# Patient Record
Sex: FEMALE | Race: BLACK OR AFRICAN AMERICAN | NOT HISPANIC OR LATINO | Employment: OTHER | URBAN - METROPOLITAN AREA
[De-identification: names, ages, dates, MRNs, and addresses within clinical notes are randomized per-mention and may not be internally consistent; named-entity substitution may affect disease eponyms.]

---

## 2018-06-15 DIAGNOSIS — M81.0 SENILE OSTEOPOROSIS: Primary | ICD-10-CM

## 2020-10-28 PROBLEM — R29.898 DECREASED STRENGTH OF UPPER EXTREMITY: Status: ACTIVE | Noted: 2020-10-28

## 2020-10-28 PROBLEM — M25.60 DECREASED RANGE OF MOTION: Status: ACTIVE | Noted: 2020-10-28

## 2021-01-11 ENCOUNTER — IMMUNIZATION (OUTPATIENT)
Dept: FAMILY MEDICINE | Facility: CLINIC | Age: 76
End: 2021-01-11

## 2021-01-11 DIAGNOSIS — Z23 NEED FOR VACCINATION: ICD-10-CM

## 2021-01-11 PROCEDURE — 0001A COVID-19, MRNA, LNP-S, PF, 30 MCG/0.3 ML DOSE VACCINE: CPT | Mod: CV19,,, | Performed by: FAMILY MEDICINE

## 2021-01-11 PROCEDURE — 91300 COVID-19, MRNA, LNP-S, PF, 30 MCG/0.3 ML DOSE VACCINE: ICD-10-PCS | Mod: ,,, | Performed by: FAMILY MEDICINE

## 2021-01-11 PROCEDURE — 91300 COVID-19, MRNA, LNP-S, PF, 30 MCG/0.3 ML DOSE VACCINE: CPT | Mod: ,,, | Performed by: FAMILY MEDICINE

## 2021-01-11 PROCEDURE — 0001A COVID-19, MRNA, LNP-S, PF, 30 MCG/0.3 ML DOSE VACCINE: ICD-10-PCS | Mod: CV19,,, | Performed by: FAMILY MEDICINE

## 2021-02-01 ENCOUNTER — IMMUNIZATION (OUTPATIENT)
Dept: FAMILY MEDICINE | Facility: CLINIC | Age: 76
End: 2021-02-01

## 2021-02-01 DIAGNOSIS — Z23 NEED FOR VACCINATION: Primary | ICD-10-CM

## 2021-02-01 PROCEDURE — 91300 COVID-19, MRNA, LNP-S, PF, 30 MCG/0.3 ML DOSE VACCINE: CPT | Mod: PBBFAC,PN

## 2021-02-01 PROCEDURE — 0002A COVID-19, MRNA, LNP-S, PF, 30 MCG/0.3 ML DOSE VACCINE: CPT | Mod: PBBFAC,PN

## 2022-09-27 ENCOUNTER — IMMUNIZATION (OUTPATIENT)
Dept: FAMILY MEDICINE | Facility: CLINIC | Age: 77
End: 2022-09-27
Payer: MEDICARE

## 2022-09-27 DIAGNOSIS — Z23 NEED FOR VACCINATION: Primary | ICD-10-CM

## 2022-09-27 PROCEDURE — 91312 COVID-19, MRNA, LNP-S, BIVALENT BOOSTER, PF, 30 MCG/0.3 ML DOSE: ICD-10-PCS | Mod: S$GLB,,, | Performed by: FAMILY MEDICINE

## 2022-09-27 PROCEDURE — 91312 COVID-19, MRNA, LNP-S, BIVALENT BOOSTER, PF, 30 MCG/0.3 ML DOSE: CPT | Mod: S$GLB,,, | Performed by: FAMILY MEDICINE

## 2022-09-27 PROCEDURE — 0124A COVID-19, MRNA, LNP-S, BIVALENT BOOSTER, PF, 30 MCG/0.3 ML DOSE: CPT | Mod: PBBFAC | Performed by: FAMILY MEDICINE

## 2022-11-28 ENCOUNTER — TELEPHONE (OUTPATIENT)
Dept: HEPATOLOGY | Facility: CLINIC | Age: 77
End: 2022-11-28
Payer: MEDICARE

## 2022-11-28 PROBLEM — R07.9 CHEST PAIN: Status: RESOLVED | Noted: 2022-11-28 | Resolved: 2022-11-28

## 2022-11-28 PROBLEM — R07.9 CHEST PAIN: Status: ACTIVE | Noted: 2022-11-28

## 2022-11-28 NOTE — TELEPHONE ENCOUNTER
Returned call to patient. No answer. Left VM that I scheduled her hospital follow up appointment on 12/9.

## 2022-11-28 NOTE — TELEPHONE ENCOUNTER
----- Message from Guilherme Gonzalez sent at 11/28/2022 10:03 AM CST -----  .Type:  Sooner Apoointment Request    Caller is requesting a sooner appointment.  Name of Caller:Pt  When is the first available appointment?02/2023  Symptoms:Hospital f/u Liver Mass  Would the patient rather a call back or a response via [employer]? call  Best Call Back Number:[phone]

## 2022-11-28 NOTE — TELEPHONE ENCOUNTER
----- Message from Guilherme Gonzalez sent at 11/28/2022 10:03 AM CST -----  .Type:  Sooner Apoointment Request    Caller is requesting a sooner appointment.  Name of Caller:Pt  When is the first available appointment?02/2023  Symptoms:Hospital f/u Liver Mass  Would the patient rather a call back or a response via [employer]? call  Best Call Back Number:[phone]

## 2022-11-29 ENCOUNTER — OFFICE VISIT (OUTPATIENT)
Dept: CARDIOLOGY | Facility: CLINIC | Age: 77
End: 2022-11-29
Payer: MEDICARE

## 2022-11-29 VITALS
DIASTOLIC BLOOD PRESSURE: 80 MMHG | SYSTOLIC BLOOD PRESSURE: 140 MMHG | BODY MASS INDEX: 20.97 KG/M2 | OXYGEN SATURATION: 98 % | HEIGHT: 64 IN | WEIGHT: 122.81 LBS | HEART RATE: 60 BPM

## 2022-11-29 DIAGNOSIS — R07.89 ATYPICAL CHEST PAIN: ICD-10-CM

## 2022-11-29 DIAGNOSIS — K74.4 SECONDARY BILIARY CIRRHOSIS: ICD-10-CM

## 2022-11-29 DIAGNOSIS — I10 PRIMARY HYPERTENSION: Primary | ICD-10-CM

## 2022-11-29 DIAGNOSIS — Z86.19 HISTORY OF HEPATITIS C: ICD-10-CM

## 2022-11-29 DIAGNOSIS — R16.0 LIVER MASS: ICD-10-CM

## 2022-11-29 PROCEDURE — 1101F PR PT FALLS ASSESS DOC 0-1 FALLS W/OUT INJ PAST YR: ICD-10-PCS | Mod: CPTII,S$GLB,, | Performed by: INTERNAL MEDICINE

## 2022-11-29 PROCEDURE — 1160F RVW MEDS BY RX/DR IN RCRD: CPT | Mod: CPTII,S$GLB,, | Performed by: INTERNAL MEDICINE

## 2022-11-29 PROCEDURE — 1101F PT FALLS ASSESS-DOCD LE1/YR: CPT | Mod: CPTII,S$GLB,, | Performed by: INTERNAL MEDICINE

## 2022-11-29 PROCEDURE — 99999 PR PBB SHADOW E&M-EST. PATIENT-LVL III: ICD-10-PCS | Mod: PBBFAC,,, | Performed by: INTERNAL MEDICINE

## 2022-11-29 PROCEDURE — 1159F PR MEDICATION LIST DOCUMENTED IN MEDICAL RECORD: ICD-10-PCS | Mod: CPTII,S$GLB,, | Performed by: INTERNAL MEDICINE

## 2022-11-29 PROCEDURE — 99999 PR PBB SHADOW E&M-EST. PATIENT-LVL III: CPT | Mod: PBBFAC,,, | Performed by: INTERNAL MEDICINE

## 2022-11-29 PROCEDURE — 3079F DIAST BP 80-89 MM HG: CPT | Mod: CPTII,S$GLB,, | Performed by: INTERNAL MEDICINE

## 2022-11-29 PROCEDURE — 3079F PR MOST RECENT DIASTOLIC BLOOD PRESSURE 80-89 MM HG: ICD-10-PCS | Mod: CPTII,S$GLB,, | Performed by: INTERNAL MEDICINE

## 2022-11-29 PROCEDURE — 99204 OFFICE O/P NEW MOD 45 MIN: CPT | Mod: S$GLB,,, | Performed by: INTERNAL MEDICINE

## 2022-11-29 PROCEDURE — 99204 PR OFFICE/OUTPT VISIT, NEW, LEVL IV, 45-59 MIN: ICD-10-PCS | Mod: S$GLB,,, | Performed by: INTERNAL MEDICINE

## 2022-11-29 PROCEDURE — 1159F MED LIST DOCD IN RCRD: CPT | Mod: CPTII,S$GLB,, | Performed by: INTERNAL MEDICINE

## 2022-11-29 PROCEDURE — 3077F SYST BP >= 140 MM HG: CPT | Mod: CPTII,S$GLB,, | Performed by: INTERNAL MEDICINE

## 2022-11-29 PROCEDURE — 1160F PR REVIEW ALL MEDS BY PRESCRIBER/CLIN PHARMACIST DOCUMENTED: ICD-10-PCS | Mod: CPTII,S$GLB,, | Performed by: INTERNAL MEDICINE

## 2022-11-29 PROCEDURE — 3288F PR FALLS RISK ASSESSMENT DOCUMENTED: ICD-10-PCS | Mod: CPTII,S$GLB,, | Performed by: INTERNAL MEDICINE

## 2022-11-29 PROCEDURE — 3288F FALL RISK ASSESSMENT DOCD: CPT | Mod: CPTII,S$GLB,, | Performed by: INTERNAL MEDICINE

## 2022-11-29 PROCEDURE — 3077F PR MOST RECENT SYSTOLIC BLOOD PRESSURE >= 140 MM HG: ICD-10-PCS | Mod: CPTII,S$GLB,, | Performed by: INTERNAL MEDICINE

## 2022-11-29 NOTE — ASSESSMENT & PLAN NOTE
Near goal.  Goal BP < 140/90.  Compliant w/ meds.    - continue medical therapy   - enroll in digital medicine prgm  - risk factor and lifestyle modifications

## 2022-11-29 NOTE — PROGRESS NOTES
Subjective:    Patient ID:  Seema Gann is a 77 y.o. female who presents for evaluation of Hospital Follow Up (Hospital f/u from 11/28/22 chest pain)      PCP: Amena Zaldivar MD     HPI  Pt is a 76 yo F w/ PMH of HTN, Biliary Cirrhosis w/ enlarging hepatic mass since 2015, and HCV who presents for evaluation of cp.  She was hospitalized 11/28/2022 for cp w/ a negative cardiac w/u and was discharged home w/ cardiology f/u.  She mentions that the cp was a pressure, like something was sitting on her chest and lasted a few hours and resolved when she went to the ED.  She denies any further episodes of cp, sob, edema, orthopnea, PND, presyncope, LOC, palpitations, and claudication.  She notes compliance w/ meds and denies side effects.  She monitors her BP at home and states it runs 140s/80s.  She does not exercise however states she is very active w/ gardening and denies cp or sob.      Past Medical History:   Diagnosis Date    Cirrhosis     Hepatitis     Hypertension      Past Surgical History:   Procedure Laterality Date    APPENDECTOMY      HYSTERECTOMY       Social History     Socioeconomic History    Marital status:    Tobacco Use    Smoking status: Never   Substance and Sexual Activity    Alcohol use: No    Drug use: No     History reviewed. No pertinent family history.    Review of patient's allergies indicates:  No Known Allergies    Medication List with Changes/Refills   Current Medications    CARVEDILOL (COREG) 3.125 MG TABLET    Take 3.125 mg by mouth 2 (two) times daily with meals.    LISINOPRIL (PRINIVIL,ZESTRIL) 20 MG TABLET    Take 20 mg by mouth once daily.    SPIRONOLACTONE (ALDACTONE) 100 MG TABLET    Take 100 mg by mouth once daily.       Review of Systems   Constitutional: Negative for diaphoresis and fever.   HENT:  Negative for congestion and hearing loss.    Eyes:  Negative for blurred vision and pain.   Cardiovascular:  Positive for chest pain. Negative for claudication, dyspnea on  "exertion, leg swelling, near-syncope, palpitations and syncope.   Respiratory:  Negative for shortness of breath and sleep disturbances due to breathing.    Hematologic/Lymphatic: Negative for bleeding problem. Does not bruise/bleed easily.   Skin:  Negative for color change and poor wound healing.   Gastrointestinal:  Negative for abdominal pain and nausea.   Genitourinary:  Negative for bladder incontinence and flank pain.   Neurological:  Negative for focal weakness and light-headedness.      Objective:   BP (!) 140/80 (BP Location: Left arm, Patient Position: Sitting, BP Method: Small (Manual))   Pulse 60   Ht 5' 4" (1.626 m)   Wt 55.7 kg (122 lb 12.8 oz)   SpO2 98%   BMI 21.08 kg/m²    Physical Exam  Constitutional:       Appearance: She is well-developed. She is not diaphoretic.   HENT:      Head: Normocephalic and atraumatic.   Eyes:      General: No scleral icterus.     Pupils: Pupils are equal, round, and reactive to light.   Neck:      Vascular: No JVD.   Cardiovascular:      Rate and Rhythm: Normal rate and regular rhythm.      Pulses: Intact distal pulses.      Heart sounds: S1 normal and S2 normal. Murmur heard.   High-pitched blowing holosystolic murmur is present with a grade of 2/6 at the apex.     No friction rub. No gallop.   Pulmonary:      Effort: Pulmonary effort is normal. No respiratory distress.      Breath sounds: Normal breath sounds. No wheezing or rales.   Chest:      Chest wall: No tenderness.   Abdominal:      General: Bowel sounds are normal. There is no distension.      Palpations: Abdomen is soft. There is no mass.      Tenderness: There is no abdominal tenderness. There is no rebound.   Musculoskeletal:         General: No tenderness. Normal range of motion.      Cervical back: Normal range of motion and neck supple.   Skin:     General: Skin is warm and dry.      Coloration: Skin is not pale.   Neurological:      Mental Status: She is alert and oriented to person, place, and " time.      Coordination: Coordination normal.      Deep Tendon Reflexes: Reflexes normal.   Psychiatric:         Behavior: Behavior normal.         Judgment: Judgment normal.         EC2022- reviewed.  Sinus katherine w/ PACs, RBBB, LAFB.      Echo: 2022- reviewed.    Summary    The left ventricle is normal in size with concentric remodeling and normal systolic function.  The estimated ejection fraction is 70%.  Normal left ventricular diastolic function.  Normal right ventricular size with normal right ventricular systolic function.  Mild aortic regurgitation.  Mild tricuspid regurgitation.  Mild pulmonic regurgitation.  Mild mitral regurgitation.  Normal central venous pressure (3 mmHg).  The estimated PA systolic pressure is 32 mmHg.    Assessment:       1. Primary hypertension    2. Secondary biliary cirrhosis    3. History of hepatitis C    4. Liver mass    5. Atypical chest pain         Plan:         Secondary biliary cirrhosis  Planned to follow w/ hepatology 2022.      History of hepatitis C  Planned to see hepatology in near future.      Liver mass  Enlarging.  Planned to see hepatology.      Primary hypertension  Near goal.  Goal BP < 140/90.  Compliant w/ meds.    - continue medical therapy   - enroll in digital medicine prgm  - risk factor and lifestyle modifications     Atypical chest pain  Pt w/ negative cardiac w/u however has not had a ETT.  Pt denies further episodes.    - check ETT to r/o ischemia       Total duration of face to face visit time 30 minutes.  Total time spent counseling greater than fifty percent of total visit time.  Counseling included discussion regarding imaging findings, diagnosis, possibilities, treatment options, risks and benefits.  The patient had many questions regarding the options and long-term effects      Prabhjot Jones M.D.  Interventional Cardiology

## 2022-11-29 NOTE — ASSESSMENT & PLAN NOTE
Pt w/ negative cardiac w/u however has not had a ETT.  Pt denies further episodes.    - check ETT to r/o ischemia

## 2022-12-09 ENCOUNTER — LAB VISIT (OUTPATIENT)
Dept: LAB | Facility: HOSPITAL | Age: 77
End: 2022-12-09
Payer: MEDICARE

## 2022-12-09 ENCOUNTER — OFFICE VISIT (OUTPATIENT)
Dept: HEPATOLOGY | Facility: CLINIC | Age: 77
End: 2022-12-09
Payer: MEDICARE

## 2022-12-09 VITALS
WEIGHT: 117.94 LBS | SYSTOLIC BLOOD PRESSURE: 124 MMHG | DIASTOLIC BLOOD PRESSURE: 61 MMHG | HEART RATE: 61 BPM | TEMPERATURE: 97 F | BODY MASS INDEX: 20.14 KG/M2 | OXYGEN SATURATION: 98 % | HEIGHT: 64 IN

## 2022-12-09 DIAGNOSIS — R16.0 LIVER MASS: ICD-10-CM

## 2022-12-09 DIAGNOSIS — R97.8 OTHER ABNORMAL TUMOR MARKERS: ICD-10-CM

## 2022-12-09 DIAGNOSIS — R93.2 ABNORMAL FINDINGS ON DIAGNOSTIC IMAGING OF LIVER AND BILIARY TRACT: ICD-10-CM

## 2022-12-09 DIAGNOSIS — Z86.19 HISTORY OF HEPATITIS C: Primary | ICD-10-CM

## 2022-12-09 DIAGNOSIS — R07.9 CHEST PAIN: ICD-10-CM

## 2022-12-09 LAB
AFP SERPL-MCNC: 24 NG/ML (ref 0–8.4)
ALBUMIN SERPL BCP-MCNC: 4.1 G/DL (ref 3.5–5.2)
ALP SERPL-CCNC: 207 U/L (ref 55–135)
ALT SERPL W/O P-5'-P-CCNC: 262 U/L (ref 10–44)
ANION GAP SERPL CALC-SCNC: 12 MMOL/L (ref 8–16)
AST SERPL-CCNC: 47 U/L (ref 10–40)
BILIRUB SERPL-MCNC: 1.9 MG/DL (ref 0.1–1)
BUN SERPL-MCNC: 14 MG/DL (ref 8–23)
CALCIUM SERPL-MCNC: 10.3 MG/DL (ref 8.7–10.5)
CANCER AG19-9 SERPL-ACNC: 40.8 U/ML (ref 0–40)
CHLORIDE SERPL-SCNC: 106 MMOL/L (ref 95–110)
CO2 SERPL-SCNC: 23 MMOL/L (ref 23–29)
CREAT SERPL-MCNC: 1.2 MG/DL (ref 0.5–1.4)
ERYTHROCYTE [DISTWIDTH] IN BLOOD BY AUTOMATED COUNT: 12.6 % (ref 11.5–14.5)
EST. GFR  (NO RACE VARIABLE): 46.9 ML/MIN/1.73 M^2
GLUCOSE SERPL-MCNC: 124 MG/DL (ref 70–110)
HBV SURFACE AG SERPL QL IA: NORMAL
HCT VFR BLD AUTO: 41.6 % (ref 37–48.5)
HGB BLD-MCNC: 13.8 G/DL (ref 12–16)
INR PPP: 1.1 (ref 0.8–1.2)
MCH RBC QN AUTO: 32.6 PG (ref 27–31)
MCHC RBC AUTO-ENTMCNC: 33.2 G/DL (ref 32–36)
MCV RBC AUTO: 98 FL (ref 82–98)
PLATELET # BLD AUTO: 148 K/UL (ref 150–450)
PMV BLD AUTO: 12.2 FL (ref 9.2–12.9)
POTASSIUM SERPL-SCNC: 4 MMOL/L (ref 3.5–5.1)
PROT SERPL-MCNC: 8.1 G/DL (ref 6–8.4)
PROTHROMBIN TIME: 11.8 SEC (ref 9–12.5)
RBC # BLD AUTO: 4.23 M/UL (ref 4–5.4)
SODIUM SERPL-SCNC: 141 MMOL/L (ref 136–145)
WBC # BLD AUTO: 4.06 K/UL (ref 3.9–12.7)

## 2022-12-09 PROCEDURE — 80053 COMPREHEN METABOLIC PANEL: CPT | Performed by: INTERNAL MEDICINE

## 2022-12-09 PROCEDURE — 1126F PR PAIN SEVERITY QUANTIFIED, NO PAIN PRESENT: ICD-10-PCS | Mod: CPTII,S$GLB,, | Performed by: INTERNAL MEDICINE

## 2022-12-09 PROCEDURE — 36415 COLL VENOUS BLD VENIPUNCTURE: CPT | Performed by: INTERNAL MEDICINE

## 2022-12-09 PROCEDURE — 87522 HEPATITIS C REVRS TRNSCRPJ: CPT | Performed by: INTERNAL MEDICINE

## 2022-12-09 PROCEDURE — 82105 ALPHA-FETOPROTEIN SERUM: CPT | Performed by: INTERNAL MEDICINE

## 2022-12-09 PROCEDURE — 3074F SYST BP LT 130 MM HG: CPT | Mod: CPTII,S$GLB,, | Performed by: INTERNAL MEDICINE

## 2022-12-09 PROCEDURE — 3074F PR MOST RECENT SYSTOLIC BLOOD PRESSURE < 130 MM HG: ICD-10-PCS | Mod: CPTII,S$GLB,, | Performed by: INTERNAL MEDICINE

## 2022-12-09 PROCEDURE — 99999 PR PBB SHADOW E&M-EST. PATIENT-LVL III: ICD-10-PCS | Mod: PBBFAC,,, | Performed by: INTERNAL MEDICINE

## 2022-12-09 PROCEDURE — 85610 PROTHROMBIN TIME: CPT | Performed by: INTERNAL MEDICINE

## 2022-12-09 PROCEDURE — 1159F PR MEDICATION LIST DOCUMENTED IN MEDICAL RECORD: ICD-10-PCS | Mod: CPTII,S$GLB,, | Performed by: INTERNAL MEDICINE

## 2022-12-09 PROCEDURE — 87340 HEPATITIS B SURFACE AG IA: CPT | Performed by: INTERNAL MEDICINE

## 2022-12-09 PROCEDURE — 85027 COMPLETE CBC AUTOMATED: CPT | Performed by: INTERNAL MEDICINE

## 2022-12-09 PROCEDURE — 99205 OFFICE O/P NEW HI 60 MIN: CPT | Mod: S$GLB,,, | Performed by: INTERNAL MEDICINE

## 2022-12-09 PROCEDURE — 1159F MED LIST DOCD IN RCRD: CPT | Mod: CPTII,S$GLB,, | Performed by: INTERNAL MEDICINE

## 2022-12-09 PROCEDURE — 3078F DIAST BP <80 MM HG: CPT | Mod: CPTII,S$GLB,, | Performed by: INTERNAL MEDICINE

## 2022-12-09 PROCEDURE — 3078F PR MOST RECENT DIASTOLIC BLOOD PRESSURE < 80 MM HG: ICD-10-PCS | Mod: CPTII,S$GLB,, | Performed by: INTERNAL MEDICINE

## 2022-12-09 PROCEDURE — 86301 IMMUNOASSAY TUMOR CA 19-9: CPT | Performed by: INTERNAL MEDICINE

## 2022-12-09 PROCEDURE — 1126F AMNT PAIN NOTED NONE PRSNT: CPT | Mod: CPTII,S$GLB,, | Performed by: INTERNAL MEDICINE

## 2022-12-09 PROCEDURE — 99999 PR PBB SHADOW E&M-EST. PATIENT-LVL III: CPT | Mod: PBBFAC,,, | Performed by: INTERNAL MEDICINE

## 2022-12-09 PROCEDURE — 99205 PR OFFICE/OUTPT VISIT, NEW, LEVL V, 60-74 MIN: ICD-10-PCS | Mod: S$GLB,,, | Performed by: INTERNAL MEDICINE

## 2022-12-09 NOTE — PROGRESS NOTES
Subjective:       Patient ID: Seema Gann is a 77 y.o. female.    Chief Complaint: Cirrhosis    HPI  I saw this 77 y.o. lady in the liver clinic where she came alone.  Last seen in our clinic in March 2015    - G1 HCV cirrhosis with SVR  - admitted to [facility] on 11/28/22 with chest pain and was found to have a large mass on US.    Liver US: 11/28/22  Large heterogeneous hepatic focus highly concerning for mass recommend further evaluation with CT of the abdomen with without contrast to further evaluate.  Nonspecific pancreatic hypoechoic focus    Investigations: 11/28/22  Low platelets    AST 52    HCV RNA neg in Jan 2016      PMH:  Hypertension  HCV cirrhosis        Review of Systems   Constitutional:  Negative for activity change, appetite change, chills, fatigue, fever and unexpected weight change.   HENT:  Negative for ear pain, hearing loss, nosebleeds, sore throat and trouble swallowing.    Eyes:  Negative for redness and visual disturbance.   Respiratory:  Negative for cough, chest tightness, shortness of breath and wheezing.    Cardiovascular:  Negative for chest pain and palpitations.   Gastrointestinal:  Negative for abdominal distention, abdominal pain, blood in stool, constipation, diarrhea, nausea and vomiting.   Genitourinary:  Negative for difficulty urinating, dysuria, frequency, hematuria and urgency.   Musculoskeletal:  Negative for arthralgias, back pain, gait problem, joint swelling and myalgias.   Skin:  Negative for rash.   Neurological:  Negative for tremors, seizures, speech difficulty, weakness and headaches.   Hematological:  Negative for adenopathy.   Psychiatric/Behavioral:  Negative for confusion, decreased concentration and sleep disturbance. The patient is not nervous/anxious.          Lab Results   Component Value Date     (H) 11/28/2022    AST 52 (H) 11/28/2022    GGT 45 01/16/2015    ALKPHOS 168 (H) 11/28/2022    BILITOT 1.0 11/28/2022      Past Medical History:   Diagnosis Date    Cirrhosis     Hepatitis     Hypertension      Past Surgical History:   Procedure Laterality Date    APPENDECTOMY      HYSTERECTOMY       Current Outpatient Medications   Medication Sig    carvedilol (COREG) 3.125 MG tablet Take 3.125 mg by mouth 2 (two) times daily with meals.    lisinopril (PRINIVIL,ZESTRIL) 20 MG tablet Take 20 mg by mouth once daily.    spironolactone (ALDACTONE) 100 MG tablet Take 100 mg by mouth once daily.     No current facility-administered medications for this visit.       Objective:      Physical Exam  Constitutional:       General: She is not in acute distress.  HENT:      Head: Normocephalic.   Eyes:      Pupils: Pupils are equal, round, and reactive to light.   Neck:      Thyroid: No thyromegaly.      Vascular: No JVD.      Trachea: No tracheal deviation.   Cardiovascular:      Rate and Rhythm: Normal rate and regular rhythm.      Heart sounds: Normal heart sounds. No murmur heard.  Pulmonary:      Effort: Pulmonary effort is normal.      Breath sounds: Normal breath sounds. No stridor.   Abdominal:      Palpations: Abdomen is soft.   Lymphadenopathy:      Head:      Right side of head: No submental, submandibular, tonsillar, preauricular, posterior auricular or occipital adenopathy.      Left side of head: No submental, submandibular, tonsillar, preauricular, posterior auricular or occipital adenopathy.      Cervical: No cervical adenopathy.   Neurological:      Mental Status: She is alert. She is not disoriented.      Cranial Nerves: No cranial nerve deficit.      Sensory: No sensory deficit.       Assessment:       1. History of hepatitis C    2. Chest pain    3. Liver mass    4. Abnormal findings on diagnostic imaging of liver and biliary tract    5. Other abnormal tumor markers        Plan:   She has an excellent functional status but her imaging suggests a large liver mass that may represent HCC.  She is aware of this.    - labs incl  AFP/Ca 19 9  - HCV RNA  - HBVsAg  - MRI with contrast    I will discuss the findings of her MRI in our IR conference and come up with a management plan.

## 2022-12-12 LAB
HCV RNA SERPL QL NAA+PROBE: NOT DETECTED
HCV RNA SPEC NAA+PROBE-ACNC: <12 IU/ML

## 2022-12-22 ENCOUNTER — HOSPITAL ENCOUNTER (OUTPATIENT)
Dept: RADIOLOGY | Facility: HOSPITAL | Age: 77
Discharge: HOME | End: 2022-12-22
Attending: INTERNAL MEDICINE
Payer: MEDICARE

## 2022-12-22 DIAGNOSIS — R16.0 LIVER MASS: ICD-10-CM

## 2022-12-22 PROCEDURE — 25500020 PHARM REV CODE 255: Mod: PO | Performed by: INTERNAL MEDICINE

## 2022-12-22 PROCEDURE — A9585 GADOBUTROL INJECTION: HCPCS | Mod: PO | Performed by: INTERNAL MEDICINE

## 2022-12-22 PROCEDURE — 74183 MRI ABD W/O CNTR FLWD CNTR: CPT | Mod: TC,PO

## 2022-12-22 RX ORDER — GADOBUTROL 604.72 MG/ML
6 INJECTION INTRAVENOUS
Status: COMPLETED | OUTPATIENT
Start: 2022-12-22 | End: 2022-12-22

## 2022-12-22 RX ADMIN — GADOBUTROL 6 ML: 604.72 INJECTION INTRAVENOUS at 08:12

## 2022-12-30 ENCOUNTER — TELEPHONE (OUTPATIENT)
Dept: HEPATOLOGY | Facility: CLINIC | Age: 77
End: 2022-12-30
Payer: MEDICARE

## 2022-12-30 ENCOUNTER — TELEPHONE (OUTPATIENT)
Dept: TRANSPLANT | Facility: CLINIC | Age: 77
End: 2022-12-30
Payer: MEDICARE

## 2022-12-30 NOTE — TELEPHONE ENCOUNTER
Patient: Seema Gann       MRN: [id number]      : 1945     Age: 77 y.o.  [address] [facility] [address]    Providers  Hepatologists: Jasiel White MD    Priority of review: Cancer    Patient Transplant Status: Not a candidate    Reason for presentation: Initial staging for transplant    Clinical Summary: 77 year old lady with HCV cirrhosis who presented with chest pain and was found to have a large liver mass.    HCV RNA neg now  AFP 24    Robert 1.9  Albumin 4  Platelets     Imaging to be reviewed: MRI abdo    HCC Treatment History: none    ABO:     Platelets:   Lab Results   Component Value Date/Time     (L) 2022 03:33 PM     Creatinine:   Lab Results   Component Value Date/Time    CREATININE 1.2 2022 03:33 PM     Bilirubin:   Lab Results   Component Value Date/Time    BILITOT 1.9 (H) 2022 03:33 PM     AFP Last 3 each if available:   Lab Results   Component Value Date/Time    AFP 24 (H) 2022 03:33 PM    AFP 4.2 2016 10:35 AM    AFP 4.0 10/14/2015 10:10 AM       MELD: MELD-Na score: 12 at 2022  3:33 PM  MELD score: 12 at 2022  3:33 PM  Calculated from:  Serum Creatinine: 1.2 mg/dL at 2022  3:33 PM  Serum Sodium: 141 mmol/L (Using max of 137 mmol/L) at 2022  3:33 PM  Total Bilirubin: 1.9 mg/dL at 2022  3:33 PM  INR(ratio): 1.1 at 2022  3:33 PM  Age: 77 years    Plan:     Follow-up Provider:

## 2022-12-30 NOTE — TELEPHONE ENCOUNTER
Patient notified, her imaging will be discussed at IR conference on 1/03/23.  Patient verbalized understanding.      ----- Message from Sanjuana Benedict MA sent at 12/30/2022  2:11 PM CST -----  Regarding: FW: Pt Advice    ----- Message -----  From: Tess Cervantes  Sent: 12/30/2022   2:07 PM CST  To: [facility] Staff  Subject: Pt Advice                                        Pt called stating that she would like to know if results from MRI        Contact Seema [phone]

## 2023-01-03 ENCOUNTER — CONFERENCE (OUTPATIENT)
Dept: TRANSPLANT | Facility: CLINIC | Age: 78
End: 2023-01-03
Payer: MEDICARE

## 2023-01-03 NOTE — TELEPHONE ENCOUNTER
Patient: Seema Gann       MRN: [id number]      : 1945     Age: 77 y.o.  [address] [facility] [address]    Providers  Hepatologists: Jasiel White MD    Priority of review: Cancer    Patient Transplant Status: Not a candidate    Reason for presentation: Initial staging for transplant    Clinical Summary: 77 year old lady with HCV cirrhosis who presented with chest pain and was found to have a large liver mass.    HCV RNA neg now  AFP 24    Robert 1.9  Albumin 4  Platelets     Imaging to be reviewed: MRI abdo    HCC Treatment History: none    ABO:     Platelets:   Lab Results   Component Value Date/Time     (L) 2022 03:33 PM     Creatinine:   Lab Results   Component Value Date/Time    CREATININE 1.2 2022 03:33 PM     Bilirubin:   Lab Results   Component Value Date/Time    BILITOT 1.9 (H) 2022 03:33 PM     AFP Last 3 each if available:   Lab Results   Component Value Date/Time    AFP 24 (H) 2022 03:33 PM    AFP 4.2 2016 10:35 AM    AFP 4.0 10/14/2015 10:10 AM       MELD: MELD-Na score: 12 at 2022  3:33 PM  MELD score: 12 at 2022  3:33 PM  Calculated from:  Serum Creatinine: 1.2 mg/dL at 2022  3:33 PM  Serum Sodium: 141 mmol/L (Using max of 137 mmol/L) at 2022  3:33 PM  Total Bilirubin: 1.9 mg/dL at 2022  3:33 PM  INR(ratio): 1.1 at 2022  3:33 PM  Age: 77 years    Discussion/Plan: MRI shows an 8.3 cm lesion centered in segments 5/6. Poor arterial phase precludes optimal evaluation for HCC features, however it's concerning. The lesion demonstrates wash out on delayed images. No definite evidence of extrahepatic disease or vascular invasion. Rec bx vs TPCT. If it's HCC, Y90 is recommended.     Committee Discussion:  Liver lesion concerning for malignancy. No extrahepatic disease. IR recommended a TP CT scan or liver biopsy.    Plan:  TP CT scan.      Follow-up Provider: Dr White

## 2023-01-04 ENCOUNTER — PATIENT MESSAGE (OUTPATIENT)
Dept: HEPATOLOGY | Facility: CLINIC | Age: 78
End: 2023-01-04
Payer: MEDICARE

## 2023-01-04 ENCOUNTER — TELEPHONE (OUTPATIENT)
Dept: HEPATOLOGY | Facility: CLINIC | Age: 78
End: 2023-01-04
Payer: MEDICARE

## 2023-01-04 NOTE — TELEPHONE ENCOUNTER
----- Message from Jean Nesbitt sent at 1/4/2023  4:03 PM CST -----  Regarding: MRI Results  Patient states this is her 4th day calling to find out/discuss the results of an MRI she had done on 12/22/22. Patient requesting a call back to discuss if possible.          Contact: [phone]

## 2023-01-05 ENCOUNTER — PATIENT MESSAGE (OUTPATIENT)
Dept: HEPATOLOGY | Facility: CLINIC | Age: 78
End: 2023-01-05
Payer: MEDICARE

## 2023-01-05 ENCOUNTER — TELEPHONE (OUTPATIENT)
Dept: HEPATOLOGY | Facility: CLINIC | Age: 78
End: 2023-01-05
Payer: MEDICARE

## 2023-01-05 DIAGNOSIS — K76.9 LIVER DISEASE, UNSPECIFIED: Primary | ICD-10-CM

## 2023-01-05 DIAGNOSIS — R16.0 LIVER MASS: ICD-10-CM

## 2023-01-05 NOTE — TELEPHONE ENCOUNTER
Spoke with patient to schedule a TPCT scan, per IR conference recommendation.  Appointment scheduled on 1/13/23.    ----- Message from Sanjuana Benedict MA sent at 1/5/2023  3:10 PM CST -----  Regarding: FW: MRI Results    ----- Message -----  From: Jean Nesbitt  Sent: 1/5/2023   3:07 PM CST  To: [facility]indy [facility]s Staff  Subject: MRI Results                                      Patient is calling to speak with nurse to discuss her MRI results. Patient expressed frustration that she is never able to speak to someone to discuss this. She states she is not able to view message via patient portal. Requesting a call back.          Contact: [phone]

## 2023-01-24 ENCOUNTER — OFFICE VISIT (OUTPATIENT)
Dept: FAMILY MEDICINE | Facility: CLINIC | Age: 78
End: 2023-01-24
Payer: MEDICARE

## 2023-01-24 VITALS
SYSTOLIC BLOOD PRESSURE: 104 MMHG | TEMPERATURE: 98 F | WEIGHT: 117.75 LBS | HEIGHT: 64 IN | DIASTOLIC BLOOD PRESSURE: 68 MMHG | HEART RATE: 66 BPM | BODY MASS INDEX: 20.1 KG/M2 | OXYGEN SATURATION: 98 %

## 2023-01-24 DIAGNOSIS — R16.0 LIVER MASS: ICD-10-CM

## 2023-01-24 DIAGNOSIS — Z00.00 ANNUAL PHYSICAL EXAM: Primary | ICD-10-CM

## 2023-01-24 DIAGNOSIS — B18.2 CHRONIC HEPATITIS C WITH CIRRHOSIS: ICD-10-CM

## 2023-01-24 DIAGNOSIS — I10 PRIMARY HYPERTENSION: ICD-10-CM

## 2023-01-24 DIAGNOSIS — K74.60 CHRONIC HEPATITIS C WITH CIRRHOSIS: ICD-10-CM

## 2023-01-24 DIAGNOSIS — Z78.0 ASYMPTOMATIC POSTMENOPAUSAL STATE: ICD-10-CM

## 2023-01-24 PROCEDURE — 1101F PR PT FALLS ASSESS DOC 0-1 FALLS W/OUT INJ PAST YR: ICD-10-PCS | Mod: CPTII,S$GLB,, | Performed by: FAMILY MEDICINE

## 2023-01-24 PROCEDURE — 3288F PR FALLS RISK ASSESSMENT DOCUMENTED: ICD-10-PCS | Mod: CPTII,S$GLB,, | Performed by: FAMILY MEDICINE

## 2023-01-24 PROCEDURE — 3078F DIAST BP <80 MM HG: CPT | Mod: CPTII,S$GLB,, | Performed by: FAMILY MEDICINE

## 2023-01-24 PROCEDURE — 3074F PR MOST RECENT SYSTOLIC BLOOD PRESSURE < 130 MM HG: ICD-10-PCS | Mod: CPTII,S$GLB,, | Performed by: FAMILY MEDICINE

## 2023-01-24 PROCEDURE — 3078F PR MOST RECENT DIASTOLIC BLOOD PRESSURE < 80 MM HG: ICD-10-PCS | Mod: CPTII,S$GLB,, | Performed by: FAMILY MEDICINE

## 2023-01-24 PROCEDURE — 99999 PR PBB SHADOW E&M-EST. PATIENT-LVL III: ICD-10-PCS | Mod: PBBFAC,,, | Performed by: FAMILY MEDICINE

## 2023-01-24 PROCEDURE — 3074F SYST BP LT 130 MM HG: CPT | Mod: CPTII,S$GLB,, | Performed by: FAMILY MEDICINE

## 2023-01-24 PROCEDURE — 1126F AMNT PAIN NOTED NONE PRSNT: CPT | Mod: CPTII,S$GLB,, | Performed by: FAMILY MEDICINE

## 2023-01-24 PROCEDURE — 1159F MED LIST DOCD IN RCRD: CPT | Mod: CPTII,S$GLB,, | Performed by: FAMILY MEDICINE

## 2023-01-24 PROCEDURE — 99999 PR PBB SHADOW E&M-EST. PATIENT-LVL III: CPT | Mod: PBBFAC,,, | Performed by: FAMILY MEDICINE

## 2023-01-24 PROCEDURE — 3288F FALL RISK ASSESSMENT DOCD: CPT | Mod: CPTII,S$GLB,, | Performed by: FAMILY MEDICINE

## 2023-01-24 PROCEDURE — 99397 PER PM REEVAL EST PAT 65+ YR: CPT | Mod: S$GLB,,, | Performed by: FAMILY MEDICINE

## 2023-01-24 PROCEDURE — 99397 PR PREVENTIVE VISIT,EST,65 & OVER: ICD-10-PCS | Mod: S$GLB,,, | Performed by: FAMILY MEDICINE

## 2023-01-24 PROCEDURE — 1126F PR PAIN SEVERITY QUANTIFIED, NO PAIN PRESENT: ICD-10-PCS | Mod: CPTII,S$GLB,, | Performed by: FAMILY MEDICINE

## 2023-01-24 PROCEDURE — 1159F PR MEDICATION LIST DOCUMENTED IN MEDICAL RECORD: ICD-10-PCS | Mod: CPTII,S$GLB,, | Performed by: FAMILY MEDICINE

## 2023-01-24 PROCEDURE — 1101F PT FALLS ASSESS-DOCD LE1/YR: CPT | Mod: CPTII,S$GLB,, | Performed by: FAMILY MEDICINE

## 2023-01-25 ENCOUNTER — TELEPHONE (OUTPATIENT)
Dept: FAMILY MEDICINE | Facility: CLINIC | Age: 78
End: 2023-01-25
Payer: MEDICARE

## 2023-01-25 ENCOUNTER — TELEPHONE (OUTPATIENT)
Dept: HEPATOLOGY | Facility: CLINIC | Age: 78
End: 2023-01-25
Payer: MEDICARE

## 2023-01-25 ENCOUNTER — PATIENT MESSAGE (OUTPATIENT)
Dept: HEPATOLOGY | Facility: CLINIC | Age: 78
End: 2023-01-25
Payer: MEDICARE

## 2023-01-25 NOTE — TELEPHONE ENCOUNTER
LVM and portal message notifying Ms Kang of Dr White first available appt which was 4-26-23 at 2 pm if that day and time don't work message back or call to change her appt day and time.

## 2023-01-26 ENCOUNTER — TELEPHONE (OUTPATIENT)
Dept: HEPATOLOGY | Facility: CLINIC | Age: 78
End: 2023-01-26
Payer: MEDICARE

## 2023-01-26 NOTE — TELEPHONE ENCOUNTER
----- Message from Jacquie Box RN sent at 1/20/2023  4:27 PM CST -----  Regarding: FW: Appointment    ----- Message -----  From: Yodit King  Sent: 1/17/2023  11:14 AM CST  To: [facility] Clinical Staff  Subject: Appointment                                      Pt wants to speak with a nurse. They came in on 01/13 and was advised that they couldn't complete their CT scan. There was a concern with pt's kidneys after their blood results were seen. Pt wants to know what steps to take next.      Seema @ [phone]

## 2023-02-06 DIAGNOSIS — R93.2 ABNORMAL FINDINGS ON DIAGNOSTIC IMAGING OF LIVER AND BILIARY TRACT: ICD-10-CM

## 2023-02-06 DIAGNOSIS — R16.0 LIVER MASS: Primary | ICD-10-CM

## 2023-02-10 ENCOUNTER — PATIENT MESSAGE (OUTPATIENT)
Dept: FAMILY MEDICINE | Facility: CLINIC | Age: 78
End: 2023-02-10
Payer: MEDICARE

## 2023-02-10 DIAGNOSIS — M85.80 OSTEOPENIA, UNSPECIFIED LOCATION: Primary | ICD-10-CM

## 2023-02-13 ENCOUNTER — TELEPHONE (OUTPATIENT)
Dept: ADMINISTRATIVE | Facility: OTHER | Age: 78
End: 2023-02-13
Payer: MEDICARE

## 2023-03-01 ENCOUNTER — LAB VISIT (OUTPATIENT)
Dept: LAB | Facility: HOSPITAL | Age: 78
End: 2023-03-01
Payer: MEDICARE

## 2023-03-01 DIAGNOSIS — R16.0 LIVER MASS: ICD-10-CM

## 2023-03-01 DIAGNOSIS — R93.2 ABNORMAL FINDINGS ON DIAGNOSTIC IMAGING OF LIVER AND BILIARY TRACT: ICD-10-CM

## 2023-03-01 LAB
AFP SERPL-MCNC: 113 NG/ML (ref 0–8.4)
ALBUMIN SERPL BCP-MCNC: 4.1 G/DL (ref 3.5–5.2)
ALP SERPL-CCNC: 241 U/L (ref 55–135)
ALT SERPL W/O P-5'-P-CCNC: 595 U/L (ref 10–44)
ANION GAP SERPL CALC-SCNC: 11 MMOL/L (ref 8–16)
AST SERPL-CCNC: 49 U/L (ref 10–40)
BASOPHILS # BLD AUTO: 0.02 K/UL (ref 0–0.2)
BASOPHILS NFR BLD: 0.4 % (ref 0–1.9)
BILIRUB SERPL-MCNC: 1 MG/DL (ref 0.1–1)
BUN SERPL-MCNC: 26 MG/DL (ref 8–23)
CALCIUM SERPL-MCNC: 10 MG/DL (ref 8.7–10.5)
CHLORIDE SERPL-SCNC: 107 MMOL/L (ref 95–110)
CO2 SERPL-SCNC: 20 MMOL/L (ref 23–29)
CREAT SERPL-MCNC: 2 MG/DL (ref 0.5–1.4)
DIFFERENTIAL METHOD: ABNORMAL
EOSINOPHIL # BLD AUTO: 0.1 K/UL (ref 0–0.5)
EOSINOPHIL NFR BLD: 1.5 % (ref 0–8)
ERYTHROCYTE [DISTWIDTH] IN BLOOD BY AUTOMATED COUNT: 12.4 % (ref 11.5–14.5)
EST. GFR  (NO RACE VARIABLE): 25 ML/MIN/1.73 M^2
GLUCOSE SERPL-MCNC: 113 MG/DL (ref 70–110)
HCT VFR BLD AUTO: 35.5 % (ref 37–48.5)
HGB BLD-MCNC: 12.1 G/DL (ref 12–16)
IMM GRANULOCYTES # BLD AUTO: 0.01 K/UL (ref 0–0.04)
IMM GRANULOCYTES NFR BLD AUTO: 0.2 % (ref 0–0.5)
INR PPP: 1.1 (ref 0.8–1.2)
LYMPHOCYTES # BLD AUTO: 2.4 K/UL (ref 1–4.8)
LYMPHOCYTES NFR BLD: 45.8 % (ref 18–48)
MCH RBC QN AUTO: 32.6 PG (ref 27–31)
MCHC RBC AUTO-ENTMCNC: 34.1 G/DL (ref 32–36)
MCV RBC AUTO: 96 FL (ref 82–98)
MONOCYTES # BLD AUTO: 0.4 K/UL (ref 0.3–1)
MONOCYTES NFR BLD: 7.4 % (ref 4–15)
NEUTROPHILS # BLD AUTO: 2.3 K/UL (ref 1.8–7.7)
NEUTROPHILS NFR BLD: 44.7 % (ref 38–73)
NRBC BLD-RTO: 0 /100 WBC
PLATELET # BLD AUTO: 160 K/UL (ref 150–450)
PMV BLD AUTO: 11.6 FL (ref 9.2–12.9)
POTASSIUM SERPL-SCNC: 4.3 MMOL/L (ref 3.5–5.1)
PROT SERPL-MCNC: 8.5 G/DL (ref 6–8.4)
PROTHROMBIN TIME: 11.6 SEC (ref 9–12.5)
RBC # BLD AUTO: 3.71 M/UL (ref 4–5.4)
SODIUM SERPL-SCNC: 138 MMOL/L (ref 136–145)
WBC # BLD AUTO: 5.17 K/UL (ref 3.9–12.7)

## 2023-03-01 PROCEDURE — 80053 COMPREHEN METABOLIC PANEL: CPT | Performed by: INTERNAL MEDICINE

## 2023-03-01 PROCEDURE — 82105 ALPHA-FETOPROTEIN SERUM: CPT | Performed by: INTERNAL MEDICINE

## 2023-03-01 PROCEDURE — 85025 COMPLETE CBC W/AUTO DIFF WBC: CPT | Performed by: INTERNAL MEDICINE

## 2023-03-01 PROCEDURE — 36415 COLL VENOUS BLD VENIPUNCTURE: CPT | Performed by: INTERNAL MEDICINE

## 2023-03-01 PROCEDURE — 85610 PROTHROMBIN TIME: CPT | Performed by: INTERNAL MEDICINE

## 2023-03-02 ENCOUNTER — OFFICE VISIT (OUTPATIENT)
Dept: CARDIOLOGY | Facility: CLINIC | Age: 78
End: 2023-03-02
Payer: MEDICARE

## 2023-03-02 ENCOUNTER — TELEPHONE (OUTPATIENT)
Dept: HEPATOLOGY | Facility: CLINIC | Age: 78
End: 2023-03-02
Payer: MEDICARE

## 2023-03-02 VITALS
WEIGHT: 110 LBS | DIASTOLIC BLOOD PRESSURE: 62 MMHG | SYSTOLIC BLOOD PRESSURE: 118 MMHG | BODY MASS INDEX: 18.78 KG/M2 | HEIGHT: 64 IN | OXYGEN SATURATION: 97 % | HEART RATE: 56 BPM

## 2023-03-02 DIAGNOSIS — B18.2 CHRONIC HEPATITIS C WITH CIRRHOSIS: ICD-10-CM

## 2023-03-02 DIAGNOSIS — K74.4 SECONDARY BILIARY CIRRHOSIS: ICD-10-CM

## 2023-03-02 DIAGNOSIS — R07.89 ATYPICAL CHEST PAIN: ICD-10-CM

## 2023-03-02 DIAGNOSIS — K74.60 CHRONIC HEPATITIS C WITH CIRRHOSIS: ICD-10-CM

## 2023-03-02 DIAGNOSIS — I10 PRIMARY HYPERTENSION: Primary | ICD-10-CM

## 2023-03-02 PROCEDURE — 99214 OFFICE O/P EST MOD 30 MIN: CPT | Mod: S$GLB,,, | Performed by: INTERNAL MEDICINE

## 2023-03-02 PROCEDURE — 3288F FALL RISK ASSESSMENT DOCD: CPT | Mod: CPTII,S$GLB,, | Performed by: INTERNAL MEDICINE

## 2023-03-02 PROCEDURE — 3074F SYST BP LT 130 MM HG: CPT | Mod: CPTII,S$GLB,, | Performed by: INTERNAL MEDICINE

## 2023-03-02 PROCEDURE — 3288F PR FALLS RISK ASSESSMENT DOCUMENTED: ICD-10-PCS | Mod: CPTII,S$GLB,, | Performed by: INTERNAL MEDICINE

## 2023-03-02 PROCEDURE — 1160F RVW MEDS BY RX/DR IN RCRD: CPT | Mod: CPTII,S$GLB,, | Performed by: INTERNAL MEDICINE

## 2023-03-02 PROCEDURE — 1126F AMNT PAIN NOTED NONE PRSNT: CPT | Mod: CPTII,S$GLB,, | Performed by: INTERNAL MEDICINE

## 2023-03-02 PROCEDURE — 99999 PR PBB SHADOW E&M-EST. PATIENT-LVL III: CPT | Mod: PBBFAC,,, | Performed by: INTERNAL MEDICINE

## 2023-03-02 PROCEDURE — 3074F PR MOST RECENT SYSTOLIC BLOOD PRESSURE < 130 MM HG: ICD-10-PCS | Mod: CPTII,S$GLB,, | Performed by: INTERNAL MEDICINE

## 2023-03-02 PROCEDURE — 1101F PR PT FALLS ASSESS DOC 0-1 FALLS W/OUT INJ PAST YR: ICD-10-PCS | Mod: CPTII,S$GLB,, | Performed by: INTERNAL MEDICINE

## 2023-03-02 PROCEDURE — 1101F PT FALLS ASSESS-DOCD LE1/YR: CPT | Mod: CPTII,S$GLB,, | Performed by: INTERNAL MEDICINE

## 2023-03-02 PROCEDURE — 1126F PR PAIN SEVERITY QUANTIFIED, NO PAIN PRESENT: ICD-10-PCS | Mod: CPTII,S$GLB,, | Performed by: INTERNAL MEDICINE

## 2023-03-02 PROCEDURE — 99999 PR PBB SHADOW E&M-EST. PATIENT-LVL III: ICD-10-PCS | Mod: PBBFAC,,, | Performed by: INTERNAL MEDICINE

## 2023-03-02 PROCEDURE — 1159F MED LIST DOCD IN RCRD: CPT | Mod: CPTII,S$GLB,, | Performed by: INTERNAL MEDICINE

## 2023-03-02 PROCEDURE — 3078F DIAST BP <80 MM HG: CPT | Mod: CPTII,S$GLB,, | Performed by: INTERNAL MEDICINE

## 2023-03-02 PROCEDURE — 3078F PR MOST RECENT DIASTOLIC BLOOD PRESSURE < 80 MM HG: ICD-10-PCS | Mod: CPTII,S$GLB,, | Performed by: INTERNAL MEDICINE

## 2023-03-02 PROCEDURE — 1160F PR REVIEW ALL MEDS BY PRESCRIBER/CLIN PHARMACIST DOCUMENTED: ICD-10-PCS | Mod: CPTII,S$GLB,, | Performed by: INTERNAL MEDICINE

## 2023-03-02 PROCEDURE — 1159F PR MEDICATION LIST DOCUMENTED IN MEDICAL RECORD: ICD-10-PCS | Mod: CPTII,S$GLB,, | Performed by: INTERNAL MEDICINE

## 2023-03-02 PROCEDURE — 99214 PR OFFICE/OUTPT VISIT, EST, LEVL IV, 30-39 MIN: ICD-10-PCS | Mod: S$GLB,,, | Performed by: INTERNAL MEDICINE

## 2023-03-02 NOTE — TELEPHONE ENCOUNTER
Received call from Mary Beth with [facility] CT Scan Department.   They have the patient there for a CT Scan Abd W WO Contrast.    Serum Cr done. Due to the results. Will not be able to do CT with IV Contrast.    Will notify the Provider.

## 2023-03-02 NOTE — ASSESSMENT & PLAN NOTE
Goal BP < 140/90.  Compliant w/ meds.    - continue medical therapy   - enrolling in digital medicine prgm  - risk factor and lifestyle modifications

## 2023-03-02 NOTE — PROGRESS NOTES
Subjective:    Patient ID:  Seema Gann is a 77 y.o. female who presents for follow-up of Follow-up        PCP: Ciro ZAZUETA  Pt is a 76 yo F w/ PMH of HTN, Biliary Cirrhosis w/ enlarging hepatic mass since 2015, and HCV who presents for f/u of cp.  She was hospitalized 11/28/2022 for cp w/ a negative cardiac w/u and was discharged home w/ cardiology f/u.  She was last seen 11/29/2022 and mentioned that the cp was a pressure, like something was sitting on her chest and lasted a few hours and resolved, she had an ETT which noted normal exercise tolerance and was negative for ischemia.  Since last appt she has been doing well and has no complaints.  She denies any further episodes of cp, sob, edema, orthopnea, PND, presyncope, LOC, palpitations, and claudication.  She notes compliance w/ meds and denies side effects.  She does not monitor her BP at home.  She does not exercise however states she is very active w/ gardening and denies cp or sob.      Past Medical History:   Diagnosis Date    Cirrhosis     Hepatitis     Hypertension      Past Surgical History:   Procedure Laterality Date    APPENDECTOMY      HYSTERECTOMY       Social History     Socioeconomic History    Marital status:    Tobacco Use    Smoking status: Never   Substance and Sexual Activity    Alcohol use: No    Drug use: No     History reviewed. No pertinent family history.    Review of patient's allergies indicates:  No Known Allergies    Medication List with Changes/Refills   Current Medications    CARVEDILOL (COREG) 3.125 MG TABLET    Take 3.125 mg by mouth 2 (two) times daily with meals.    LISINOPRIL (PRINIVIL,ZESTRIL) 20 MG TABLET    Take 20 mg by mouth once daily.    SPIRONOLACTONE (ALDACTONE) 100 MG TABLET    Take 100 mg by mouth once daily.       Review of Systems   Constitutional: Negative for diaphoresis and fever.   HENT:  Negative for congestion and hearing loss.    Eyes:  Negative for blurred vision and pain.  "  Cardiovascular:  Negative for chest pain, claudication, dyspnea on exertion, leg swelling, near-syncope, palpitations and syncope.   Respiratory:  Negative for shortness of breath and sleep disturbances due to breathing.    Hematologic/Lymphatic: Negative for bleeding problem. Does not bruise/bleed easily.   Skin:  Negative for color change and poor wound healing.   Gastrointestinal:  Negative for abdominal pain and nausea.   Genitourinary:  Negative for bladder incontinence and flank pain.   Neurological:  Negative for focal weakness and light-headedness.      Objective:   /62   Pulse (!) 56   Ht 5' 4" (1.626 m)   Wt 49.9 kg (110 lb)   SpO2 97%   BMI 18.88 kg/m²    Physical Exam  Constitutional:       Appearance: She is well-developed. She is not diaphoretic.   HENT:      Head: Normocephalic and atraumatic.   Eyes:      General: No scleral icterus.     Pupils: Pupils are equal, round, and reactive to light.   Neck:      Vascular: No JVD.   Cardiovascular:      Rate and Rhythm: Normal rate and regular rhythm.      Pulses: Intact distal pulses.      Heart sounds: S1 normal and S2 normal. Murmur heard.   High-pitched blowing holosystolic murmur is present with a grade of 2/6 at the apex.     No friction rub. No gallop.   Pulmonary:      Effort: Pulmonary effort is normal. No respiratory distress.      Breath sounds: Normal breath sounds. No wheezing or rales.   Chest:      Chest wall: No tenderness.   Abdominal:      General: Bowel sounds are normal. There is no distension.      Palpations: Abdomen is soft. There is no mass.      Tenderness: There is no abdominal tenderness. There is no rebound.   Musculoskeletal:         General: No tenderness. Normal range of motion.      Cervical back: Normal range of motion and neck supple.   Skin:     General: Skin is warm and dry.      Coloration: Skin is not pale.   Neurological:      Mental Status: She is alert and oriented to person, place, and time.      " Coordination: Coordination normal.      Deep Tendon Reflexes: Reflexes normal.   Psychiatric:         Behavior: Behavior normal.         Judgment: Judgment normal.         EC2022- reviewed.  Sinus katherine w/ PACs, RBBB, LAFB.      Echo: 2022- reviewed.    Summary    The left ventricle is normal in size with concentric remodeling and normal systolic function.  The estimated ejection fraction is 70%.  Normal left ventricular diastolic function.  Normal right ventricular size with normal right ventricular systolic function.  Mild aortic regurgitation.  Mild tricuspid regurgitation.  Mild pulmonic regurgitation.  Mild mitral regurgitation.  Normal central venous pressure (3 mmHg).  The estimated PA systolic pressure is 32 mmHg.    ETT: 2022- reviewed.    Conclusion         The patient exercised for 7 minutes 30 seconds on a Abel protocol, corresponding to a functional capacity of 9 METS, achieving a peak heart rate of 157 bpm, which is 113 % of the age predicted maximum heart rate. The patient experienced no angina during the test. Their exercise capacity was above average. The Duke Treadmill Score was 8.    The ECG portion of the study is negative for ischemia.    The patient reported no chest pain during the stress test.    The blood pressure response to stress was normal.    The Duke Treadmill Score was 8.    During stress, occasional PACs are noted. , During stress, occasional PVCs are noted.    The exercise capacity was above average.    Assessment:       1. Primary hypertension    2. Chronic hepatitis C with cirrhosis    3. Secondary biliary cirrhosis    4. Atypical chest pain         Plan:         Primary hypertension  Goal BP < 140/90.  Compliant w/ meds.    - continue medical therapy   - enrolling in digital medicine prgm  - risk factor and lifestyle modifications     Chronic hepatitis C with cirrhosis  Followed by hepatology.      Secondary biliary cirrhosis  Planned to follow w/ hepatology  12/9/2022.      Atypical chest pain  Resolved.  Pt denies further episodes.    - ETT negative for ischemia       Total duration of face to face visit time 30 minutes.  Total time spent counseling greater than fifty percent of total visit time.  Counseling included discussion regarding imaging findings, diagnosis, possibilities, treatment options, risks and benefits.  The patient had many questions regarding the options and long-term effects      Prabhjot Jones M.D.  Interventional Cardiology

## 2023-04-26 ENCOUNTER — LAB VISIT (OUTPATIENT)
Dept: LAB | Facility: HOSPITAL | Age: 78
End: 2023-04-26
Attending: INTERNAL MEDICINE
Payer: MEDICARE

## 2023-04-26 ENCOUNTER — OFFICE VISIT (OUTPATIENT)
Dept: HEPATOLOGY | Facility: CLINIC | Age: 78
End: 2023-04-26
Payer: MEDICARE

## 2023-04-26 VITALS
SYSTOLIC BLOOD PRESSURE: 129 MMHG | HEART RATE: 65 BPM | HEIGHT: 64 IN | RESPIRATION RATE: 18 BRPM | WEIGHT: 108.94 LBS | BODY MASS INDEX: 18.6 KG/M2 | DIASTOLIC BLOOD PRESSURE: 62 MMHG | OXYGEN SATURATION: 97 % | TEMPERATURE: 98 F

## 2023-04-26 DIAGNOSIS — Z86.19 HISTORY OF HEPATITIS C: ICD-10-CM

## 2023-04-26 DIAGNOSIS — R16.0 LIVER MASS: ICD-10-CM

## 2023-04-26 DIAGNOSIS — B19.20 COMPENSATED HCV CIRRHOSIS: ICD-10-CM

## 2023-04-26 DIAGNOSIS — B19.20 COMPENSATED HCV CIRRHOSIS: Primary | ICD-10-CM

## 2023-04-26 DIAGNOSIS — K74.69 COMPENSATED HCV CIRRHOSIS: Primary | ICD-10-CM

## 2023-04-26 DIAGNOSIS — K74.60 CHRONIC HEPATITIS C WITH CIRRHOSIS: ICD-10-CM

## 2023-04-26 DIAGNOSIS — B18.2 CHRONIC HEPATITIS C WITH CIRRHOSIS: ICD-10-CM

## 2023-04-26 DIAGNOSIS — K74.69 COMPENSATED HCV CIRRHOSIS: ICD-10-CM

## 2023-04-26 LAB
AFP SERPL-MCNC: 460 NG/ML (ref 0–8.4)
ALBUMIN SERPL BCP-MCNC: 4 G/DL (ref 3.5–5.2)
ALP SERPL-CCNC: 212 U/L (ref 55–135)
ALT SERPL W/O P-5'-P-CCNC: 447 U/L (ref 10–44)
ANION GAP SERPL CALC-SCNC: 8 MMOL/L (ref 8–16)
AST SERPL-CCNC: 49 U/L (ref 10–40)
BASOPHILS # BLD AUTO: 0.02 K/UL (ref 0–0.2)
BASOPHILS NFR BLD: 0.5 % (ref 0–1.9)
BILIRUB SERPL-MCNC: 1 MG/DL (ref 0.1–1)
BUN SERPL-MCNC: 15 MG/DL (ref 8–23)
CALCIUM SERPL-MCNC: 10 MG/DL (ref 8.7–10.5)
CHLORIDE SERPL-SCNC: 107 MMOL/L (ref 95–110)
CO2 SERPL-SCNC: 27 MMOL/L (ref 23–29)
CREAT SERPL-MCNC: 1.4 MG/DL (ref 0.5–1.4)
DIFFERENTIAL METHOD: ABNORMAL
EOSINOPHIL # BLD AUTO: 0.1 K/UL (ref 0–0.5)
EOSINOPHIL NFR BLD: 1.6 % (ref 0–8)
ERYTHROCYTE [DISTWIDTH] IN BLOOD BY AUTOMATED COUNT: 13.1 % (ref 11.5–14.5)
EST. GFR  (NO RACE VARIABLE): 39 ML/MIN/1.73 M^2
GLUCOSE SERPL-MCNC: 99 MG/DL (ref 70–110)
HCT VFR BLD AUTO: 30.7 % (ref 37–48.5)
HGB BLD-MCNC: 10.2 G/DL (ref 12–16)
IMM GRANULOCYTES # BLD AUTO: 0.01 K/UL (ref 0–0.04)
IMM GRANULOCYTES NFR BLD AUTO: 0.2 % (ref 0–0.5)
INR PPP: 1.1 (ref 0.8–1.2)
LYMPHOCYTES # BLD AUTO: 1.6 K/UL (ref 1–4.8)
LYMPHOCYTES NFR BLD: 36.5 % (ref 18–48)
MCH RBC QN AUTO: 32.8 PG (ref 27–31)
MCHC RBC AUTO-ENTMCNC: 33.2 G/DL (ref 32–36)
MCV RBC AUTO: 99 FL (ref 82–98)
MONOCYTES # BLD AUTO: 0.4 K/UL (ref 0.3–1)
MONOCYTES NFR BLD: 7.9 % (ref 4–15)
NEUTROPHILS # BLD AUTO: 2.4 K/UL (ref 1.8–7.7)
NEUTROPHILS NFR BLD: 53.3 % (ref 38–73)
NRBC BLD-RTO: 0 /100 WBC
PLATELET # BLD AUTO: 125 K/UL (ref 150–450)
PMV BLD AUTO: 12.1 FL (ref 9.2–12.9)
POTASSIUM SERPL-SCNC: 4.1 MMOL/L (ref 3.5–5.1)
PROT SERPL-MCNC: 7.7 G/DL (ref 6–8.4)
PROTHROMBIN TIME: 11.6 SEC (ref 9–12.5)
RBC # BLD AUTO: 3.11 M/UL (ref 4–5.4)
SODIUM SERPL-SCNC: 142 MMOL/L (ref 136–145)
WBC # BLD AUTO: 4.44 K/UL (ref 3.9–12.7)

## 2023-04-26 PROCEDURE — 3078F PR MOST RECENT DIASTOLIC BLOOD PRESSURE < 80 MM HG: ICD-10-PCS | Mod: CPTII,S$GLB,, | Performed by: INTERNAL MEDICINE

## 2023-04-26 PROCEDURE — 1126F PR PAIN SEVERITY QUANTIFIED, NO PAIN PRESENT: ICD-10-PCS | Mod: CPTII,S$GLB,, | Performed by: INTERNAL MEDICINE

## 2023-04-26 PROCEDURE — 1101F PR PT FALLS ASSESS DOC 0-1 FALLS W/OUT INJ PAST YR: ICD-10-PCS | Mod: CPTII,S$GLB,, | Performed by: INTERNAL MEDICINE

## 2023-04-26 PROCEDURE — 3288F FALL RISK ASSESSMENT DOCD: CPT | Mod: CPTII,S$GLB,, | Performed by: INTERNAL MEDICINE

## 2023-04-26 PROCEDURE — 99999 PR PBB SHADOW E&M-EST. PATIENT-LVL III: CPT | Mod: PBBFAC,,, | Performed by: INTERNAL MEDICINE

## 2023-04-26 PROCEDURE — 1159F MED LIST DOCD IN RCRD: CPT | Mod: CPTII,S$GLB,, | Performed by: INTERNAL MEDICINE

## 2023-04-26 PROCEDURE — 1126F AMNT PAIN NOTED NONE PRSNT: CPT | Mod: CPTII,S$GLB,, | Performed by: INTERNAL MEDICINE

## 2023-04-26 PROCEDURE — 36415 COLL VENOUS BLD VENIPUNCTURE: CPT | Performed by: INTERNAL MEDICINE

## 2023-04-26 PROCEDURE — 85610 PROTHROMBIN TIME: CPT | Performed by: INTERNAL MEDICINE

## 2023-04-26 PROCEDURE — 99215 OFFICE O/P EST HI 40 MIN: CPT | Mod: S$GLB,,, | Performed by: INTERNAL MEDICINE

## 2023-04-26 PROCEDURE — 1101F PT FALLS ASSESS-DOCD LE1/YR: CPT | Mod: CPTII,S$GLB,, | Performed by: INTERNAL MEDICINE

## 2023-04-26 PROCEDURE — 3074F PR MOST RECENT SYSTOLIC BLOOD PRESSURE < 130 MM HG: ICD-10-PCS | Mod: CPTII,S$GLB,, | Performed by: INTERNAL MEDICINE

## 2023-04-26 PROCEDURE — 3074F SYST BP LT 130 MM HG: CPT | Mod: CPTII,S$GLB,, | Performed by: INTERNAL MEDICINE

## 2023-04-26 PROCEDURE — 99999 PR PBB SHADOW E&M-EST. PATIENT-LVL III: ICD-10-PCS | Mod: PBBFAC,,, | Performed by: INTERNAL MEDICINE

## 2023-04-26 PROCEDURE — 82105 ALPHA-FETOPROTEIN SERUM: CPT | Performed by: INTERNAL MEDICINE

## 2023-04-26 PROCEDURE — 1159F PR MEDICATION LIST DOCUMENTED IN MEDICAL RECORD: ICD-10-PCS | Mod: CPTII,S$GLB,, | Performed by: INTERNAL MEDICINE

## 2023-04-26 PROCEDURE — 85025 COMPLETE CBC W/AUTO DIFF WBC: CPT | Performed by: INTERNAL MEDICINE

## 2023-04-26 PROCEDURE — 80053 COMPREHEN METABOLIC PANEL: CPT | Performed by: INTERNAL MEDICINE

## 2023-04-26 PROCEDURE — 3288F PR FALLS RISK ASSESSMENT DOCUMENTED: ICD-10-PCS | Mod: CPTII,S$GLB,, | Performed by: INTERNAL MEDICINE

## 2023-04-26 PROCEDURE — 99215 PR OFFICE/OUTPT VISIT, EST, LEVL V, 40-54 MIN: ICD-10-PCS | Mod: S$GLB,,, | Performed by: INTERNAL MEDICINE

## 2023-04-26 PROCEDURE — 3078F DIAST BP <80 MM HG: CPT | Mod: CPTII,S$GLB,, | Performed by: INTERNAL MEDICINE

## 2023-04-26 RX ORDER — AMLODIPINE BESYLATE 10 MG/1
TABLET ORAL
COMMUNITY
Start: 2023-02-15 | End: 2023-11-30

## 2023-04-26 RX ORDER — LISINOPRIL AND HYDROCHLOROTHIAZIDE 12.5; 2 MG/1; MG/1
TABLET ORAL
COMMUNITY
Start: 2023-02-15 | End: 2023-11-21

## 2023-04-26 NOTE — PROGRESS NOTES
Subjective:       Patient ID: Seema Gann is a 77 y.o. female.    Chief Complaint: No chief complaint on file.      HPI  I saw this 77 y.o. lady in the liver clinic where she came alone.  Last seen in our clinic in Dec 2022    - G1 HCV cirrhosis with SVR  - admitted to [facility] on 11/28/22 with chest pain and was found to have a large mass on US.    Liver US: 11/28/22  Large heterogeneous hepatic focus highly concerning for mass recommend further evaluation with CT of the abdomen with without contrast to further evaluate.  Nonspecific pancreatic hypoechoic focus    CT abdo: 3/2/2023  1. Cirrhotic hepatic morphology with 7.0 x 8.5 x 8.2-cm (previously 7.0 x 7.9 x 7.3-cm) homogeneous partially exophytic right hepatic lobe mass. Further evaluation is limited given the lack of IV contrast.    Investigations: 11/28/22  Low platelets    AST 52    HCV RNA neg in Jan 2016    PMH:  Hypertension  HCV cirrhosis    Review of Systems   Constitutional:  Negative for activity change, appetite change, chills, fatigue, fever and unexpected weight change.   HENT:  Negative for ear pain, hearing loss, nosebleeds, sore throat and trouble swallowing.    Eyes:  Negative for redness and visual disturbance.   Respiratory:  Negative for cough, chest tightness, shortness of breath and wheezing.    Cardiovascular:  Negative for chest pain and palpitations.   Gastrointestinal:  Negative for abdominal distention, abdominal pain, blood in stool, constipation, diarrhea, nausea and vomiting.   Genitourinary:  Negative for difficulty urinating, dysuria, frequency, hematuria and urgency.   Musculoskeletal:  Negative for arthralgias, back pain, gait problem, joint swelling and myalgias.   Skin:  Negative for rash.   Neurological:  Negative for tremors, seizures, speech difficulty, weakness and headaches.   Hematological:  Negative for adenopathy.   Psychiatric/Behavioral:  Negative for confusion, decreased  concentration and sleep disturbance. The patient is not nervous/anxious.          Lab Results   Component Value Date     (H) 04/26/2023    AST 49 (H) 04/26/2023    GGT 45 01/16/2015    ALKPHOS 212 (H) 04/26/2023    BILITOT 1.0 04/26/2023     Past Medical History:   Diagnosis Date    Cirrhosis     Hepatitis     Hypertension      Past Surgical History:   Procedure Laterality Date    APPENDECTOMY      HYSTERECTOMY       Current Outpatient Medications   Medication Sig    amLODIPine (NORVASC) 10 MG tablet     carvedilol (COREG) 3.125 MG tablet Take 3.125 mg by mouth 2 (two) times daily with meals.    lisinopril (PRINIVIL,ZESTRIL) 20 MG tablet Take 20 mg by mouth once daily.    lisinopriL-hydrochlorothiazide (PRINZIDE,ZESTORETIC) 20-12.5 mg per tablet     spironolactone (ALDACTONE) 100 MG tablet Take 100 mg by mouth once daily.     No current facility-administered medications for this visit.       Objective:      Physical Exam  Constitutional:       General: She is not in acute distress.  HENT:      Head: Normocephalic.   Eyes:      Pupils: Pupils are equal, round, and reactive to light.   Neck:      Thyroid: No thyromegaly.      Vascular: No JVD.      Trachea: No tracheal deviation.   Cardiovascular:      Rate and Rhythm: Normal rate and regular rhythm.      Heart sounds: Normal heart sounds. No murmur heard.  Pulmonary:      Effort: Pulmonary effort is normal.      Breath sounds: Normal breath sounds. No stridor.   Abdominal:      Palpations: Abdomen is soft.   Lymphadenopathy:      Head:      Right side of head: No submental, submandibular, tonsillar, preauricular, posterior auricular or occipital adenopathy.      Left side of head: No submental, submandibular, tonsillar, preauricular, posterior auricular or occipital adenopathy.      Cervical: No cervical adenopathy.   Neurological:      Mental Status: She is alert. She is not disoriented.      Cranial Nerves: No cranial nerve deficit.      Sensory: No sensory  deficit.       Assessment:       1. Compensated HCV cirrhosis    2. History of hepatitis C    3. Chronic hepatitis C with cirrhosis    4. Liver mass          Plan:   She has an excellent functional status but her imaging suggests a large liver mass that may represent HCC.This has increased in size since she was last seen and her AFP has increased to 480.  She is aware of this.    - feels well BUT  - losing weight    We had previously discussed a contrast cross-sectional study but this was not done because her creatinine levi to 2. She has since recovered but I think given the delays we should go ahead with a targeted liver biopsy.    She agrees with this plan.

## 2023-05-02 ENCOUNTER — TELEPHONE (OUTPATIENT)
Dept: HEPATOLOGY | Facility: CLINIC | Age: 78
End: 2023-05-02
Payer: MEDICARE

## 2023-05-02 DIAGNOSIS — B18.2 CHRONIC HEPATITIS C WITH CIRRHOSIS: ICD-10-CM

## 2023-05-02 DIAGNOSIS — R16.0 LIVER MASS: Primary | ICD-10-CM

## 2023-05-02 DIAGNOSIS — K74.60 CHRONIC HEPATITIS C WITH CIRRHOSIS: ICD-10-CM

## 2023-05-02 NOTE — TELEPHONE ENCOUNTER
----- Message from Jasiel White MD sent at 5/1/2023  1:41 PM CDT -----  Jacquie, I ordered a liver biopsy- do we need to do anything more?

## 2023-05-03 ENCOUNTER — TELEPHONE (OUTPATIENT)
Dept: INTERVENTIONAL RADIOLOGY/VASCULAR | Facility: HOSPITAL | Age: 78
End: 2023-05-03
Payer: MEDICARE

## 2023-05-04 ENCOUNTER — TELEPHONE (OUTPATIENT)
Dept: INTERVENTIONAL RADIOLOGY/VASCULAR | Facility: HOSPITAL | Age: 78
End: 2023-05-04
Payer: MEDICARE

## 2023-05-05 ENCOUNTER — TELEPHONE (OUTPATIENT)
Dept: INTERVENTIONAL RADIOLOGY/VASCULAR | Facility: HOSPITAL | Age: 78
End: 2023-05-05
Payer: MEDICARE

## 2023-05-09 ENCOUNTER — TELEPHONE (OUTPATIENT)
Dept: INTERVENTIONAL RADIOLOGY/VASCULAR | Facility: CLINIC | Age: 78
End: 2023-05-09
Payer: MEDICARE

## 2023-05-09 NOTE — TELEPHONE ENCOUNTER
Called and left voice message for patient to return call at [phone] to schedule IR clinic consult to discuss Y90 mapping and biopsy.

## 2023-05-15 ENCOUNTER — OFFICE VISIT (OUTPATIENT)
Dept: INTERVENTIONAL RADIOLOGY/VASCULAR | Facility: CLINIC | Age: 78
End: 2023-05-15
Payer: MEDICARE

## 2023-05-15 VITALS
HEIGHT: 64 IN | RESPIRATION RATE: 17 BRPM | BODY MASS INDEX: 18.78 KG/M2 | HEART RATE: 56 BPM | SYSTOLIC BLOOD PRESSURE: 109 MMHG | DIASTOLIC BLOOD PRESSURE: 60 MMHG | WEIGHT: 110 LBS

## 2023-05-15 DIAGNOSIS — R16.0 LIVER MASS: Primary | ICD-10-CM

## 2023-05-15 DIAGNOSIS — R77.2 ELEVATED AFP: ICD-10-CM

## 2023-05-15 DIAGNOSIS — B18.2 CHRONIC HEPATITIS C WITH CIRRHOSIS: ICD-10-CM

## 2023-05-15 DIAGNOSIS — Z86.19 HISTORY OF HEPATITIS C: ICD-10-CM

## 2023-05-15 DIAGNOSIS — K74.60 CHRONIC HEPATITIS C WITH CIRRHOSIS: ICD-10-CM

## 2023-05-15 PROCEDURE — 99204 PR OFFICE/OUTPT VISIT, NEW, LEVL IV, 45-59 MIN: ICD-10-PCS | Mod: S$GLB,,,

## 2023-05-15 PROCEDURE — 1160F PR REVIEW ALL MEDS BY PRESCRIBER/CLIN PHARMACIST DOCUMENTED: ICD-10-PCS | Mod: CPTII,S$GLB,,

## 2023-05-15 PROCEDURE — 3078F DIAST BP <80 MM HG: CPT | Mod: CPTII,S$GLB,,

## 2023-05-15 PROCEDURE — 1159F PR MEDICATION LIST DOCUMENTED IN MEDICAL RECORD: ICD-10-PCS | Mod: CPTII,S$GLB,,

## 2023-05-15 PROCEDURE — 99204 OFFICE O/P NEW MOD 45 MIN: CPT | Mod: S$GLB,,,

## 2023-05-15 PROCEDURE — 1159F MED LIST DOCD IN RCRD: CPT | Mod: CPTII,S$GLB,,

## 2023-05-15 PROCEDURE — 3074F PR MOST RECENT SYSTOLIC BLOOD PRESSURE < 130 MM HG: ICD-10-PCS | Mod: CPTII,S$GLB,,

## 2023-05-15 PROCEDURE — 3078F PR MOST RECENT DIASTOLIC BLOOD PRESSURE < 80 MM HG: ICD-10-PCS | Mod: CPTII,S$GLB,,

## 2023-05-15 PROCEDURE — 99999 PR PBB SHADOW E&M-EST. PATIENT-LVL IV: ICD-10-PCS | Mod: PBBFAC,,,

## 2023-05-15 PROCEDURE — 1126F AMNT PAIN NOTED NONE PRSNT: CPT | Mod: CPTII,S$GLB,,

## 2023-05-15 PROCEDURE — 1126F PR PAIN SEVERITY QUANTIFIED, NO PAIN PRESENT: ICD-10-PCS | Mod: CPTII,S$GLB,,

## 2023-05-15 PROCEDURE — 3074F SYST BP LT 130 MM HG: CPT | Mod: CPTII,S$GLB,,

## 2023-05-15 PROCEDURE — 99999 PR PBB SHADOW E&M-EST. PATIENT-LVL IV: CPT | Mod: PBBFAC,,,

## 2023-05-15 PROCEDURE — 1160F RVW MEDS BY RX/DR IN RCRD: CPT | Mod: CPTII,S$GLB,,

## 2023-05-15 NOTE — PROGRESS NOTES
Subjective     Patient ID: Seema Gann is a 77 y.o. female.    Chief Complaint: Liver mass    Referral form Dr. White for y90 mapping and liver biopsy.   Pt is a 78 yo F w/ PMH of HTN, Biliary Cirrhosis w/ enlarging hepatic mass since 2015, and HCV who presents with  to discuss liver biopsy and y90 mapping.  She was hospitalized 11/28/2022 for chest pain w/ a negative cardiac w/u; however, increasing size in hepatic mass. Patient's kidney function limits the use of contrast. Due to the increasing in size, increasing AFP (460), and pmh risk factors. High suspicion for HCC. She endorses weight loss.  He also denies other signs of decompensated cirrhosis including no recent abdominal distention, encephalopathy, jaundice    Patient is not taking any blood thinners.  No issues laying flat. No CPAP or O2 use at home.   Hepatology clinic notes reviewed.     Review of Systems   Constitutional:  Positive for unexpected weight change. Negative for fatigue and fever.   Respiratory:  Negative for cough and shortness of breath.    Cardiovascular:  Negative for chest pain and leg swelling.   Gastrointestinal:  Negative for abdominal distention and abdominal pain.   Neurological:  Negative for dizziness and headaches.   Psychiatric/Behavioral:  Negative for behavioral problems and confusion.         Objective     Physical Exam  Constitutional:       General: She is not in acute distress.     Appearance: Normal appearance.   HENT:      Head: Normocephalic and atraumatic.      Nose: Nose normal.   Eyes:      Conjunctiva/sclera: Conjunctivae normal.   Cardiovascular:      Rate and Rhythm: Normal rate and regular rhythm.      Pulses: Normal pulses.      Heart sounds: Normal heart sounds.   Pulmonary:      Effort: Pulmonary effort is normal.      Breath sounds: Normal breath sounds.   Abdominal:      General: Abdomen is flat. Bowel sounds are normal. There is no distension.      Palpations: Abdomen is soft. There is  no mass.      Tenderness: There is no abdominal tenderness.   Musculoskeletal:      Right lower leg: No edema.      Left lower leg: No edema.   Neurological:      General: No focal deficit present.      Mental Status: She is alert and oriented to person, place, and time.   Psychiatric:         Mood and Affect: Mood normal.         Behavior: Behavior normal.       Cirrhotic hepatic morphology with 7.0 x 8.5 x 8.2-cm (previously 7.0 x 7.9 x 7.3-cm) homogeneous partially exophytic right hepatic lobe mass.    - ECOG- 0       Assessment and Plan     Liver mass  -     Bilirubin, Direct; Future; Expected date: 05/15/2023  -     CBC Auto Differential; Future; Expected date: 05/15/2023  -     Comprehensive Metabolic Panel; Future; Expected date: 05/15/2023  -     Protime-INR; Future; Expected date: 05/15/2023  -     IR Embolization for Tumor_Organ Ischemia; Future; Expected date: 05/15/2023  -     IR Embolization for Tumor_Organ Ischemia; Future; Expected date: 05/15/2023  -     NM Liver Imaging Static POST Y-90 Emboli; Future; Expected date: 05/15/2023  -     NM Liver Imaging Static PRE Y-90 Emboliz; Future; Expected date: 05/15/2023  -     NM Spect/CT Single Area; Future; Expected date: 05/15/2023  -     NM Spect/CT Single Area; Future; Expected date: 05/15/2023  -     Cancel: IR Biopsy Liver; Future; Expected date: 05/15/2023    History of hepatitis C    Chronic hepatitis C with cirrhosis  -     Bilirubin, Direct; Future; Expected date: 05/15/2023  -     CBC Auto Differential; Future; Expected date: 05/15/2023  -     Comprehensive Metabolic Panel; Future; Expected date: 05/15/2023  -     Protime-INR; Future; Expected date: 05/15/2023  -     IR Embolization for Tumor_Organ Ischemia; Future; Expected date: 05/15/2023  -     IR Embolization for Tumor_Organ Ischemia; Future; Expected date: 05/15/2023  -     NM Liver Imaging Static POST Y-90 Emboli; Future; Expected date: 05/15/2023  -     NM Liver Imaging Static PRE Y-90  Emboliz; Future; Expected date: 05/15/2023  -     NM Spect/CT Single Area; Future; Expected date: 05/15/2023  -     NM Spect/CT Single Area; Future; Expected date: 05/15/2023  -     Cancel: IR Biopsy Liver; Future; Expected date: 05/15/2023    Elevated AFP  -     Bilirubin, Direct; Future; Expected date: 05/15/2023  -     CBC Auto Differential; Future; Expected date: 05/15/2023  -     Comprehensive Metabolic Panel; Future; Expected date: 05/15/2023  -     Protime-INR; Future; Expected date: 05/15/2023  -     IR Embolization for Tumor_Organ Ischemia; Future; Expected date: 05/15/2023  -     IR Embolization for Tumor_Organ Ischemia; Future; Expected date: 05/15/2023  -     NM Liver Imaging Static POST Y-90 Emboli; Future; Expected date: 05/15/2023  -     NM Liver Imaging Static PRE Y-90 Emboliz; Future; Expected date: 05/15/2023  -     NM Spect/CT Single Area; Future; Expected date: 05/15/2023  -     NM Spect/CT Single Area; Future; Expected date: 05/15/2023  -     Cancel: IR Biopsy Liver; Future; Expected date: 05/15/2023    - Approved by Dr. Lutz.  - Patient with HCV cirrhosis with an elevated AFP (460). Unable to get contrast secondary to Kidney function. High suspicion that patient has HCC.   - To avoid further delays will plan for y90 mapping at same time as liver biopsy.  - Based on liver function and functional status patient has early stage disease. Recommend Y90 for locoregional therapy as possible definitive therapy.  - If patient has HCC, untreated HCC at risk for progression without intervention.    Yttrium 90 Radioembolization and Liver biopsy  discussed in detail with the patient including risks (including, but not limited to, pain, bleeding, infection, damage to nearby structures, and the need for additional procedures), benefits, potential complications, usual pre and post procedure course.  Discussed the need for initial Angiogram mapping study prior to scheduling the actual Radioembolization  procedure. Utilized images from the patient's chart, the internet, and illustrations to help explain procedure. The patient voices understanding and all questions have been answered.  The patient agrees to proceed as planned. Patient is scheduled for procedure on 5/30/2023 at [facility].  Pre-procedure handout with clinic phone number provided.    Shirin Segovia PA-C  Interventional Radiology  [phone]

## 2023-05-15 NOTE — LETTER
May 15, 2023    Seema Gann  [address] [place] [address]     [facility]  [address]  [place] [address]  Phone: [phone] PRE-PROCEDURE INSTRUCTIONS    Your procedure with Interventional Radiology is scheduled for 5/30/2023. Please arrive by 9am.    **Do not eat or drink anything between midnight and the time of your procedure. This includes gum, mints, and candy lemon drops.    **Do not smoke or drink alcoholic beverages 24 hours prior to your procedure.    **If you wear contact lenses, dentures, hearing aids, or glasses, bring a container to put them in during the procedure and give them to a family member for safekeeping.    **If you have been diagnosed with sleep apnea please bring your CPAP machine.    **If your doctor has scheduled you for an overnight stay, bring a small overnight bag with any personal items that you may need.    **Make arrangements in advance for transportation home by a responsible adult. It is not safe to drive a vehicle during the 24 hours following the procedure.    **All [facility] facilities and properties are tobacco free. Smoking is NOT allowed.    PLEASE NOTE: The procedure schedule has many variables which affect the time of your procedure. Family members should be available if your surgery time changes.    If you have any questions about these instructions call Interventional Radiology at [phone] Monday - Friday between 8:00am and 4:00pm or [phone] (ask for interventional radiology resident) for after hours.

## 2023-05-30 ENCOUNTER — HOSPITAL ENCOUNTER (OUTPATIENT)
Dept: RADIOLOGY | Facility: HOSPITAL | Age: 78
Discharge: HOME | End: 2023-05-30
Payer: MEDICARE

## 2023-05-30 ENCOUNTER — HOSPITAL ENCOUNTER (OUTPATIENT)
Dept: INTERVENTIONAL RADIOLOGY/VASCULAR | Facility: HOSPITAL | Age: 78
Discharge: HOME | End: 2023-05-30
Payer: MEDICARE

## 2023-05-30 VITALS
HEIGHT: 64 IN | TEMPERATURE: 98 F | OXYGEN SATURATION: 100 % | BODY MASS INDEX: 19.63 KG/M2 | RESPIRATION RATE: 14 BRPM | WEIGHT: 115 LBS | SYSTOLIC BLOOD PRESSURE: 138 MMHG | DIASTOLIC BLOOD PRESSURE: 42 MMHG | HEART RATE: 55 BPM

## 2023-05-30 DIAGNOSIS — K74.60 CHRONIC HEPATITIS C WITH CIRRHOSIS: ICD-10-CM

## 2023-05-30 DIAGNOSIS — B18.2 CHRONIC HEPATITIS C WITH CIRRHOSIS: ICD-10-CM

## 2023-05-30 DIAGNOSIS — R16.0 LIVER MASS: ICD-10-CM

## 2023-05-30 DIAGNOSIS — R77.2 ELEVATED AFP: ICD-10-CM

## 2023-05-30 PROCEDURE — 75726 IR EMBOLIZATION COMP FOR TUMOR_ORGAN ISCHEMIA_INFARC: ICD-10-PCS | Mod: 26,,, | Performed by: RADIOLOGY

## 2023-05-30 PROCEDURE — 99152 MOD SED SAME PHYS/QHP 5/>YRS: CPT

## 2023-05-30 PROCEDURE — 77290 THER RAD SIMULAJ FIELD CPLX: CPT | Mod: TC

## 2023-05-30 PROCEDURE — 75774 ARTERY X-RAY EACH VESSEL: CPT | Mod: TC,59

## 2023-05-30 PROCEDURE — 76937 US GUIDE VASCULAR ACCESS: CPT | Mod: TC

## 2023-05-30 PROCEDURE — 63600175 PHARM REV CODE 636 W HCPCS: Performed by: RADIOLOGY

## 2023-05-30 PROCEDURE — 78201 NM LIVER IMAGING STATIC PRE Y-90 EMBOLIZATION: ICD-10-PCS | Mod: 26,59,, | Performed by: RADIOLOGY

## 2023-05-30 PROCEDURE — 78830 NM SPECT/CT SINGLE AREA: ICD-10-PCS | Mod: 26,,, | Performed by: RADIOLOGY

## 2023-05-30 PROCEDURE — 25000003 PHARM REV CODE 250: Performed by: RADIOLOGY

## 2023-05-30 PROCEDURE — 27201068 IR BIOPSY LIVER

## 2023-05-30 PROCEDURE — 99152 MOD SED SAME PHYS/QHP 5/>YRS: CPT | Performed by: RADIOLOGY

## 2023-05-30 PROCEDURE — 75774 PR  ANGIO EA ADDNL SELECTV VESSEL: ICD-10-PCS | Mod: 26,59,, | Performed by: RADIOLOGY

## 2023-05-30 PROCEDURE — 76380 CAT SCAN FOLLOW-UP STUDY: CPT | Mod: 26,,, | Performed by: RADIOLOGY

## 2023-05-30 PROCEDURE — 47000 PR NEEDLE BIOPSY LIVER: ICD-10-PCS | Mod: 51,,, | Performed by: RADIOLOGY

## 2023-05-30 PROCEDURE — 88307 TISSUE EXAM BY PATHOLOGIST: CPT | Mod: 26,,, | Performed by: PATHOLOGY

## 2023-05-30 PROCEDURE — 76377 3D RENDER W/INTRP POSTPROCES: CPT | Mod: 26,59,, | Performed by: RADIOLOGY

## 2023-05-30 PROCEDURE — 27201045 IR EMBOLIZATION COMP FOR TUMOR_ORGAN ISCHEMIA_INFARC

## 2023-05-30 PROCEDURE — 78201 LIVER IMAGING STATIC ONLY: CPT | Mod: TC

## 2023-05-30 PROCEDURE — 76937 US GUIDE VASCULAR ACCESS: CPT | Mod: 26,,, | Performed by: RADIOLOGY

## 2023-05-30 PROCEDURE — 75726 ARTERY X-RAYS ABDOMEN: CPT | Mod: 26,,, | Performed by: RADIOLOGY

## 2023-05-30 PROCEDURE — 88333 PATH CONSLTJ SURG CYTO XM 1: CPT | Mod: 26,,, | Performed by: PATHOLOGY

## 2023-05-30 PROCEDURE — 99153 MOD SED SAME PHYS/QHP EA: CPT

## 2023-05-30 PROCEDURE — 76380 CAT SCAN FOLLOW-UP STUDY: CPT | Mod: TC

## 2023-05-30 PROCEDURE — 78830 RP LOCLZJ TUM SPECT W/CT 1: CPT | Mod: TC

## 2023-05-30 PROCEDURE — 47000 NEEDLE BIOPSY OF LIVER PERQ: CPT | Mod: 51,,, | Performed by: RADIOLOGY

## 2023-05-30 PROCEDURE — 78830 RP LOCLZJ TUM SPECT W/CT 1: CPT | Mod: 26,,, | Performed by: RADIOLOGY

## 2023-05-30 PROCEDURE — 76942 PR U/S GUIDANCE FOR NEEDLE GUIDANCE: ICD-10-PCS | Mod: 26,59,, | Performed by: RADIOLOGY

## 2023-05-30 PROCEDURE — 36247 INS CATH ABD/L-EXT ART 3RD: CPT | Mod: RT

## 2023-05-30 PROCEDURE — 47000 NEEDLE BIOPSY OF LIVER PERQ: CPT | Performed by: RADIOLOGY

## 2023-05-30 PROCEDURE — 99153 MOD SED SAME PHYS/QHP EA: CPT | Performed by: RADIOLOGY

## 2023-05-30 PROCEDURE — 88307 TISSUE EXAM BY PATHOLOGIST: CPT | Performed by: PATHOLOGY

## 2023-05-30 PROCEDURE — 76942 ECHO GUIDE FOR BIOPSY: CPT | Mod: TC | Performed by: RADIOLOGY

## 2023-05-30 PROCEDURE — 76942 ECHO GUIDE FOR BIOPSY: CPT | Mod: 26,59,, | Performed by: RADIOLOGY

## 2023-05-30 PROCEDURE — 36247 INS CATH ABD/L-EXT ART 3RD: CPT | Mod: 51,RT,, | Performed by: RADIOLOGY

## 2023-05-30 PROCEDURE — 77290 THER RAD SIMULAJ FIELD CPLX: CPT | Mod: 26,,, | Performed by: RADIOLOGY

## 2023-05-30 PROCEDURE — 76377 3D RENDER W/INTRP POSTPROCES: CPT | Mod: TC,59

## 2023-05-30 PROCEDURE — 36247 PR PLACE CATH SUBSUBSELECT ART,ABD/PEL: ICD-10-PCS | Mod: 51,RT,, | Performed by: RADIOLOGY

## 2023-05-30 PROCEDURE — 76380 PR  CT SCAN,LIMITED/LOCALIZED F/U STUDY: ICD-10-PCS | Mod: 26,,, | Performed by: RADIOLOGY

## 2023-05-30 PROCEDURE — G0269 OCCLUSIVE DEVICE IN VEIN ART: HCPCS

## 2023-05-30 PROCEDURE — 77290 PR  SET RADN THERAPY FIELD COMPLEX: ICD-10-PCS | Mod: 26,,, | Performed by: RADIOLOGY

## 2023-05-30 PROCEDURE — C1894 INTRO/SHEATH, NON-LASER: HCPCS

## 2023-05-30 PROCEDURE — 78201 LIVER IMAGING STATIC ONLY: CPT | Mod: 26,59,, | Performed by: RADIOLOGY

## 2023-05-30 PROCEDURE — 76377 PR  3D RENDERING W/ IMAGE POSTPROCESS: ICD-10-PCS | Mod: 26,59,, | Performed by: RADIOLOGY

## 2023-05-30 PROCEDURE — 75774 ARTERY X-RAY EACH VESSEL: CPT | Mod: 26,59,, | Performed by: RADIOLOGY

## 2023-05-30 PROCEDURE — 88333 PATH CONSLTJ SURG CYTO XM 1: CPT | Performed by: PATHOLOGY

## 2023-05-30 PROCEDURE — 76937 PR  US GUIDE, VASCULAR ACCESS: ICD-10-PCS | Mod: 26,,, | Performed by: RADIOLOGY

## 2023-05-30 PROCEDURE — 88333 PR  INTRAOPERATIVE CYTO PATH CONSULT, INITIAL SITE: ICD-10-PCS | Mod: 26,,, | Performed by: PATHOLOGY

## 2023-05-30 PROCEDURE — 25500020 PHARM REV CODE 255: Performed by: RADIOLOGY

## 2023-05-30 PROCEDURE — 88307 PR  SURG PATH,LEVEL V: ICD-10-PCS | Mod: 26,,, | Performed by: PATHOLOGY

## 2023-05-30 PROCEDURE — 75726 ARTERY X-RAYS ABDOMEN: CPT | Mod: TC

## 2023-05-30 RX ORDER — ONDANSETRON 2 MG/ML
4 INJECTION INTRAMUSCULAR; INTRAVENOUS EVERY 6 HOURS PRN
Status: DISCONTINUED | OUTPATIENT
Start: 2023-05-30 | End: 2023-05-31 | Stop reason: HOSPADM

## 2023-05-30 RX ORDER — HEPARIN SODIUM 200 [USP'U]/100ML
INJECTION, SOLUTION INTRAVENOUS
Status: COMPLETED | OUTPATIENT
Start: 2023-05-30 | End: 2023-05-30

## 2023-05-30 RX ORDER — LIDOCAINE HYDROCHLORIDE 10 MG/ML
INJECTION INFILTRATION; PERINEURAL
Status: COMPLETED | OUTPATIENT
Start: 2023-05-30 | End: 2023-05-30

## 2023-05-30 RX ORDER — FENTANYL CITRATE 50 UG/ML
INJECTION, SOLUTION INTRAMUSCULAR; INTRAVENOUS
Status: COMPLETED | OUTPATIENT
Start: 2023-05-30 | End: 2023-05-30

## 2023-05-30 RX ORDER — SODIUM CHLORIDE 9 MG/ML
INJECTION, SOLUTION INTRAVENOUS CONTINUOUS
Status: DISCONTINUED | OUTPATIENT
Start: 2023-05-30 | End: 2023-05-31 | Stop reason: HOSPADM

## 2023-05-30 RX ORDER — MIDAZOLAM HYDROCHLORIDE 1 MG/ML
INJECTION INTRAMUSCULAR; INTRAVENOUS
Status: COMPLETED | OUTPATIENT
Start: 2023-05-30 | End: 2023-05-30

## 2023-05-30 RX ORDER — SODIUM CHLORIDE 9 MG/ML
INJECTION, SOLUTION INTRAVENOUS
Status: COMPLETED | OUTPATIENT
Start: 2023-05-30 | End: 2023-05-30

## 2023-05-30 RX ADMIN — HEPARIN SODIUM 1000 UNITS/HR: 200 INJECTION, SOLUTION INTRAVENOUS at 11:05

## 2023-05-30 RX ADMIN — LIDOCAINE HYDROCHLORIDE 5 ML: 10 INJECTION, SOLUTION INFILTRATION; PERINEURAL at 11:05

## 2023-05-30 RX ADMIN — FENTANYL CITRATE 50 MCG: 50 INJECTION, SOLUTION INTRAMUSCULAR; INTRAVENOUS at 11:05

## 2023-05-30 RX ADMIN — SODIUM CHLORIDE 500 ML: 0.9 INJECTION, SOLUTION INTRAVENOUS at 11:05

## 2023-05-30 RX ADMIN — MIDAZOLAM HYDROCHLORIDE 1 MG: 1 INJECTION, SOLUTION INTRAMUSCULAR; INTRAVENOUS at 11:05

## 2023-05-30 RX ADMIN — IOHEXOL 35 ML: 350 INJECTION, SOLUTION INTRAVENOUS at 11:05

## 2023-05-30 NOTE — PROCEDURES
Radiology Post-Procedure Note    Pre Op Diagnosis: Liver mass    Post Op Diagnosis: Same    Procedure: Y90 mapping    Procedure performed by: Haseeb Dao MD    Written Informed Consent Obtained: Yes  Specimen Removed: NO  Estimated Blood Loss: Minimal    Findings:   Via rt cfa, celiac, rha, and ant div rha selected and angiograms obtained. Tumor feeders identified. MAA administered to rha. Vascade to rt cfa. No complications. See dictation.    Patient tolerated procedure well.    Haseeb Dao M.D.  Interventional Radiology  Department of Radiology  Pager: [phone]

## 2023-05-30 NOTE — PLAN OF CARE
Patient is discharged from IR. IV is removed. Site is dressed, and patient is advised to leave dressing in place for at least 20 to 30 minutes. AVS is printed and reviewed. Upcoming appointments and the [facility] number is highlighted for convenience. The opportunity to ask questions is provided. Patient is wheeled to family vehicle on the entrance ramp.

## 2023-05-30 NOTE — DISCHARGE SUMMARY
Radiology Discharge Summary      Hospital Course: No complications    Admit Date: 5/30/2023  Discharge Date: 05/30/2023     Instructions Given to Patient: Yes  Diet: Resume prior diet  Activity: activity as tolerated and no driving for today    Description of Condition on Discharge: Stable  Vital Signs (Most Recent): Temp: 98.1 °F (36.7 °C) (05/30/23 0922)  Pulse: (!) 50 (05/30/23 1152)  Resp: 14 (05/30/23 1152)  BP: 135/78 (05/30/23 1152)  SpO2: 100 % (05/30/23 1152)    Discharge Disposition: Home    Discharge Diagnosis: Liver mass s/p biopsy and Y90 mapping    Follow up: As scheduled    Haseeb Dao M.D.  Interventional Radiology  Department of Radiology  Pager: [phone]

## 2023-05-30 NOTE — H&P
Radiology History & Physical      SUBJECTIVE:     Chief Complaint: Liver mass    History of Present Illness:  Seema Gann is a 77 y.o. female who presents for liver mass biospy and Y90 mapping  Past Medical History:   Diagnosis Date    Cirrhosis     Hepatitis     Hypertension      Past Surgical History:   Procedure Laterality Date    APPENDECTOMY      HYSTERECTOMY         Home Meds:   Prior to Admission medications    Medication Sig Start Date End Date Taking? Authorizing Provider   amLODIPine (NORVASC) 10 MG tablet  2/15/23  Yes Historical Provider   carvedilol (COREG) 3.125 MG tablet Take 3.125 mg by mouth 2 (two) times daily with meals.   Yes Historical Provider   lisinopril (PRINIVIL,ZESTRIL) 20 MG tablet Take 20 mg by mouth once daily.   Yes Historical Provider   lisinopriL-hydrochlorothiazide (PRINZIDE,ZESTORETIC) 20-12.5 mg per tablet  2/15/23  Yes Historical Provider   spironolactone (ALDACTONE) 100 MG tablet Take 100 mg by mouth once daily.   Yes Historical Provider     Anticoagulants/Antiplatelets: no anticoagulation    Allergies: Review of patient's allergies indicates:  No Known Allergies  Sedation History:  no adverse reactions    Review of Systems:   Hematological: no known coagulopathies  Respiratory: no shortness of breath  Cardiovascular: no chest pain  Gastrointestinal: no abdominal pain  Genito-Urinary: no dysuria  Musculoskeletal: negative  Neurological: no TIA or stroke symptoms         OBJECTIVE:     Vital Signs (Most Recent)  Temp: 98.1 °F (36.7 °C) (05/30/23 0922)  Pulse: (!) 56 (05/30/23 1156)  Resp: 14 (05/30/23 1156)  BP: (!) 142/78 (05/30/23 1156)  SpO2: 100 % (05/30/23 1156)    Physical Exam:  ASA: 3  Mallampati: 2    General: no acute distress  Mental Status: alert and oriented to person, place and time  HEENT: normocephalic, atraumatic  Chest: unlabored breathing  Heart: regular heart rate  Abdomen: nondistended  Extremity: moves all extremities    Laboratory  Lab Results    Component Value Date    INR 1.0 05/29/2023       Lab Results   Component Value Date    WBC 3.89 (L) 05/29/2023    HGB 11.6 (L) 05/29/2023    HCT 34.7 (L) 05/29/2023    MCV 98 05/29/2023     (L) 05/29/2023      Lab Results   Component Value Date     05/29/2023     05/29/2023    K 3.8 05/29/2023     05/29/2023    CO2 27 05/29/2023    BUN 14 05/29/2023    CREATININE 1.30 05/29/2023    CALCIUM 10.0 05/29/2023     (H) 05/29/2023    AST 76 (H) 05/29/2023    ALBUMIN 4.5 05/29/2023    BILITOT 1.3 (H) 05/29/2023    BILIDIR 0.4 (H) 05/29/2023       ASSESSMENT/PLAN:     Sedation Plan: Moderate  Patient will undergo liver mass biopsy and Y90 mapping.     Haseeb Dao M.D.  Interventional Radiology  Department of Radiology  Pager: [phone]

## 2023-05-30 NOTE — DISCHARGE INSTRUCTIONS
Discharge Instructions for Hepatic Angiography  You had a procedure called hepatic angiography. This is an X-ray study of the blood vessels that supply your liver. During the procedure, a catheter (thin, flexible tube) was inserted into one of your blood vessels through a small incision. A specially trained doctor called an interventional radiologist usually does the procedure. Heres what to do at home afterward.  Home care  Follow your doctor's recommendations on when it is safe to drive after the procedure.  Rest according to your doctor's instructions after the procedure. Most people are able to resume normal activity within a few days.  Dont lift anything heavier than 10 pounds for 3 to 4 days.  Avoid strenuous activity for 2 weeks after the procedure.  Exercise according to your doctors recommendations.  You can shower the day after the procedure.  Ask your doctor when it is safe to swim or take a bath.  Take your medications exactly as directed. Dont skip doses.  Unless directed otherwise, drink 6 to 8 glasses of water a day to prevent dehydration and to help flush your body of the dye that was used during your procedure.  Take your temperature and check the place where your incision was made for signs of infection (redness, swelling, or warmth) every day for a week.  Follow-up care  Make a follow-up appointment as directed by our staff.  If you have stitches or staples, see your doctor to have them removed 7 to 10 days after your procedure.  Ask your doctor when you can return to work.  When to call your doctor  Call your doctor right away if you have any of the following:  Constant or increasing pain or numbness in your leg  Fever above 100.4°F (38.0°C)  Signs of infection at the place where the incision was made (redness, swelling, or warmth)  Shortness of breath  A leg that feels cold or looks blue  Bleeding, bruising, or a large swelling where the catheter was inserted  Blood in your urine  Black or  tarry stools  Any unusual bleeding     Your Y-90 dose will be delivered to the hospital within 2-3 weeks. You will be contacted, within 2-3 weeks to be scheduled from the Y-90 delivery. The Y-90 delivery will be very similar to your procedure today. You are to be have nothing to eat or drink after midnight the night before your Y-90 delivery. Post operatively, you will, again, have to lie flat for 2 hours.

## 2023-05-30 NOTE — PROCEDURES
Radiology Post-Procedure Note    Pre Op Diagnosis: right liver mass    Post Op Diagnosis: right liver mass    Procedure: right liver biopsy    Procedure performed by: Haseeb Dao MD    Written Informed Consent Obtained: Yes    Specimen Removed: YES 10 x 18 gauge cores    Estimated Blood Loss: Minimal    Findings:   Using US guidance a 17g sheath needle was placed into a right liver mass. 18g biopsy gun used to take 10 core biopsy samples. Specimen sent to pathology.     Patient tolerated procedure well.    Haseeb Dao M.D.  Interventional Radiology  Department of Radiology  Pager: [phone]

## 2023-06-01 LAB
ADEQUACY: NORMAL
COMMENT: NORMAL
FINAL PATHOLOGIC DIAGNOSIS: NORMAL
GROSS: NORMAL
Lab: NORMAL

## 2023-06-20 RX ORDER — SODIUM CHLORIDE 9 MG/ML
INJECTION, SOLUTION INTRAVENOUS CONTINUOUS
Status: CANCELLED | OUTPATIENT
Start: 2023-06-20

## 2023-06-20 RX ORDER — LIDOCAINE HYDROCHLORIDE 10 MG/ML
1 INJECTION, SOLUTION EPIDURAL; INFILTRATION; INTRACAUDAL; PERINEURAL ONCE AS NEEDED
Status: CANCELLED | OUTPATIENT
Start: 2023-06-20 | End: 2034-11-16

## 2023-06-26 ENCOUNTER — TELEPHONE (OUTPATIENT)
Dept: INTERVENTIONAL RADIOLOGY/VASCULAR | Facility: HOSPITAL | Age: 78
End: 2023-06-26
Payer: MEDICARE

## 2023-06-27 ENCOUNTER — HOSPITAL ENCOUNTER (OUTPATIENT)
Dept: RADIOLOGY | Facility: HOSPITAL | Age: 78
Discharge: HOME | End: 2023-06-27
Payer: MEDICARE

## 2023-06-27 ENCOUNTER — HOSPITAL ENCOUNTER (OUTPATIENT)
Dept: INTERVENTIONAL RADIOLOGY/VASCULAR | Facility: HOSPITAL | Age: 78
Discharge: HOME | End: 2023-06-27
Payer: MEDICARE

## 2023-06-27 VITALS
TEMPERATURE: 97 F | RESPIRATION RATE: 18 BRPM | OXYGEN SATURATION: 99 % | SYSTOLIC BLOOD PRESSURE: 169 MMHG | DIASTOLIC BLOOD PRESSURE: 84 MMHG | HEART RATE: 58 BPM

## 2023-06-27 DIAGNOSIS — B18.2 CHRONIC HEPATITIS C WITH CIRRHOSIS: ICD-10-CM

## 2023-06-27 DIAGNOSIS — R16.0 LIVER MASS: ICD-10-CM

## 2023-06-27 DIAGNOSIS — R77.2 ELEVATED AFP: ICD-10-CM

## 2023-06-27 DIAGNOSIS — K74.60 CHRONIC HEPATITIS C WITH CIRRHOSIS: ICD-10-CM

## 2023-06-27 LAB
ALBUMIN SERPL BCP-MCNC: 4.3 G/DL (ref 3.5–5.2)
ALP SERPL-CCNC: 256 U/L (ref 55–135)
ALT SERPL W/O P-5'-P-CCNC: 584 U/L (ref 10–44)
ANION GAP SERPL CALC-SCNC: 10 MMOL/L (ref 8–16)
AST SERPL-CCNC: 67 U/L (ref 10–40)
BASOPHILS # BLD AUTO: 0.01 K/UL (ref 0–0.2)
BASOPHILS NFR BLD: 0.2 % (ref 0–1.9)
BILIRUB DIRECT SERPL-MCNC: 0.4 MG/DL (ref 0.1–0.3)
BILIRUB SERPL-MCNC: 1 MG/DL (ref 0.1–1)
BUN SERPL-MCNC: 14 MG/DL (ref 8–23)
CALCIUM SERPL-MCNC: 10.3 MG/DL (ref 8.7–10.5)
CHLORIDE SERPL-SCNC: 108 MMOL/L (ref 95–110)
CO2 SERPL-SCNC: 23 MMOL/L (ref 23–29)
CREAT SERPL-MCNC: 1.3 MG/DL (ref 0.5–1.4)
DIFFERENTIAL METHOD: ABNORMAL
EOSINOPHIL # BLD AUTO: 0.1 K/UL (ref 0–0.5)
EOSINOPHIL NFR BLD: 1.5 % (ref 0–8)
ERYTHROCYTE [DISTWIDTH] IN BLOOD BY AUTOMATED COUNT: 12.1 % (ref 11.5–14.5)
EST. GFR  (NO RACE VARIABLE): 42.6 ML/MIN/1.73 M^2
GLUCOSE SERPL-MCNC: 101 MG/DL (ref 70–110)
HCT VFR BLD AUTO: 36 % (ref 37–48.5)
HGB BLD-MCNC: 11.8 G/DL (ref 12–16)
IMM GRANULOCYTES # BLD AUTO: 0.02 K/UL (ref 0–0.04)
IMM GRANULOCYTES NFR BLD AUTO: 0.5 % (ref 0–0.5)
INR PPP: 1.1 (ref 0.8–1.2)
LYMPHOCYTES # BLD AUTO: 1.3 K/UL (ref 1–4.8)
LYMPHOCYTES NFR BLD: 32.3 % (ref 18–48)
MCH RBC QN AUTO: 32.2 PG (ref 27–31)
MCHC RBC AUTO-ENTMCNC: 32.8 G/DL (ref 32–36)
MCV RBC AUTO: 98 FL (ref 82–98)
MONOCYTES # BLD AUTO: 0.3 K/UL (ref 0.3–1)
MONOCYTES NFR BLD: 6.7 % (ref 4–15)
NEUTROPHILS # BLD AUTO: 2.4 K/UL (ref 1.8–7.7)
NEUTROPHILS NFR BLD: 58.8 % (ref 38–73)
NRBC BLD-RTO: 0 /100 WBC
PLATELET # BLD AUTO: 121 K/UL (ref 150–450)
PMV BLD AUTO: 12.1 FL (ref 9.2–12.9)
POTASSIUM SERPL-SCNC: 4.4 MMOL/L (ref 3.5–5.1)
PROT SERPL-MCNC: 8.1 G/DL (ref 6–8.4)
PROTHROMBIN TIME: 11.9 SEC (ref 9–12.5)
RBC # BLD AUTO: 3.66 M/UL (ref 4–5.4)
SODIUM SERPL-SCNC: 141 MMOL/L (ref 136–145)
WBC # BLD AUTO: 4.03 K/UL (ref 3.9–12.7)

## 2023-06-27 PROCEDURE — 77300 RADIATION THERAPY DOSE PLAN: CPT | Mod: 26,,, | Performed by: RADIOLOGY

## 2023-06-27 PROCEDURE — 76377 3D RENDER W/INTRP POSTPROCES: CPT | Mod: 26,,, | Performed by: RADIOLOGY

## 2023-06-27 PROCEDURE — 63600175 PHARM REV CODE 636 W HCPCS: Performed by: RADIOLOGY

## 2023-06-27 PROCEDURE — 99152 MOD SED SAME PHYS/QHP 5/>YRS: CPT | Mod: 59 | Performed by: RADIOLOGY

## 2023-06-27 PROCEDURE — 36248 INS CATH ABD/L-EXT ART ADDL: CPT | Mod: ,,, | Performed by: RADIOLOGY

## 2023-06-27 PROCEDURE — 77300 PR RADIATION THERAPY,DOSIMETRY PLAN: ICD-10-PCS | Mod: 26,,, | Performed by: RADIOLOGY

## 2023-06-27 PROCEDURE — 36247 INS CATH ABD/L-EXT ART 3RD: CPT | Performed by: RADIOLOGY

## 2023-06-27 PROCEDURE — A4550 SURGICAL TRAYS: HCPCS

## 2023-06-27 PROCEDURE — 79445 PR  NUCLEAR THERAPY, INTRA-ARTERIAL: ICD-10-PCS | Mod: 26,,, | Performed by: RADIOLOGY

## 2023-06-27 PROCEDURE — 76377 3D RENDER W/INTRP POSTPROCES: CPT | Mod: TC | Performed by: RADIOLOGY

## 2023-06-27 PROCEDURE — 78830 RP LOCLZJ TUM SPECT W/CT 1: CPT | Mod: 59,TC

## 2023-06-27 PROCEDURE — 75774 ARTERY X-RAY EACH VESSEL: CPT | Mod: 59,TC | Performed by: RADIOLOGY

## 2023-06-27 PROCEDURE — 76377 PR  3D RENDERING W/ IMAGE POSTPROCESS: ICD-10-PCS | Mod: 26,,, | Performed by: RADIOLOGY

## 2023-06-27 PROCEDURE — 75726 CHG ANGIO VISCERAL SELECTV/SUBSELEC: ICD-10-PCS | Mod: 26,59,, | Performed by: RADIOLOGY

## 2023-06-27 PROCEDURE — 78201 NM LIVER IMAGING STATIC POST Y-90 EMBOLIZATION: ICD-10-PCS | Mod: 26,59,, | Performed by: STUDENT IN AN ORGANIZED HEALTH CARE EDUCATION/TRAINING PROGRAM

## 2023-06-27 PROCEDURE — 25500020 PHARM REV CODE 255: Performed by: RADIOLOGY

## 2023-06-27 PROCEDURE — 78201 LIVER IMAGING STATIC ONLY: CPT | Mod: 26,59,, | Performed by: STUDENT IN AN ORGANIZED HEALTH CARE EDUCATION/TRAINING PROGRAM

## 2023-06-27 PROCEDURE — 36415 COLL VENOUS BLD VENIPUNCTURE: CPT | Performed by: RADIOLOGY

## 2023-06-27 PROCEDURE — 76937 US GUIDE VASCULAR ACCESS: CPT | Mod: 26,,, | Performed by: RADIOLOGY

## 2023-06-27 PROCEDURE — 75887 PR  PERCUT XHEPATIC PORTOGRAM: ICD-10-PCS | Mod: 26,,, | Performed by: RADIOLOGY

## 2023-06-27 PROCEDURE — 76380 PR  CT SCAN,LIMITED/LOCALIZED F/U STUDY: ICD-10-PCS | Mod: 26,59,, | Performed by: RADIOLOGY

## 2023-06-27 PROCEDURE — 85025 COMPLETE CBC W/AUTO DIFF WBC: CPT

## 2023-06-27 PROCEDURE — 37243 VASC EMBOLIZE/OCCLUDE ORGAN: CPT | Performed by: RADIOLOGY

## 2023-06-27 PROCEDURE — 36247 PR PLACE CATH SUBSUBSELECT ART,ABD/PEL: ICD-10-PCS | Mod: ,,, | Performed by: RADIOLOGY

## 2023-06-27 PROCEDURE — 76380 CAT SCAN FOLLOW-UP STUDY: CPT | Mod: TC | Performed by: RADIOLOGY

## 2023-06-27 PROCEDURE — 76937 PR  US GUIDE, VASCULAR ACCESS: ICD-10-PCS | Mod: 26,,, | Performed by: RADIOLOGY

## 2023-06-27 PROCEDURE — 78830 RP LOCLZJ TUM SPECT W/CT 1: CPT | Mod: 26,,, | Performed by: STUDENT IN AN ORGANIZED HEALTH CARE EDUCATION/TRAINING PROGRAM

## 2023-06-27 PROCEDURE — 36248 PR PR INS CATH ABD/L-EXT ART ADDL 2ND ORD/3RD ORD/BYD: ICD-10-PCS | Mod: ,,, | Performed by: RADIOLOGY

## 2023-06-27 PROCEDURE — 99153 MOD SED SAME PHYS/QHP EA: CPT | Performed by: RADIOLOGY

## 2023-06-27 PROCEDURE — 76937 US GUIDE VASCULAR ACCESS: CPT | Mod: TC | Performed by: RADIOLOGY

## 2023-06-27 PROCEDURE — 36247 INS CATH ABD/L-EXT ART 3RD: CPT | Mod: ,,, | Performed by: RADIOLOGY

## 2023-06-27 PROCEDURE — 75726 ARTERY X-RAYS ABDOMEN: CPT | Mod: 26,59,, | Performed by: RADIOLOGY

## 2023-06-27 PROCEDURE — 76380 CAT SCAN FOLLOW-UP STUDY: CPT | Mod: 26,59,, | Performed by: RADIOLOGY

## 2023-06-27 PROCEDURE — 75887 VEIN X-RAY LIVER W/O HEMODYN: CPT | Mod: TC | Performed by: RADIOLOGY

## 2023-06-27 PROCEDURE — 37243 IR EMBOLIZATION COMP FOR TUMOR_ORGAN ISCHEMIA_INFARC: ICD-10-PCS | Mod: ,,, | Performed by: RADIOLOGY

## 2023-06-27 PROCEDURE — 78830 NM SPECT/CT SINGLE AREA: ICD-10-PCS | Mod: 26,,, | Performed by: STUDENT IN AN ORGANIZED HEALTH CARE EDUCATION/TRAINING PROGRAM

## 2023-06-27 PROCEDURE — 36248 INS CATH ABD/L-EXT ART ADDL: CPT | Performed by: RADIOLOGY

## 2023-06-27 PROCEDURE — 85610 PROTHROMBIN TIME: CPT

## 2023-06-27 PROCEDURE — 25000003 PHARM REV CODE 250: Performed by: RADIOLOGY

## 2023-06-27 PROCEDURE — 78201 LIVER IMAGING STATIC ONLY: CPT | Mod: TC

## 2023-06-27 PROCEDURE — G0269 OCCLUSIVE DEVICE IN VEIN ART: HCPCS | Performed by: RADIOLOGY

## 2023-06-27 PROCEDURE — 75726 ARTERY X-RAYS ABDOMEN: CPT | Mod: 59,TC | Performed by: RADIOLOGY

## 2023-06-27 PROCEDURE — 80053 COMPREHEN METABOLIC PANEL: CPT | Performed by: RADIOLOGY

## 2023-06-27 PROCEDURE — 77300 RADIATION THERAPY DOSE PLAN: CPT | Mod: TC | Performed by: RADIOLOGY

## 2023-06-27 PROCEDURE — 79445 NUCLEAR RX INTRA-ARTERIAL: CPT | Mod: TC | Performed by: RADIOLOGY

## 2023-06-27 PROCEDURE — 82248 BILIRUBIN DIRECT: CPT | Performed by: RADIOLOGY

## 2023-06-27 PROCEDURE — 79445 NUCLEAR RX INTRA-ARTERIAL: CPT | Mod: 26,,, | Performed by: RADIOLOGY

## 2023-06-27 PROCEDURE — 75887 VEIN X-RAY LIVER W/O HEMODYN: CPT | Mod: 26,,, | Performed by: RADIOLOGY

## 2023-06-27 PROCEDURE — 75774 ARTERY X-RAY EACH VESSEL: CPT | Mod: 26,59,, | Performed by: RADIOLOGY

## 2023-06-27 PROCEDURE — 75774 PR  ANGIO EA ADDNL SELECTV VESSEL: ICD-10-PCS | Mod: 26,59,, | Performed by: RADIOLOGY

## 2023-06-27 RX ORDER — NITROGLYCERIN 5 MG/ML
INJECTION, SOLUTION INTRAVENOUS
Status: COMPLETED | OUTPATIENT
Start: 2023-06-27 | End: 2023-06-27

## 2023-06-27 RX ORDER — MIDAZOLAM HYDROCHLORIDE 1 MG/ML
INJECTION INTRAMUSCULAR; INTRAVENOUS
Status: COMPLETED | OUTPATIENT
Start: 2023-06-27 | End: 2023-06-27

## 2023-06-27 RX ORDER — ONDANSETRON 4 MG/1
4 TABLET, FILM COATED ORAL EVERY 8 HOURS PRN
Qty: 20 TABLET | Refills: 0 | Status: SHIPPED | OUTPATIENT
Start: 2023-06-27 | End: 2024-01-12

## 2023-06-27 RX ORDER — LIDOCAINE HYDROCHLORIDE 10 MG/ML
INJECTION INFILTRATION; PERINEURAL
Status: COMPLETED | OUTPATIENT
Start: 2023-06-27 | End: 2023-06-27

## 2023-06-27 RX ORDER — OXYCODONE AND ACETAMINOPHEN 5; 325 MG/1; MG/1
1 TABLET ORAL EVERY 6 HOURS PRN
Qty: 20 TABLET | Refills: 0 | Status: SHIPPED | OUTPATIENT
Start: 2023-06-27 | End: 2023-11-21

## 2023-06-27 RX ORDER — FENTANYL CITRATE 50 UG/ML
INJECTION, SOLUTION INTRAMUSCULAR; INTRAVENOUS
Status: COMPLETED | OUTPATIENT
Start: 2023-06-27 | End: 2023-06-27

## 2023-06-27 RX ORDER — AMOXICILLIN AND CLAVULANATE POTASSIUM 875; 125 MG/1; MG/1
1 TABLET, FILM COATED ORAL 2 TIMES DAILY
Qty: 14 TABLET | Refills: 0 | Status: SHIPPED | OUTPATIENT
Start: 2023-06-27 | End: 2023-07-04

## 2023-06-27 RX ORDER — DEXAMETHASONE SODIUM PHOSPHATE 4 MG/ML
INJECTION, SOLUTION INTRA-ARTICULAR; INTRALESIONAL; INTRAMUSCULAR; INTRAVENOUS; SOFT TISSUE
Status: COMPLETED | OUTPATIENT
Start: 2023-06-27 | End: 2023-06-27

## 2023-06-27 RX ADMIN — MIDAZOLAM HYDROCHLORIDE 0.5 MG: 1 INJECTION INTRAMUSCULAR; INTRAVENOUS at 11:06

## 2023-06-27 RX ADMIN — FENTANYL CITRATE 25 MCG: 50 INJECTION, SOLUTION INTRAMUSCULAR; INTRAVENOUS at 11:06

## 2023-06-27 RX ADMIN — IOHEXOL 80 ML: 300 INJECTION, SOLUTION INTRAVENOUS at 12:06

## 2023-06-27 RX ADMIN — NITROGLYCERIN 200 MCG: 5 INJECTION, SOLUTION INTRAVENOUS at 11:06

## 2023-06-27 RX ADMIN — LIDOCAINE HYDROCHLORIDE 5 ML: 10 INJECTION, SOLUTION INFILTRATION; PERINEURAL at 11:06

## 2023-06-27 RX ADMIN — DEXAMETHASONE SODIUM PHOSPHATE 20 MG: 4 INJECTION, SOLUTION INTRAMUSCULAR; INTRAVENOUS at 11:06

## 2023-06-27 RX ADMIN — MIDAZOLAM HYDROCHLORIDE 1 MG: 1 INJECTION INTRAMUSCULAR; INTRAVENOUS at 11:06

## 2023-06-27 NOTE — DISCHARGE SUMMARY
Radiology Discharge Summary      Hospital Course: No complications    Admit Date: 6/27/2023  Discharge Date: 06/27/2023     Instructions Given to Patient: Yes  Diet: Resume prior diet  Activity: activity as tolerated and no driving for today    Description of Condition on Discharge: Stable  Vital Signs (Most Recent): Temp: 97.9 °F (36.6 °C) (06/27/23 0640)  Pulse: (!) 55 (06/27/23 1209)  Resp: 17 (06/27/23 1209)  BP: 131/66 (06/27/23 1209)  SpO2: 99 % (06/27/23 1209)    Discharge Disposition: Home    Discharge Diagnosis: HCC s/p Y90 delivery    Follow up: As scheduled    Haseeb Dao M.D.  Interventional Radiology  Department of Radiology  Pager: [phone]

## 2023-06-27 NOTE — DISCHARGE INSTRUCTIONS
For immediate concerns that are not emergent, you may call our radiology clinic at: [phone]. After hours, for urgent concerns: ask for the radiology resident on call at [phone].    You may remove the dressing over your procedure site the day after your procedure. You may shower the day after your procedure with the procedure site covered, then uncover it after your shower. When not showing, keep the procedure site clean, dry, and open to air. Do not submerge in water (bath, pool, hot tub, ect.) until the site is completely healed.   Discharge Instructions for Hepatic Angiography  You had a procedure called hepatic angiography. This is an X-ray study of the blood vessels that supply your liver. During  the procedure, a catheter (thin, flexible tube) was inserted into one of your blood vessels through a small incision. A  specially trained doctor called an interventional radiologist usually does the procedure. Heres what to do at home  afterward.  Home care  Follow your doctor's recommendations on when it is safe to drive after the procedure.  Exercise according to your doctors recommendations.  You can shower the day after the procedure.  Ask your doctor when it is safe to swim or take a bath.  Take your medications exactly as directed. Dont skip doses.  Unless directed otherwise, drink 6 to 8 glasses of water a day to prevent dehydration and to help flush your body of  the dye that was used during your procedure.  Take your temperature and check the place where your incision was made for signs of infection (redness, swelling, bleeding or  warmth) every day for a week.  Report any numbness or coolness to the extremity. Report any discoloration to the puncture site or the extremity.  Follow-up care  Make a follow-up appointment as directed by our staff.  Ask your doctor when you can return to work.  Date Last Reviewed: 6/14/2015  © 8211-5465 [employer]. [address] [place] [address], [place]  [address]. All rights reserved. This information  is not intended as a substitute for professional medical care. Always follow your healthcare professional's instructions.

## 2023-06-27 NOTE — PLAN OF CARE
Pt arrived to  for Yttrium. Pt oriented to unit and staff. Plan of care reviewed with patient, patient verbalizes understanding. Comfort measures utilized. Pt safely transferred from stretcher to procedural table. Fall risk reviewed with patient, fall risk interventions maintained. Safety strap applied, positioner pillows utilized to minimize pressure points. Blankets applied. Pt prepped and draped utilizing standard sterile technique. Patient placed on continuous monitoring, as required by sedation policy. Timeouts completed utilizing standard universal time-out, per department and facility policy. RN to remain at bedside, continuous monitoring maintained. Pt resting comfortably. Denies pain/discomfort. Will continue to monitor. See flow sheets for monitoring, medication administration, and updates.

## 2023-06-27 NOTE — PROCEDURES
Radiology Post-Procedure Note    Pre Op Diagnosis: HCC    Post Op Diagnosis: Same    Procedure: Y90 delivery    Procedure performed by: Haseeb Dao MD    Written Informed Consent Obtained: Yes  Specimen Removed: NO  Estimated Blood Loss: Minimal    Findings:   Via rt cfa, celiac, rha and branches selected and angiograms obtained. Y90 delivered according to WD. Vascade to rt cfa. No complications. See dictation.    Patient tolerated procedure well.    Haseeb Dao M.D.  Interventional Radiology  Department of Radiology  Pager: [phone]

## 2023-06-27 NOTE — H&P
Vascular and Interventional Radiology History & Physical    Date:  6/27/2023    Chief Complaint:   HCC    History of Present Illness:  Seema Gann is a 77 y.o. female who presents for Y90 radioembolization.    Past Medical History:  Past Medical History:   Diagnosis Date    Cirrhosis     Hepatitis     Hypertension        Past Surgical History:  Past Surgical History:   Procedure Laterality Date    APPENDECTOMY      HYSTERECTOMY          Sedation History:    Denies any adverse reactions.  Denies problems laying flat.    Social History:  Social History     Tobacco Use    Smoking status: Never   Substance Use Topics    Alcohol use: No    Drug use: No        Home Medications:   Prior to Admission medications    Medication Sig Start Date End Date Taking? Authorizing Provider   amLODIPine (NORVASC) 10 MG tablet  2/15/23  Yes Historical Provider   carvedilol (COREG) 3.125 MG tablet Take 3.125 mg by mouth 2 (two) times daily with meals.   Yes Historical Provider   lisinopril (PRINIVIL,ZESTRIL) 20 MG tablet Take 20 mg by mouth once daily.   Yes Historical Provider   spironolactone (ALDACTONE) 100 MG tablet Take 100 mg by mouth once daily.   Yes Historical Provider   lisinopriL-hydrochlorothiazide (PRINZIDE,ZESTORETIC) 20-12.5 mg per tablet  2/15/23   Historical Provider       Inpatient Medications:    Current Outpatient Medications:     amLODIPine (NORVASC) 10 MG tablet, , Disp: , Rfl:     carvedilol (COREG) 3.125 MG tablet, Take 3.125 mg by mouth 2 (two) times daily with meals., Disp: , Rfl:     lisinopril (PRINIVIL,ZESTRIL) 20 MG tablet, Take 20 mg by mouth once daily., Disp: , Rfl:     spironolactone (ALDACTONE) 100 MG tablet, Take 100 mg by mouth once daily., Disp: , Rfl:     lisinopriL-hydrochlorothiazide (PRINZIDE,ZESTORETIC) 20-12.5 mg per tablet, , Disp: , Rfl:      Anticoagulants/Antiplatelets:   no anticoagulation    Allergies:   Review of patient's allergies indicates:  No Known Allergies    Review of  Systems:   As documented in primary provider H&P.    Vital Signs (Most Recent):  BP: (!) 141/60 (06/27/23 0713)    Physical Exam:  No acute distress, laying comfortably in bed, pleasant and cooperative  Regular rate and rhythm  Breathing unlabored  Abdomen benign  Extremities warm and well perfused    Sedation Exam:  ASA: III - Patient appears to have severe systemic disease not posing a constant threat to life   Mallampati: II (hard and soft palate, upper portion of tonsils anduvula visible)     Laboratory:  Lab Results   Component Value Date    INR 1.1 06/27/2023       Lab Results   Component Value Date    WBC 4.03 06/27/2023    HGB 11.8 (L) 06/27/2023    HCT 36.0 (L) 06/27/2023    MCV 98 06/27/2023     (L) 06/27/2023      Lab Results   Component Value Date     05/29/2023     05/29/2023    K 3.8 05/29/2023     05/29/2023    CO2 27 05/29/2023    BUN 14 05/29/2023    CREATININE 1.30 05/29/2023    CALCIUM 10.0 05/29/2023     (H) 05/29/2023    AST 76 (H) 05/29/2023    ALBUMIN 4.5 05/29/2023    BILITOT 1.3 (H) 05/29/2023    BILIDIR 0.4 (H) 05/29/2023       Imaging:  Reviewed.      ASSESSMENT/PLAN:   77F with HCC presents for Y90 radioembolization. Will plan to proceed with moderate intravenous conscious sedation.    Melissa Davis MD  Fellow, Dept. Of Interventional Radiology  [facility]

## 2023-06-27 NOTE — CARE UPDATE
Pt up at 14:00 right groin CDI. Pt fully recovered and states full understanding of discharge instructions. Pt dressed and left with escort for garage.

## 2023-06-27 NOTE — PLAN OF CARE
Yttrium (Y-90)  procedure completed. Patient tolerated well. Patient AAOx3, no distress noted, respirations even and unlabored, will continue to monitor. VSS. 5 F Vascade closure device deployed in right  femoral artery; hemostasis achieved at 12:00. Patient to lay flat for 2 hours until 14:00 on 06/27/2023 per MD. Right groin site clean, dry, and intact; no bleeding or hematoma noted. Patient to be transferred to nuclear medicine for post-procedural imaging then to MPU for recovery per MD.  Report to be given at bedside to RN.

## 2023-06-28 ENCOUNTER — TELEPHONE (OUTPATIENT)
Dept: INTERVENTIONAL RADIOLOGY/VASCULAR | Facility: HOSPITAL | Age: 78
End: 2023-06-28
Payer: MEDICARE

## 2023-06-28 DIAGNOSIS — C22.0 HCC (HEPATOCELLULAR CARCINOMA): Primary | ICD-10-CM

## 2023-06-28 DIAGNOSIS — R16.0 LIVER MASS: ICD-10-CM

## 2023-06-28 NOTE — NURSING
Called patient to follow up.  Received message that patient was having N/V and pain yesterday.  Patient denies N/V and pain today.  She denies any symptoms at present and states she is much better.  Confirmed that patient has IR clinic.  She will call if symptoms return.

## 2023-07-18 ENCOUNTER — TELEPHONE (OUTPATIENT)
Dept: HEPATOLOGY | Facility: CLINIC | Age: 78
End: 2023-07-18
Payer: MEDICARE

## 2023-07-18 NOTE — TELEPHONE ENCOUNTER
Called the patient to let her know that [facility] is out of network with this plan everywhere. No answer, left voicemail with call back # [phone]. Also, left Pre-service # [phone], Central pricing office , and [facility] acct Customer service .

## 2023-07-19 ENCOUNTER — OFFICE VISIT (OUTPATIENT)
Dept: HEPATOLOGY | Facility: CLINIC | Age: 78
End: 2023-07-19
Payer: MEDICARE

## 2023-07-19 VITALS
SYSTOLIC BLOOD PRESSURE: 145 MMHG | HEART RATE: 63 BPM | DIASTOLIC BLOOD PRESSURE: 68 MMHG | WEIGHT: 104.25 LBS | TEMPERATURE: 98 F | RESPIRATION RATE: 17 BRPM | HEIGHT: 64 IN | BODY MASS INDEX: 17.8 KG/M2 | OXYGEN SATURATION: 100 %

## 2023-07-19 DIAGNOSIS — B18.2 CHRONIC HEPATITIS C WITH CIRRHOSIS: Primary | ICD-10-CM

## 2023-07-19 DIAGNOSIS — C22.0 HCC (HEPATOCELLULAR CARCINOMA): ICD-10-CM

## 2023-07-19 DIAGNOSIS — K74.60 CHRONIC HEPATITIS C WITH CIRRHOSIS: Primary | ICD-10-CM

## 2023-07-19 PROCEDURE — 1159F MED LIST DOCD IN RCRD: CPT | Mod: CPTII,S$GLB,, | Performed by: INTERNAL MEDICINE

## 2023-07-19 PROCEDURE — 99999 PR PBB SHADOW E&M-EST. PATIENT-LVL III: CPT | Mod: PBBFAC,,, | Performed by: INTERNAL MEDICINE

## 2023-07-19 PROCEDURE — 3078F PR MOST RECENT DIASTOLIC BLOOD PRESSURE < 80 MM HG: ICD-10-PCS | Mod: CPTII,S$GLB,, | Performed by: INTERNAL MEDICINE

## 2023-07-19 PROCEDURE — 1101F PR PT FALLS ASSESS DOC 0-1 FALLS W/OUT INJ PAST YR: ICD-10-PCS | Mod: CPTII,S$GLB,, | Performed by: INTERNAL MEDICINE

## 2023-07-19 PROCEDURE — 1159F PR MEDICATION LIST DOCUMENTED IN MEDICAL RECORD: ICD-10-PCS | Mod: CPTII,S$GLB,, | Performed by: INTERNAL MEDICINE

## 2023-07-19 PROCEDURE — 1126F PR PAIN SEVERITY QUANTIFIED, NO PAIN PRESENT: ICD-10-PCS | Mod: CPTII,S$GLB,, | Performed by: INTERNAL MEDICINE

## 2023-07-19 PROCEDURE — 1126F AMNT PAIN NOTED NONE PRSNT: CPT | Mod: CPTII,S$GLB,, | Performed by: INTERNAL MEDICINE

## 2023-07-19 PROCEDURE — 3077F SYST BP >= 140 MM HG: CPT | Mod: CPTII,S$GLB,, | Performed by: INTERNAL MEDICINE

## 2023-07-19 PROCEDURE — 3288F PR FALLS RISK ASSESSMENT DOCUMENTED: ICD-10-PCS | Mod: CPTII,S$GLB,, | Performed by: INTERNAL MEDICINE

## 2023-07-19 PROCEDURE — 99999 PR PBB SHADOW E&M-EST. PATIENT-LVL III: ICD-10-PCS | Mod: PBBFAC,,, | Performed by: INTERNAL MEDICINE

## 2023-07-19 PROCEDURE — 3077F PR MOST RECENT SYSTOLIC BLOOD PRESSURE >= 140 MM HG: ICD-10-PCS | Mod: CPTII,S$GLB,, | Performed by: INTERNAL MEDICINE

## 2023-07-19 PROCEDURE — 3078F DIAST BP <80 MM HG: CPT | Mod: CPTII,S$GLB,, | Performed by: INTERNAL MEDICINE

## 2023-07-19 PROCEDURE — 1101F PT FALLS ASSESS-DOCD LE1/YR: CPT | Mod: CPTII,S$GLB,, | Performed by: INTERNAL MEDICINE

## 2023-07-19 PROCEDURE — 99214 PR OFFICE/OUTPT VISIT, EST, LEVL IV, 30-39 MIN: ICD-10-PCS | Mod: S$GLB,,, | Performed by: INTERNAL MEDICINE

## 2023-07-19 PROCEDURE — 99214 OFFICE O/P EST MOD 30 MIN: CPT | Mod: S$GLB,,, | Performed by: INTERNAL MEDICINE

## 2023-07-19 PROCEDURE — 3288F FALL RISK ASSESSMENT DOCD: CPT | Mod: CPTII,S$GLB,, | Performed by: INTERNAL MEDICINE

## 2023-07-19 NOTE — PROGRESS NOTES
Subjective:       Patient ID: Seema Gann is a 77 y.o. female.    Chief Complaint: Hepatitis C, Hepatocellular Carcinoma, and Cirrhosis      HPI  I saw this 77 y.o. lady in the liver clinic where she came alone.  Last seen in our clinic in April 2023.    HCV RNA neg in Jan 2016    - G1 HCV cirrhosis with SVR  - admitted to [facility] on 11/28/22 with chest pain and was found to have a large mass on US.    CT abdo: 3/2/2023  1. Cirrhotic hepatic morphology with 7.0 x 8.5 x 8.2-cm (previously 7.0 x 7.9 x 7.3-cm) homogeneous partially exophytic right hepatic lobe mass. Further evaluation is limited given the lack of IV contrast.    Liver biopsy on 5/30/2023  Hepatocellular carcinoma, moderately differentiated     AFP on 4/26/23= 460    Y90 on 6/27/2023  Plan for MRI and labs on July 27    PMH:  Hypertension  HCV cirrhosis    Review of Systems   Constitutional:  Negative for activity change, appetite change, chills, fatigue, fever and unexpected weight change.   HENT:  Negative for ear pain, hearing loss, nosebleeds, sore throat and trouble swallowing.    Eyes:  Negative for redness and visual disturbance.   Respiratory:  Negative for cough, chest tightness, shortness of breath and wheezing.    Cardiovascular:  Negative for chest pain and palpitations.   Gastrointestinal:  Negative for abdominal distention, abdominal pain, blood in stool, constipation, diarrhea, nausea and vomiting.   Genitourinary:  Negative for difficulty urinating, dysuria, frequency, hematuria and urgency.   Musculoskeletal:  Negative for arthralgias, back pain, gait problem, joint swelling and myalgias.   Skin:  Negative for rash.   Neurological:  Negative for tremors, seizures, speech difficulty, weakness and headaches.   Hematological:  Negative for adenopathy.   Psychiatric/Behavioral:  Negative for confusion, decreased concentration and sleep disturbance. The patient is not nervous/anxious.          Lab Results    Component Value Date     (H) 06/27/2023    AST 67 (H) 06/27/2023    GGT 45 01/16/2015    ALKPHOS 256 (H) 06/27/2023    BILITOT 1.0 06/27/2023     Past Medical History:   Diagnosis Date    Cirrhosis     Hepatitis     Hypertension      Past Surgical History:   Procedure Laterality Date    APPENDECTOMY      HYSTERECTOMY       Current Outpatient Medications   Medication Sig    amLODIPine (NORVASC) 10 MG tablet     carvedilol (COREG) 3.125 MG tablet Take 3.125 mg by mouth 2 (two) times daily with meals.    lisinopriL-hydrochlorothiazide (PRINZIDE,ZESTORETIC) 20-12.5 mg per tablet     lisinopril (PRINIVIL,ZESTRIL) 20 MG tablet Take 20 mg by mouth once daily.    ondansetron (ZOFRAN) 4 MG tablet Take 1 tablet (4 mg total) by mouth every 8 (eight) hours as needed for Nausea. (Patient not taking: Reported on 7/19/2023)    oxyCODONE-acetaminophen (PERCOCET) 5-325 mg per tablet Take 1 tablet by mouth every 6 (six) hours as needed for Pain. (Patient not taking: Reported on 7/19/2023)    spironolactone (ALDACTONE) 100 MG tablet Take 100 mg by mouth once daily.     No current facility-administered medications for this visit.       Objective:      Physical Exam  Constitutional:       General: She is not in acute distress.  HENT:      Head: Normocephalic.   Eyes:      Pupils: Pupils are equal, round, and reactive to light.   Neck:      Thyroid: No thyromegaly.      Vascular: No JVD.      Trachea: No tracheal deviation.   Cardiovascular:      Rate and Rhythm: Normal rate and regular rhythm.      Heart sounds: Normal heart sounds. No murmur heard.  Pulmonary:      Effort: Pulmonary effort is normal.      Breath sounds: Normal breath sounds. No stridor.   Abdominal:      Palpations: Abdomen is soft.   Lymphadenopathy:      Head:      Right side of head: No submental, submandibular, tonsillar, preauricular, posterior auricular or occipital adenopathy.      Left side of head: No submental, submandibular, tonsillar, preauricular,  posterior auricular or occipital adenopathy.      Cervical: No cervical adenopathy.   Neurological:      Mental Status: She is alert. She is not disoriented.      Cranial Nerves: No cranial nerve deficit.      Sensory: No sensory deficit.       Assessment:       1. Chronic hepatitis C with cirrhosis    2. HCC (hepatocellular carcinoma)          Plan:   She has an excellent functional status and has tolerated Y90 well. She feels well but has poor appetite and has lost some weight    - repeat MRI at the end of July 2023  - labs a the same time  - clinic in 3 months

## 2023-08-04 ENCOUNTER — TELEPHONE (OUTPATIENT)
Dept: INTERVENTIONAL RADIOLOGY/VASCULAR | Facility: CLINIC | Age: 78
End: 2023-08-04
Payer: MEDICARE

## 2023-08-04 NOTE — TELEPHONE ENCOUNTER
Spoke to pt on phone, Pt is rescheduled on 8/10 for IR procedure at follow up location. Pt aware and confirmed, Thanks

## 2023-08-10 ENCOUNTER — LAB VISIT (OUTPATIENT)
Dept: LAB | Facility: HOSPITAL | Age: 78
End: 2023-08-10
Payer: MEDICARE

## 2023-08-10 ENCOUNTER — OFFICE VISIT (OUTPATIENT)
Dept: INTERVENTIONAL RADIOLOGY/VASCULAR | Facility: CLINIC | Age: 78
End: 2023-08-10
Payer: MEDICARE

## 2023-08-10 VITALS
SYSTOLIC BLOOD PRESSURE: 146 MMHG | HEIGHT: 64 IN | WEIGHT: 98.63 LBS | HEART RATE: 69 BPM | BODY MASS INDEX: 16.84 KG/M2 | DIASTOLIC BLOOD PRESSURE: 74 MMHG

## 2023-08-10 DIAGNOSIS — C22.0 HCC (HEPATOCELLULAR CARCINOMA): ICD-10-CM

## 2023-08-10 DIAGNOSIS — C22.0 HCC (HEPATOCELLULAR CARCINOMA): Primary | ICD-10-CM

## 2023-08-10 LAB
ALBUMIN SERPL BCP-MCNC: 3.6 G/DL (ref 3.5–5.2)
ALP SERPL-CCNC: 94 U/L (ref 55–135)
ALT SERPL W/O P-5'-P-CCNC: 125 U/L (ref 10–44)
ANION GAP SERPL CALC-SCNC: 10 MMOL/L (ref 8–16)
AST SERPL-CCNC: 37 U/L (ref 10–40)
BILIRUB SERPL-MCNC: 1.4 MG/DL (ref 0.1–1)
BUN SERPL-MCNC: 10 MG/DL (ref 8–23)
CALCIUM SERPL-MCNC: 9.3 MG/DL (ref 8.7–10.5)
CHLORIDE SERPL-SCNC: 107 MMOL/L (ref 95–110)
CO2 SERPL-SCNC: 25 MMOL/L (ref 23–29)
CREAT SERPL-MCNC: 1.2 MG/DL (ref 0.5–1.4)
EST. GFR  (NO RACE VARIABLE): 46.9 ML/MIN/1.73 M^2
GLUCOSE SERPL-MCNC: 148 MG/DL (ref 70–110)
POTASSIUM SERPL-SCNC: 3.9 MMOL/L (ref 3.5–5.1)
PROT SERPL-MCNC: 7.2 G/DL (ref 6–8.4)
SODIUM SERPL-SCNC: 142 MMOL/L (ref 136–145)

## 2023-08-10 PROCEDURE — 3078F DIAST BP <80 MM HG: CPT | Mod: CPTII,S$GLB,, | Performed by: FAMILY MEDICINE

## 2023-08-10 PROCEDURE — 3078F PR MOST RECENT DIASTOLIC BLOOD PRESSURE < 80 MM HG: ICD-10-PCS | Mod: CPTII,S$GLB,, | Performed by: FAMILY MEDICINE

## 2023-08-10 PROCEDURE — 99999 PR PBB SHADOW E&M-EST. PATIENT-LVL III: CPT | Mod: PBBFAC,,, | Performed by: FAMILY MEDICINE

## 2023-08-10 PROCEDURE — 1160F PR REVIEW ALL MEDS BY PRESCRIBER/CLIN PHARMACIST DOCUMENTED: ICD-10-PCS | Mod: CPTII,S$GLB,, | Performed by: FAMILY MEDICINE

## 2023-08-10 PROCEDURE — 3077F PR MOST RECENT SYSTOLIC BLOOD PRESSURE >= 140 MM HG: ICD-10-PCS | Mod: CPTII,S$GLB,, | Performed by: FAMILY MEDICINE

## 2023-08-10 PROCEDURE — 3077F SYST BP >= 140 MM HG: CPT | Mod: CPTII,S$GLB,, | Performed by: FAMILY MEDICINE

## 2023-08-10 PROCEDURE — 99999 PR PBB SHADOW E&M-EST. PATIENT-LVL III: ICD-10-PCS | Mod: PBBFAC,,, | Performed by: FAMILY MEDICINE

## 2023-08-10 PROCEDURE — 80053 COMPREHEN METABOLIC PANEL: CPT | Performed by: FAMILY MEDICINE

## 2023-08-10 PROCEDURE — 1159F PR MEDICATION LIST DOCUMENTED IN MEDICAL RECORD: ICD-10-PCS | Mod: CPTII,S$GLB,, | Performed by: FAMILY MEDICINE

## 2023-08-10 PROCEDURE — 1159F MED LIST DOCD IN RCRD: CPT | Mod: CPTII,S$GLB,, | Performed by: FAMILY MEDICINE

## 2023-08-10 PROCEDURE — 1160F RVW MEDS BY RX/DR IN RCRD: CPT | Mod: CPTII,S$GLB,, | Performed by: FAMILY MEDICINE

## 2023-08-10 PROCEDURE — 99213 PR OFFICE/OUTPT VISIT, EST, LEVL III, 20-29 MIN: ICD-10-PCS | Mod: S$GLB,,, | Performed by: FAMILY MEDICINE

## 2023-08-10 PROCEDURE — 99213 OFFICE O/P EST LOW 20 MIN: CPT | Mod: S$GLB,,, | Performed by: FAMILY MEDICINE

## 2023-08-10 PROCEDURE — 36415 COLL VENOUS BLD VENIPUNCTURE: CPT | Performed by: FAMILY MEDICINE

## 2023-08-10 NOTE — PROGRESS NOTES
Subjective     Patient ID: Seema Gann is a 77 y.o. female.    Chief Complaint: Hepatocellular Carcinoma    Patient  here for follow up of biopsy proven (2023) hepatocellular carcinoma recently treated with radioembolization on 2023. Has a history of HCV. Patient's kidney function limits the use of contrast. She  reports feeling well. She denies any  abdominal pain or abdominal distention. She is accompanied  by her . She had an  MRI and AFP on 2023.      Review of Systems   Constitutional:  Negative for activity change, appetite change, chills, fatigue and fever.   Respiratory:  Negative for cough, shortness of breath, wheezing and stridor.    Cardiovascular:  Negative for chest pain, palpitations and leg swelling.   Gastrointestinal:  Negative for abdominal distention, abdominal pain, constipation, diarrhea, nausea and vomiting.          Objective     Physical Exam  Constitutional:       General: She is not in acute distress.     Appearance: She is well-developed. She is not diaphoretic.   HENT:      Head: Normocephalic and atraumatic.   Pulmonary:      Effort: Pulmonary effort is normal. No respiratory distress.   Neurological:      Mental Status: She is alert and oriented to person, place, and time.   Psychiatric:         Behavior: Behavior normal.         Thought Content: Thought content normal.         Judgment: Judgment normal.     Reviewed hepatology progress  note.     ECO  MELD 3.0: 11 at 2023  9:15 AM  MELD-Na: 10 at 2023  9:15 AM  Calculated from:  Serum Creatinine: 1.3 mg/dL at 2023  9:15 AM  Serum Sodium: 141 mmol/L (Using max of 137 mmol/L) at 2023  9:15 AM  Total Bilirubin: 1.0 mg/dL at 2023  9:15 AM  Serum Albumin: 4.3 g/dL (Using max of 3.5 g/dL) at 2023  9:15 AM  INR(ratio): 1.1 at 2023  6:58 AM  Age at listing (hypothetical): 77 years  Sex: Female at 2023  9:15 AM  Transplant Status: not a candidate    MRI  8/1/2023  Impression:     No real change in size of large right hepatic lobe mass in keeping with 8 cc.  Persistent diffusion restriction with suggest residual viable tumor and continued follow-up is suggested.     Stable background of cirrhosis and portal hypertension     Enhancing L3 vertebral body lesion which can be further evaluated with dedicated MRI of the lumbar spine    AFP 8/1/2023  Component Ref Range & Units 9 d ago  (8/1/23) 3 mo ago  (4/26/23) 5 mo ago  (3/1/23) 8 mo ago  (12/9/22) 7 yr ago  (5/4/16) 7 yr ago  (10/14/15) 8 yr ago  (4/10/15)   AFP 0.0 - 8.4 ng/mL 88 High   460 High  CM  113 High  CM  24 High  CM  4.2  4.0  10 High          Assessment and Plan     1. HCC (hepatocellular carcinoma)  -     Comprehensive Metabolic Panel; Future; Expected date: 08/10/2023        Reviewed MRI and AFP with Dr. Dao. Explained to patient MRI arterial phase is  subpar, making it difficulty to evaluate for residual disease. Would like to obtain  TPCT for further evaluation, but need  to make sure kidney function is adequate.  Therefore, recommendation  is to obtain CMP today. If renal function adequate, then would   prefer a TPCT  scan. If renal function proves to be inadequate, then will schedule for repeat mapping and evaluate for  residual at that time. Patient verbalized understanding and agreement. I will call her when CMP results. Confirmed patient's phone number in chart and provided clinic phone  number to patient.

## 2023-08-11 ENCOUNTER — TELEPHONE (OUTPATIENT)
Dept: INTERVENTIONAL RADIOLOGY/VASCULAR | Facility: HOSPITAL | Age: 78
End: 2023-08-11
Payer: MEDICARE

## 2023-08-11 ENCOUNTER — TELEPHONE (OUTPATIENT)
Dept: INTERVENTIONAL RADIOLOGY/VASCULAR | Facility: CLINIC | Age: 78
End: 2023-08-11
Payer: MEDICARE

## 2023-08-11 DIAGNOSIS — C22.0 HCC (HEPATOCELLULAR CARCINOMA): Primary | ICD-10-CM

## 2023-08-11 NOTE — TELEPHONE ENCOUNTER
Left VM stating that CMP shows normal creatine. CT scan ordered. Schedulers will call to set up. Please call clinic with any questions/concerns: [phone].

## 2023-08-17 ENCOUNTER — TELEPHONE (OUTPATIENT)
Dept: INTERVENTIONAL RADIOLOGY/VASCULAR | Facility: CLINIC | Age: 78
End: 2023-08-17
Payer: MEDICARE

## 2023-08-17 NOTE — TELEPHONE ENCOUNTER
Left VM  stating I was calling regarding her CT results. Please call clinic to discuss: [phone]

## 2023-08-22 ENCOUNTER — TELEPHONE (OUTPATIENT)
Dept: INTERVENTIONAL RADIOLOGY/VASCULAR | Facility: CLINIC | Age: 78
End: 2023-08-22
Payer: MEDICARE

## 2023-08-22 DIAGNOSIS — C22.0 HCC (HEPATOCELLULAR CARCINOMA): Primary | ICD-10-CM

## 2023-08-22 NOTE — TELEPHONE ENCOUNTER
Spoke to patient. Notified that CT scan shows improvement in  tumor burden,  but residual is noted. Recommendation  of Dr. Dao is to repeat treatment with radioembolization, starting with repeat mapping. Patient verbalized  understanding and agreement. Patient scheduled for mapping on  9/5/2023.

## 2023-08-22 NOTE — TELEPHONE ENCOUNTER
Second attempt at contacting patient regarding her CT  scan  results. Left VM to please call clinic regarding  CT  scan results: [phone]

## 2023-08-30 ENCOUNTER — TELEPHONE (OUTPATIENT)
Dept: INTERVENTIONAL RADIOLOGY/VASCULAR | Facility: HOSPITAL | Age: 78
End: 2023-08-30

## 2023-09-05 ENCOUNTER — HOSPITAL ENCOUNTER (OUTPATIENT)
Dept: RADIOLOGY | Facility: HOSPITAL | Age: 78
Discharge: HOME | End: 2023-09-05
Attending: FAMILY MEDICINE
Payer: MEDICARE

## 2023-09-05 ENCOUNTER — HOSPITAL ENCOUNTER (OUTPATIENT)
Dept: INTERVENTIONAL RADIOLOGY/VASCULAR | Facility: HOSPITAL | Age: 78
Discharge: HOME | End: 2023-09-05
Attending: FAMILY MEDICINE | Admitting: RADIOLOGY
Payer: MEDICARE

## 2023-09-05 DIAGNOSIS — C22.0 HCC (HEPATOCELLULAR CARCINOMA): ICD-10-CM

## 2023-09-05 PROCEDURE — 36248 INS CATH ABD/L-EXT ART ADDL: CPT | Mod: ICN,,, | Performed by: RADIOLOGY

## 2023-09-05 PROCEDURE — 75887 PR  PERCUT XHEPATIC PORTOGRAM: ICD-10-PCS | Mod: 26,ICN,, | Performed by: RADIOLOGY

## 2023-09-05 PROCEDURE — C1769 GUIDE WIRE: HCPCS

## 2023-09-05 PROCEDURE — 76380 CAT SCAN FOLLOW-UP STUDY: CPT | Mod: 26,ICN,, | Performed by: RADIOLOGY

## 2023-09-05 PROCEDURE — 77290 PR  SET RADN THERAPY FIELD COMPLEX: ICD-10-PCS | Mod: 26,ICN,, | Performed by: RADIOLOGY

## 2023-09-05 PROCEDURE — 36247 INS CATH ABD/L-EXT ART 3RD: CPT | Performed by: RADIOLOGY

## 2023-09-05 PROCEDURE — 36247 IR EMBOLIZATION COMP FOR TUMOR_ORGAN ISCHEMIA_INFARC: ICD-10-PCS | Mod: RT,ICN,, | Performed by: RADIOLOGY

## 2023-09-05 PROCEDURE — 78830 RP LOCLZJ TUM SPECT W/CT 1: CPT | Mod: 26,,, | Performed by: STUDENT IN AN ORGANIZED HEALTH CARE EDUCATION/TRAINING PROGRAM

## 2023-09-05 PROCEDURE — 78201 LIVER IMAGING STATIC ONLY: CPT | Mod: TC

## 2023-09-05 PROCEDURE — 77290 THER RAD SIMULAJ FIELD CPLX: CPT | Mod: 26,ICN,, | Performed by: RADIOLOGY

## 2023-09-05 PROCEDURE — 78830 RP LOCLZJ TUM SPECT W/CT 1: CPT | Mod: TC

## 2023-09-05 PROCEDURE — 78201 NM LIVER IMAGING STATIC PRE Y-90 EMBOLIZATION: ICD-10-PCS | Mod: 26,59,, | Performed by: STUDENT IN AN ORGANIZED HEALTH CARE EDUCATION/TRAINING PROGRAM

## 2023-09-05 PROCEDURE — 78830 NM SPECT/CT SINGLE AREA: ICD-10-PCS | Mod: 26,,, | Performed by: STUDENT IN AN ORGANIZED HEALTH CARE EDUCATION/TRAINING PROGRAM

## 2023-09-05 PROCEDURE — 99152 MOD SED SAME PHYS/QHP 5/>YRS: CPT | Performed by: RADIOLOGY

## 2023-09-05 PROCEDURE — 75887 VEIN X-RAY LIVER W/O HEMODYN: CPT | Mod: TC | Performed by: RADIOLOGY

## 2023-09-05 PROCEDURE — G0269 OCCLUSIVE DEVICE IN VEIN ART: HCPCS | Performed by: RADIOLOGY

## 2023-09-05 PROCEDURE — 76377 3D RENDER W/INTRP POSTPROCES: CPT | Mod: 26,59,ICN, | Performed by: RADIOLOGY

## 2023-09-05 PROCEDURE — 76937 US GUIDE VASCULAR ACCESS: CPT | Mod: 26,ICN,, | Performed by: RADIOLOGY

## 2023-09-05 PROCEDURE — 78201 LIVER IMAGING STATIC ONLY: CPT | Mod: 26,59,, | Performed by: STUDENT IN AN ORGANIZED HEALTH CARE EDUCATION/TRAINING PROGRAM

## 2023-09-05 PROCEDURE — 36248 INS CATH ABD/L-EXT ART ADDL: CPT | Mod: RT | Performed by: RADIOLOGY

## 2023-09-05 PROCEDURE — 75726 ARTERY X-RAYS ABDOMEN: CPT | Mod: TC | Performed by: RADIOLOGY

## 2023-09-05 PROCEDURE — 76380 CAT SCAN FOLLOW-UP STUDY: CPT | Mod: TC | Performed by: RADIOLOGY

## 2023-09-05 PROCEDURE — 75887 VEIN X-RAY LIVER W/O HEMODYN: CPT | Mod: 26,ICN,, | Performed by: RADIOLOGY

## 2023-09-05 PROCEDURE — 99153 MOD SED SAME PHYS/QHP EA: CPT | Mod: 59 | Performed by: RADIOLOGY

## 2023-09-05 PROCEDURE — 76937 PR  US GUIDE, VASCULAR ACCESS: ICD-10-PCS | Mod: 26,ICN,, | Performed by: RADIOLOGY

## 2023-09-05 PROCEDURE — 76380 PR  CT SCAN,LIMITED/LOCALIZED F/U STUDY: ICD-10-PCS | Mod: 26,ICN,, | Performed by: RADIOLOGY

## 2023-09-05 PROCEDURE — 75774 ARTERY X-RAY EACH VESSEL: CPT | Mod: 26,ICN,, | Performed by: RADIOLOGY

## 2023-09-05 PROCEDURE — 75726 CHG ANGIO VISCERAL SELECTV/SUBSELEC: ICD-10-PCS | Mod: 26,ICN,, | Performed by: RADIOLOGY

## 2023-09-05 PROCEDURE — 76937 US GUIDE VASCULAR ACCESS: CPT | Mod: TC | Performed by: RADIOLOGY

## 2023-09-05 PROCEDURE — 63600175 PHARM REV CODE 636 W HCPCS: Performed by: INTERNAL MEDICINE

## 2023-09-05 PROCEDURE — 76377 PR  3D RENDERING W/ IMAGE POSTPROCESS: ICD-10-PCS | Mod: 26,59,ICN, | Performed by: RADIOLOGY

## 2023-09-05 PROCEDURE — 75774 PR  ANGIO EA ADDNL SELECTV VESSEL: ICD-10-PCS | Mod: 26,ICN,, | Performed by: RADIOLOGY

## 2023-09-05 PROCEDURE — 75774 ARTERY X-RAY EACH VESSEL: CPT | Mod: TC | Performed by: RADIOLOGY

## 2023-09-05 PROCEDURE — 75726 ARTERY X-RAYS ABDOMEN: CPT | Mod: 26,ICN,, | Performed by: RADIOLOGY

## 2023-09-05 PROCEDURE — 76377 3D RENDER W/INTRP POSTPROCES: CPT | Mod: 59,TC | Performed by: RADIOLOGY

## 2023-09-05 PROCEDURE — 36248 PR PR INS CATH ABD/L-EXT ART ADDL 2ND ORD/3RD ORD/BYD: ICD-10-PCS | Mod: ICN,,, | Performed by: RADIOLOGY

## 2023-09-05 PROCEDURE — 77290 THER RAD SIMULAJ FIELD CPLX: CPT | Mod: TC | Performed by: RADIOLOGY

## 2023-09-05 RX ORDER — FENTANYL CITRATE 50 UG/ML
INJECTION, SOLUTION INTRAMUSCULAR; INTRAVENOUS
Status: COMPLETED | OUTPATIENT
Start: 2023-09-05 | End: 2023-09-05

## 2023-09-05 RX ORDER — LIDOCAINE HYDROCHLORIDE 10 MG/ML
1 INJECTION, SOLUTION EPIDURAL; INFILTRATION; INTRACAUDAL; PERINEURAL ONCE AS NEEDED
Status: DISCONTINUED | OUTPATIENT
Start: 2023-09-05 | End: 2023-09-06 | Stop reason: HOSPADM

## 2023-09-05 RX ORDER — MIDAZOLAM HYDROCHLORIDE 1 MG/ML
INJECTION INTRAMUSCULAR; INTRAVENOUS
Status: COMPLETED | OUTPATIENT
Start: 2023-09-05 | End: 2023-09-05

## 2023-09-05 RX ORDER — SODIUM CHLORIDE 9 MG/ML
INJECTION, SOLUTION INTRAVENOUS CONTINUOUS
Status: DISCONTINUED | OUTPATIENT
Start: 2023-09-05 | End: 2023-09-06 | Stop reason: HOSPADM

## 2023-09-05 RX ADMIN — MIDAZOLAM HYDROCHLORIDE 1 MG: 2 INJECTION, SOLUTION INTRAMUSCULAR; INTRAVENOUS at 10:09

## 2023-09-05 RX ADMIN — FENTANYL CITRATE 50 MCG: 50 INJECTION, SOLUTION INTRAMUSCULAR; INTRAVENOUS at 10:09

## 2023-09-05 RX ADMIN — FENTANYL CITRATE 25 MCG: 50 INJECTION, SOLUTION INTRAMUSCULAR; INTRAVENOUS at 12:09

## 2023-09-05 NOTE — H&P
VIR Pre-Procedure H&P      SUBJECTIVE:     Chief Complaint: Right HCC    History of Present Illness:  Seema Gann is a 77 y.o. female who presents for Y90 mapping angiography for large right lobe HCC  Past Medical History:   Diagnosis Date    Cirrhosis     Hepatitis     Hypertension      Past Surgical History:   Procedure Laterality Date    APPENDECTOMY      HYSTERECTOMY         Home Meds:   Prior to Admission medications    Medication Sig Start Date End Date Taking? Authorizing Provider   amLODIPine (NORVASC) 10 MG tablet  2/15/23  Yes Provider, Historical   carvedilol (COREG) 3.125 MG tablet Take 3.125 mg by mouth 2 (two) times daily with meals.   Yes Provider, Historical   lisinopril (PRINIVIL,ZESTRIL) 20 MG tablet Take 20 mg by mouth once daily.   Yes Provider, Historical   lisinopriL-hydrochlorothiazide (PRINZIDE,ZESTORETIC) 20-12.5 mg per tablet  2/15/23   Provider, Historical   ondansetron (ZOFRAN) 4 MG tablet Take 1 tablet (4 mg total) by mouth every 8 (eight) hours as needed for Nausea.  Patient not taking: Reported on 7/19/2023 6/27/23   Roberto Cruz MD   oxyCODONE-acetaminophen (PERCOCET) 5-325 mg per tablet Take 1 tablet by mouth every 6 (six) hours as needed for Pain.  Patient not taking: Reported on 7/19/2023 6/27/23   Roberto Cruz MD   spironolactone (ALDACTONE) 100 MG tablet Take 100 mg by mouth once daily.    Provider, Historical     Anticoagulants/Antiplatelets: no anticoagulation    Allergies: Review of patient's allergies indicates:  No Known Allergies  Sedation History:  no adverse reactions    Review of Systems:   Hematological: no known coagulopathies  Respiratory: no shortness of breath  Cardiovascular: no chest pain  Gastrointestinal: no abdominal pain  Genito-Urinary: no dysuria  Musculoskeletal: negative  Neurological: no TIA or stroke symptoms         OBJECTIVE:     Vital Signs (Most Recent)  Temp: 97.2 °F (36.2 °C) (09/05/23 0746)  Pulse: 65 (09/05/23 0746)  Resp: 16  (09/05/23 0746)  BP: 130/63 (09/05/23 0746)  SpO2: 95 % (09/05/23 0746)    Physical Exam:  ASA: 3  Mallampati: 2    General: no acute distress  Mental Status: alert and oriented to person, place and time  HEENT: normocephalic, atraumatic  Chest: unlabored breathing  Heart: regular heart rate  Abdomen: nondistended  Extremity: moves all extremities    Laboratory  Lab Results   Component Value Date    INR 1.1 09/05/2023       Lab Results   Component Value Date    WBC 3.74 (L) 09/05/2023    HGB 10.7 (L) 09/05/2023    HCT 32.7 (L) 09/05/2023    MCV 98 09/05/2023    PLT 89 (L) 09/05/2023      Lab Results   Component Value Date     (H) 08/10/2023     08/10/2023    K 3.9 08/10/2023     08/10/2023    CO2 25 08/10/2023    BUN 10 08/10/2023    CREATININE 1.2 08/10/2023    CALCIUM 9.3 08/10/2023     (H) 08/10/2023    AST 37 08/10/2023    ALBUMIN 3.6 08/10/2023    BILITOT 1.4 (H) 08/10/2023    BILIDIR 0.4 (H) 06/27/2023       ASSESSMENT/PLAN:     Sedation Plan: moderate sedation  Patient will undergo y90 mapping angiography for right HCC.      Tae Mendez MD  VIR Fellow

## 2023-09-05 NOTE — DISCHARGE INSTRUCTIONS
For scheduling: Call Cece at [phone]    For questions or concerns call: [facility] 8 AM- 5PM [phone]. Radiology resident on call [phone].    For immediate concerns that are not emergent, you may call our radiology clinic at: [phone]      Discharge Instructions for Hepatic Angiography  You had a procedure called hepatic angiography. This is an X-ray study of the blood vessels that supply your liver. During  the procedure, a catheter (thin, flexible tube) was inserted into one of your blood vessels through a small incision. A  specially trained doctor called an interventional radiologist usually does the procedure. Heres what to do at home  afterward.    Home care  Follow your doctor's recommendations on when it is safe to drive after the procedure.  Exercise according to your doctors recommendations.  You can shower the day after the procedure.  Ask your doctor when it is safe to swim or take a bath.  Take your medications exactly as directed. Dont skip doses.  Unless directed otherwise, drink 6 to 8 glasses of water a day to prevent dehydration and to help flush your body of  the dye that was used during your procedure.  Take your temperature and check the place where your incision was made for signs of infection (redness, swelling, bleeding or  warmth) every day for a week.  Report any numbness or coolness to the extremity. Report any discoloration to the puncture site or the extremity.    Follow-up care  Make a follow-up appointment as directed by our staff.  Ask your doctor when you can return to work.

## 2023-09-05 NOTE — PLAN OF CARE
Pt arrives to room alert and oriented X 4. Pt denies discomfort. Pt here for pre y 90. Consent, labs and HP verified. Pt moves self to procedure table, secured and connected to monitor for safety.

## 2023-09-05 NOTE — PLAN OF CARE
Pt arrived via strecher, right groin site soft palpable, bl +2 pedal pulses noted,  at bedside, connect to monitor, monitoring pt, call bell, tv remoted at bedside, int=structed to call for assistance, assisted pt to bed pan urine noted

## 2023-09-05 NOTE — PLAN OF CARE
Pt continues to tolerate procedure without difficulty and denies discomfort. Will continue to monitor.

## 2023-09-05 NOTE — PLAN OF CARE
Went over discharged instruction, assisted pt getting dress, assess site, c/d/I after walked to restroom, asked pt and  to feel the rg groin site, asked what do it feels like, pt/ stated soft, instructed pt to called IR number if sites feels cool, bleed, numbness, they reply ok, wheelchair to parking garaged

## 2023-09-05 NOTE — PROCEDURES
VIR Post-Procedure Note    Pre Op Diagnosis: Hepatocellular carcinoma    Post Op Diagnosis: Same    Procedure: Yttrium planning    Procedure performed by: Tae Mendez MD / Haseeb Dao MD    Written Informed Consent Obtained:  Yes    Specimen Removed: No    Estimated Blood Loss:  Minimal     Findings:    RCFA access, Celiac and superselective hepatic angiography    Radioembolization preparatory angiography with administration of [id number].     Patient tolerated procedure well.      Plan: Patient to be sent to nuclear medicine for radioisotope imaging and calculation of lung shunt fraction        Tae Mendez MD  VIR Fellow

## 2023-09-07 VITALS
HEART RATE: 52 BPM | RESPIRATION RATE: 17 BRPM | WEIGHT: 100 LBS | BODY MASS INDEX: 17.07 KG/M2 | TEMPERATURE: 97 F | OXYGEN SATURATION: 100 % | SYSTOLIC BLOOD PRESSURE: 113 MMHG | DIASTOLIC BLOOD PRESSURE: 61 MMHG | HEIGHT: 64 IN

## 2023-09-20 DIAGNOSIS — C22.0 HCC (HEPATOCELLULAR CARCINOMA): Primary | ICD-10-CM

## 2023-09-20 RX ORDER — SODIUM CHLORIDE 9 MG/ML
INJECTION, SOLUTION INTRAVENOUS CONTINUOUS
Status: CANCELLED | OUTPATIENT
Start: 2023-09-20

## 2023-10-10 ENCOUNTER — TELEPHONE (OUTPATIENT)
Dept: INTERVENTIONAL RADIOLOGY/VASCULAR | Facility: CLINIC | Age: 78
End: 2023-10-10
Payer: MEDICARE

## 2023-10-10 DIAGNOSIS — C22.0 HCC (HEPATOCELLULAR CARCINOMA): Primary | ICD-10-CM

## 2023-10-10 NOTE — TELEPHONE ENCOUNTER
Called pt to review mild hypokalemia result, K at 3.2.  Pt reports appetite is good.  Denies GI losses.  No new meds.  Reviewed high k foods to eat.  Repeat BMP order in for 10/12.

## 2023-10-11 ENCOUNTER — TELEPHONE (OUTPATIENT)
Dept: INTERVENTIONAL RADIOLOGY/VASCULAR | Facility: HOSPITAL | Age: 78
End: 2023-10-11
Payer: MEDICARE

## 2023-10-11 NOTE — NURSING
Advised to arrive at 8:00, where to check in, have nothing to eat/drink past midnight, have ride to/from procedure, take a.m. meds with small sip of water, bring current list of home meds. Confirmed no allergies and no blood thinners.

## 2023-10-12 ENCOUNTER — HOSPITAL ENCOUNTER (OUTPATIENT)
Dept: RADIOLOGY | Facility: HOSPITAL | Age: 78
Discharge: HOME | End: 2023-10-12
Attending: FAMILY MEDICINE
Payer: MEDICARE

## 2023-10-12 ENCOUNTER — HOSPITAL ENCOUNTER (OUTPATIENT)
Dept: INTERVENTIONAL RADIOLOGY/VASCULAR | Facility: HOSPITAL | Age: 78
Discharge: HOME | End: 2023-10-12
Attending: FAMILY MEDICINE
Payer: MEDICARE

## 2023-10-12 VITALS
RESPIRATION RATE: 15 BRPM | SYSTOLIC BLOOD PRESSURE: 137 MMHG | BODY MASS INDEX: 17.07 KG/M2 | WEIGHT: 100 LBS | HEART RATE: 54 BPM | HEIGHT: 64 IN | DIASTOLIC BLOOD PRESSURE: 69 MMHG | TEMPERATURE: 99 F | OXYGEN SATURATION: 100 %

## 2023-10-12 DIAGNOSIS — C22.0 HCC (HEPATOCELLULAR CARCINOMA): ICD-10-CM

## 2023-10-12 DIAGNOSIS — C22.0 HCC (HEPATOCELLULAR CARCINOMA): Primary | ICD-10-CM

## 2023-10-12 LAB
ALBUMIN SERPL BCP-MCNC: 3.8 G/DL (ref 3.5–5.2)
ALP SERPL-CCNC: 71 U/L (ref 55–135)
ALT SERPL W/O P-5'-P-CCNC: 22 U/L (ref 10–44)
ANION GAP SERPL CALC-SCNC: 9 MMOL/L (ref 8–16)
AST SERPL-CCNC: 25 U/L (ref 10–40)
BILIRUB SERPL-MCNC: 1.2 MG/DL (ref 0.1–1)
BUN SERPL-MCNC: 13 MG/DL (ref 8–23)
CALCIUM SERPL-MCNC: 9.1 MG/DL (ref 8.7–10.5)
CHLORIDE SERPL-SCNC: 110 MMOL/L (ref 95–110)
CO2 SERPL-SCNC: 23 MMOL/L (ref 23–29)
CREAT SERPL-MCNC: 1.3 MG/DL (ref 0.5–1.4)
EST. GFR  (NO RACE VARIABLE): 42 ML/MIN/1.73 M^2
GLUCOSE SERPL-MCNC: 105 MG/DL (ref 70–110)
POTASSIUM SERPL-SCNC: 3 MMOL/L (ref 3.5–5.1)
PROT SERPL-MCNC: 7.9 G/DL (ref 6–8.4)
SODIUM SERPL-SCNC: 142 MMOL/L (ref 136–145)

## 2023-10-12 PROCEDURE — 78830 RP LOCLZJ TUM SPECT W/CT 1: CPT | Mod: 26,,, | Performed by: RADIOLOGY

## 2023-10-12 PROCEDURE — 77300 RADIATION THERAPY DOSE PLAN: CPT | Mod: 26,,, | Performed by: RADIOLOGY

## 2023-10-12 PROCEDURE — 79445 PR  NUCLEAR THERAPY, INTRA-ARTERIAL: ICD-10-PCS | Mod: 26,,, | Performed by: RADIOLOGY

## 2023-10-12 PROCEDURE — 79445 NUCLEAR RX INTRA-ARTERIAL: CPT | Mod: TC | Performed by: RADIOLOGY

## 2023-10-12 PROCEDURE — 76937 US GUIDE VASCULAR ACCESS: CPT | Mod: 26,,, | Performed by: RADIOLOGY

## 2023-10-12 PROCEDURE — 76380 CAT SCAN FOLLOW-UP STUDY: CPT | Mod: TC,59 | Performed by: RADIOLOGY

## 2023-10-12 PROCEDURE — 75726 ARTERY X-RAYS ABDOMEN: CPT | Mod: 26,59,, | Performed by: RADIOLOGY

## 2023-10-12 PROCEDURE — 78830 NM SPECT/CT SINGLE AREA: ICD-10-PCS | Mod: 26,,, | Performed by: RADIOLOGY

## 2023-10-12 PROCEDURE — 99153 MOD SED SAME PHYS/QHP EA: CPT | Performed by: RADIOLOGY

## 2023-10-12 PROCEDURE — G0269 OCCLUSIVE DEVICE IN VEIN ART: HCPCS | Performed by: RADIOLOGY

## 2023-10-12 PROCEDURE — C2616 BRACHYTX, NON-STR,YTTRIUM-90: HCPCS

## 2023-10-12 PROCEDURE — 78201 LIVER IMAGING STATIC ONLY: CPT | Mod: TC,59

## 2023-10-12 PROCEDURE — 27201045 IR EMBOLIZATION COMP FOR TUMOR_ORGAN ISCHEMIA_INFARC

## 2023-10-12 PROCEDURE — 76377 3D RENDER W/INTRP POSTPROCES: CPT | Mod: 26,59,, | Performed by: RADIOLOGY

## 2023-10-12 PROCEDURE — 76380 CAT SCAN FOLLOW-UP STUDY: CPT | Mod: 26,59,, | Performed by: RADIOLOGY

## 2023-10-12 PROCEDURE — 75774 ARTERY X-RAY EACH VESSEL: CPT | Mod: TC,59 | Performed by: RADIOLOGY

## 2023-10-12 PROCEDURE — 63600175 PHARM REV CODE 636 W HCPCS: Performed by: RADIOLOGY

## 2023-10-12 PROCEDURE — 79445 NUCLEAR RX INTRA-ARTERIAL: CPT | Mod: 26,,, | Performed by: RADIOLOGY

## 2023-10-12 PROCEDURE — 37243 VASC EMBOLIZE/OCCLUDE ORGAN: CPT | Mod: RT | Performed by: RADIOLOGY

## 2023-10-12 PROCEDURE — 75726 ARTERY X-RAYS ABDOMEN: CPT | Mod: TC,59 | Performed by: RADIOLOGY

## 2023-10-12 PROCEDURE — 77300 RADIATION THERAPY DOSE PLAN: CPT | Mod: TC | Performed by: RADIOLOGY

## 2023-10-12 PROCEDURE — 76377 PR  3D RENDERING W/ IMAGE POSTPROCESS: ICD-10-PCS | Mod: 26,59,, | Performed by: RADIOLOGY

## 2023-10-12 PROCEDURE — 36247 INS CATH ABD/L-EXT ART 3RD: CPT | Performed by: RADIOLOGY

## 2023-10-12 PROCEDURE — 78201 LIVER IMAGING STATIC ONLY: CPT | Mod: 26,59,, | Performed by: RADIOLOGY

## 2023-10-12 PROCEDURE — 25500020 PHARM REV CODE 255: Performed by: RADIOLOGY

## 2023-10-12 PROCEDURE — 76937 US GUIDE VASCULAR ACCESS: CPT | Mod: TC | Performed by: RADIOLOGY

## 2023-10-12 PROCEDURE — 99152 MOD SED SAME PHYS/QHP 5/>YRS: CPT

## 2023-10-12 PROCEDURE — 75774 ARTERY X-RAY EACH VESSEL: CPT | Mod: 26,59,, | Performed by: RADIOLOGY

## 2023-10-12 PROCEDURE — C1760 CLOSURE DEV, VASC: HCPCS

## 2023-10-12 PROCEDURE — 99153 MOD SED SAME PHYS/QHP EA: CPT | Mod: 59

## 2023-10-12 PROCEDURE — 37243 IR EMBOLIZATION COMP FOR TUMOR_ORGAN ISCHEMIA_INFARC: ICD-10-PCS | Mod: ,,, | Performed by: RADIOLOGY

## 2023-10-12 PROCEDURE — 77300 PR RADIATION THERAPY,DOSIMETRY PLAN: ICD-10-PCS | Mod: 26,,, | Performed by: RADIOLOGY

## 2023-10-12 PROCEDURE — 25000003 PHARM REV CODE 250: Performed by: RADIOLOGY

## 2023-10-12 PROCEDURE — 75774 PR  ANGIO EA ADDNL SELECTV VESSEL: ICD-10-PCS | Mod: 26,59,, | Performed by: RADIOLOGY

## 2023-10-12 PROCEDURE — 36247 INS CATH ABD/L-EXT ART 3RD: CPT | Mod: 51,,, | Performed by: RADIOLOGY

## 2023-10-12 PROCEDURE — 76937 PR  US GUIDE, VASCULAR ACCESS: ICD-10-PCS | Mod: 26,,, | Performed by: RADIOLOGY

## 2023-10-12 PROCEDURE — 75726 CHG ANGIO VISCERAL SELECTV/SUBSELEC: ICD-10-PCS | Mod: 26,59,, | Performed by: RADIOLOGY

## 2023-10-12 PROCEDURE — 76377 3D RENDER W/INTRP POSTPROCES: CPT | Mod: TC | Performed by: RADIOLOGY

## 2023-10-12 PROCEDURE — 76380 PR  CT SCAN,LIMITED/LOCALIZED F/U STUDY: ICD-10-PCS | Mod: 26,59,, | Performed by: RADIOLOGY

## 2023-10-12 PROCEDURE — 78201 NM LIVER IMAGING STATIC POST Y-90 EMBOLIZATION: ICD-10-PCS | Mod: 26,59,, | Performed by: RADIOLOGY

## 2023-10-12 PROCEDURE — 99152 MOD SED SAME PHYS/QHP 5/>YRS: CPT | Performed by: RADIOLOGY

## 2023-10-12 PROCEDURE — 80053 COMPREHEN METABOLIC PANEL: CPT | Performed by: PHYSICIAN ASSISTANT

## 2023-10-12 PROCEDURE — 36247 PR PLACE CATH SUBSUBSELECT ART,ABD/PEL: ICD-10-PCS | Mod: 51,,, | Performed by: RADIOLOGY

## 2023-10-12 RX ORDER — ONDANSETRON 2 MG/ML
4 INJECTION INTRAMUSCULAR; INTRAVENOUS EVERY 6 HOURS PRN
Status: DISCONTINUED | OUTPATIENT
Start: 2023-10-12 | End: 2023-10-13 | Stop reason: HOSPADM

## 2023-10-12 RX ORDER — SODIUM CHLORIDE 9 MG/ML
INJECTION, SOLUTION INTRAVENOUS CONTINUOUS
Status: DISCONTINUED | OUTPATIENT
Start: 2023-10-12 | End: 2023-10-13 | Stop reason: HOSPADM

## 2023-10-12 RX ORDER — LIDOCAINE HYDROCHLORIDE 10 MG/ML
INJECTION INFILTRATION; PERINEURAL
Status: COMPLETED | OUTPATIENT
Start: 2023-10-12 | End: 2023-10-12

## 2023-10-12 RX ORDER — FENTANYL CITRATE 50 UG/ML
INJECTION, SOLUTION INTRAMUSCULAR; INTRAVENOUS
Status: COMPLETED | OUTPATIENT
Start: 2023-10-12 | End: 2023-10-12

## 2023-10-12 RX ORDER — SODIUM CHLORIDE 9 MG/ML
INJECTION, SOLUTION INTRAVENOUS
Status: COMPLETED | OUTPATIENT
Start: 2023-10-12 | End: 2023-10-12

## 2023-10-12 RX ORDER — MIDAZOLAM HYDROCHLORIDE 1 MG/ML
INJECTION INTRAMUSCULAR; INTRAVENOUS
Status: COMPLETED | OUTPATIENT
Start: 2023-10-12 | End: 2023-10-12

## 2023-10-12 RX ORDER — HEPARIN SODIUM 200 [USP'U]/100ML
INJECTION, SOLUTION INTRAVENOUS
Status: COMPLETED | OUTPATIENT
Start: 2023-10-12 | End: 2023-10-12

## 2023-10-12 RX ADMIN — HEPARIN SODIUM 500 UNITS/HR: 200 INJECTION, SOLUTION INTRAVENOUS at 10:10

## 2023-10-12 RX ADMIN — FENTANYL CITRATE 50 MCG: 50 INJECTION, SOLUTION INTRAMUSCULAR; INTRAVENOUS at 10:10

## 2023-10-12 RX ADMIN — LIDOCAINE HYDROCHLORIDE 5 ML: 10 INJECTION, SOLUTION INFILTRATION; PERINEURAL at 10:10

## 2023-10-12 RX ADMIN — MIDAZOLAM HYDROCHLORIDE 1 MG: 1 INJECTION, SOLUTION INTRAMUSCULAR; INTRAVENOUS at 10:10

## 2023-10-12 RX ADMIN — SODIUM CHLORIDE 500 ML: 0.9 INJECTION, SOLUTION INTRAVENOUS at 10:10

## 2023-10-12 RX ADMIN — IOHEXOL 40 ML: 350 INJECTION, SOLUTION INTRAVENOUS at 11:10

## 2023-10-12 RX ADMIN — FENTANYL CITRATE 25 MCG: 50 INJECTION, SOLUTION INTRAMUSCULAR; INTRAVENOUS at 10:10

## 2023-10-12 RX ADMIN — MIDAZOLAM HYDROCHLORIDE 0.5 MG: 1 INJECTION, SOLUTION INTRAMUSCULAR; INTRAVENOUS at 10:10

## 2023-10-12 NOTE — DISCHARGE INSTRUCTIONS
Y 90 Delivery Discharge Instructions      Monitor the groin site for bleeding. If bleeding occurs, apply firm, manual pressure and seek medical assistance immediately!  Do not shower/bathe tonight. Remove the groin dressing 24 hours post-procedure. You may shower at that time.  Cleanse the groin site with gentle soap and water; do not pick at any scab formation.  Monitor the groin site for signs and symptoms of infection (See below).  Stay three feet away from people especially pregnant people, small pets, and small children for three days.  Sleep alone for the next three days.  You may use the restroom as other members of your home; your waste is not radioactive.    Recovering at Home:  Follow your doctor's recommendations on when it is safe to drive after the procedure.  Rest according to your doctor's instructions post-procedure. Most people are able to resume normal activity within a few days.  Do not lift anything heavier than 10 pounds for three to four days.  Avoid strenuous activity for two weeks post-procedure.   Exercise according to your doctors recommendations.  Ask your doctor when it is safe to swim or take a bath.  Take your medications exactly as directed. Do not skip doses. Note: some medications should not be resumed after this procedure to prevent any adverse interaction with the contrast dye, which may be harmful to your kidneys. Be sure to ask your health care team about any potential reactions and for guidance on what medications to hold.  Unless directed otherwise, drink six to eight glasses of water a day to prevent dehydration and to help flush your body of the contrast dye used during your procedure. This may take up to 24 to 48 hours.  Take your temperature and check the groin site for signs of infection - redness, swelling, drainage, or warmth - every day for one week.    Follow-up care:  Make a follow-up appointment as directed by your health care team.   If you have stitches or  staples, see your doctor to have them removed seven to 10 days post-procedure.  Ask your doctor when you may return to work.    When to call your doctor:  Fever of 100.4°F (38°C) or higher, or as directed by your health care provider.  Sudden shortness breath or any bleeding, bruising, or a large area of swelling at the groin site.  Signs of an allergic reaction to the contrast dye: rash, nausea, vomiting, or trouble breathing.  Signs of infection at the groin site: increased pain, redness, swelling, warmth, or foul-smelling drainage.   Constant or increasing pain or numbness in your leg.  Your leg feels cold, looks blue; numbness and/or tingling in the leg, especially near the catheter insertion site.   Rapid, pounding, or irregular heartbeat.  Blood in your urine or black, tarry stools.

## 2023-10-12 NOTE — DISCHARGE SUMMARY
Radiology Discharge Summary      Hospital Course: No complications    Admit Date: 10/12/2023  Discharge Date: 10/12/2023     Instructions Given to Patient: Yes  Diet: Resume prior diet  Activity: activity as tolerated and no driving for today    Description of Condition on Discharge: Stable  Vital Signs (Most Recent): Temp: 98.8 °F (37.1 °C) (10/12/23 0817)  Pulse: 60 (10/12/23 0817)  Resp: 16 (10/12/23 0817)  BP: 134/63 (10/12/23 0817)  SpO2: 100 % (10/12/23 0817)    Discharge Disposition: Home    Discharge Diagnosis: HCC s/p Y90 delivery    Follow up: As scheduled    Haseeb Dao M.D.  Interventional Radiology  Department of Radiology

## 2023-10-12 NOTE — PROCEDURES
Radiology Post-Procedure Note    Pre Op Diagnosis: HCC    Post Op Diagnosis: Same    Procedure: Y90 delivery    Procedure performed by: Haseeb Dao MD    Written Informed Consent Obtained: Yes  Specimen Removed: NO  Estimated Blood Loss: Minimal    Findings:   Via rt cfa, celiac and rha selected and angiograms obtained. Y90 delivered to rha per WD. Vascade to rt cfa. No complications. See dictation.    Patient tolerated procedure well.    Haseeb Dao M.D.  Interventional Radiology  Department of Radiology

## 2023-10-12 NOTE — NURSING
Spouse, Jerome, is updated. Patient has completed nuclear medicine scanning and is now in the pre/post recovery area. Time of anticipated discharge is provided (13:10).

## 2023-10-12 NOTE — H&P
Radiology History & Physical      SUBJECTIVE:     Chief Complaint: HCC    History of Present Illness:  Seema Gann is a 78 y.o. female who presents for Y90 delivery  Past Medical History:   Diagnosis Date    Cirrhosis     Hepatitis     Hypertension      Past Surgical History:   Procedure Laterality Date    APPENDECTOMY      HYSTERECTOMY         Home Meds:   Prior to Admission medications    Medication Sig Start Date End Date Taking? Authorizing Provider   amLODIPine (NORVASC) 10 MG tablet  2/15/23  Yes Provider, Historical   carvedilol (COREG) 3.125 MG tablet Take 3.125 mg by mouth 2 (two) times daily with meals.   Yes Provider, Historical   lisinopril (PRINIVIL,ZESTRIL) 20 MG tablet Take 20 mg by mouth once daily.   Yes Provider, Historical   spironolactone (ALDACTONE) 100 MG tablet Take 100 mg by mouth once daily.   Yes Provider, Historical   lisinopriL-hydrochlorothiazide (PRINZIDE,ZESTORETIC) 20-12.5 mg per tablet  2/15/23   Provider, Historical   ondansetron (ZOFRAN) 4 MG tablet Take 1 tablet (4 mg total) by mouth every 8 (eight) hours as needed for Nausea.  Patient not taking: Reported on 7/19/2023 6/27/23   Roberto Cruz MD   oxyCODONE-acetaminophen (PERCOCET) 5-325 mg per tablet Take 1 tablet by mouth every 6 (six) hours as needed for Pain.  Patient not taking: Reported on 7/19/2023 6/27/23   Roberto Cruz MD     Anticoagulants/Antiplatelets: no anticoagulation    Allergies: Review of patient's allergies indicates:  No Known Allergies  Sedation History:  no adverse reactions    Review of Systems:   Hematological: no known coagulopathies  Respiratory: no shortness of breath  Cardiovascular: no chest pain  Gastrointestinal: no abdominal pain  Genito-Urinary: no dysuria  Musculoskeletal: negative  Neurological: no TIA or stroke symptoms         OBJECTIVE:     Vital Signs (Most Recent)  Temp: 98.8 °F (37.1 °C) (10/12/23 0817)  Pulse: 60 (10/12/23 0817)  Resp: 16 (10/12/23 0817)  BP: 134/63  (10/12/23 0817)  SpO2: 100 % (10/12/23 0817)    Physical Exam:  ASA: 3  Mallampati: 2    General: no acute distress  Mental Status: alert and oriented to person, place and time  HEENT: normocephalic, atraumatic  Chest: unlabored breathing  Heart: regular heart rate  Abdomen: nondistended  Extremity: moves all extremities    Laboratory  Lab Results   Component Value Date    INR 1.0 10/10/2023       Lab Results   Component Value Date    WBC 3.06 (L) 10/10/2023    HGB 10.5 (L) 10/10/2023    HCT 31.3 (L) 10/10/2023    MCV 97 10/10/2023    PLT 70 (L) 10/10/2023      Lab Results   Component Value Date     10/12/2023     10/12/2023    K 3.0 (L) 10/12/2023     10/12/2023    CO2 23 10/12/2023    BUN 13 10/12/2023    CREATININE 1.3 10/12/2023    CALCIUM 9.1 10/12/2023    ALT 22 10/12/2023    AST 25 10/12/2023    ALBUMIN 3.8 10/12/2023    BILITOT 1.2 (H) 10/12/2023    BILIDIR 0.3 10/10/2023       ASSESSMENT/PLAN:     Sedation Plan: Moderate  Patient will undergo Y90 delivery.    Haseeb Dao M.D.  Interventional Radiology  Department of Radiology

## 2023-10-12 NOTE — SEDATION DOCUMENTATION
IR procedure - Y 90 Delivery - is completed. Patient tolerated well. She is awake, alert, and oriented. Vital signs are stable. Patient will proceed to nuclear medicine for post procedure scanning and then to IR pre/post to complete the prescribed recovery time of two hours post hemostasis (11:10). Anticipated discharge is 13:10.

## 2023-10-19 ENCOUNTER — TELEPHONE (OUTPATIENT)
Dept: INTERVENTIONAL RADIOLOGY/VASCULAR | Facility: HOSPITAL | Age: 78
End: 2023-10-19
Payer: MEDICARE

## 2023-11-08 ENCOUNTER — TELEPHONE (OUTPATIENT)
Dept: HEPATOLOGY | Facility: CLINIC | Age: 78
End: 2023-11-08
Payer: MEDICARE

## 2023-11-08 NOTE — TELEPHONE ENCOUNTER
----- Message from Telma Stephens MA sent at 11/8/2023  3:59 PM CST -----  Regarding: FW: Speak to staff  Contact: Seema    ----- Message -----  From: Lilliana Amanda  Sent: 11/8/2023   3:13 PM CST  To: Cindy Weathers Staff  Subject: Speak to staff                                   Regarding: Speak to staff        Name Of Caller: Seema        Contact Preference: [phone] (home)           Nature of call:  pt is calling to speak to staff regarding, really bad pain that she is experiencing in her stomach, called about 9:00 am this morning and was told that Nurse Kaye FLORES, would call right back, she said she felt really bad. Please advise. Requesting a call back.

## 2023-11-08 NOTE — TELEPHONE ENCOUNTER
Patient is S/P Y90 from 10/12/23.  She told me she is not feeling well after her 3rd Y90 treatment.  She is complaining of weakness, lack of appetite and indigestion. Patient denies fever.   I instructed patient to increase meal intake to 3 meals/day.  Sit in a chair instead of lying in bed most of her day and do small walks.  She has appt with IR on 11/13/23.    Patient verbalized understanding.

## 2023-11-15 ENCOUNTER — TELEPHONE (OUTPATIENT)
Dept: HEPATOLOGY | Facility: CLINIC | Age: 78
End: 2023-11-15
Payer: MEDICARE

## 2023-11-15 ENCOUNTER — OFFICE VISIT (OUTPATIENT)
Dept: INTERVENTIONAL RADIOLOGY/VASCULAR | Facility: CLINIC | Age: 78
End: 2023-11-15
Payer: MEDICARE

## 2023-11-15 VITALS
WEIGHT: 101.94 LBS | HEIGHT: 64 IN | BODY MASS INDEX: 17.4 KG/M2 | DIASTOLIC BLOOD PRESSURE: 70 MMHG | SYSTOLIC BLOOD PRESSURE: 136 MMHG | HEART RATE: 79 BPM

## 2023-11-15 DIAGNOSIS — C22.0 HCC (HEPATOCELLULAR CARCINOMA): Primary | ICD-10-CM

## 2023-11-15 PROCEDURE — 3078F PR MOST RECENT DIASTOLIC BLOOD PRESSURE < 80 MM HG: ICD-10-PCS | Mod: CPTII,S$GLB,,

## 2023-11-15 PROCEDURE — 3075F SYST BP GE 130 - 139MM HG: CPT | Mod: CPTII,S$GLB,,

## 2023-11-15 PROCEDURE — 99212 PR OFFICE/OUTPT VISIT, EST, LEVL II, 10-19 MIN: ICD-10-PCS | Mod: S$GLB,,,

## 2023-11-15 PROCEDURE — 3078F DIAST BP <80 MM HG: CPT | Mod: CPTII,S$GLB,,

## 2023-11-15 PROCEDURE — 99999 PR PBB SHADOW E&M-EST. PATIENT-LVL II: CPT | Mod: PBBFAC,,,

## 2023-11-15 PROCEDURE — 3075F PR MOST RECENT SYSTOLIC BLOOD PRESS GE 130-139MM HG: ICD-10-PCS | Mod: CPTII,S$GLB,,

## 2023-11-15 PROCEDURE — 99212 OFFICE O/P EST SF 10 MIN: CPT | Mod: S$GLB,,,

## 2023-11-15 PROCEDURE — 99999 PR PBB SHADOW E&M-EST. PATIENT-LVL II: ICD-10-PCS | Mod: PBBFAC,,,

## 2023-11-15 NOTE — TELEPHONE ENCOUNTER
----- Message from Telma Stephens MA sent at 11/15/2023 12:26 PM CST -----  Regarding: FW: Speak to staff  Contact: Seema    ----- Message -----  From: Lilliana Amanda  Sent: 11/15/2023  11:40 AM CST  To: Cindy Weathers Staff  Subject: Speak to staff                                   Regarding: Speak to staff        Name Of Caller: Seema        Contact Preference: [phone] (home)              Nature of call:  pt is calling to speak to staff regarding, getting some type of vitamins for strength, and energy. Please advise. Request a call back

## 2023-11-15 NOTE — PROGRESS NOTES
Subjective     Patient ID: Seema Gann is a 78 y.o. female.    Chief Complaint: Fatigue    Pt is a 78 yo F w/ PMH of HTN, Biliary Cirrhosis w/ enlarging hepatic mass since 2015 (Biopsy confirmed HCC), and HCV who presents with  to discuss follow up imaging. Patient is s/p Y90 on 10/12/2023. Follow up MRI completed on 11/13/2023.     Patient endorses fatigue and loss of appetite that started 2 weeks ago. Wants to follow up with hepatology. Message sent to hepatology to reschedule appointment that was canceled on 11/21.          Review of Systems   Constitutional:  Positive for appetite change and unexpected weight change.   Respiratory:  Negative for cough and shortness of breath.    Cardiovascular:  Negative for chest pain.   Gastrointestinal:  Negative for abdominal distention and abdominal pain.   Neurological:  Negative for dizziness and facial asymmetry.   Psychiatric/Behavioral:  Negative for behavioral problems and confusion.           Objective     Physical Exam  Constitutional:       Appearance: She is ill-appearing.   HENT:      Head: Normocephalic and atraumatic.      Nose: Nose normal.   Eyes:      Conjunctiva/sclera: Conjunctivae normal.   Pulmonary:      Effort: Pulmonary effort is normal.   Abdominal:      General: Abdomen is flat.   Skin:     General: Skin is warm and dry.      Coloration: Skin is not jaundiced.   Neurological:      General: No focal deficit present.      Mental Status: She is alert and oriented to person, place, and time.   Psychiatric:         Mood and Affect: Mood normal.         Behavior: Behavior normal.          Assessment and Plan     1. HCC (hepatocellular carcinoma)      - Imaging reviewed by Dr. Dao. Great response to y90. Recommend 3 month follow up MRI with hepatology. This was communicated with patient.   - AFP decreased to 9.4.  - Message sent to hepatology. Patient wants to be seen.   - follow up with IR as needed.     Shirin Segovia  NAMITA  Interventional Radiology  [phone]

## 2023-11-16 ENCOUNTER — TELEPHONE (OUTPATIENT)
Dept: HEPATOLOGY | Facility: CLINIC | Age: 78
End: 2023-11-16
Payer: MEDICARE

## 2023-11-16 NOTE — TELEPHONE ENCOUNTER
Spoke with patient.  She would like some Vitamins to increase energy and Sleeping pill to help her sleep.  Patient instructed to call her PCP.  Patient verbalized understanding.

## 2023-11-16 NOTE — TELEPHONE ENCOUNTER
----- Message from Telma Stephens MA sent at 11/16/2023 11:07 AM CST -----  Regarding: FW: Returning a Missed Call  Contact: Seema Gann    ----- Message -----  From: Kvng Ayala  Sent: 11/16/2023  10:14 AM CST  To: Cindy Weathers Staff  Subject: Returning a Missed Call                          Returning a Missed Call    Caller: Seema Gann       Returning call to: Kaye        Caller can be reached @: [phone] (home)      Nature of the call: Patient returning call to [place] regarding instructions from Dr. White. Requesting a call back.

## 2023-11-21 ENCOUNTER — OFFICE VISIT (OUTPATIENT)
Dept: FAMILY MEDICINE | Facility: CLINIC | Age: 78
End: 2023-11-21
Payer: MEDICARE

## 2023-11-21 VITALS
WEIGHT: 99 LBS | HEIGHT: 64 IN | HEART RATE: 65 BPM | BODY MASS INDEX: 16.9 KG/M2 | SYSTOLIC BLOOD PRESSURE: 116 MMHG | DIASTOLIC BLOOD PRESSURE: 58 MMHG | OXYGEN SATURATION: 97 %

## 2023-11-21 DIAGNOSIS — D61.818 PANCYTOPENIA: Primary | ICD-10-CM

## 2023-11-21 DIAGNOSIS — D69.6 THROMBOCYTOPENIA, UNSPECIFIED: ICD-10-CM

## 2023-11-21 DIAGNOSIS — E87.6 HYPOKALEMIA: ICD-10-CM

## 2023-11-21 DIAGNOSIS — N18.31 STAGE 3A CHRONIC KIDNEY DISEASE: ICD-10-CM

## 2023-11-21 DIAGNOSIS — Z00.00 ENCOUNTER FOR MEDICAL EXAMINATION TO ESTABLISH CARE: ICD-10-CM

## 2023-11-21 DIAGNOSIS — R16.0 LIVER MASS: ICD-10-CM

## 2023-11-21 DIAGNOSIS — K74.5: ICD-10-CM

## 2023-11-21 DIAGNOSIS — I10 ESSENTIAL HYPERTENSION: ICD-10-CM

## 2023-11-21 DIAGNOSIS — D63.8 ANEMIA OF CHRONIC DISEASE: ICD-10-CM

## 2023-11-21 DIAGNOSIS — R62.7 FAILURE TO THRIVE IN ADULT: ICD-10-CM

## 2023-11-21 DIAGNOSIS — R79.9 ABNORMAL BLOOD CHEMISTRY: ICD-10-CM

## 2023-11-21 PROCEDURE — 1125F PR PAIN SEVERITY QUANTIFIED, PAIN PRESENT: ICD-10-PCS | Mod: CPTII,S$GLB,, | Performed by: STUDENT IN AN ORGANIZED HEALTH CARE EDUCATION/TRAINING PROGRAM

## 2023-11-21 PROCEDURE — 3288F FALL RISK ASSESSMENT DOCD: CPT | Mod: CPTII,S$GLB,, | Performed by: STUDENT IN AN ORGANIZED HEALTH CARE EDUCATION/TRAINING PROGRAM

## 2023-11-21 PROCEDURE — 1160F RVW MEDS BY RX/DR IN RCRD: CPT | Mod: CPTII,S$GLB,, | Performed by: STUDENT IN AN ORGANIZED HEALTH CARE EDUCATION/TRAINING PROGRAM

## 2023-11-21 PROCEDURE — 3074F SYST BP LT 130 MM HG: CPT | Mod: CPTII,S$GLB,, | Performed by: STUDENT IN AN ORGANIZED HEALTH CARE EDUCATION/TRAINING PROGRAM

## 2023-11-21 PROCEDURE — 99205 OFFICE O/P NEW HI 60 MIN: CPT | Mod: S$GLB,,, | Performed by: STUDENT IN AN ORGANIZED HEALTH CARE EDUCATION/TRAINING PROGRAM

## 2023-11-21 PROCEDURE — 99999 PR PBB SHADOW E&M-EST. PATIENT-LVL III: ICD-10-PCS | Mod: PBBFAC,,, | Performed by: STUDENT IN AN ORGANIZED HEALTH CARE EDUCATION/TRAINING PROGRAM

## 2023-11-21 PROCEDURE — 3288F PR FALLS RISK ASSESSMENT DOCUMENTED: ICD-10-PCS | Mod: CPTII,S$GLB,, | Performed by: STUDENT IN AN ORGANIZED HEALTH CARE EDUCATION/TRAINING PROGRAM

## 2023-11-21 PROCEDURE — 3078F DIAST BP <80 MM HG: CPT | Mod: CPTII,S$GLB,, | Performed by: STUDENT IN AN ORGANIZED HEALTH CARE EDUCATION/TRAINING PROGRAM

## 2023-11-21 PROCEDURE — 1101F PT FALLS ASSESS-DOCD LE1/YR: CPT | Mod: CPTII,S$GLB,, | Performed by: STUDENT IN AN ORGANIZED HEALTH CARE EDUCATION/TRAINING PROGRAM

## 2023-11-21 PROCEDURE — 1125F AMNT PAIN NOTED PAIN PRSNT: CPT | Mod: CPTII,S$GLB,, | Performed by: STUDENT IN AN ORGANIZED HEALTH CARE EDUCATION/TRAINING PROGRAM

## 2023-11-21 PROCEDURE — 1159F PR MEDICATION LIST DOCUMENTED IN MEDICAL RECORD: ICD-10-PCS | Mod: CPTII,S$GLB,, | Performed by: STUDENT IN AN ORGANIZED HEALTH CARE EDUCATION/TRAINING PROGRAM

## 2023-11-21 PROCEDURE — 1101F PR PT FALLS ASSESS DOC 0-1 FALLS W/OUT INJ PAST YR: ICD-10-PCS | Mod: CPTII,S$GLB,, | Performed by: STUDENT IN AN ORGANIZED HEALTH CARE EDUCATION/TRAINING PROGRAM

## 2023-11-21 PROCEDURE — 3078F PR MOST RECENT DIASTOLIC BLOOD PRESSURE < 80 MM HG: ICD-10-PCS | Mod: CPTII,S$GLB,, | Performed by: STUDENT IN AN ORGANIZED HEALTH CARE EDUCATION/TRAINING PROGRAM

## 2023-11-21 PROCEDURE — 1160F PR REVIEW ALL MEDS BY PRESCRIBER/CLIN PHARMACIST DOCUMENTED: ICD-10-PCS | Mod: CPTII,S$GLB,, | Performed by: STUDENT IN AN ORGANIZED HEALTH CARE EDUCATION/TRAINING PROGRAM

## 2023-11-21 PROCEDURE — 99205 PR OFFICE/OUTPT VISIT, NEW, LEVL V, 60-74 MIN: ICD-10-PCS | Mod: S$GLB,,, | Performed by: STUDENT IN AN ORGANIZED HEALTH CARE EDUCATION/TRAINING PROGRAM

## 2023-11-21 PROCEDURE — 3074F PR MOST RECENT SYSTOLIC BLOOD PRESSURE < 130 MM HG: ICD-10-PCS | Mod: CPTII,S$GLB,, | Performed by: STUDENT IN AN ORGANIZED HEALTH CARE EDUCATION/TRAINING PROGRAM

## 2023-11-21 PROCEDURE — 1159F MED LIST DOCD IN RCRD: CPT | Mod: CPTII,S$GLB,, | Performed by: STUDENT IN AN ORGANIZED HEALTH CARE EDUCATION/TRAINING PROGRAM

## 2023-11-21 PROCEDURE — 99999 PR PBB SHADOW E&M-EST. PATIENT-LVL III: CPT | Mod: PBBFAC,,, | Performed by: STUDENT IN AN ORGANIZED HEALTH CARE EDUCATION/TRAINING PROGRAM

## 2023-11-21 RX ORDER — LACTOSE-REDUCED FOOD
118 LIQUID (ML) ORAL 2 TIMES DAILY
Qty: 90 EACH | Refills: 3 | Status: SHIPPED | OUTPATIENT
Start: 2023-11-21 | End: 2023-11-30

## 2023-11-21 RX ORDER — CYPROHEPTADINE HYDROCHLORIDE 4 MG/1
4 TABLET ORAL 3 TIMES DAILY
Qty: 90 TABLET | Refills: 0 | Status: SHIPPED | OUTPATIENT
Start: 2023-11-21

## 2023-11-21 NOTE — PROGRESS NOTES
[facility] Note    Chief Complaint      Chief Complaint   Patient presents with    Establish Care    Anorexia    Insomnia     History of Present Illness      HPI    Seema Gann is a 78 y.o. female with sHTN, Biliary cirrhosis with hepatic mass since 2015 due to HCV s/p chemotherapy who presents for establishment of care. She c/o worsening lethargy, poor appetite and weight loss since completion of chemotherapy     Problem List Addressed This Visit:  1. Encounter for medical examination to establish care    2. Failure to thrive in adult  -     cyproheptadine (PERIACTIN) 4 mg tablet; Take 1 tablet (4 mg total) by mouth 3 (three) times daily.  Dispense: 90 tablet; Refill: 0  -     food supplemt, lactose-reduced (ENSURE) Liqd; Take 118 mLs by mouth 2 (two) times a day.  Dispense: 90 each; Refill: 3    3. Hypokalemia  -     Potassium; Future; Expected date: 11/21/2023    4. Essential hypertension  -     TSH; Future; Expected date: 11/21/2023  -     LIPID PANEL; Future; Expected date: 11/21/2023    5. Liver mass  Overview:  On 10/2015 US subcentimeter hypoechoic mass within the right hepatic lobe.    11/3/15 tpCT scan No focal hepatic lesion   Reviewed by US Hollie 5/2016--stable lesion , normal AFP   Reviewed by Dr. Topete,  again in 6 months        6. Biliary liver cirrhosis    7. Abnormal blood chemistry  -     HEMOGLOBIN A1C; Future; Expected date: 11/21/2023    8. Stage 3a chronic kidney disease    9. Anemia of chronic disease         Health Maintenance   Topic Date Due    Lipid Panel  Never done    Shingles Vaccine (1 of 2) 01/24/2024 (Originally 9/23/1964)    TETANUS VACCINE  01/27/2024 (Originally 9/23/1963)    DEXA Scan  02/09/2026    Hepatitis C Screening  Completed       Past Medical History:   Diagnosis Date    Cirrhosis     Hepatitis     Hypertension        Past Surgical History:   Procedure Laterality Date    APPENDECTOMY      HYSTERECTOMY         family history is  not on file.    Social History     Tobacco Use    Smoking status: Never   Substance Use Topics    Alcohol use: No    Drug use: No       Review of Systems   Constitutional:  Negative for fatigue and fever.   Respiratory:  Negative for cough and chest tightness.    Gastrointestinal:  Positive for nausea. Negative for abdominal pain, diarrhea and vomiting.   Endocrine: Negative for polydipsia and polyphagia.   Genitourinary:  Negative for difficulty urinating, dysuria and frequency.   Musculoskeletal:  Negative for arthralgias, back pain, gait problem and joint swelling.   Skin:  Negative for rash.   Neurological:  Negative for seizures, weakness, numbness and headaches.   Psychiatric/Behavioral:  Negative for sleep disturbance.        Outpatient Encounter Medications as of 11/21/2023   Medication Sig Note Dispense Refill    amLODIPine (NORVASC) 10 MG tablet        carvedilol (COREG) 3.125 MG tablet Take 3.125 mg by mouth 2 (two) times daily with meals.       lisinopril (PRINIVIL,ZESTRIL) 20 MG tablet Take 20 mg by mouth once daily.       ondansetron (ZOFRAN) 4 MG tablet Take 1 tablet (4 mg total) by mouth every 8 (eight) hours as needed for Nausea.  20 tablet 0    [DISCONTINUED] lisinopriL-hydrochlorothiazide (PRINZIDE,ZESTORETIC) 20-12.5 mg per tablet  10/12/2023: No longer taking this medication.      cyproheptadine (PERIACTIN) 4 mg tablet Take 1 tablet (4 mg total) by mouth 3 (three) times daily.  90 tablet 0    food supplemt, lactose-reduced (ENSURE) Liqd Take 118 mLs by mouth 2 (two) times a day.  90 each 3    spironolactone (ALDACTONE) 100 MG tablet Take 100 mg by mouth once daily.       [DISCONTINUED] oxyCODONE-acetaminophen (PERCOCET) 5-325 mg per tablet Take 1 tablet by mouth every 6 (six) hours as needed for Pain. (Patient not taking: Reported on 7/19/2023)  20 tablet 0     No facility-administered encounter medications on file as of 11/21/2023.        Review of patient's allergies indicates:  No Known  "Allergies    Physical Exam      Vital Signs  Pulse: 65  SpO2: 97 %  BP: (!) 116/58  Pain Score:   3  Height and Weight  Height: 5' 4" (162.6 cm)  Weight: 44.9 kg (98 lb 15.8 oz)  BSA (Calculated - sq m): 1.42 sq meters  BMI (Calculated): 17  Weight in (lb) to have BMI = 25: 145.3]    Physical Exam  Vitals reviewed.   Constitutional:       Appearance: Normal appearance.   HENT:      Head: Normocephalic and atraumatic.      Right Ear: Tympanic membrane normal.      Left Ear: Tympanic membrane normal.      Mouth/Throat:      Mouth: Mucous membranes are moist.      Pharynx: Oropharynx is clear.   Eyes:      Extraocular Movements: Extraocular movements intact.      Conjunctiva/sclera: Conjunctivae normal.      Pupils: Pupils are equal, round, and reactive to light.   Cardiovascular:      Rate and Rhythm: Normal rate and regular rhythm.      Pulses: Normal pulses.      Heart sounds: Normal heart sounds.   Pulmonary:      Effort: Pulmonary effort is normal.      Breath sounds: Normal breath sounds.   Abdominal:      General: Abdomen is flat. Bowel sounds are normal.      Palpations: Abdomen is soft.   Musculoskeletal:      Cervical back: Normal range of motion.      Right lower leg: No edema.      Left lower leg: No edema.   Skin:     General: Skin is warm and dry.   Neurological:      General: No focal deficit present.      Mental Status: She is alert and oriented to person, place, and time.      Sensory: No sensory deficit.   Psychiatric:         Mood and Affect: Mood normal.         Behavior: Behavior normal.       Laboratory:  CBC:  Recent Labs   Lab Result Units 09/05/23  0712 10/10/23  1020   WBC K/uL 3.74* 3.06*   RBC M/uL 3.34* 3.23*   Hemoglobin g/dL 10.7* 10.5*   Hematocrit % 32.7* 31.3*   Platelets K/uL 89* 70*   MCV fL 98 97   MCH pg 32.0* 32.5*   MCHC g/dL 32.7 33.5     CMP:  Recent Labs   Lab Result Units 10/10/23  1020 10/12/23  0811 11/13/23  1131   Glucose mg/dL 102 105 131*   Calcium mg/dL 9.1 9.1 9.7 " "  Albumin g/dL 3.8 3.8 3.6   Total Protein g/dL 7.7 7.9 7.6   Sodium mmol/L 142 142 139   Potassium mmol/L 3.2* 3.0* 3.4*   CO2 mmol/L 24 23 24   Chloride mmol/L 109 110 105   BUN mg/dL 14 13 13   Alkaline Phosphatase U/L 61 71 95   ALT U/L 27 22 13   AST U/L 35 25 25   Total Bilirubin mg/dL 1.1* 1.2* 1.6*     URINALYSIS:  No results for input(s): "COLORU", "CLARITYU", "SPECGRAV", "PHUR", "PROTEINUA", "GLUCOSEU", "BILIRUBINCON", "BLOODU", "WBCU", "RBCU", "BACTERIA", "MUCUS", "NITRITE", "LEUKOCYTESUR", "UROBILINOGEN", "HYALINECASTS" in the last 2160 hours.   LIPIDS:  No results for input(s): "TSH", "HDL", "CHOL", "TRIG", "LDLCALC", "CHOLHDL", "NONHDLCHOL", "TOTALCHOLEST" in the last 2160 hours.  TSH:  No results for input(s): "TSH" in the last 2160 hours.  A1C:  No results for input(s): "HGBA1C" in the last 2160 hours.    Radiology:      Assessment/Plan     Seema Gann is a 78 y.o.female with:    1. Encounter for medical examination to establish care    2. Failure to thrive in adult  -BMI @ 16.99   -likely due to co-morbidities  - trial of cyproheptadine (PERIACTIN) 4 mg tablet; Take 1 tablet (4 mg total) by mouth 3 (three) times daily.  Dispense: 90 tablet; Refill: 0  - start food supplemt, lactose-reduced (ENSURE) Liqd; Take 118 mLs by mouth 2 (two) times a day.  Dispense: 90 each; Refill: 3    3. Hypokalemia  -serum level improved to 3.4 from 3.0  -monitor for now due to CKD  - Potassium; Future    4. Essential hypertension  -well controlled on current regimen  - TSH; Future  - LIPID PANEL; Future    5. Liver mass  -due to hepatocellular cancer from HCV  -compensated   -HCV RNA undetectable  -completed chemotherapy last week  -followed by hepatology, oncology  -c/w zofran PRN for nausea    6. Biliary liver cirrhosis  -followed by hepatology    8. Stage 3a chronic kidney disease  -stable renal function     9. Anemia of chronic disease  -stable H&H  -monitor for now    10. Pancytopenia  -likely due to " chemotherapy  -stable       -Continue current medications and maintain follow up with specialists.      Patient verbalizes understanding and agrees with current treatment plan.      Oluwakemi Adeyanju, MD [facility]

## 2023-11-22 ENCOUNTER — TELEPHONE (OUTPATIENT)
Dept: FAMILY MEDICINE | Facility: CLINIC | Age: 78
End: 2023-11-22
Payer: MEDICARE

## 2023-11-22 NOTE — TELEPHONE ENCOUNTER
----- Message from Bethany Mesa MD sent at 11/22/2023 11:32 AM CST -----  Please let patient know there is no concerns on recent blood work done with me and potassium is back to normal . We will continue with her current regimen as discussed during her recent clinic visit. Thanks

## 2023-11-22 NOTE — TELEPHONE ENCOUNTER
Spoke with pt. Informed pt that there is no concerns on recent blood work done with me and potassium is back to normal . We will continue with her current regimen as discussed during her recent clinic visit.

## 2023-11-27 PROBLEM — R07.9 CHEST PAIN: Status: RESOLVED | Noted: 2022-11-28 | Resolved: 2023-11-27

## 2023-11-27 PROBLEM — N17.9 AKI (ACUTE KIDNEY INJURY): Status: ACTIVE | Noted: 2023-11-27

## 2023-11-27 PROBLEM — N17.9 AKI (ACUTE KIDNEY INJURY): Status: RESOLVED | Noted: 2023-11-27 | Resolved: 2023-11-27

## 2023-11-27 PROBLEM — R10.84 ABDOMINAL PAIN, GENERALIZED: Status: RESOLVED | Noted: 2023-11-27 | Resolved: 2023-11-27

## 2023-11-27 PROBLEM — R10.84 ABDOMINAL PAIN, GENERALIZED: Status: ACTIVE | Noted: 2023-11-27

## 2023-11-29 ENCOUNTER — PATIENT MESSAGE (OUTPATIENT)
Dept: ADMINISTRATIVE | Facility: CLINIC | Age: 78
End: 2023-11-29
Payer: MEDICARE

## 2023-11-29 ENCOUNTER — PATIENT OUTREACH (OUTPATIENT)
Dept: ADMINISTRATIVE | Facility: CLINIC | Age: 78
End: 2023-11-29
Payer: MEDICARE

## 2023-11-29 NOTE — PROGRESS NOTES
C3 nurse attempted to contact Seema David Gann for a TCC post hospital discharge follow up call. No answer. Left voicemail with callback information. The patient has a scheduled [facility] Bethany Mesa MD on 12/6/23 @1130am

## 2023-11-30 ENCOUNTER — OFFICE VISIT (OUTPATIENT)
Dept: CARDIOLOGY | Facility: CLINIC | Age: 78
End: 2023-11-30
Payer: MEDICARE

## 2023-11-30 VITALS
HEART RATE: 75 BPM | WEIGHT: 102.06 LBS | HEIGHT: 64 IN | BODY MASS INDEX: 17.42 KG/M2 | DIASTOLIC BLOOD PRESSURE: 60 MMHG | SYSTOLIC BLOOD PRESSURE: 91 MMHG | OXYGEN SATURATION: 87 %

## 2023-11-30 DIAGNOSIS — I10 PRIMARY HYPERTENSION: Primary | ICD-10-CM

## 2023-11-30 DIAGNOSIS — K74.60 CHRONIC HEPATITIS C WITH CIRRHOSIS: ICD-10-CM

## 2023-11-30 DIAGNOSIS — R16.0 LIVER MASS: ICD-10-CM

## 2023-11-30 DIAGNOSIS — B18.2 CHRONIC HEPATITIS C WITH CIRRHOSIS: ICD-10-CM

## 2023-11-30 DIAGNOSIS — R79.89 ELEVATED TROPONIN: ICD-10-CM

## 2023-11-30 DIAGNOSIS — K74.4 SECONDARY BILIARY CIRRHOSIS: ICD-10-CM

## 2023-11-30 DIAGNOSIS — R53.1 WEAKNESS: ICD-10-CM

## 2023-11-30 DIAGNOSIS — R07.89 OTHER CHEST PAIN: ICD-10-CM

## 2023-11-30 PROCEDURE — 1160F RVW MEDS BY RX/DR IN RCRD: CPT | Mod: CPTII,S$GLB,,

## 2023-11-30 PROCEDURE — 1126F PR PAIN SEVERITY QUANTIFIED, NO PAIN PRESENT: ICD-10-PCS | Mod: CPTII,S$GLB,,

## 2023-11-30 PROCEDURE — 1126F AMNT PAIN NOTED NONE PRSNT: CPT | Mod: CPTII,S$GLB,,

## 2023-11-30 PROCEDURE — 1159F MED LIST DOCD IN RCRD: CPT | Mod: CPTII,S$GLB,,

## 2023-11-30 PROCEDURE — 99214 PR OFFICE/OUTPT VISIT, EST, LEVL IV, 30-39 MIN: ICD-10-PCS | Mod: S$GLB,,,

## 2023-11-30 PROCEDURE — 3078F DIAST BP <80 MM HG: CPT | Mod: CPTII,S$GLB,,

## 2023-11-30 PROCEDURE — 99999 PR PBB SHADOW E&M-EST. PATIENT-LVL III: ICD-10-PCS | Mod: PBBFAC,,,

## 2023-11-30 PROCEDURE — 99999 PR PBB SHADOW E&M-EST. PATIENT-LVL III: CPT | Mod: PBBFAC,,,

## 2023-11-30 PROCEDURE — 1101F PT FALLS ASSESS-DOCD LE1/YR: CPT | Mod: CPTII,S$GLB,,

## 2023-11-30 PROCEDURE — 3074F PR MOST RECENT SYSTOLIC BLOOD PRESSURE < 130 MM HG: ICD-10-PCS | Mod: CPTII,S$GLB,,

## 2023-11-30 PROCEDURE — 3288F FALL RISK ASSESSMENT DOCD: CPT | Mod: CPTII,S$GLB,,

## 2023-11-30 PROCEDURE — 99214 OFFICE O/P EST MOD 30 MIN: CPT | Mod: S$GLB,,,

## 2023-11-30 PROCEDURE — 3074F SYST BP LT 130 MM HG: CPT | Mod: CPTII,S$GLB,,

## 2023-11-30 PROCEDURE — 1160F PR REVIEW ALL MEDS BY PRESCRIBER/CLIN PHARMACIST DOCUMENTED: ICD-10-PCS | Mod: CPTII,S$GLB,,

## 2023-11-30 PROCEDURE — 3078F PR MOST RECENT DIASTOLIC BLOOD PRESSURE < 80 MM HG: ICD-10-PCS | Mod: CPTII,S$GLB,,

## 2023-11-30 PROCEDURE — 1101F PR PT FALLS ASSESS DOC 0-1 FALLS W/OUT INJ PAST YR: ICD-10-PCS | Mod: CPTII,S$GLB,,

## 2023-11-30 PROCEDURE — 3288F PR FALLS RISK ASSESSMENT DOCUMENTED: ICD-10-PCS | Mod: CPTII,S$GLB,,

## 2023-11-30 PROCEDURE — 1159F PR MEDICATION LIST DOCUMENTED IN MEDICAL RECORD: ICD-10-PCS | Mod: CPTII,S$GLB,,

## 2023-11-30 RX ORDER — AMLODIPINE BESYLATE 5 MG/1
5 TABLET ORAL DAILY
Qty: 30 TABLET | Refills: 11 | Status: SHIPPED | OUTPATIENT
Start: 2023-11-30 | End: 2024-11-29

## 2023-11-30 NOTE — ASSESSMENT & PLAN NOTE
Goal BP < 140/90.  Compliant w/ meds.    -controlled   -in office 91/60  - current medication regimen: amlodipine 10 mg, carvedilol 3.125 mg, lisinopril 20 mg, spironolactone 100 mg   -decrease amlodipine to 5 mg   - enrolling in digital medicine prgm  - risk factor and lifestyle modifications

## 2023-11-30 NOTE — PROGRESS NOTES
Subjective:    Patient ID:  Seema Gann is a 78 y.o. female who presents for follow-up of No chief complaint on file.      PCP: Bethany Mesa MD     Referring Provider: Roshan Jesus MD     HPI: Pt is a 77 yo F w/ PMH of HTN, Biliary Cirrhosis w/ enlarging hepatic mass since 2015, HCV, HCC s/p radio embolization, who presents for hospital f/u. She was last seen by DR. Jones on 3/2/23 for f/u of cp.  She had an ETT which noted normal exercise tolerance and was negative for ischemia. Hospital admission 11/26/23-11/27/23 w c/o abdominal and CP, she reported mid sternal chest heaviness. EKG reviewed no ST changes, troponin stable. ACS ruled out and she was discharged home w Cardiology f/u. Since discharge she denies any acute symptoms.  She reports fatigue. She denies any further episodes of cp, sob, edema, orthopnea, PND, presyncope, LOC, palpitations, and claudication. She notes compliance w/ meds and denies side effects.  She does not monitor her BP at home.  She does not exercise however states she is very active w/ gardening and denies cp or sob.     Past Medical History:   Diagnosis Date    Cirrhosis     HCC (hepatocellular carcinoma)     Hepatitis     Hypertension      Past Surgical History:   Procedure Laterality Date    APPENDECTOMY      HYSTERECTOMY       Social History     Socioeconomic History    Marital status:    Tobacco Use    Smoking status: Never   Substance and Sexual Activity    Alcohol use: No    Drug use: No    Sexual activity: Yes   Social History Narrative    ** Merged History Encounter **          No family history on file.    Review of patient's allergies indicates:  No Known Allergies    Medication List with Changes/Refills   Current Medications    ASPIRIN (ECOTRIN) 81 MG EC TABLET    Take 1 tablet (81 mg total) by mouth once daily.    CARVEDILOL (COREG) 3.125 MG TABLET    Take 3.125 mg by mouth 2 (two) times daily with meals.    CYPROHEPTADINE (PERIACTIN) 4 MG  "TABLET    Take 1 tablet (4 mg total) by mouth 3 (three) times daily.    FOOD SUPPLEMT, LACTOSE-REDUCED (ENSURE) LIQD    Take 118 mLs by mouth 2 (two) times a day.    LISINOPRIL (PRINIVIL,ZESTRIL) 20 MG TABLET    Take 20 mg by mouth once daily.    ONDANSETRON (ZOFRAN) 4 MG TABLET    Take 1 tablet (4 mg total) by mouth every 8 (eight) hours as needed for Nausea.    SPIRONOLACTONE (ALDACTONE) 100 MG TABLET    Take 100 mg by mouth once daily.   Discontinued Medications    AMLODIPINE (NORVASC) 10 MG TABLET           Review of Systems   Constitutional: Positive for malaise/fatigue. Negative for diaphoresis and fever.   HENT:  Negative for congestion and hearing loss.    Eyes:  Negative for blurred vision and pain.   Cardiovascular:  Negative for chest pain, claudication, dyspnea on exertion, leg swelling, near-syncope, palpitations and syncope.   Respiratory:  Negative for shortness of breath and sleep disturbances due to breathing.    Hematologic/Lymphatic: Negative for bleeding problem. Does not bruise/bleed easily.   Skin:  Negative for color change and poor wound healing.   Gastrointestinal:  Negative for abdominal pain and nausea.   Genitourinary:  Negative for bladder incontinence and flank pain.   Neurological:  Negative for focal weakness and light-headedness.        Objective:   BP 91/60 (BP Location: Left arm, Patient Position: Sitting)   Pulse 75   Ht 5' 4" (1.626 m)   Wt 46.3 kg (102 lb 1.2 oz)   SpO2 (!) 87%   BMI 17.52 kg/m²    Physical Exam  Constitutional:       Appearance: She is well-developed. She is not diaphoretic.   HENT:      Head: Normocephalic and atraumatic.   Eyes:      General: No scleral icterus.     Pupils: Pupils are equal, round, and reactive to light.   Neck:      Vascular: No JVD.   Cardiovascular:      Rate and Rhythm: Normal rate and regular rhythm.      Pulses: Intact distal pulses.      Heart sounds: S1 normal and S2 normal. Murmur heard.      High-pitched blowing holosystolic " murmur is present with a grade of 2/6 at the apex.      No friction rub. No gallop.   Pulmonary:      Effort: Pulmonary effort is normal. No respiratory distress.      Breath sounds: Normal breath sounds. No wheezing or rales.   Chest:      Chest wall: No tenderness.   Abdominal:      General: Bowel sounds are normal. There is no distension.      Palpations: Abdomen is soft. There is no mass.      Tenderness: There is no abdominal tenderness. There is no rebound.   Musculoskeletal:         General: No tenderness. Normal range of motion.      Cervical back: Normal range of motion and neck supple.   Skin:     General: Skin is warm and dry.      Coloration: Skin is not pale.   Neurological:      Mental Status: She is alert and oriented to person, place, and time.      Coordination: Coordination normal.      Deep Tendon Reflexes: Reflexes normal.   Psychiatric:         Behavior: Behavior normal.         Judgment: Judgment normal.           EKG 23- reviewed - SR w SA, RBBB  TTE 23  Summary         Left Ventricle: The left ventricle is normal in size. Normal wall thickness. There is concentric remodeling. Normal wall motion. There is normal systolic function. Biplane (2D) method of discs ejection fraction is 60%. There is normal diastolic function.    Right Ventricle: Normal right ventricular cavity size. Wall thickness is normal. Right ventricle wall motion  is normal. Systolic function is normal.    Aortic Valve: The aortic valve is a trileaflet valve. There is moderate aortic valve sclerosis.    Tricuspid Valve: There is mild to moderate regurgitation.    Pulmonary Artery: The estimated pulmonary artery systolic pressure is 25 mmHg.    IVC/SVC: Normal venous pressure at 3 mmHg.    EC2022- reviewed.  Sinus katherine w/ PACs, RBBB, LAFB.       Echo: 2022- reviewed.    Summary     The left ventricle is normal in size with concentric remodeling and normal systolic function.  The estimated ejection  fraction is 70%.  Normal left ventricular diastolic function.  Normal right ventricular size with normal right ventricular systolic function.  Mild aortic regurgitation.  Mild tricuspid regurgitation.  Mild pulmonic regurgitation.  Mild mitral regurgitation.  Normal central venous pressure (3 mmHg).  The estimated PA systolic pressure is 32 mmHg.     ETT: 12/8/2022- reviewed.    Conclusion          The patient exercised for 7 minutes 30 seconds on a Abel protocol, corresponding to a functional capacity of 9 METS, achieving a peak heart rate of 157 bpm, which is 113 % of the age predicted maximum heart rate. The patient experienced no angina during the test. Their exercise capacity was above average. The Duke Treadmill Score was 8.    The ECG portion of the study is negative for ischemia.    The patient reported no chest pain during the stress test.    The blood pressure response to stress was normal.    The Duke Treadmill Score was 8.    During stress, occasional PACs are noted. , During stress, occasional PVCs are noted.    The exercise capacity was above average.    Assessment:       1. Primary hypertension    2. Chest pain    3. Weakness    4. Elevated troponin    5. Secondary biliary cirrhosis    6. Chronic hepatitis C with cirrhosis    7. Liver mass         Plan:         Primary hypertension  Goal BP < 140/90.  Compliant w/ meds.    -controlled   -in office 91/60  - current medication regimen: amlodipine 10 mg, carvedilol 3.125 mg, lisinopril 20 mg, spironolactone 100 mg   -decrease amlodipine to 5 mg   - enrolling in digital medicine prgm  - risk factor and lifestyle modifications     Secondary biliary cirrhosis   Followed by  hepatology    Chronic hepatitis C with cirrhosis  Followed by hepatology.    Liver mass  -Followed by Hepatology       Total duration of face to face visit time 30 minutes.  Total time spent counseling greater than fifty percent of total visit time.  Counseling included discussion  regarding imaging findings, diagnosis, possibilities, treatment options, risks and benefits.  The patient had many questions regarding the options and long-term effects      Guilherme Gregorio NP  Cardiology

## 2023-12-20 DIAGNOSIS — C22.0 HCC (HEPATOCELLULAR CARCINOMA): ICD-10-CM

## 2023-12-20 DIAGNOSIS — K76.9 LIVER DISEASE, UNSPECIFIED: Primary | ICD-10-CM

## 2024-01-08 ENCOUNTER — OFFICE VISIT (OUTPATIENT)
Dept: CARDIOLOGY | Facility: CLINIC | Age: 79
End: 2024-01-08
Payer: MEDICARE

## 2024-01-08 VITALS
OXYGEN SATURATION: 95 % | SYSTOLIC BLOOD PRESSURE: 110 MMHG | HEART RATE: 80 BPM | WEIGHT: 102.5 LBS | HEIGHT: 64 IN | DIASTOLIC BLOOD PRESSURE: 62 MMHG | BODY MASS INDEX: 17.5 KG/M2

## 2024-01-08 DIAGNOSIS — K74.60 CHRONIC HEPATITIS C WITH CIRRHOSIS: ICD-10-CM

## 2024-01-08 DIAGNOSIS — K74.4 SECONDARY BILIARY CIRRHOSIS: ICD-10-CM

## 2024-01-08 DIAGNOSIS — B18.2 CHRONIC HEPATITIS C WITH CIRRHOSIS: ICD-10-CM

## 2024-01-08 DIAGNOSIS — I10 PRIMARY HYPERTENSION: Primary | ICD-10-CM

## 2024-01-08 DIAGNOSIS — R16.0 LIVER MASS: ICD-10-CM

## 2024-01-08 PROCEDURE — 1101F PT FALLS ASSESS-DOCD LE1/YR: CPT | Mod: CPTII,S$GLB,,

## 2024-01-08 PROCEDURE — 99999 PR PBB SHADOW E&M-EST. PATIENT-LVL III: CPT | Mod: PBBFAC,,,

## 2024-01-08 PROCEDURE — 99214 OFFICE O/P EST MOD 30 MIN: CPT | Mod: S$GLB,,,

## 2024-01-08 PROCEDURE — 1126F AMNT PAIN NOTED NONE PRSNT: CPT | Mod: CPTII,S$GLB,,

## 2024-01-08 PROCEDURE — 1159F MED LIST DOCD IN RCRD: CPT | Mod: CPTII,S$GLB,,

## 2024-01-08 PROCEDURE — 3078F DIAST BP <80 MM HG: CPT | Mod: CPTII,S$GLB,,

## 2024-01-08 PROCEDURE — 3074F SYST BP LT 130 MM HG: CPT | Mod: CPTII,S$GLB,,

## 2024-01-08 PROCEDURE — 3288F FALL RISK ASSESSMENT DOCD: CPT | Mod: CPTII,S$GLB,,

## 2024-01-08 PROCEDURE — 1160F RVW MEDS BY RX/DR IN RCRD: CPT | Mod: CPTII,S$GLB,,

## 2024-01-08 RX ORDER — LISINOPRIL 20 MG/1
20 TABLET ORAL DAILY
Qty: 90 TABLET | Refills: 3 | Status: SHIPPED | OUTPATIENT
Start: 2024-01-08

## 2024-01-08 RX ORDER — SPIRONOLACTONE 100 MG/1
100 TABLET, FILM COATED ORAL DAILY
Qty: 90 TABLET | Refills: 3 | Status: SHIPPED | OUTPATIENT
Start: 2024-01-08

## 2024-01-08 RX ORDER — CARVEDILOL 3.12 MG/1
3.12 TABLET ORAL 2 TIMES DAILY WITH MEALS
Qty: 180 TABLET | Refills: 3 | Status: SHIPPED | OUTPATIENT
Start: 2024-01-08

## 2024-01-08 NOTE — PROGRESS NOTES
Subjective:    Patient ID:  Seema Gann is a 78 y.o. female who presents for follow-up of No chief complaint on file.      PCP: Bethany Mesa MD     Referring Provider: Roshan Jesus MD     HPI: Pt is a 77 yo F w/ PMH of HTN, Biliary Cirrhosis w/ enlarging hepatic mass since 2015, HCV, HCC s/p radio embolization, who presents for hospital f/u. She was last seen by DR. Jones on 3/2/23 for f/u of cp.  She had an ETT which noted normal exercise tolerance and was negative for ischemia. Hospital admission 11/26/23-11/27/23 w c/o abdominal and CP, she reported mid sternal chest heaviness. EKG reviewed no ST changes, troponin stable. ACS ruled out and she was discharged home w Cardiology f/u. Since discharge she denies any acute symptoms.  She reports fatigue. She denies any further episodes of cp, sob, edema, orthopnea, PND, presyncope, LOC, palpitations, and claudication. She notes compliance w/ meds and denies side effects.  She does not monitor her BP at home.  She does not exercise however states she is very active w/ gardening and denies cp or sob.     1/8/24: Patient presents today for f/u appt. She was last seen on 11/30/23 for hospital f/u and HTN management. She was continued on medical therapy. She reports doing well from cardiac standpoint but reports insomnia. She denies cp, sob, edema, orthopnea, PND, presyncope, LOC, palpitations, and claudication. She notes compliance w/ meds and denies side effects. She does not monitor her BP at home.  She does not exercise however states she is very active w/ gardening and denies cp or sob.    Past Medical History:   Diagnosis Date    Cirrhosis     HCC (hepatocellular carcinoma)     Hepatitis     Hypertension      Past Surgical History:   Procedure Laterality Date    APPENDECTOMY      HYSTERECTOMY       Social History     Socioeconomic History    Marital status:    Tobacco Use    Smoking status: Never   Substance and Sexual Activity     Alcohol use: No    Drug use: No    Sexual activity: Yes   Social History Narrative    ** Merged History Encounter **          No family history on file.    Review of patient's allergies indicates:  No Known Allergies    Medication List with Changes/Refills   Current Medications    AMLODIPINE (NORVASC) 5 MG TABLET    Take 1 tablet (5 mg total) by mouth once daily.    ASPIRIN (ECOTRIN) 81 MG EC TABLET    Take 1 tablet (81 mg total) by mouth once daily.    CYPROHEPTADINE (PERIACTIN) 4 MG TABLET    Take 1 tablet (4 mg total) by mouth 3 (three) times daily.    ONDANSETRON (ZOFRAN) 4 MG TABLET    Take 1 tablet (4 mg total) by mouth every 8 (eight) hours as needed for Nausea.   Changed and/or Refilled Medications    Modified Medication Previous Medication    CARVEDILOL (COREG) 3.125 MG TABLET carvedilol (COREG) 3.125 MG tablet       Take 1 tablet (3.125 mg total) by mouth 2 (two) times daily with meals.    Take 3.125 mg by mouth 2 (two) times daily with meals.    LISINOPRIL (PRINIVIL,ZESTRIL) 20 MG TABLET lisinopril (PRINIVIL,ZESTRIL) 20 MG tablet       Take 1 tablet (20 mg total) by mouth once daily.    Take 20 mg by mouth once daily.    SPIRONOLACTONE (ALDACTONE) 100 MG TABLET spironolactone (ALDACTONE) 100 MG tablet       Take 1 tablet (100 mg total) by mouth once daily.    Take 100 mg by mouth once daily.       Review of Systems   Constitutional: Positive for malaise/fatigue. Negative for diaphoresis and fever.   HENT:  Negative for congestion and hearing loss.    Eyes:  Negative for blurred vision and pain.   Cardiovascular:  Negative for chest pain, claudication, dyspnea on exertion, leg swelling, near-syncope, palpitations and syncope.   Respiratory:  Negative for shortness of breath and sleep disturbances due to breathing.    Hematologic/Lymphatic: Negative for bleeding problem. Does not bruise/bleed easily.   Skin:  Negative for color change and poor wound healing.   Gastrointestinal:  Negative for abdominal pain  "and nausea.   Genitourinary:  Negative for bladder incontinence and flank pain.   Neurological:  Negative for focal weakness and light-headedness.        Objective:   /62 (BP Location: Left arm, Patient Position: Sitting, BP Method: Medium (Manual))   Pulse 80   Ht 5' 4" (1.626 m)   Wt 46.5 kg (102 lb 8.2 oz)   SpO2 95%   BMI 17.60 kg/m²    Physical Exam  Constitutional:       Appearance: She is well-developed. She is not diaphoretic.   HENT:      Head: Normocephalic and atraumatic.   Eyes:      General: No scleral icterus.     Pupils: Pupils are equal, round, and reactive to light.   Neck:      Vascular: No JVD.   Cardiovascular:      Rate and Rhythm: Normal rate and regular rhythm.      Pulses: Intact distal pulses.      Heart sounds: S1 normal and S2 normal. Murmur heard.      High-pitched blowing holosystolic murmur is present with a grade of 2/6 at the apex.      No friction rub. No gallop.   Pulmonary:      Effort: Pulmonary effort is normal. No respiratory distress.      Breath sounds: Normal breath sounds. No wheezing or rales.   Chest:      Chest wall: No tenderness.   Abdominal:      General: Bowel sounds are normal. There is no distension.      Palpations: Abdomen is soft. There is no mass.      Tenderness: There is no abdominal tenderness. There is no rebound.   Musculoskeletal:         General: No tenderness. Normal range of motion.      Cervical back: Normal range of motion and neck supple.   Skin:     General: Skin is warm and dry.      Coloration: Skin is not pale.   Neurological:      Mental Status: She is alert and oriented to person, place, and time.      Coordination: Coordination normal.      Deep Tendon Reflexes: Reflexes normal.   Psychiatric:         Behavior: Behavior normal.         Judgment: Judgment normal.           EKG 11/27/23- reviewed - SR w SA, RBBB  TTE 11/27/23  Summary         Left Ventricle: The left ventricle is normal in size. Normal wall thickness. There is " concentric remodeling. Normal wall motion. There is normal systolic function. Biplane (2D) method of discs ejection fraction is 60%. There is normal diastolic function.    Right Ventricle: Normal right ventricular cavity size. Wall thickness is normal. Right ventricle wall motion  is normal. Systolic function is normal.    Aortic Valve: The aortic valve is a trileaflet valve. There is moderate aortic valve sclerosis.    Tricuspid Valve: There is mild to moderate regurgitation.    Pulmonary Artery: The estimated pulmonary artery systolic pressure is 25 mmHg.    IVC/SVC: Normal venous pressure at 3 mmHg.    EC2022- reviewed.  Sinus katherine w/ PACs, RBBB, LAFB.       Echo: 2022- reviewed.    Summary     The left ventricle is normal in size with concentric remodeling and normal systolic function.  The estimated ejection fraction is 70%.  Normal left ventricular diastolic function.  Normal right ventricular size with normal right ventricular systolic function.  Mild aortic regurgitation.  Mild tricuspid regurgitation.  Mild pulmonic regurgitation.  Mild mitral regurgitation.  Normal central venous pressure (3 mmHg).  The estimated PA systolic pressure is 32 mmHg.     ETT: 2022- reviewed.    Conclusion          The patient exercised for 7 minutes 30 seconds on a Abel protocol, corresponding to a functional capacity of 9 METS, achieving a peak heart rate of 157 bpm, which is 113 % of the age predicted maximum heart rate. The patient experienced no angina during the test. Their exercise capacity was above average. The Duke Treadmill Score was 8.    The ECG portion of the study is negative for ischemia.    The patient reported no chest pain during the stress test.    The blood pressure response to stress was normal.    The Duke Treadmill Score was 8.    During stress, occasional PACs are noted. , During stress, occasional PVCs are noted.    The exercise capacity was above average.    Assessment:       1.  Primary hypertension    2. Secondary biliary cirrhosis    3. Chronic hepatitis C with cirrhosis    4. Liver mass           Plan:         Primary hypertension  Goal BP < 140/90.  Compliant w/ meds.    -controlled   -in office 110/62  - current medication regimen: amlodipine 5 mg, carvedilol 3.125 mg, lisinopril 20 mg, spironolactone 100 mg   - enrolling in digital medicine prgm  - risk factor and lifestyle modifications     Secondary biliary cirrhosis   Followed by  hepatology    Chronic hepatitis C with cirrhosis  Followed by hepatology.    Liver mass  -Followed by Hepatology       Total duration of face to face visit time 30 minutes.  Total time spent counseling greater than fifty percent of total visit time.  Counseling included discussion regarding imaging findings, diagnosis, possibilities, treatment options, risks and benefits.  The patient had many questions regarding the options and long-term effects      Guilherme Gregorio NP  Cardiology

## 2024-01-08 NOTE — ASSESSMENT & PLAN NOTE
Goal BP < 140/90.  Compliant w/ meds.    -controlled   -in office 110/62  - current medication regimen: amlodipine 5 mg, carvedilol 3.125 mg, lisinopril 20 mg, spironolactone 100 mg   - enrolling in digital medicine prgm  - risk factor and lifestyle modifications

## 2024-01-09 ENCOUNTER — TELEPHONE (OUTPATIENT)
Dept: FAMILY MEDICINE | Facility: CLINIC | Age: 79
End: 2024-01-09
Payer: MEDICARE

## 2024-01-09 NOTE — TELEPHONE ENCOUNTER
----- Message from Joselin Morrissey sent at 1/9/2024 11:46 AM CST -----  Type:  Sooner Apoointment Request    Caller is requesting a sooner appointment.  Caller will accept being placed on the waitlist and is requesting a message be sent to doctor.  Name of Caller:pt  When is the first available appointment?03/2024  Symptoms:right side pain   Would the patient rather a call back or a response via [employer]? call  Best Call Back Number:[phone]  Additional Information: please call   Pain has been for 2 weeks

## 2024-01-09 NOTE — TELEPHONE ENCOUNTER
Spoke with pt. Informed pt  does not have any availability but I can try and put pt on [facility] schedule. Pt verbally understood and accepted offer

## 2024-01-12 ENCOUNTER — OFFICE VISIT (OUTPATIENT)
Dept: FAMILY MEDICINE | Facility: CLINIC | Age: 79
End: 2024-01-12
Payer: MEDICARE

## 2024-01-12 ENCOUNTER — PATIENT OUTREACH (OUTPATIENT)
Dept: ADMINISTRATIVE | Facility: HOSPITAL | Age: 79
End: 2024-01-12
Payer: MEDICARE

## 2024-01-12 VITALS
HEIGHT: 64 IN | SYSTOLIC BLOOD PRESSURE: 102 MMHG | WEIGHT: 102.5 LBS | OXYGEN SATURATION: 98 % | DIASTOLIC BLOOD PRESSURE: 58 MMHG | BODY MASS INDEX: 17.5 KG/M2 | HEART RATE: 68 BPM | TEMPERATURE: 99 F

## 2024-01-12 DIAGNOSIS — C22.0 HCC (HEPATOCELLULAR CARCINOMA): ICD-10-CM

## 2024-01-12 DIAGNOSIS — I10 PRIMARY HYPERTENSION: ICD-10-CM

## 2024-01-12 DIAGNOSIS — R10.31 RIGHT LOWER QUADRANT ABDOMINAL PAIN: Primary | ICD-10-CM

## 2024-01-12 DIAGNOSIS — K57.92 DIVERTICULITIS: ICD-10-CM

## 2024-01-12 PROCEDURE — 3288F FALL RISK ASSESSMENT DOCD: CPT | Mod: CPTII,S$GLB,,

## 2024-01-12 PROCEDURE — 3078F DIAST BP <80 MM HG: CPT | Mod: CPTII,S$GLB,,

## 2024-01-12 PROCEDURE — 99999 PR PBB SHADOW E&M-EST. PATIENT-LVL IV: CPT | Mod: PBBFAC,,,

## 2024-01-12 PROCEDURE — 1160F RVW MEDS BY RX/DR IN RCRD: CPT | Mod: CPTII,S$GLB,,

## 2024-01-12 PROCEDURE — 3074F SYST BP LT 130 MM HG: CPT | Mod: CPTII,S$GLB,,

## 2024-01-12 PROCEDURE — 1159F MED LIST DOCD IN RCRD: CPT | Mod: CPTII,S$GLB,,

## 2024-01-12 PROCEDURE — 1101F PT FALLS ASSESS-DOCD LE1/YR: CPT | Mod: CPTII,S$GLB,,

## 2024-01-12 PROCEDURE — 99214 OFFICE O/P EST MOD 30 MIN: CPT | Mod: S$GLB,,,

## 2024-01-12 RX ORDER — CIPROFLOXACIN 250 MG/1
250 TABLET, FILM COATED ORAL 2 TIMES DAILY
Qty: 14 TABLET | Refills: 0 | Status: SHIPPED | OUTPATIENT
Start: 2024-01-12 | End: 2024-01-19

## 2024-01-12 RX ORDER — LISINOPRIL AND HYDROCHLOROTHIAZIDE 12.5; 2 MG/1; MG/1
TABLET ORAL DAILY
COMMUNITY
Start: 2023-10-31

## 2024-01-12 RX ORDER — METRONIDAZOLE 500 MG/1
500 TABLET ORAL 3 TIMES DAILY
Qty: 21 TABLET | Refills: 0 | Status: SHIPPED | OUTPATIENT
Start: 2024-01-12 | End: 2024-01-19

## 2024-01-12 NOTE — PROGRESS NOTES
Subjective:            Chief Complaint: Abdominal Pain     Seema Gann is a 78 y.o. female, patient of Bethany Mesa MD with Htn, thrombocytopenia, HCC, pancytopenia, chronic hep C with cirrhosis, unknown to me, presents today with complaints of Abdominal Pain  Reports RLQ pain x 2 weeks.    HCV, HCC, s/p Y90 10/2023 followed by IR, Reports having 3 radiation treatments, tolerated them well, only had vomiting once. Last seen by IR 11/15/2023.  Last seen by Hepatology 07/19/2023. Scheduled for blood work and MRI 01/23, office visit 03/06.     Last colonoscopy 5 years ago.     Denies heavy lifting, denies falls or injury.     Abdominal Pain  This is a new problem. The current episode started 1 to 4 weeks ago. The onset quality is gradual. The problem occurs constantly. The problem has been unchanged. The pain is at a severity of 6/10. The pain is moderate. The quality of the pain is aching (described as being like a toothache). The abdominal pain does not radiate. Associated symptoms include a fever (reports around 101 at night only; normal during the day. has been like this for the last week or so). Pertinent negatives include no belching, constipation, diarrhea, dysuria, frequency, hematochezia, hematuria, nausea, vomiting or weight loss. Associated symptoms comments: Keeps her up at night, unable to sleep due to the pain. . The pain is aggravated by being still (lying down at nigth to go to bed). The pain is relieved by Movement. She has tried nothing for the symptoms.       Past Medical History:   Diagnosis Date    Cirrhosis     HCC (hepatocellular carcinoma)     Hepatitis     Hypertension        Past Surgical History:   Procedure Laterality Date    APPENDECTOMY      HYSTERECTOMY         History reviewed. No pertinent family history.    Social History     Socioeconomic History    Marital status:    Tobacco Use    Smoking status: Never     Passive exposure: Never    Smokeless tobacco: Never  "  Substance and Sexual Activity    Alcohol use: No    Drug use: No    Sexual activity: Yes   Social History Narrative    ** Merged History Encounter **            Review of Systems   Constitutional:  Positive for fever (reports around 101 at night only; normal during the day. has been like this for the last week or so). Negative for appetite change, chills, fatigue and weight loss.   Respiratory:  Negative for cough and shortness of breath.    Cardiovascular:  Negative for chest pain and leg swelling.   Gastrointestinal:  Positive for abdominal pain. Negative for blood in stool, constipation, diarrhea, hematochezia, nausea and vomiting.   Genitourinary:  Negative for dysuria, frequency and hematuria.   Neurological:  Negative for dizziness and weakness.         Objective:     Vitals:    01/12/24 0950 01/12/24 1021 01/12/24 1032   BP: (!) 98/58 (!) 102/58    BP Location: Left arm Left arm    Patient Position: Sitting     BP Method: Small (Manual)     Pulse: 68     Temp:   98.6 °F (37 °C)   TempSrc:   Oral   SpO2: 98%     Weight: 46.5 kg (102 lb 8.2 oz)     Height: 5' 4" (1.626 m)            Physical Exam  Vitals reviewed.   Constitutional:       Appearance: Normal appearance. She is well-developed and well-groomed.   HENT:      Head: Normocephalic and atraumatic.   Cardiovascular:      Rate and Rhythm: Normal rate and regular rhythm.      Heart sounds: Normal heart sounds.   Pulmonary:      Effort: Pulmonary effort is normal.      Breath sounds: Normal breath sounds.   Abdominal:      General: Bowel sounds are normal.      Palpations: Abdomen is soft.      Tenderness: There is abdominal tenderness in the right lower quadrant. There is no right CVA tenderness, left CVA tenderness or guarding.       Musculoskeletal:      Right lower leg: No edema.      Left lower leg: No edema.   Skin:     General: Skin is warm and dry.      Capillary Refill: Capillary refill takes less than 2 seconds.   Neurological:      General: No " focal deficit present.      Mental Status: She is alert and oriented to person, place, and time.   Psychiatric:         Speech: Speech normal.         Behavior: Behavior is cooperative.           Laboratory:  CBC:  Recent Labs   Lab Result Units 11/26/23 1953 11/27/23  0548 01/12/24  1057   WBC K/uL 4.29 2.93* 4.41   RBC M/uL 2.77* 2.54* 2.88*   Hemoglobin g/dL 9.5* 8.5* 9.6*   Hematocrit % 27.2* 24.9* 28.8*   Platelets K/uL 124* 87* 118*   MCV fL 98 98 100*   MCH pg 34.3* 33.5* 33.3*   MCHC g/dL 34.9 34.1 33.3     CMP:  Recent Labs   Lab Result Units 11/26/23 1953 11/27/23 0548 01/12/24  1057   Glucose mg/dL 153* 93 135*   Calcium mg/dL 9.4 9.0 9.2   Albumin g/dL 3.4* 2.8* 3.6   Total Protein g/dL 7.6 6.5 7.7   Sodium mmol/L 142 142 144   Potassium mmol/L 3.7 4.1 4.3   CO2 mmol/L 24 24 23   Chloride mmol/L 106 111* 110   BUN mg/dL 24* 20* 13   Alkaline Phosphatase U/L 88 77 103   ALT U/L 11 9* 14   AST U/L 24 21 30   Total Bilirubin mg/dL 1.1* 1.0 1.0     URINALYSIS:  Recent Labs   Lab Result Units 01/12/24  1101   Color, UA  Yellow   Specific Gravity, UA  1.020   pH, UA  6.0   Protein, UA  Negative   Nitrite, UA  Negative   Leukocytes, UA  Negative   Urobilinogen, UA EU/dL Negative      LIPIDS:  Recent Labs   Lab Result Units 11/22/23  0626   TSH uIU/mL 2.430   HDL mg/dL 39*   Cholesterol mg/dL 139   Triglycerides mg/dL 61   LDL Cholesterol mg/dL 87.8   HDL/Cholesterol Ratio % 28.1   Non-HDL Cholesterol mg/dL 100   Total Cholesterol/HDL Ratio  3.6     TSH:  Recent Labs   Lab Result Units 11/22/23  0626   TSH uIU/mL 2.430     A1C:  Recent Labs   Lab Result Units 11/22/23  0626   Hemoglobin A1C % 4.9       Assessment:         ICD-10-CM ICD-9-CM   1. Right lower quadrant abdominal pain  R10.31 789.03   2. HCC (hepatocellular carcinoma)  C22.0 155.0   3. Primary hypertension  I10 401.9   4. Diverticulitis  K57.92 562.11       Plan:       Right lower quadrant abdominal pain  -     Urinalysis; Future; Expected date:  01/12/2024  -     Urine culture; Future; Expected date: 01/12/2024  -     CBC Auto Differential; Future; Expected date: 01/12/2024  -     Comprehensive Metabolic Panel; Future; Expected date: 01/12/2024  -     metroNIDAZOLE (FLAGYL) 500 MG tablet; Take 1 tablet (500 mg total) by mouth 3 (three) times daily. for 7 days  Dispense: 21 tablet; Refill: 0  -     ciprofloxacin HCl (CIPRO) 250 MG tablet; Take 1 tablet (250 mg total) by mouth 2 (two) times daily. for 7 days  Dispense: 14 tablet; Refill: 0  Patient with RLQ abdominal pain, fever at night. CT scan 11/26/23 showed the following:  Cirrhotic liver with redemonstration of known large heterogeneous right hepatic lobe mass, Sequela of portal hypertension, Circumferential bladder wall thickening. Please correlate for cystitis.  CBC and CMP unremarkable. Urine does not show any abnormalities.     Collaborated with PCP Dr. Mesa regarding labs and patients assessment, recommends to treat for diverticulitis in patient with fever+RLQ abdominal pain in the setting of colonic diverticulosis. Will treat with metronidazole and cipro x 7 days.     Patient instructed to go to ER if symptoms worsen.     HCC (hepatocellular carcinoma)  Followed by IR and hepatology. Unsure if abdominal pain is related. Instructed patient to f/u  with IR and Hepatology soon regarding this pain.     Primary hypertension  Controlled with current regimen.     Diverticulitis  -     metroNIDAZOLE (FLAGYL) 500 MG tablet; Take 1 tablet (500 mg total) by mouth 3 (three) times daily. for 7 days  Dispense: 21 tablet; Refill: 0  -     ciprofloxacin HCl (CIPRO) 250 MG tablet; Take 1 tablet (250 mg total) by mouth 2 (two) times daily. for 7 days  Dispense: 14 tablet; Refill: 0      If symptoms worsen, go to ER.  If symptoms do not improve, return to clinic.   Keep appointments with all specialists.    Patient verbalizes understanding and agrees with current treatment plan.       Follow up if symptoms  worsen or fail to improve.     Patient's Medications   New Prescriptions    CIPROFLOXACIN HCL (CIPRO) 250 MG TABLET    Take 1 tablet (250 mg total) by mouth 2 (two) times daily. for 7 days    METRONIDAZOLE (FLAGYL) 500 MG TABLET    Take 1 tablet (500 mg total) by mouth 3 (three) times daily. for 7 days   Previous Medications    AMLODIPINE (NORVASC) 5 MG TABLET    Take 1 tablet (5 mg total) by mouth once daily.    ASPIRIN (ECOTRIN) 81 MG EC TABLET    Take 1 tablet (81 mg total) by mouth once daily.    CARVEDILOL (COREG) 3.125 MG TABLET    Take 1 tablet (3.125 mg total) by mouth 2 (two) times daily with meals.    CYPROHEPTADINE (PERIACTIN) 4 MG TABLET    Take 1 tablet (4 mg total) by mouth 3 (three) times daily.    LISINOPRIL (PRINIVIL,ZESTRIL) 20 MG TABLET    Take 1 tablet (20 mg total) by mouth once daily.    LISINOPRIL-HYDROCHLOROTHIAZIDE (PRINZIDE,ZESTORETIC) 20-12.5 MG PER TABLET    Take by mouth once daily.    ONDANSETRON (ZOFRAN) 4 MG TABLET    Take 1 tablet (4 mg total) by mouth every 8 (eight) hours as needed for Nausea.    SPIRONOLACTONE (ALDACTONE) 100 MG TABLET    Take 1 tablet (100 mg total) by mouth once daily.   Modified Medications    No medications on file   Discontinued Medications    No medications on file         Wen Li NP

## 2024-01-12 NOTE — PROGRESS NOTES
Updates were requested from care everywhere.  Health Maintenance has been updated.  [facility] immunization registry triggered.  Immunizations were reconciled.  Per [employer] in basket message, pt declined flu vaccine 01/12/2024.

## 2024-01-12 NOTE — PATIENT INSTRUCTIONS
Blood work and urine today. Will notify of results via my chart.   Use heating pad as needed for pain.   If symptoms worsen, go to ER.   Follow up with PCP, Hepatology, and Interventional Radiology soon as possible.

## 2024-02-26 ENCOUNTER — OFFICE VISIT (OUTPATIENT)
Dept: FAMILY MEDICINE | Facility: CLINIC | Age: 79
End: 2024-02-26
Payer: MEDICARE

## 2024-02-26 VITALS
HEART RATE: 68 BPM | SYSTOLIC BLOOD PRESSURE: 100 MMHG | WEIGHT: 106.56 LBS | HEIGHT: 64 IN | OXYGEN SATURATION: 98 % | DIASTOLIC BLOOD PRESSURE: 54 MMHG | BODY MASS INDEX: 18.19 KG/M2

## 2024-02-26 DIAGNOSIS — R60.9 EDEMA, UNSPECIFIED TYPE: ICD-10-CM

## 2024-02-26 DIAGNOSIS — I10 PRIMARY HYPERTENSION: ICD-10-CM

## 2024-02-26 DIAGNOSIS — M79.89 LEG SWELLING: Primary | ICD-10-CM

## 2024-02-26 DIAGNOSIS — M79.89 FOOT SWELLING: ICD-10-CM

## 2024-02-26 PROCEDURE — 3074F SYST BP LT 130 MM HG: CPT | Mod: CPTII,S$GLB,,

## 2024-02-26 PROCEDURE — 99999 PR PBB SHADOW E&M-EST. PATIENT-LVL IV: CPT | Mod: PBBFAC,,,

## 2024-02-26 PROCEDURE — 1101F PT FALLS ASSESS-DOCD LE1/YR: CPT | Mod: CPTII,S$GLB,,

## 2024-02-26 PROCEDURE — 3078F DIAST BP <80 MM HG: CPT | Mod: CPTII,S$GLB,,

## 2024-02-26 PROCEDURE — 1125F AMNT PAIN NOTED PAIN PRSNT: CPT | Mod: CPTII,S$GLB,,

## 2024-02-26 PROCEDURE — 3288F FALL RISK ASSESSMENT DOCD: CPT | Mod: CPTII,S$GLB,,

## 2024-02-26 PROCEDURE — 1159F MED LIST DOCD IN RCRD: CPT | Mod: CPTII,S$GLB,,

## 2024-02-26 PROCEDURE — 99214 OFFICE O/P EST MOD 30 MIN: CPT | Mod: S$GLB,,,

## 2024-02-26 NOTE — PATIENT INSTRUCTIONS
Hold amlodipine to see if the swelling goes away. Follow up with Dr. Rdz on this on Tuesday.   Have US done, will notify you of results via my chart.   Wear compression stockings 20-30 mmHg pressure during the day, especially when you are on your feet a lot.   Low salt diet  You should be taking lisinopril by itself, not with hctz per Cardiology note.

## 2024-02-26 NOTE — PROGRESS NOTES
"Subjective:            Chief Complaint: Foot Pain (And swelling been going on for about a week) and Follow-up  HPI   Seema Gann is a 78 y.o. female, patient of Bethany Mesa MD with  Htn, thrombocytopenia, HCC, pancytopenia, chronic hep C with cirrhosis   known to me, presents today with spouse with complaints of Foot Pain (And swelling been going on for about a week) and Follow-up    Reports BLE foot swelling since last week. Denies this has occurred before. Denies chest pain or shortness of breath. States she has not done anything different. Has not had anything salty.   Has been elevating legs when sitting. Works in the flower bed frequently.   Denies new medications.     Past Medical History:   Diagnosis Date    Cirrhosis     HCC (hepatocellular carcinoma)     Hepatitis     Hypertension        Past Surgical History:   Procedure Laterality Date    APPENDECTOMY      HYSTERECTOMY         No family history on file.    Social History     Socioeconomic History    Marital status:    Tobacco Use    Smoking status: Never     Passive exposure: Never    Smokeless tobacco: Never   Substance and Sexual Activity    Alcohol use: No    Drug use: No    Sexual activity: Yes   Social History Narrative    ** Merged History Encounter **            Review of Systems   Respiratory:  Negative for cough and shortness of breath.    Cardiovascular:  Positive for leg swelling. Negative for chest pain.         Objective:     Vitals:    02/26/24 1035 02/26/24 1107   BP: (!) 98/58 (!) 100/54   BP Location: Left arm Left arm   Patient Position: Sitting    BP Method: Medium (Manual)    Pulse: 68    SpO2: 98%    Weight: 48.4 kg (106 lb 9.5 oz)    Height: 5' 4" (1.626 m)           Physical Exam  Constitutional:       Appearance: Normal appearance. She is well-developed and well-groomed.   HENT:      Head: Normocephalic and atraumatic.   Eyes:      Extraocular Movements: Extraocular movements intact.   Cardiovascular:     "  Rate and Rhythm: Normal rate and regular rhythm.      Pulses:           Dorsalis pedis pulses are 2+ on the right side and 2+ on the left side.      Heart sounds: Normal heart sounds.   Pulmonary:      Effort: Pulmonary effort is normal.      Breath sounds: Normal breath sounds.   Musculoskeletal:         General: Normal range of motion.      Cervical back: Normal range of motion.      Right lower le+ Edema present.      Left lower le+ Edema present.      Right foot: Swelling present. Normal pulse.      Left foot: Swelling present. Normal pulse.   Skin:     General: Skin is warm and dry.      Capillary Refill: Capillary refill takes less than 2 seconds.      Comments: Small varicosities noted to BLE.    Neurological:      General: No focal deficit present.      Mental Status: She is alert and oriented to person, place, and time.   Psychiatric:         Attention and Perception: Attention normal.         Mood and Affect: Mood normal.         Speech: Speech normal.         Behavior: Behavior is cooperative.           Laboratory:  CBC:  Recent Labs   Lab Result Units 24  1057 24  0847   WBC K/uL 4.41 4.53   RBC M/uL 2.88* 2.84*   Hemoglobin g/dL 9.6* 9.6*   Hematocrit % 28.8* 27.8*   Platelets K/uL 118* 117*   MCV fL 100* 98   MCH pg 33.3* 33.8*   MCHC g/dL 33.3 34.5     CMP:  Recent Labs   Lab Result Units 24  1057 24  0847   Glucose mg/dL 135* 102   Calcium mg/dL 9.2 9.0   Albumin g/dL 3.6 3.3*   Total Protein g/dL 7.7 7.3   Sodium mmol/L 144 143   Potassium mmol/L 4.3 3.9   CO2 mmol/L 23 23   Chloride mmol/L 110 113*   BUN mg/dL 13 13   Alkaline Phosphatase U/L 103 94   ALT U/L 14 13   AST U/L 30 28   Total Bilirubin mg/dL 1.0 1.4*     URINALYSIS:  Recent Labs   Lab Result Units 24  1101   Color, UA  Yellow   Specific Gravity, UA  1.020   pH, UA  6.0   Protein, UA  Negative   Nitrite, UA  Negative   Leukocytes, UA  Negative   Urobilinogen, UA EU/dL Negative      LIPIDS:  No  "results for input(s): "TSH", "HDL", "CHOL", "TRIG", "LDLCALC", "CHOLHDL", "NONHDLCHOL", "TOTALCHOLEST" in the last 2160 hours.  TSH:  No results for input(s): "TSH" in the last 2160 hours.  A1C:  No results for input(s): "HGBA1C" in the last 2160 hours.    Assessment:         ICD-10-CM ICD-9-CM   1. Leg swelling  M79.89 729.81   2. Foot swelling  M79.89 729.81   3. Edema, unspecified type  R60.9 782.3   4. Primary hypertension  I10 401.9       Plan:       Leg swelling  Foot swelling     Edema, unspecified type  -      Lower Extremity Veins Bilateral; Future; Expected date: 02/26/2024  Echo in 11/2023 showed     Left Ventricle: The left ventricle is normal in size. Normal wall thickness. There is concentric remodeling. Normal wall motion. There is normal systolic function. Biplane (2D) method of discs ejection fraction is 60%. There is normal diastolic function.    Right Ventricle: Normal right ventricular cavity size. Wall thickness is normal. Right ventricle wall motion  is normal. Systolic function is normal.    Aortic Valve: The aortic valve is a trileaflet valve. There is moderate aortic valve sclerosis.    Tricuspid Valve: There is mild to moderate regurgitation.    Pulmonary Artery: The estimated pulmonary artery systolic pressure is 25 mmHg.    IVC/SVC: Normal venous pressure at 3 mmHg.  Will order US LE to r/o DVT.   Patient instructed to hold amlodipine for the next week to determine if this is the cause. Scheduled to f/u with PCP in week.   Elevate legs when sitting.   Wear compression stockings 20-30 mmHg pressure during the day, remove at night.   Low sodium diet  If symptoms worsen, go to ER.     Primary hypertension  Controlled with current regimen.   Hold amlodipine for the next week.   Continue to check BP daily.       If symptoms worsen, go to ER.  If symptoms do not improve, return to clinic.   Keep appointments with all specialists.     Patient verbalizes understanding and agrees with current " treatment plan.      Follow up in about 1 week (around 3/4/2024).     Patient's Medications   New Prescriptions    No medications on file   Previous Medications    AMLODIPINE (NORVASC) 5 MG TABLET    Take 1 tablet (5 mg total) by mouth once daily.    ASPIRIN (ECOTRIN) 81 MG EC TABLET    Take 1 tablet (81 mg total) by mouth once daily.    CARVEDILOL (COREG) 3.125 MG TABLET    Take 1 tablet (3.125 mg total) by mouth 2 (two) times daily with meals.    CYPROHEPTADINE (PERIACTIN) 4 MG TABLET    Take 1 tablet (4 mg total) by mouth 3 (three) times daily.    LISINOPRIL (PRINIVIL,ZESTRIL) 20 MG TABLET    Take 1 tablet (20 mg total) by mouth once daily.    LISINOPRIL-HYDROCHLOROTHIAZIDE (PRINZIDE,ZESTORETIC) 20-12.5 MG PER TABLET    Take by mouth once daily.    SPIRONOLACTONE (ALDACTONE) 100 MG TABLET    Take 1 tablet (100 mg total) by mouth once daily.   Modified Medications    No medications on file   Discontinued Medications    No medications on file         Wen Li NP

## 2024-03-05 ENCOUNTER — OFFICE VISIT (OUTPATIENT)
Dept: FAMILY MEDICINE | Facility: CLINIC | Age: 79
End: 2024-03-05
Payer: MEDICARE

## 2024-03-05 VITALS
DIASTOLIC BLOOD PRESSURE: 64 MMHG | HEIGHT: 64 IN | SYSTOLIC BLOOD PRESSURE: 112 MMHG | WEIGHT: 113 LBS | BODY MASS INDEX: 19.29 KG/M2 | TEMPERATURE: 98 F | OXYGEN SATURATION: 96 % | HEART RATE: 67 BPM

## 2024-03-05 DIAGNOSIS — I10 ESSENTIAL HYPERTENSION: ICD-10-CM

## 2024-03-05 DIAGNOSIS — C22.0 HCC (HEPATOCELLULAR CARCINOMA): ICD-10-CM

## 2024-03-05 DIAGNOSIS — M85.80 OSTEOPENIA, UNSPECIFIED LOCATION: ICD-10-CM

## 2024-03-05 DIAGNOSIS — D69.6 THROMBOCYTOPENIA, UNSPECIFIED: ICD-10-CM

## 2024-03-05 DIAGNOSIS — R60.0 LEG EDEMA: Primary | ICD-10-CM

## 2024-03-05 DIAGNOSIS — D64.9 NORMOCYTIC ANEMIA: ICD-10-CM

## 2024-03-05 DIAGNOSIS — N18.31 STAGE 3A CHRONIC KIDNEY DISEASE: ICD-10-CM

## 2024-03-05 PROBLEM — D61.818 PANCYTOPENIA: Status: RESOLVED | Noted: 2023-11-21 | Resolved: 2024-03-05

## 2024-03-05 PROCEDURE — 1159F MED LIST DOCD IN RCRD: CPT | Mod: CPTII,S$GLB,, | Performed by: STUDENT IN AN ORGANIZED HEALTH CARE EDUCATION/TRAINING PROGRAM

## 2024-03-05 PROCEDURE — 3078F DIAST BP <80 MM HG: CPT | Mod: CPTII,S$GLB,, | Performed by: STUDENT IN AN ORGANIZED HEALTH CARE EDUCATION/TRAINING PROGRAM

## 2024-03-05 PROCEDURE — 3288F FALL RISK ASSESSMENT DOCD: CPT | Mod: CPTII,S$GLB,, | Performed by: STUDENT IN AN ORGANIZED HEALTH CARE EDUCATION/TRAINING PROGRAM

## 2024-03-05 PROCEDURE — 1126F AMNT PAIN NOTED NONE PRSNT: CPT | Mod: CPTII,S$GLB,, | Performed by: STUDENT IN AN ORGANIZED HEALTH CARE EDUCATION/TRAINING PROGRAM

## 2024-03-05 PROCEDURE — 3074F SYST BP LT 130 MM HG: CPT | Mod: CPTII,S$GLB,, | Performed by: STUDENT IN AN ORGANIZED HEALTH CARE EDUCATION/TRAINING PROGRAM

## 2024-03-05 PROCEDURE — 99999 PR PBB SHADOW E&M-EST. PATIENT-LVL III: CPT | Mod: PBBFAC,,, | Performed by: STUDENT IN AN ORGANIZED HEALTH CARE EDUCATION/TRAINING PROGRAM

## 2024-03-05 PROCEDURE — 1160F RVW MEDS BY RX/DR IN RCRD: CPT | Mod: CPTII,S$GLB,, | Performed by: STUDENT IN AN ORGANIZED HEALTH CARE EDUCATION/TRAINING PROGRAM

## 2024-03-05 PROCEDURE — 99215 OFFICE O/P EST HI 40 MIN: CPT | Mod: S$GLB,,, | Performed by: STUDENT IN AN ORGANIZED HEALTH CARE EDUCATION/TRAINING PROGRAM

## 2024-03-05 PROCEDURE — 1101F PT FALLS ASSESS-DOCD LE1/YR: CPT | Mod: CPTII,S$GLB,, | Performed by: STUDENT IN AN ORGANIZED HEALTH CARE EDUCATION/TRAINING PROGRAM

## 2024-03-05 RX ORDER — FUROSEMIDE 20 MG/1
20 TABLET ORAL DAILY
Qty: 30 TABLET | Refills: 0 | Status: SHIPPED | OUTPATIENT
Start: 2024-03-05 | End: 2024-04-09 | Stop reason: DRUGHIGH

## 2024-03-05 NOTE — PROGRESS NOTES
[facility] Note    Chief Complaint      Chief Complaint   Patient presents with    Follow-up on chronic conditions  Bilateral leg swelling     History of Present Illness     Seema Gann is a 78 y.o. female with sHTN, Biliary cirrhosis with hepatic mass since 2015 due to HCV s/p chemotherapy who presents for f/u on chronic conditions in addition c/o bilateral leg swelling, denies any abdominal pain or swelling, palpitations, SOB, orthopnea, PND or prolonged immobilization.    Problem List Addressed This Visit:  1. Leg edema  -     furosemide (LASIX) 20 MG tablet; Take 1 tablet (20 mg total) by mouth once daily. For three days, then as needed  Dispense: 30 tablet; Refill: 0    2. Stage 3a chronic kidney disease    3. Thrombocytopenia, unspecified    4. Normocytic anemia    5. HCC (hepatocellular carcinoma)    6. Essential hypertension         Health Maintenance   Topic Date Due    TETANUS VACCINE  Never done    Shingles Vaccine (1 of 2) Never done    DEXA Scan  02/09/2026    Lipid Panel  11/22/2028    Hepatitis C Screening  Completed       Past Medical History:   Diagnosis Date    Cirrhosis     HCC (hepatocellular carcinoma)     Hepatitis     Hypertension        Past Surgical History:   Procedure Laterality Date    APPENDECTOMY      HYSTERECTOMY         family history is not on file.    Social History     Tobacco Use    Smoking status: Never     Passive exposure: Never    Smokeless tobacco: Never   Substance Use Topics    Alcohol use: No    Drug use: No       Review of Systems   Constitutional:  Negative for fatigue and fever.   Respiratory:  Negative for cough and chest tightness.    Cardiovascular:  Positive for leg swelling. Negative for chest pain and palpitations.   Gastrointestinal:  Negative for abdominal pain, diarrhea and vomiting.   Endocrine: Negative for polydipsia and polyphagia.   Genitourinary:  Negative for difficulty urinating, dysuria and frequency.   Musculoskeletal:   "Negative for arthralgias, back pain, gait problem and joint swelling.   Skin:  Negative for rash.   Neurological:  Negative for seizures, weakness, numbness and headaches.   Psychiatric/Behavioral:  Negative for sleep disturbance.        Outpatient Encounter Medications as of 3/5/2024   Medication Sig Dispense Refill    amLODIPine (NORVASC) 5 MG tablet Take 1 tablet (5 mg total) by mouth once daily. 30 tablet 11    carvediloL (COREG) 3.125 MG tablet Take 1 tablet (3.125 mg total) by mouth 2 (two) times daily with meals. 180 tablet 3    cyproheptadine (PERIACTIN) 4 mg tablet Take 1 tablet (4 mg total) by mouth 3 (three) times daily. 90 tablet 0    lisinopriL (PRINIVIL,ZESTRIL) 20 MG tablet Take 1 tablet (20 mg total) by mouth once daily. 90 tablet 3    spironolactone (ALDACTONE) 100 MG tablet Take 1 tablet (100 mg total) by mouth once daily. 90 tablet 3    aspirin (ECOTRIN) 81 MG EC tablet Take 1 tablet (81 mg total) by mouth once daily. 30 tablet 2    furosemide (LASIX) 20 MG tablet Take 1 tablet (20 mg total) by mouth once daily. For three days, then as needed 30 tablet 0    lisinopriL-hydrochlorothiazide (PRINZIDE,ZESTORETIC) 20-12.5 mg per tablet Take by mouth once daily.       No facility-administered encounter medications on file as of 3/5/2024.        Review of patient's allergies indicates:  No Known Allergies    Physical Exam      Vital Signs  Temp: 97.9 °F (36.6 °C)  Temp Source: Temporal  Pulse: 67  SpO2: 96 %  BP: 112/64  BP Location: Right arm  Patient Position: Sitting  Pain Score: 0-No pain  Height and Weight  Height: 5' 4" (162.6 cm)  Weight: 51.2 kg (112 lb 15.8 oz)  BSA (Calculated - sq m): 1.52 sq meters  BMI (Calculated): 19.4  Weight in (lb) to have BMI = 25: 145.3]    Physical Exam  Vitals reviewed.   Constitutional:       Appearance: Normal appearance. She is normal weight.   HENT:      Head: Normocephalic and atraumatic.      Mouth/Throat:      Mouth: Mucous membranes are moist.      Pharynx: " "Oropharynx is clear.   Eyes:      Extraocular Movements: Extraocular movements intact.      Conjunctiva/sclera: Conjunctivae normal.      Pupils: Pupils are equal, round, and reactive to light.   Cardiovascular:      Rate and Rhythm: Normal rate and regular rhythm.      Pulses: Normal pulses.      Heart sounds: Normal heart sounds.   Pulmonary:      Effort: Pulmonary effort is normal.      Breath sounds: Normal breath sounds.   Abdominal:      General: Abdomen is flat. Bowel sounds are normal.      Palpations: Abdomen is soft.   Musculoskeletal:      Cervical back: Normal range of motion.      Right lower leg: Edema ((1+ bilaterally)) present.      Left lower leg: Edema present.   Skin:     General: Skin is warm and dry.   Neurological:      General: No focal deficit present.      Mental Status: She is alert and oriented to person, place, and time.      Sensory: No sensory deficit.   Psychiatric:         Mood and Affect: Mood normal.         Behavior: Behavior normal.       Laboratory:  CBC:  Recent Labs   Lab Result Units 01/12/24  1057 01/23/24  0847   WBC K/uL 4.41 4.53   RBC M/uL 2.88* 2.84*   Hemoglobin g/dL 9.6* 9.6*   Hematocrit % 28.8* 27.8*   Platelets K/uL 118* 117*   MCV fL 100* 98   MCH pg 33.3* 33.8*   MCHC g/dL 33.3 34.5     CMP:  Recent Labs   Lab Result Units 01/12/24  1057 01/23/24  0847   Glucose mg/dL 135* 102   Calcium mg/dL 9.2 9.0   Albumin g/dL 3.6 3.3*   Total Protein g/dL 7.7 7.3   Sodium mmol/L 144 143   Potassium mmol/L 4.3 3.9   CO2 mmol/L 23 23   Chloride mmol/L 110 113*   BUN mg/dL 13 13   Alkaline Phosphatase U/L 103 94   ALT U/L 14 13   AST U/L 30 28   Total Bilirubin mg/dL 1.0 1.4*     URINALYSIS:  Recent Labs   Lab Result Units 01/12/24  1101   Color, UA  Yellow   Specific Gravity, UA  1.020   pH, UA  6.0   Protein, UA  Negative   Nitrite, UA  Negative   Leukocytes, UA  Negative   Urobilinogen, UA EU/dL Negative      LIPIDS:  No results for input(s): "TSH", "HDL", "CHOL", "TRIG", " ""LDLCALC", "CHOLHDL", "NONHDLCHOL", "TOTALCHOLEST" in the last 2160 hours.  TSH:  No results for input(s): "TSH" in the last 2160 hours.  A1C:  No results for input(s): "HGBA1C" in the last 2160 hours.    Radiology:      Assessment/Plan     Seema Gann is a 78 y.o.female with:    LE edema  -like venous insufficiency   -no signs of decompensated liver cirrhosis  -no cardiac or renal signs or symptoms  -lasix X 3days  -c/w compression stockings, leg elevation, low salt diet    2. Essential hypertension  -well controlled on current regimen    3. 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 Biliary liver cirrhosis  -followed by hepatology    5. Stage 3a chronic kidney disease  -stable renal function     6. Anemia of chronic disease  -stable H&H  -monitor for now    7. Thrombocytopenia  -likely due to chemotherapy  -stable       -Continue current medications and maintain follow up with specialists.      Patient verbalizes understanding and agrees with current treatment plan.      Bethany Mesa MD  [facility]                  "

## 2024-03-06 ENCOUNTER — OFFICE VISIT (OUTPATIENT)
Dept: HEPATOLOGY | Facility: CLINIC | Age: 79
End: 2024-03-06
Payer: MEDICARE

## 2024-03-06 VITALS
SYSTOLIC BLOOD PRESSURE: 121 MMHG | HEART RATE: 69 BPM | WEIGHT: 111.31 LBS | OXYGEN SATURATION: 100 % | DIASTOLIC BLOOD PRESSURE: 56 MMHG | BODY MASS INDEX: 19 KG/M2 | HEIGHT: 64 IN

## 2024-03-06 DIAGNOSIS — K74.60 CHRONIC HEPATITIS C WITH CIRRHOSIS: ICD-10-CM

## 2024-03-06 DIAGNOSIS — C22.0 HCC (HEPATOCELLULAR CARCINOMA): Primary | ICD-10-CM

## 2024-03-06 DIAGNOSIS — B18.2 CHRONIC HEPATITIS C WITH CIRRHOSIS: ICD-10-CM

## 2024-03-06 DIAGNOSIS — Z86.19 HISTORY OF HEPATITIS C: ICD-10-CM

## 2024-03-06 PROCEDURE — 1126F AMNT PAIN NOTED NONE PRSNT: CPT | Mod: CPTII,S$GLB,, | Performed by: INTERNAL MEDICINE

## 2024-03-06 PROCEDURE — 99214 OFFICE O/P EST MOD 30 MIN: CPT | Mod: S$GLB,,, | Performed by: INTERNAL MEDICINE

## 2024-03-06 PROCEDURE — 3074F SYST BP LT 130 MM HG: CPT | Mod: CPTII,S$GLB,, | Performed by: INTERNAL MEDICINE

## 2024-03-06 PROCEDURE — 3288F FALL RISK ASSESSMENT DOCD: CPT | Mod: CPTII,S$GLB,, | Performed by: INTERNAL MEDICINE

## 2024-03-06 PROCEDURE — 99999 PR PBB SHADOW E&M-EST. PATIENT-LVL III: CPT | Mod: PBBFAC,,, | Performed by: INTERNAL MEDICINE

## 2024-03-06 PROCEDURE — 3078F DIAST BP <80 MM HG: CPT | Mod: CPTII,S$GLB,, | Performed by: INTERNAL MEDICINE

## 2024-03-06 PROCEDURE — 1101F PT FALLS ASSESS-DOCD LE1/YR: CPT | Mod: CPTII,S$GLB,, | Performed by: INTERNAL MEDICINE

## 2024-03-06 PROCEDURE — 1159F MED LIST DOCD IN RCRD: CPT | Mod: CPTII,S$GLB,, | Performed by: INTERNAL MEDICINE

## 2024-03-06 NOTE — PROGRESS NOTES
Subjective:       Patient ID: Seema Gann is a 78 y.o. female.    Chief Complaint: No chief complaint on file.      HPI  I saw this 78 y.o. lady in the liver clinic where she came alone.  Last seen in our clinic in July 2023.    HCV RNA neg in Jan 2016    - G1 HCV cirrhosis with SVR  - admitted to [facility] on 11/28/22 with chest pain and was found to have a large mass on US.    CT abdo: 3/2/2023  1. Cirrhotic hepatic morphology with 7.0 x 8.5 x 8.2-cm (previously 7.0 x 7.9 x 7.3-cm) homogeneous partially exophytic right hepatic lobe mass. Further evaluation is limited given the lack of IV contrast.    Liver biopsy on 5/30/2023  Hepatocellular carcinoma, moderately differentiated     AFP on 4/26/23= 460  AFP on 1/23/24= 3.6    Y90 on 6/27/2023  Y90 on 10/12/2023    MRI abdo: 1/23/24  1. Postprocedural change of right hepatic artery chemoembolization with increased size of nonenhancing area.  There is persistent heterogeneous signal and enhancement throughout the treatment cavity favoring sequelae of treatment with residual disease felt unlikely.  Given configuration of treatment site, appearance concerning for infarction of the posterior right hepatic lobe.  2. Similar appearance of likely occlusion of the right portal vein.  3. Stable nonenhancing T2 hyperintense pancreatic foci, nonspecific and possibly representing IPMN.  4. Cirrhotic morphology of the liver with sequelae of portal hypertension including small volume of ascites and collateral vessels in the left upper quadrant.    PMH:  Hypertension  HCV cirrhosis    Review of Systems   Constitutional:  Negative for activity change, appetite change, chills, fatigue, fever and unexpected weight change.   HENT:  Negative for ear pain, hearing loss, nosebleeds, sore throat and trouble swallowing.    Eyes:  Negative for redness and visual disturbance.   Respiratory:  Negative for cough, chest tightness, shortness of breath and wheezing.     Cardiovascular:  Negative for chest pain and palpitations.   Gastrointestinal:  Negative for abdominal distention, abdominal pain, blood in stool, constipation, diarrhea, nausea and vomiting.   Genitourinary:  Negative for difficulty urinating, dysuria, frequency, hematuria and urgency.   Musculoskeletal:  Negative for arthralgias, back pain, gait problem, joint swelling and myalgias.   Skin:  Negative for rash.   Neurological:  Negative for tremors, seizures, speech difficulty, weakness and headaches.   Hematological:  Negative for adenopathy.   Psychiatric/Behavioral:  Negative for confusion, decreased concentration and sleep disturbance. The patient is not nervous/anxious.            Lab Results   Component Value Date    ALT 13 01/23/2024    AST 28 01/23/2024    GGT 45 01/16/2015    ALKPHOS 94 01/23/2024    BILITOT 1.4 (H) 01/23/2024     Past Medical History:   Diagnosis Date    Cirrhosis     HCC (hepatocellular carcinoma)     Hepatitis     Hypertension      Past Surgical History:   Procedure Laterality Date    APPENDECTOMY      HYSTERECTOMY       Current Outpatient Medications   Medication Sig    amLODIPine (NORVASC) 5 MG tablet Take 1 tablet (5 mg total) by mouth once daily.    aspirin (ECOTRIN) 81 MG EC tablet Take 1 tablet (81 mg total) by mouth once daily.    carvediloL (COREG) 3.125 MG tablet Take 1 tablet (3.125 mg total) by mouth 2 (two) times daily with meals.    cyproheptadine (PERIACTIN) 4 mg tablet Take 1 tablet (4 mg total) by mouth 3 (three) times daily.    furosemide (LASIX) 20 MG tablet Take 1 tablet (20 mg total) by mouth once daily. For three days, then as needed    lisinopriL (PRINIVIL,ZESTRIL) 20 MG tablet Take 1 tablet (20 mg total) by mouth once daily.    spironolactone (ALDACTONE) 100 MG tablet Take 1 tablet (100 mg total) by mouth once daily.    lisinopriL-hydrochlorothiazide (PRINZIDE,ZESTORETIC) 20-12.5 mg per tablet Take by mouth once daily.     No current facility-administered  medications for this visit.       Objective:      Physical Exam  Constitutional:       General: She is not in acute distress.  HENT:      Head: Normocephalic.   Eyes:      Pupils: Pupils are equal, round, and reactive to light.   Neck:      Thyroid: No thyromegaly.      Vascular: No JVD.      Trachea: No tracheal deviation.   Cardiovascular:      Rate and Rhythm: Normal rate and regular rhythm.      Heart sounds: Normal heart sounds. No murmur heard.  Pulmonary:      Effort: Pulmonary effort is normal.      Breath sounds: Normal breath sounds. No stridor.   Abdominal:      Palpations: Abdomen is soft.   Lymphadenopathy:      Head:      Right side of head: No submental, submandibular, tonsillar, preauricular, posterior auricular or occipital adenopathy.      Left side of head: No submental, submandibular, tonsillar, preauricular, posterior auricular or occipital adenopathy.      Cervical: No cervical adenopathy.   Neurological:      Mental Status: She is alert. She is not disoriented.      Cranial Nerves: No cranial nerve deficit.      Sensory: No sensory deficit.         Assessment:       1. HCC (hepatocellular carcinoma)    2. History of hepatitis C    3. Chronic hepatitis C with cirrhosis          Plan:   She has an excellent functional status and has tolerated Y90 well. She feels well and has regained her appetitie and her weight has gone up by about 10 lb.    Her AFP is now normal from a peak of 460.    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  She developed some very mild ankle edema and was started on diuretics yesterday.    - labs and repeat MRI in 3 months  - clinic in 3 months.

## 2024-04-02 ENCOUNTER — TELEPHONE (OUTPATIENT)
Dept: FAMILY MEDICINE | Facility: CLINIC | Age: 79
End: 2024-04-02
Payer: MEDICARE

## 2024-04-02 NOTE — TELEPHONE ENCOUNTER
----- Message from Amelie Gilman sent at 4/2/2024 10:15 AM CDT -----  Regarding: return call  Type:  Patient Returning Call    Who Called:pt  Who Left Message for Patient:office  Does the patient know what this is regarding?:return call  Would the patient rather a call back or a response via [facility]? Call  Best Call Back Number:[phone]  Additional Information:

## 2024-04-02 NOTE — TELEPHONE ENCOUNTER
----- Message from Carla George sent at 4/1/2024 11:17 AM CDT -----  Type:  Needs Medical Advice    Who Called: Pt  Symptoms (please be specific):  swelling in legs   How long has patient had these symptoms:  few weeks   Pharmacy name and phone #:  [id number] - [place] - [address]  [address] [place] [address]  Phone: [phone] Fax: [phone]   Would the patient rather a call back or a response via [facility]? Callback   Best Call Back Number:  [phone]  Additional Information:

## 2024-04-09 ENCOUNTER — OFFICE VISIT (OUTPATIENT)
Dept: CARDIOLOGY | Facility: CLINIC | Age: 79
End: 2024-04-09
Payer: MEDICARE

## 2024-04-09 VITALS
DIASTOLIC BLOOD PRESSURE: 75 MMHG | WEIGHT: 124.69 LBS | OXYGEN SATURATION: 98 % | BODY MASS INDEX: 21.29 KG/M2 | HEIGHT: 64 IN | SYSTOLIC BLOOD PRESSURE: 124 MMHG | HEART RATE: 68 BPM

## 2024-04-09 DIAGNOSIS — K74.4 SECONDARY BILIARY CIRRHOSIS: ICD-10-CM

## 2024-04-09 DIAGNOSIS — B18.2 CHRONIC HEPATITIS C WITH CIRRHOSIS: ICD-10-CM

## 2024-04-09 DIAGNOSIS — R53.83 OTHER FATIGUE: ICD-10-CM

## 2024-04-09 DIAGNOSIS — I10 PRIMARY HYPERTENSION: Primary | ICD-10-CM

## 2024-04-09 DIAGNOSIS — R60.0 BILATERAL LOWER EXTREMITY EDEMA: ICD-10-CM

## 2024-04-09 DIAGNOSIS — K74.60 CHRONIC HEPATITIS C WITH CIRRHOSIS: ICD-10-CM

## 2024-04-09 PROCEDURE — 1125F AMNT PAIN NOTED PAIN PRSNT: CPT | Mod: CPTII,S$GLB,,

## 2024-04-09 PROCEDURE — 3288F FALL RISK ASSESSMENT DOCD: CPT | Mod: CPTII,S$GLB,,

## 2024-04-09 PROCEDURE — 99999 PR PBB SHADOW E&M-EST. PATIENT-LVL III: CPT | Mod: PBBFAC,,,

## 2024-04-09 PROCEDURE — 1101F PT FALLS ASSESS-DOCD LE1/YR: CPT | Mod: CPTII,S$GLB,,

## 2024-04-09 PROCEDURE — 1160F RVW MEDS BY RX/DR IN RCRD: CPT | Mod: CPTII,S$GLB,,

## 2024-04-09 PROCEDURE — 3078F DIAST BP <80 MM HG: CPT | Mod: CPTII,S$GLB,,

## 2024-04-09 PROCEDURE — 3074F SYST BP LT 130 MM HG: CPT | Mod: CPTII,S$GLB,,

## 2024-04-09 PROCEDURE — 99214 OFFICE O/P EST MOD 30 MIN: CPT | Mod: S$GLB,,,

## 2024-04-09 PROCEDURE — 1159F MED LIST DOCD IN RCRD: CPT | Mod: CPTII,S$GLB,,

## 2024-04-09 RX ORDER — FUROSEMIDE 20 MG/1
20 TABLET ORAL DAILY
Qty: 30 TABLET | Refills: 3 | Status: SHIPPED | OUTPATIENT
Start: 2024-04-09 | End: 2024-05-27 | Stop reason: SDUPTHER

## 2024-04-09 NOTE — ASSESSMENT & PLAN NOTE
Goal BP < 140/90.  Compliant w/ meds.    -controlled   -in office 124/75  - current medication regimen: amlodipine 5 mg, carvedilol 3.125 mg, lisinopril 20 mg, spironolactone 100 mg   - enrolling in digital medicine prgm  - risk factor and lifestyle modifications

## 2024-04-09 NOTE — ASSESSMENT & PLAN NOTE
-Bilateral LE US to evaluate for DVT/ reflux   -compression stockings   -Elevate legs when sitting   -continue furosemide 20 mg daily for swelling

## 2024-04-09 NOTE — PROGRESS NOTES
Subjective:    Patient ID:  Seema Gann is a 78 y.o. female who presents for follow-up of No chief complaint on file.      PCP: Bethany Mesa MD     Referring Provider: Roshan Jesus MD     HPI: Pt is a 79 yo F w/ PMH of HTN, Biliary Cirrhosis w/ enlarging hepatic mass since 2015, HCV, HCC s/p radio embolization, who presents for  f/u appt. She was last seen on 1/8/24 for f/u and was continued on medical therapy for HTN management. She had an ETT which noted normal exercise tolerance and was negative for ischemia.  She reports doing well from cardiac standpoint but reports insomnia. She denies cp, sob, edema, orthopnea, PND, presyncope, LOC, palpitations, and claudication.  Patient reports BLE edema  X  1 month. She was seen by PCP on 3/5/24 and started on furosemide 20 mg , 1 tablet daily X 3 days and then as needed for swelling. Patient notes she was taking the furosemide daily. She notes compliance w/ meds and denies side effects. She does not monitor her BP at home.  She does not exercise however states she is very active w/ gardening and denies cp or sob.    Past Medical History:   Diagnosis Date    Cirrhosis     HCC (hepatocellular carcinoma)     Hepatitis     Hypertension      Past Surgical History:   Procedure Laterality Date    APPENDECTOMY      HYSTERECTOMY       Social History     Socioeconomic History    Marital status:    Tobacco Use    Smoking status: Never     Passive exposure: Never    Smokeless tobacco: Never   Substance and Sexual Activity    Alcohol use: No    Drug use: No    Sexual activity: Yes   Social History Narrative    ** Merged History Encounter **          No family history on file.    Review of patient's allergies indicates:  No Known Allergies    Medication List with Changes/Refills   New Medications    FUROSEMIDE (LASIX) 20 MG TABLET    Take 1 tablet (20 mg total) by mouth once daily.   Current Medications    AMLODIPINE (NORVASC) 5 MG TABLET    Take 1 tablet  "(5 mg total) by mouth once daily.    ASPIRIN (ECOTRIN) 81 MG EC TABLET    Take 1 tablet (81 mg total) by mouth once daily.    CARVEDILOL (COREG) 3.125 MG TABLET    Take 1 tablet (3.125 mg total) by mouth 2 (two) times daily with meals.    CYPROHEPTADINE (PERIACTIN) 4 MG TABLET    Take 1 tablet (4 mg total) by mouth 3 (three) times daily.    LISINOPRIL (PRINIVIL,ZESTRIL) 20 MG TABLET    Take 1 tablet (20 mg total) by mouth once daily.    SPIRONOLACTONE (ALDACTONE) 100 MG TABLET    Take 1 tablet (100 mg total) by mouth once daily.   Discontinued Medications    FUROSEMIDE (LASIX) 20 MG TABLET    Take 1 tablet (20 mg total) by mouth once daily. For three days, then as needed    LISINOPRIL-HYDROCHLOROTHIAZIDE (PRINZIDE,ZESTORETIC) 20-12.5 MG PER TABLET    Take by mouth once daily.       Review of Systems   Constitutional: Positive for malaise/fatigue. Negative for diaphoresis and fever.   HENT:  Negative for congestion and hearing loss.    Eyes:  Negative for blurred vision and pain.   Cardiovascular:  Positive for leg swelling. Negative for chest pain, claudication, dyspnea on exertion, near-syncope, palpitations and syncope.   Respiratory:  Negative for shortness of breath and sleep disturbances due to breathing.    Hematologic/Lymphatic: Negative for bleeding problem. Does not bruise/bleed easily.   Skin:  Negative for color change and poor wound healing.   Gastrointestinal:  Negative for abdominal pain and nausea.   Genitourinary:  Negative for bladder incontinence and flank pain.   Neurological:  Negative for focal weakness and light-headedness.        Objective:   /75 (BP Location: Left arm, Patient Position: Sitting, BP Method: Medium (Automatic))   Pulse 68   Ht 5' 4" (1.626 m)   Wt 56.5 kg (124 lb 10.7 oz)   SpO2 98%   BMI 21.40 kg/m²    Physical Exam  Constitutional:       Appearance: She is well-developed. She is not diaphoretic.   HENT:      Head: Normocephalic and atraumatic.   Eyes:      General: " No scleral icterus.     Pupils: Pupils are equal, round, and reactive to light.   Neck:      Vascular: No JVD.   Cardiovascular:      Rate and Rhythm: Normal rate and regular rhythm.      Pulses: Intact distal pulses.      Heart sounds: S1 normal and S2 normal. Murmur heard.      High-pitched blowing holosystolic murmur is present with a grade of 2/6 at the apex.      No friction rub. No gallop.   Pulmonary:      Effort: Pulmonary effort is normal. No respiratory distress.      Breath sounds: Normal breath sounds. No wheezing or rales.   Chest:      Chest wall: No tenderness.   Abdominal:      General: Bowel sounds are normal. There is no distension.      Palpations: Abdomen is soft. There is no mass.      Tenderness: There is no abdominal tenderness. There is no rebound.   Musculoskeletal:         General: No tenderness. Normal range of motion.      Cervical back: Normal range of motion and neck supple.   Skin:     General: Skin is warm and dry.      Coloration: Skin is not pale.   Neurological:      Mental Status: She is alert and oriented to person, place, and time.      Coordination: Coordination normal.      Deep Tendon Reflexes: Reflexes normal.   Psychiatric:         Behavior: Behavior normal.         Judgment: Judgment normal.           EKG 11/27/23- reviewed - SR w SA, RBBB  TTE 11/27/23  Summary         Left Ventricle: The left ventricle is normal in size. Normal wall thickness. There is concentric remodeling. Normal wall motion. There is normal systolic function. Biplane (2D) method of discs ejection fraction is 60%. There is normal diastolic function.    Right Ventricle: Normal right ventricular cavity size. Wall thickness is normal. Right ventricle wall motion  is normal. Systolic function is normal.    Aortic Valve: The aortic valve is a trileaflet valve. There is moderate aortic valve sclerosis.    Tricuspid Valve: There is mild to moderate regurgitation.    Pulmonary Artery: The estimated pulmonary  artery systolic pressure is 25 mmHg.    IVC/SVC: Normal venous pressure at 3 mmHg.    EC2022- reviewed.  Sinus katherine w/ PACs, RBBB, LAFB.       Echo: 2022- reviewed.    Summary     The left ventricle is normal in size with concentric remodeling and normal systolic function.  The estimated ejection fraction is 70%.  Normal left ventricular diastolic function.  Normal right ventricular size with normal right ventricular systolic function.  Mild aortic regurgitation.  Mild tricuspid regurgitation.  Mild pulmonic regurgitation.  Mild mitral regurgitation.  Normal central venous pressure (3 mmHg).  The estimated PA systolic pressure is 32 mmHg.     ETT: 2022- reviewed.    Conclusion          The patient exercised for 7 minutes 30 seconds on a Abel protocol, corresponding to a functional capacity of 9 METS, achieving a peak heart rate of 157 bpm, which is 113 % of the age predicted maximum heart rate. The patient experienced no angina during the test. Their exercise capacity was above average. The Duke Treadmill Score was 8.    The ECG portion of the study is negative for ischemia.    The patient reported no chest pain during the stress test.    The blood pressure response to stress was normal.    The Duke Treadmill Score was 8.    During stress, occasional PACs are noted. , During stress, occasional PVCs are noted.    The exercise capacity was above average.    Assessment:       1. Primary hypertension    2. Chronic hepatitis C with cirrhosis    3. Secondary biliary cirrhosis    4. Bilateral lower extremity edema    5. Other fatigue             Plan:         Primary hypertension  Goal BP < 140/90.  Compliant w/ meds.    -controlled   -in office 124/75  - current medication regimen: amlodipine 5 mg, carvedilol 3.125 mg, lisinopril 20 mg, spironolactone 100 mg   - enrolling in digital medicine prgm  - risk factor and lifestyle modifications     Chronic hepatitis C with cirrhosis  Followed by  hepatology.    Secondary biliary cirrhosis   Followed by  hepatology    Bilateral lower extremity edema  -Bilateral LE US to evaluate for DVT/ reflux   -compression stockings   -Elevate legs when sitting   -continue furosemide 20 mg daily for swelling     Other fatigue  -Cardiac echo to evaluate heart function         Total duration of face to face visit time 30 minutes.  Total time spent counseling greater than fifty percent of total visit time.  Counseling included discussion regarding imaging findings, diagnosis, possibilities, treatment options, risks and benefits.  The patient had many questions regarding the options and long-term effects      Guilherme Gregorio NP  Cardiology

## 2024-04-23 ENCOUNTER — HOSPITAL ENCOUNTER (OUTPATIENT)
Dept: CARDIOLOGY | Facility: HOSPITAL | Age: 79
Discharge: HOME | End: 2024-04-23
Payer: MEDICARE

## 2024-04-23 DIAGNOSIS — R60.0 BILATERAL LOWER EXTREMITY EDEMA: ICD-10-CM

## 2024-04-23 PROCEDURE — 93970 EXTREMITY STUDY: CPT | Mod: 26,,, | Performed by: INTERNAL MEDICINE

## 2024-04-23 PROCEDURE — 93970 EXTREMITY STUDY: CPT | Mod: TC

## 2024-04-24 LAB
LEFT GREAT SAPHENOUS DISTAL THIGH DIA: 0.25 CM
LEFT GREAT SAPHENOUS JUNCTION DIA: 0.42 CM
LEFT GREAT SAPHENOUS MIDDLE THIGH DIA: 0.24 CM
LEFT GREAT SAPHENOUS MIDDLE THIGH REFLUX: 1073 MS
LEFT GREAT SAPHENOUS PROXIMAL CALF DIA: 0.22 CM
LEFT SMALL SAPHENOUS KNEE DIA: 0.24 CM
RIGHT GREAT SAPHENOUS DISTAL THIGH DIA: 0.23 CM
RIGHT GREAT SAPHENOUS JUNCTION DIA: 0.32 CM
RIGHT GREAT SAPHENOUS JUNCTION REFLUX: 2535 MS
RIGHT GREAT SAPHENOUS MIDDLE THIGH DIA: 0.27 CM
RIGHT GREAT SAPHENOUS PROXIMAL CALF DIA: 0.22 CM
RIGHT GREAT SAPHENOUS PROXIMAL CALF REFLUX: 5146 MS
RIGHT SMALL SAPHENOUS KNEE DIA: 0.21 CM
RIGHT SMALL SAPHENOUS KNEE REFLUX: 0 MS

## 2024-05-03 ENCOUNTER — TELEPHONE (OUTPATIENT)
Dept: CARDIOLOGY | Facility: CLINIC | Age: 79
End: 2024-05-03
Payer: MEDICARE

## 2024-05-03 DIAGNOSIS — R60.0 BILATERAL LOWER EXTREMITY EDEMA: Primary | ICD-10-CM

## 2024-05-03 DIAGNOSIS — I87.2 VENOUS REFLUX: ICD-10-CM

## 2024-05-03 DIAGNOSIS — R93.1 REGIONAL WALL MOTION ABNORMALITY OF HEART: ICD-10-CM

## 2024-05-03 DIAGNOSIS — Z13.6 ENCOUNTER FOR SCREENING FOR CARDIOVASCULAR DISORDERS: Primary | ICD-10-CM

## 2024-05-03 NOTE — TELEPHONE ENCOUNTER
Sent a message to NM to schedule the NM Stress- and sent message to shilpi to schedule the VM.Thank you,    Kristyn  Medical Assistant       ----- Message from Guilherme Gregorio NP sent at 5/3/2024  3:56 PM CDT -----  Please schedule patient for NM stress test. I have also placed a referral for Vascular medicine.    Thank you,  Guilherme Gregorio NP

## 2024-05-03 NOTE — TELEPHONE ENCOUNTER
Reached out to Ms. Gann  and read her the results, She verbally acknowledged what I had read and thanked me.    Thank you,    Kristyn  Medical Assistant       ----- Message from Guilherme Gregorio NP sent at 5/3/2024  3:55 PM CDT -----  Please inform Ms. Gann that her study was negative for blood clods but she does have venous reflux which may be contributing the the swelling. I am referring her to Vascular medicine for further evaluation.    Thank you,  Guilherme Gregorio NP

## 2024-05-03 NOTE — ASSESSMENT & PLAN NOTE
Cardiac echo 4/22/24  Summary  Show Result Comparison     Left Ventricle: The left ventricle is normal in size. Normal wall thickness. Regional wall motion abnormalities present very mild hypokinesia apical lateral wall. . There is normal systolic function with a visually estimated ejection fraction of 65 - 70%. Biplane (2D) method of discs ejection fraction is 73%. There is normal diastolic function.    Right Ventricle: Normal right ventricular cavity size. Wall thickness is normal. Right ventricle wall motion  is normal. Systolic function is normal.    Left Atrium: Left atrium is mildly dilated.    Aortic Valve: There is mild aortic valve sclerosis. There is mild stenosis. Aortic valve area by VTI is 1.92 cm². Aortic valve peak velocity is 1.40 m/s. Mean gradient is 4 mmHg. The dimensionless index is 0.84. There is mild aortic regurgitation.    Mitral Valve: There is mild regurgitation.    Pulmonary Artery: The estimated pulmonary artery systolic pressure is 35 mmHg.    IVC/SVC: Normal venous pressure at 3 mmHg.    -NM stress test for Ischemic evaluation

## 2024-05-03 NOTE — TELEPHONE ENCOUNTER
Reached out to Ms. Gann and read her the results, She verbally acknowledged what I had read and thanked me.    Thank you,    Kristyn  Medical Assistant       ----- Message from Guilherme Gregorio NP sent at 5/3/2024  3:56 PM CDT -----  Please inform Ms. Gann that her ultrasound was abnormal and I have ordered a stress test for further evaluation.    Thank you,  Guilherme Gregorio NP

## 2024-05-06 ENCOUNTER — TELEPHONE (OUTPATIENT)
Dept: CARDIOLOGY | Facility: CLINIC | Age: 79
End: 2024-05-06
Payer: MEDICARE

## 2024-05-06 ENCOUNTER — PATIENT MESSAGE (OUTPATIENT)
Dept: CARDIOLOGY | Facility: CLINIC | Age: 79
End: 2024-05-06
Payer: MEDICARE

## 2024-05-06 NOTE — TELEPHONE ENCOUNTER
I reached out to Ms Stephenson to discuss her NM Stress Test. Left a Voice mail.       ----- Message from Kenzie Brunner RT sent at 5/6/2024  6:52 AM CDT -----  Regarding: RE: NM Stress Test  5/16 @ 10 30 am . Please have the patient arrive NPO 6hrs, NO caffeine 12 hrs, Tks.  ----- Message -----  From: Kristyn Gutierrez MA  Sent: 5/3/2024   4:01 PM CDT  To: RT Dina  Subject: NM Stress Test                                   Can you please schedule an appointment for this patient.    Thank you    Kristyn Gutierrez MA  [facility]

## 2024-05-16 ENCOUNTER — PATIENT MESSAGE (OUTPATIENT)
Dept: CARDIOLOGY | Facility: CLINIC | Age: 79
End: 2024-05-16
Payer: MEDICARE

## 2024-05-16 ENCOUNTER — TELEPHONE (OUTPATIENT)
Dept: CARDIOLOGY | Facility: CLINIC | Age: 79
End: 2024-05-16
Payer: MEDICARE

## 2024-05-16 NOTE — TELEPHONE ENCOUNTER
Left Voice Mail for patient to call back at their conveniece.    ----- Message from Guilherme Gregorio NP sent at 5/16/2024  3:45 PM CDT -----  Please inform Ms. Gann that her stress test was negative.    Thank you,  Guilherme Gregorio, DNP

## 2024-05-23 ENCOUNTER — OFFICE VISIT (OUTPATIENT)
Dept: CARDIOLOGY | Facility: CLINIC | Age: 79
End: 2024-05-23
Payer: MEDICARE

## 2024-05-23 VITALS
WEIGHT: 114 LBS | DIASTOLIC BLOOD PRESSURE: 62 MMHG | SYSTOLIC BLOOD PRESSURE: 100 MMHG | HEART RATE: 91 BPM | HEIGHT: 64 IN | BODY MASS INDEX: 19.46 KG/M2

## 2024-05-23 DIAGNOSIS — N18.31 STAGE 3A CHRONIC KIDNEY DISEASE: ICD-10-CM

## 2024-05-23 DIAGNOSIS — R60.0 BILATERAL LOWER EXTREMITY EDEMA: ICD-10-CM

## 2024-05-23 DIAGNOSIS — I87.2 VENOUS REFLUX: ICD-10-CM

## 2024-05-23 DIAGNOSIS — I10 PRIMARY HYPERTENSION: Primary | ICD-10-CM

## 2024-05-23 DIAGNOSIS — K74.60 CHRONIC HEPATITIS C WITH CIRRHOSIS: ICD-10-CM

## 2024-05-23 DIAGNOSIS — B18.2 CHRONIC HEPATITIS C WITH CIRRHOSIS: ICD-10-CM

## 2024-05-23 DIAGNOSIS — C22.0 HCC (HEPATOCELLULAR CARCINOMA): ICD-10-CM

## 2024-05-23 PROBLEM — R93.1 REGIONAL WALL MOTION ABNORMALITY OF HEART: Status: RESOLVED | Noted: 2024-05-03 | Resolved: 2024-05-23

## 2024-05-23 PROCEDURE — 3078F DIAST BP <80 MM HG: CPT | Mod: CPTII,S$GLB,, | Performed by: INTERNAL MEDICINE

## 2024-05-23 PROCEDURE — 3074F SYST BP LT 130 MM HG: CPT | Mod: CPTII,S$GLB,, | Performed by: INTERNAL MEDICINE

## 2024-05-23 PROCEDURE — 1126F AMNT PAIN NOTED NONE PRSNT: CPT | Mod: CPTII,S$GLB,, | Performed by: INTERNAL MEDICINE

## 2024-05-23 PROCEDURE — 3288F FALL RISK ASSESSMENT DOCD: CPT | Mod: CPTII,S$GLB,, | Performed by: INTERNAL MEDICINE

## 2024-05-23 PROCEDURE — 99999 PR PBB SHADOW E&M-EST. PATIENT-LVL III: CPT | Mod: PBBFAC,,, | Performed by: INTERNAL MEDICINE

## 2024-05-23 PROCEDURE — 93000 ELECTROCARDIOGRAM COMPLETE: CPT | Mod: S$GLB,,, | Performed by: INTERNAL MEDICINE

## 2024-05-23 PROCEDURE — 1160F RVW MEDS BY RX/DR IN RCRD: CPT | Mod: CPTII,S$GLB,, | Performed by: INTERNAL MEDICINE

## 2024-05-23 PROCEDURE — 1101F PT FALLS ASSESS-DOCD LE1/YR: CPT | Mod: CPTII,S$GLB,, | Performed by: INTERNAL MEDICINE

## 2024-05-23 PROCEDURE — 1159F MED LIST DOCD IN RCRD: CPT | Mod: CPTII,S$GLB,, | Performed by: INTERNAL MEDICINE

## 2024-05-23 PROCEDURE — 99214 OFFICE O/P EST MOD 30 MIN: CPT | Mod: 25,S$GLB,, | Performed by: INTERNAL MEDICINE

## 2024-05-23 NOTE — PROGRESS NOTES
HISTORY:    77 yo F w a h/o hypertension, HCV cirrhosis, HCC s/p radioembolization presenting for initial evaluation by me.    Referred for B LE edema. Follows w NP Black.     Present for weeks. Sudden onset and constant since. Has been on diuretics long term. Now on spironolactone and furosemide. Has tried compression stockings, which have not made a difference. Pt also with cirrhosis and development of abdominal ascites over months.     The patient denies any symptoms of chest pain, shortness of breath, or dyspnea on exertion.    Activity levels mild to moderate. Complete ADLs and does yard/house work.    The patient denies any previous history of myocardial infarction, coronary artery disease, peripheral arterial disease, stroke, congestive heart failure, or cardiomyopathy.    Tolerates asa 81x1, amlodipine 5x1, carvedilol 3.125x2, lisinopril 20x1, spironolactone 25x1, furosemide 20x1.    PHYSICAL EXAM:    Vitals:    05/23/24 0818   BP: 100/62   Pulse: 91       NAD, A+Ox3.  No jvd, no bruit.  RRR nml s1,s2. No murmurs.  CTA B no wheezes or crackles.  No edema.    LABS/STUDIES (imaging reviewed during clinic visit):    May '24 Hb 9.5/MCV 97/Plt 136. Cr 1.7/BUN 15/GFR 30. Alb 2.7. INR normal. '23 TSH nml.  /LDL 88/HDL 39/TG 61.   ECG 2023 SR, RBBB. No Qs/Sts.  TST 2022 7:30 on a Abel with no ischemia  TTE April '24 Nml LV size and fxn w an EF of 65-70%. Aortic sclerosis without stenosis. Mild AI. Mild MR. Nml RV size and fxn. CVP 3.   NST May 2024 Nml LVEF w no e/o ischemia.   Venous us April '24 No e/o DVT. B GSV reflux.  March '23 No e/o acute DVT. Thickened L SFV and pop venous walls and R SFV venous walls.      ASSESSMENT & PLAN:    1. Primary hypertension    2. Bilateral lower extremity edema    3. Venous reflux    4. Stage 3a chronic kidney disease    5. HCC (hepatocellular carcinoma)    6. Chronic hepatitis C with cirrhosis        Orders Placed This Encounter    IN OFFICE EKG 12-LEAD (to Muse)         C3 venous insufficiency bilaterally. Pt also with cirrhosis and an albumin of 2.6 with recent progression of ascites. On carolynn 25x1 and furosemide 20x1. Following w hepatology and imagine they will increase her spironolactone and furosemide. Not certain venous ablation will be helpful at this moment or address the primary problem which appears to be cirrhosis. Will see pt back in 4 months and reassess. Cont current diuretic tx, compression stockings, and lifestyle modifications. Try to increase ambulation time.     Follow up in about 4 months (around 9/23/2024).      Vinicio Dominguez MD

## 2024-05-24 ENCOUNTER — OFFICE VISIT (OUTPATIENT)
Dept: CARDIOLOGY | Facility: CLINIC | Age: 79
End: 2024-05-24
Payer: MEDICARE

## 2024-05-24 VITALS
HEART RATE: 82 BPM | WEIGHT: 114.06 LBS | BODY MASS INDEX: 19.47 KG/M2 | DIASTOLIC BLOOD PRESSURE: 64 MMHG | OXYGEN SATURATION: 94 % | HEIGHT: 64 IN | SYSTOLIC BLOOD PRESSURE: 106 MMHG

## 2024-05-24 DIAGNOSIS — R60.0 BILATERAL LOWER EXTREMITY EDEMA: ICD-10-CM

## 2024-05-24 DIAGNOSIS — K74.60 CHRONIC HEPATITIS C WITH CIRRHOSIS: ICD-10-CM

## 2024-05-24 DIAGNOSIS — R53.83 OTHER FATIGUE: ICD-10-CM

## 2024-05-24 DIAGNOSIS — I10 PRIMARY HYPERTENSION: Primary | ICD-10-CM

## 2024-05-24 DIAGNOSIS — K74.4 SECONDARY BILIARY CIRRHOSIS: ICD-10-CM

## 2024-05-24 DIAGNOSIS — I73.9 PERIPHERAL VASCULAR DISEASE, UNSPECIFIED: ICD-10-CM

## 2024-05-24 DIAGNOSIS — B18.2 CHRONIC HEPATITIS C WITH CIRRHOSIS: ICD-10-CM

## 2024-05-24 PROCEDURE — 3288F FALL RISK ASSESSMENT DOCD: CPT | Mod: CPTII,S$GLB,,

## 2024-05-24 PROCEDURE — 1160F RVW MEDS BY RX/DR IN RCRD: CPT | Mod: CPTII,S$GLB,,

## 2024-05-24 PROCEDURE — 99999 PR PBB SHADOW E&M-EST. PATIENT-LVL III: CPT | Mod: PBBFAC,,,

## 2024-05-24 PROCEDURE — 99214 OFFICE O/P EST MOD 30 MIN: CPT | Mod: S$GLB,,,

## 2024-05-24 PROCEDURE — 3074F SYST BP LT 130 MM HG: CPT | Mod: CPTII,S$GLB,,

## 2024-05-24 PROCEDURE — 1101F PT FALLS ASSESS-DOCD LE1/YR: CPT | Mod: CPTII,S$GLB,,

## 2024-05-24 PROCEDURE — 1159F MED LIST DOCD IN RCRD: CPT | Mod: CPTII,S$GLB,,

## 2024-05-24 PROCEDURE — 3078F DIAST BP <80 MM HG: CPT | Mod: CPTII,S$GLB,,

## 2024-05-24 PROCEDURE — 1125F AMNT PAIN NOTED PAIN PRSNT: CPT | Mod: CPTII,S$GLB,,

## 2024-05-24 NOTE — ASSESSMENT & PLAN NOTE
-Bilateral LE US 4/23/24-no DVT, Lt prox calf accessory vein reflux time [id number], the left superficial femoral middle vein has reflux.  -followed by Vascular medicine, last visit 5/23/24- continue medical therapy   -compression stockings   -Elevate legs when sitting   -continue furosemide 20 mg daily for swelling

## 2024-05-24 NOTE — ASSESSMENT & PLAN NOTE
CV US BLE Veins 4/23/2024  Interpretation Summary    There is no evidence of a right lower extremity DVT.    There is no evidence of a left lower extremity DVT.    The right greater saphenous vein has reflux.  Mostly around calf    The left greater saphenous vein has reflux.    Lt prox calf accessory vein reflux time =[id number]    The left superficial femoral middle vein has reflux.\    -followed by Vascular medicine, last visit 5/23/24- continue medical therapy

## 2024-05-24 NOTE — ASSESSMENT & PLAN NOTE
Goal BP < 140/90.  Compliant w/ meds.    -controlled   -in office 106/64  - current medication regimen: amlodipine 5 mg, carvedilol 3.125 mg, lisinopril 20 mg, spironolactone 100 mg, furosemide 20 mg   - enrolling in digital medicine prgm  - risk factor and lifestyle modifications

## 2024-05-24 NOTE — ASSESSMENT & PLAN NOTE
Followed by hepatology.- f/u appt scheduled for 5/27/24  -continue furosemide 20 mg, spironolactone 100 mg

## 2024-05-24 NOTE — PROGRESS NOTES
Subjective:    Patient ID:  Seema Gann is a 78 y.o. female who presents for follow-up of No chief complaint on file.      PCP: Bethany Mesa MD     Referring Provider: Roshan Jesus MD     HPI: Pt is a 77 yo F w/ PMH of HTN, Biliary Cirrhosis w/ enlarging hepatic mass since 2015, HCV, HCC s/p radio embolization, who presents for  f/u appt. She was last seen on 1/8/24 for f/u and was continued on medical therapy for HTN management. She had an ETT which noted normal exercise tolerance and was negative for ischemia.  She reports doing well from cardiac standpoint but reports insomnia. She denies cp, sob, edema, orthopnea, PND, presyncope, LOC, palpitations, and claudication.  Patient reports BLE edema  X  1 month. She was seen by PCP on 3/5/24 and started on furosemide 20 mg , 1 tablet daily X 3 days and then as needed for swelling. Patient notes she was taking the furosemide daily. She notes compliance w/ meds and denies side effects. She does not monitor her BP at home.  She does not exercise however states she is very active w/ gardening and denies cp or sob.    5/24/24: Patient presents today for f/u appt. She was last seen on 4/9/24 for f/u appt and was continued on medical therapy.   She was seen by Vascular medicine, Dr. Vinicio Dominguez  on 5/23/24 and was continued on medical therapy. NM stress test negative for ischemia, Cardiac echo LVEF 73%. She reports doing well from cardiac standpoint but reports diarrhea.  She denies cp, sob, edema, orthopnea, PND, presyncope, LOC, palpitations, and claudication.  Patient reports BLE edema, but is slowly decreasing w daily furosemide. She notes compliance w/ meds and denies side effects. She does not monitor her BP at home.  She does not exercise however states she is very active w/ gardening and denies cp or sob.    Past Medical History:   Diagnosis Date    Cirrhosis     HCC (hepatocellular carcinoma)     Hepatitis     Hypertension      Past Surgical  History:   Procedure Laterality Date    APPENDECTOMY      HYSTERECTOMY       Social History     Socioeconomic History    Marital status:    Tobacco Use    Smoking status: Never     Passive exposure: Never    Smokeless tobacco: Never   Substance and Sexual Activity    Alcohol use: No    Drug use: No    Sexual activity: Yes   Social History Narrative    ** Merged History Encounter **          No family history on file.    Review of patient's allergies indicates:  No Known Allergies    Medication List with Changes/Refills   Current Medications    AMLODIPINE (NORVASC) 5 MG TABLET    Take 1 tablet (5 mg total) by mouth once daily.    ASPIRIN (ECOTRIN) 81 MG EC TABLET    Take 1 tablet (81 mg total) by mouth once daily.    CARVEDILOL (COREG) 3.125 MG TABLET    Take 1 tablet (3.125 mg total) by mouth 2 (two) times daily with meals.    CYPROHEPTADINE (PERIACTIN) 4 MG TABLET    Take 1 tablet (4 mg total) by mouth 3 (three) times daily.    FUROSEMIDE (LASIX) 20 MG TABLET    Take 1 tablet (20 mg total) by mouth once daily.    LISINOPRIL (PRINIVIL,ZESTRIL) 20 MG TABLET    Take 1 tablet (20 mg total) by mouth once daily.    SPIRONOLACTONE (ALDACTONE) 100 MG TABLET    Take 1 tablet (100 mg total) by mouth once daily.       Review of Systems   Constitutional: Negative for diaphoresis and fever.   HENT:  Negative for congestion and hearing loss.    Eyes:  Negative for blurred vision and pain.   Cardiovascular:  Positive for leg swelling. Negative for chest pain, claudication, dyspnea on exertion, near-syncope, palpitations and syncope.   Respiratory:  Negative for shortness of breath and sleep disturbances due to breathing.    Hematologic/Lymphatic: Negative for bleeding problem. Does not bruise/bleed easily.   Skin:  Negative for color change and poor wound healing.   Gastrointestinal:  Negative for abdominal pain and nausea.   Genitourinary:  Negative for bladder incontinence and flank pain.   Neurological:  Negative for  "focal weakness and light-headedness.        Objective:   /64 (BP Location: Left arm, Patient Position: Sitting, BP Method: Large (Automatic))   Pulse 82   Ht 5' 4" (1.626 m)   Wt 51.8 kg (114 lb 1.4 oz)   SpO2 (!) 94%   BMI 19.58 kg/m²    Physical Exam  Constitutional:       Appearance: She is well-developed. She is not diaphoretic.   HENT:      Head: Normocephalic and atraumatic.   Eyes:      General: No scleral icterus.     Pupils: Pupils are equal, round, and reactive to light.   Neck:      Vascular: No JVD.   Cardiovascular:      Rate and Rhythm: Normal rate and regular rhythm.      Pulses: Intact distal pulses.      Heart sounds: S1 normal and S2 normal. Murmur heard.      High-pitched blowing holosystolic murmur is present with a grade of 2/6 at the apex.      No friction rub. No gallop.   Pulmonary:      Effort: Pulmonary effort is normal. No respiratory distress.      Breath sounds: Normal breath sounds. No wheezing or rales.   Chest:      Chest wall: No tenderness.   Abdominal:      General: Bowel sounds are normal. There is no distension.      Palpations: Abdomen is soft. There is no mass.      Tenderness: There is no abdominal tenderness. There is no rebound.   Musculoskeletal:         General: No tenderness. Normal range of motion.      Cervical back: Normal range of motion and neck supple.   Skin:     General: Skin is warm and dry.      Coloration: Skin is not pale.   Neurological:      Mental Status: She is alert and oriented to person, place, and time.      Coordination: Coordination normal.      Deep Tendon Reflexes: Reflexes normal.   Psychiatric:         Behavior: Behavior normal.         Judgment: Judgment normal.           NM stress test 5/16/24  Interpretation Summary  Show Result Comparison     The ECG portion of the study is abnormal but not diagnostic due to resting ST-T abnormalities.    The patient reported no chest pain during the stress test.    There were no arrhythmias during " stress.    The nuclear portion of this study will be reported separately.    FINDINGS:  There is no fixed or reversible perfusion defect.  The stress and rest end-diastolic volumes each measure 47 mL.  The stress and rest end systolic volumes each measure 9 mL respectively.  The stress and rest ejection fraction measure 81%.     Impression:     No acute findings.    CV US BLE Veins 4/23/2024  Interpretation Summary    There is no evidence of a right lower extremity DVT.    There is no evidence of a left lower extremity DVT.    The right greater saphenous vein has reflux.  Mostly around calf    The left greater saphenous vein has reflux.    Lt prox calf accessory vein reflux time =7753mx    The left superficial femoral middle vein has reflux.     Cardiac echo 4/22/24  Summary    Left Ventricle: The left ventricle is normal in size. Normal wall thickness. Regional wall motion abnormalities present very mild hypokinesia apical lateral wall. . There is normal systolic function with a visually estimated ejection fraction of 65 - 70%. Biplane (2D) method of discs ejection fraction is 73%. There is normal diastolic function.    Right Ventricle: Normal right ventricular cavity size. Wall thickness is normal. Right ventricle wall motion  is normal. Systolic function is normal.    Left Atrium: Left atrium is mildly dilated.    Aortic Valve: There is mild aortic valve sclerosis. There is mild stenosis. Aortic valve area by VTI is 1.92 cm². Aortic valve peak velocity is 1.40 m/s. Mean gradient is 4 mmHg. The dimensionless index is 0.84. There is mild aortic regurgitation.    Mitral Valve: There is mild regurgitation.    Pulmonary Artery: The estimated pulmonary artery systolic pressure is 35 mmHg.    IVC/SVC: Normal venous pressure at 3 mmHg.    EKG 11/27/23- reviewed - SR w SA, RBBB  TTE 11/27/23  Summary         Left Ventricle: The left ventricle is normal in size. Normal wall thickness. There is concentric remodeling. Normal  wall motion. There is normal systolic function. Biplane (2D) method of discs ejection fraction is 60%. There is normal diastolic function.    Right Ventricle: Normal right ventricular cavity size. Wall thickness is normal. Right ventricle wall motion  is normal. Systolic function is normal.    Aortic Valve: The aortic valve is a trileaflet valve. There is moderate aortic valve sclerosis.    Tricuspid Valve: There is mild to moderate regurgitation.    Pulmonary Artery: The estimated pulmonary artery systolic pressure is 25 mmHg.    IVC/SVC: Normal venous pressure at 3 mmHg.    EC2022- reviewed.  Sinus katherine w/ PACs, RBBB, LAFB.       Echo: 2022- reviewed.    Summary     The left ventricle is normal in size with concentric remodeling and normal systolic function.  The estimated ejection fraction is 70%.  Normal left ventricular diastolic function.  Normal right ventricular size with normal right ventricular systolic function.  Mild aortic regurgitation.  Mild tricuspid regurgitation.  Mild pulmonic regurgitation.  Mild mitral regurgitation.  Normal central venous pressure (3 mmHg).  The estimated PA systolic pressure is 32 mmHg.     ETT: 2022- reviewed.    Conclusion          The patient exercised for 7 minutes 30 seconds on a Abel protocol, corresponding to a functional capacity of 9 METS, achieving a peak heart rate of 157 bpm, which is 113 % of the age predicted maximum heart rate. The patient experienced no angina during the test. Their exercise capacity was above average. The Duke Treadmill Score was 8.    The ECG portion of the study is negative for ischemia.    The patient reported no chest pain during the stress test.    The blood pressure response to stress was normal.    The Duke Treadmill Score was 8.    During stress, occasional PACs are noted. , During stress, occasional PVCs are noted.    The exercise capacity was above average.    Assessment:       1. Primary hypertension    2.  Chronic hepatitis C with cirrhosis    3. Secondary biliary cirrhosis    4. Other fatigue    5. Bilateral lower extremity edema    6. Peripheral vascular disease, unspecified           Plan:         Primary hypertension  Goal BP < 140/90.  Compliant w/ meds.    -controlled   -in office 106/64  - current medication regimen: amlodipine 5 mg, carvedilol 3.125 mg, lisinopril 20 mg, spironolactone 100 mg, furosemide 20 mg   - enrolling in digital medicine prgm  - risk factor and lifestyle modifications     Chronic hepatitis C with cirrhosis  Followed by hepatology.- f/u appt scheduled for 5/27/24  -continue furosemide 20 mg, spironolactone 100 mg     Secondary biliary cirrhosis   Followed by hepatology  -continue medical therapy     Other fatigue  -Cardiac echo 4/22/24: LVEF 73% w normal diastolic function     Bilateral lower extremity edema  -Bilateral LE US 4/23/24-no DVT, Lt prox calf accessory vein reflux time =7753mx, the left superficial femoral middle vein has reflux.  -followed by Vascular medicine, last visit 5/23/24- continue medical therapy   -compression stockings   -Elevate legs when sitting   -continue furosemide 20 mg daily for swelling     Peripheral vascular disease, unspecified  CV US BLE Veins 4/23/2024  Interpretation Summary    There is no evidence of a right lower extremity DVT.    There is no evidence of a left lower extremity DVT.    The right greater saphenous vein has reflux.  Mostly around calf    The left greater saphenous vein has reflux.    Lt prox calf accessory vein reflux time =7753mx    The left superficial femoral middle vein has reflux.\    -followed by Vascular medicine, last visit 5/23/24- continue medical therapy       Total duration of face to face visit time 30 minutes.  Total time spent counseling greater than fifty percent of total visit time.  Counseling included discussion regarding imaging findings, diagnosis, possibilities, treatment options, risks and benefits.  The patient  had many questions regarding the options and long-term effects      Guilherme Gregorio, DNP  Cardiology

## 2024-05-27 ENCOUNTER — OFFICE VISIT (OUTPATIENT)
Dept: HEPATOLOGY | Facility: CLINIC | Age: 79
End: 2024-05-27
Payer: MEDICARE

## 2024-05-27 VITALS
DIASTOLIC BLOOD PRESSURE: 58 MMHG | TEMPERATURE: 98 F | SYSTOLIC BLOOD PRESSURE: 107 MMHG | HEIGHT: 64 IN | BODY MASS INDEX: 19.84 KG/M2 | HEART RATE: 79 BPM | WEIGHT: 116.19 LBS

## 2024-05-27 DIAGNOSIS — Z86.19 HISTORY OF HEPATITIS C: ICD-10-CM

## 2024-05-27 DIAGNOSIS — K74.60 CHRONIC HEPATITIS C WITH CIRRHOSIS: ICD-10-CM

## 2024-05-27 DIAGNOSIS — C22.0 HCC (HEPATOCELLULAR CARCINOMA): Primary | ICD-10-CM

## 2024-05-27 DIAGNOSIS — R60.0 BILATERAL LOWER EXTREMITY EDEMA: ICD-10-CM

## 2024-05-27 DIAGNOSIS — B18.2 CHRONIC HEPATITIS C WITH CIRRHOSIS: ICD-10-CM

## 2024-05-27 LAB
OHS QRS DURATION: 130 MS
OHS QTC CALCULATION: 499 MS

## 2024-05-27 PROCEDURE — 1159F MED LIST DOCD IN RCRD: CPT | Mod: CPTII,S$GLB,, | Performed by: INTERNAL MEDICINE

## 2024-05-27 PROCEDURE — G2211 COMPLEX E/M VISIT ADD ON: HCPCS | Mod: S$GLB,,, | Performed by: INTERNAL MEDICINE

## 2024-05-27 PROCEDURE — 3288F FALL RISK ASSESSMENT DOCD: CPT | Mod: CPTII,S$GLB,, | Performed by: INTERNAL MEDICINE

## 2024-05-27 PROCEDURE — 99999 PR PBB SHADOW E&M-EST. PATIENT-LVL III: CPT | Mod: PBBFAC,,, | Performed by: INTERNAL MEDICINE

## 2024-05-27 PROCEDURE — 1126F AMNT PAIN NOTED NONE PRSNT: CPT | Mod: CPTII,S$GLB,, | Performed by: INTERNAL MEDICINE

## 2024-05-27 PROCEDURE — 99215 OFFICE O/P EST HI 40 MIN: CPT | Mod: S$GLB,,, | Performed by: INTERNAL MEDICINE

## 2024-05-27 PROCEDURE — 3074F SYST BP LT 130 MM HG: CPT | Mod: CPTII,S$GLB,, | Performed by: INTERNAL MEDICINE

## 2024-05-27 PROCEDURE — 3078F DIAST BP <80 MM HG: CPT | Mod: CPTII,S$GLB,, | Performed by: INTERNAL MEDICINE

## 2024-05-27 PROCEDURE — 1101F PT FALLS ASSESS-DOCD LE1/YR: CPT | Mod: CPTII,S$GLB,, | Performed by: INTERNAL MEDICINE

## 2024-05-27 RX ORDER — FUROSEMIDE 20 MG/1
40 TABLET ORAL DAILY
Qty: 60 TABLET | Refills: 3 | Status: SHIPPED | OUTPATIENT
Start: 2024-05-27 | End: 2025-05-27

## 2024-05-27 NOTE — PROGRESS NOTES
Subjective:       Patient ID: Seema Gann is a 78 y.o. female.    Chief Complaint: No chief complaint on file.      HPI  I saw this 78 y.o. lady in the liver clinic where she came alone.  Last seen in our clinic in March 2024.    She has been treated for her HCC with Y90 x2 and her HCC is under control. Unfortunately she has developed worsening ascites.    HCV RNA neg in Jan 2016    - G1 HCV cirrhosis with SVR  - admitted to [facility] on 11/28/22 with chest pain and was found to have a large mass on US.    CT abdo: 3/2/2023  1. Cirrhotic hepatic morphology with 7.0 x 8.5 x 8.2-cm (previously 7.0 x 7.9 x 7.3-cm) homogeneous partially exophytic right hepatic lobe mass. Further evaluation is limited given the lack of IV contrast.    Liver biopsy on 5/30/2023  Hepatocellular carcinoma, moderately differentiated     AFP on 4/26/23= 460  AFP on 1/23/24= 3.6  AFP on 5/6/24= 2.1    Y90 on 6/27/2023  Y90 on 10/12/2023    MRI abdo: 5/6/24  Post treatment changes of the right hepatic lobe with no convincing evidence of local recurrent disease.  Probable infarction of the posterior right hepatic lobe.  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  Continued follow-up is recommended.  Large volume of ascites, increased compared to prior exam    MELD 3.0: 17 at 5/6/2024 12:56 PM  MELD-Na: 15 at 5/6/2024 12:56 PM  Calculated from:  Serum Creatinine: 1.7 mg/dL at 5/6/2024 12:56 PM  Serum Sodium: 139 mmol/L (Using max of 137 mmol/L) at 5/6/2024 12:56 PM  Total Bilirubin: 1.5 mg/dL at 5/6/2024 12:56 PM  Serum Albumin: 2.7 g/dL at 5/6/2024 12:56 PM  INR(ratio): 1.2 at 5/6/2024 12:56 PM  Age at listing (hypothetical): 78 years  Sex: Female at 5/6/2024 12:56 PM        PMH:  Hypertension  HCV cirrhosis    Review of Systems   Constitutional:  Negative for activity change, appetite  change, chills, fatigue, fever and unexpected weight change.   HENT:  Negative for ear pain, hearing loss, nosebleeds, sore throat and trouble swallowing.    Eyes:  Negative for redness and visual disturbance.   Respiratory:  Negative for cough, chest tightness, shortness of breath and wheezing.    Cardiovascular:  Negative for chest pain and palpitations.   Gastrointestinal:  Negative for abdominal distention, abdominal pain, blood in stool, constipation, diarrhea, nausea and vomiting.   Genitourinary:  Negative for difficulty urinating, dysuria, frequency, hematuria and urgency.   Musculoskeletal:  Negative for arthralgias, back pain, gait problem, joint swelling and myalgias.   Skin:  Negative for rash.   Neurological:  Negative for tremors, seizures, speech difficulty, weakness and headaches.   Hematological:  Negative for adenopathy.   Psychiatric/Behavioral:  Negative for confusion, decreased concentration and sleep disturbance. The patient is not nervous/anxious.            Lab Results   Component Value Date    ALT 8 (L) 05/06/2024    AST 21 05/06/2024    GGT 45 01/16/2015    ALKPHOS 55 05/06/2024    BILITOT 1.5 (H) 05/06/2024     Past Medical History:   Diagnosis Date    Cirrhosis     HCC (hepatocellular carcinoma)     Hepatitis     Hypertension      Past Surgical History:   Procedure Laterality Date    APPENDECTOMY      HYSTERECTOMY       Current Outpatient Medications   Medication Sig    amLODIPine (NORVASC) 5 MG tablet Take 1 tablet (5 mg total) by mouth once daily.    carvediloL (COREG) 3.125 MG tablet Take 1 tablet (3.125 mg total) by mouth 2 (two) times daily with meals.    cyproheptadine (PERIACTIN) 4 mg tablet Take 1 tablet (4 mg total) by mouth 3 (three) times daily.    lisinopriL (PRINIVIL,ZESTRIL) 20 MG tablet Take 1 tablet (20 mg total) by mouth once daily.    spironolactone (ALDACTONE) 100 MG tablet Take 1 tablet (100 mg total) by mouth once daily.    aspirin (ECOTRIN) 81 MG EC tablet Take 1  tablet (81 mg total) by mouth once daily.    furosemide (LASIX) 20 MG tablet Take 2 tablets (40 mg total) by mouth once daily.     No current facility-administered medications for this visit.       Objective:      Physical Exam  Constitutional:       General: She is not in acute distress.  HENT:      Head: Normocephalic.   Eyes:      Pupils: Pupils are equal, round, and reactive to light.   Neck:      Thyroid: No thyromegaly.      Vascular: No JVD.      Trachea: No tracheal deviation.   Cardiovascular:      Rate and Rhythm: Normal rate and regular rhythm.      Heart sounds: Normal heart sounds. No murmur heard.  Pulmonary:      Effort: Pulmonary effort is normal.      Breath sounds: Normal breath sounds. No stridor.   Abdominal:      General: There is distension.      Palpations: Abdomen is soft.   Musculoskeletal:      Right lower leg: Edema present.      Left lower leg: Edema present.   Lymphadenopathy:      Head:      Right side of head: No submental, submandibular, tonsillar, preauricular, posterior auricular or occipital adenopathy.      Left side of head: No submental, submandibular, tonsillar, preauricular, posterior auricular or occipital adenopathy.      Cervical: No cervical adenopathy.   Neurological:      Mental Status: She is alert. She is not disoriented.      Cranial Nerves: No cranial nerve deficit.      Sensory: No sensory deficit.         Assessment:       1. HCC (hepatocellular carcinoma)    2. Bilateral lower extremity edema    3. History of hepatitis C    4. Chronic hepatitis C with cirrhosis          Plan:   She has an excellent functional status and has tolerated Y90 well. She feels well and has regained her appetitie and her weight has gone up by about 10 lb.    Her AFP is now normal from a peak of 460.    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    - increased furosemide to 40 mg daily (already on  20 mg + carolynn)  - labs in 2 weeks and clinic in 4 weeks  - labs and repeat MRI in 3 months

## 2024-06-19 ENCOUNTER — TELEPHONE (OUTPATIENT)
Dept: CARDIOLOGY | Facility: CLINIC | Age: 79
End: 2024-06-19
Payer: MEDICARE

## 2024-06-19 NOTE — TELEPHONE ENCOUNTER
Called pt to notify of appointment reschedule from 9/25 to 9/24 with Dr. Dominguez. No answer LMOR

## 2024-06-20 ENCOUNTER — TELEPHONE (OUTPATIENT)
Dept: HEPATOLOGY | Facility: CLINIC | Age: 79
End: 2024-06-20
Payer: MEDICARE

## 2024-06-20 DIAGNOSIS — E87.6 HYPOKALEMIA: Primary | ICD-10-CM

## 2024-06-20 DIAGNOSIS — C22.0 HCC (HEPATOCELLULAR CARCINOMA): ICD-10-CM

## 2024-06-20 NOTE — TELEPHONE ENCOUNTER
----- Message from Jasiel White MD sent at 6/19/2024  1:26 AM CDT -----  Can we recheck her CMP this week please?

## 2024-06-20 NOTE — TELEPHONE ENCOUNTER
---- Message from Dr HAROON White -----  Can we recheck her CMP this week please?    Call placed to the patient x 2, at [phone].   No Answer. Left VM message.    Call placed to emergency contact daughter, Bri x 2 at [phone]. Line is busy. Cannot leave a VM message.    Will try again.

## 2024-06-20 NOTE — TELEPHONE ENCOUNTER
----- Message from Jasiel White MD ----  Can we recheck her CMP this week please?     3rd attempt to reach Patient to relay message from Dr White.    Patient called at [phone].  Patient asked if she listened to the other message?  Pt stated she had not.    Message relayed from Dr White.  Patient asked if she could increase some high potassium foods in diet.  Pt does not like OJ or banana's.  Pt offered some other suggestions like spinach, beans, salmon, fish, shrimp, potatoes, dark green leafy vegetables, cabbage. Pt stated I like those.    Patient stated she can go tomorrow to [place] to get Labs (cmp) done around 10 am.  Labs scheduled at the [facility] location for Fri 6/21/24 at 10 am.    Pt verbalized understanding.

## 2024-07-02 ENCOUNTER — OFFICE VISIT (OUTPATIENT)
Dept: HEPATOLOGY | Facility: CLINIC | Age: 79
End: 2024-07-02
Payer: MEDICARE

## 2024-07-02 ENCOUNTER — LAB VISIT (OUTPATIENT)
Dept: LAB | Facility: HOSPITAL | Age: 79
End: 2024-07-02
Attending: INTERNAL MEDICINE
Payer: MEDICARE

## 2024-07-02 VITALS
HEART RATE: 88 BPM | HEIGHT: 64 IN | WEIGHT: 118.19 LBS | BODY MASS INDEX: 20.18 KG/M2 | OXYGEN SATURATION: 95 % | DIASTOLIC BLOOD PRESSURE: 57 MMHG | SYSTOLIC BLOOD PRESSURE: 94 MMHG

## 2024-07-02 DIAGNOSIS — R18.8 OTHER ASCITES: Primary | ICD-10-CM

## 2024-07-02 DIAGNOSIS — Z86.19 HISTORY OF HEPATITIS C: ICD-10-CM

## 2024-07-02 DIAGNOSIS — N18.31 STAGE 3A CHRONIC KIDNEY DISEASE: ICD-10-CM

## 2024-07-02 DIAGNOSIS — R18.8 OTHER ASCITES: ICD-10-CM

## 2024-07-02 DIAGNOSIS — B19.20 DECOMPENSATED CIRRHOSIS RELATED TO HEPATITIS C VIRUS (HCV): ICD-10-CM

## 2024-07-02 DIAGNOSIS — C22.0 HCC (HEPATOCELLULAR CARCINOMA): ICD-10-CM

## 2024-07-02 DIAGNOSIS — K74.69 DECOMPENSATED CIRRHOSIS RELATED TO HEPATITIS C VIRUS (HCV): ICD-10-CM

## 2024-07-02 LAB
ALBUMIN SERPL BCP-MCNC: 2.4 G/DL (ref 3.5–5.2)
ALP SERPL-CCNC: 38 U/L (ref 55–135)
ALT SERPL W/O P-5'-P-CCNC: <5 U/L (ref 10–44)
ANION GAP SERPL CALC-SCNC: 10 MMOL/L (ref 8–16)
AST SERPL-CCNC: 16 U/L (ref 10–40)
BASOPHILS # BLD AUTO: 0.01 K/UL (ref 0–0.2)
BASOPHILS NFR BLD: 0.3 % (ref 0–1.9)
BILIRUB SERPL-MCNC: 1 MG/DL (ref 0.1–1)
BUN SERPL-MCNC: 28 MG/DL (ref 8–23)
CALCIUM SERPL-MCNC: 8.3 MG/DL (ref 8.7–10.5)
CHLORIDE SERPL-SCNC: 107 MMOL/L (ref 95–110)
CO2 SERPL-SCNC: 23 MMOL/L (ref 23–29)
CREAT SERPL-MCNC: 2.4 MG/DL (ref 0.5–1.4)
DIFFERENTIAL METHOD BLD: ABNORMAL
EOSINOPHIL # BLD AUTO: 0.1 K/UL (ref 0–0.5)
EOSINOPHIL NFR BLD: 2.9 % (ref 0–8)
ERYTHROCYTE [DISTWIDTH] IN BLOOD BY AUTOMATED COUNT: 12.3 % (ref 11.5–14.5)
EST. GFR  (NO RACE VARIABLE): 20.3 ML/MIN/1.73 M^2
GLUCOSE SERPL-MCNC: 156 MG/DL (ref 70–110)
HCT VFR BLD AUTO: 25.3 % (ref 37–48.5)
HGB BLD-MCNC: 8.4 G/DL (ref 12–16)
IMM GRANULOCYTES # BLD AUTO: 0 K/UL (ref 0–0.04)
IMM GRANULOCYTES NFR BLD AUTO: 0 % (ref 0–0.5)
INR PPP: 1.3 (ref 0.8–1.2)
LYMPHOCYTES # BLD AUTO: 0.7 K/UL (ref 1–4.8)
LYMPHOCYTES NFR BLD: 21.1 % (ref 18–48)
MCH RBC QN AUTO: 32.1 PG (ref 27–31)
MCHC RBC AUTO-ENTMCNC: 33.2 G/DL (ref 32–36)
MCV RBC AUTO: 97 FL (ref 82–98)
MONOCYTES # BLD AUTO: 0.4 K/UL (ref 0.3–1)
MONOCYTES NFR BLD: 11.4 % (ref 4–15)
NEUTROPHILS # BLD AUTO: 2 K/UL (ref 1.8–7.7)
NEUTROPHILS NFR BLD: 64.3 % (ref 38–73)
NRBC BLD-RTO: 0 /100 WBC
PLATELET # BLD AUTO: 103 K/UL (ref 150–450)
PMV BLD AUTO: 11.2 FL (ref 9.2–12.9)
POTASSIUM SERPL-SCNC: 3.1 MMOL/L (ref 3.5–5.1)
PROT SERPL-MCNC: 7.1 G/DL (ref 6–8.4)
PROTHROMBIN TIME: 13.7 SEC (ref 9–12.5)
RBC # BLD AUTO: 2.62 M/UL (ref 4–5.4)
SODIUM SERPL-SCNC: 140 MMOL/L (ref 136–145)
WBC # BLD AUTO: 3.08 K/UL (ref 3.9–12.7)

## 2024-07-02 PROCEDURE — 85025 COMPLETE CBC W/AUTO DIFF WBC: CPT | Performed by: INTERNAL MEDICINE

## 2024-07-02 PROCEDURE — 85610 PROTHROMBIN TIME: CPT | Performed by: INTERNAL MEDICINE

## 2024-07-02 PROCEDURE — 1126F AMNT PAIN NOTED NONE PRSNT: CPT | Mod: CPTII,S$GLB,, | Performed by: INTERNAL MEDICINE

## 2024-07-02 PROCEDURE — 99999 PR PBB SHADOW E&M-EST. PATIENT-LVL III: CPT | Mod: PBBFAC,,, | Performed by: INTERNAL MEDICINE

## 2024-07-02 PROCEDURE — 1101F PT FALLS ASSESS-DOCD LE1/YR: CPT | Mod: CPTII,S$GLB,, | Performed by: INTERNAL MEDICINE

## 2024-07-02 PROCEDURE — 3078F DIAST BP <80 MM HG: CPT | Mod: CPTII,S$GLB,, | Performed by: INTERNAL MEDICINE

## 2024-07-02 PROCEDURE — 3074F SYST BP LT 130 MM HG: CPT | Mod: CPTII,S$GLB,, | Performed by: INTERNAL MEDICINE

## 2024-07-02 PROCEDURE — G2211 COMPLEX E/M VISIT ADD ON: HCPCS | Mod: S$GLB,,, | Performed by: INTERNAL MEDICINE

## 2024-07-02 PROCEDURE — 99215 OFFICE O/P EST HI 40 MIN: CPT | Mod: S$GLB,,, | Performed by: INTERNAL MEDICINE

## 2024-07-02 PROCEDURE — 3288F FALL RISK ASSESSMENT DOCD: CPT | Mod: CPTII,S$GLB,, | Performed by: INTERNAL MEDICINE

## 2024-07-02 PROCEDURE — 36415 COLL VENOUS BLD VENIPUNCTURE: CPT | Performed by: INTERNAL MEDICINE

## 2024-07-02 PROCEDURE — 1159F MED LIST DOCD IN RCRD: CPT | Mod: CPTII,S$GLB,, | Performed by: INTERNAL MEDICINE

## 2024-07-02 PROCEDURE — 80053 COMPREHEN METABOLIC PANEL: CPT | Performed by: INTERNAL MEDICINE

## 2024-07-02 NOTE — PROGRESS NOTES
Subjective:       Patient ID: Seema Gann is a 78 y.o. female.    Chief Complaint: No chief complaint on file.      HPI  I saw this 78 y.o. lady in the liver clinic where she came alone.  Last seen in our clinic in May 2024.    She has been treated for her HCC with Y90 x2 and her HCC is under control. Unfortunately she has developed worsening ascites and when I last met her I had increased her diuretics.    HCV RNA neg in Jan 2016    - G1 HCV cirrhosis with SVR  - admitted to [facility] on 11/28/22 with chest pain and was found to have a large mass on US.    CT abdo: 3/2/2023  1. Cirrhotic hepatic morphology with 7.0 x 8.5 x 8.2-cm (previously 7.0 x 7.9 x 7.3-cm) homogeneous partially exophytic right hepatic lobe mass. Further evaluation is limited given the lack of IV contrast.    Liver biopsy on 5/30/2023  Hepatocellular carcinoma, moderately differentiated     AFP on 4/26/23= 460  AFP on 1/23/24= 3.6  AFP on 5/6/24= 2.1    Y90 on 6/27/2023  Y90 on 10/12/2023    MRI abdo: 5/6/24  Post treatment changes of the right hepatic lobe with no convincing evidence of local recurrent disease.  Probable infarction of the posterior right hepatic lobe.  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  Continued follow-up is recommended.  Large volume of ascites, increased compared to prior exam    MELD 3.0: 20 at 7/2/2024 12:05 PM  MELD-Na: 18 at 7/2/2024 12:05 PM  Calculated from:  Serum Creatinine: 2.4 mg/dL at 7/2/2024 12:05 PM  Serum Sodium: 140 mmol/L (Using max of 137 mmol/L) at 7/2/2024 12:05 PM  Total Bilirubin: 1.0 mg/dL at 7/2/2024 12:05 PM  Serum Albumin: 2.4 g/dL at 7/2/2024 12:05 PM  INR(ratio): 1.3 at 7/2/2024 12:05 PM  Age at listing (hypothetical): 78 years  Sex: Female at 7/2/2024 12:05 PM        PMH:  Hypertension  HCV cirrhosis    Review of Systems    Constitutional:  Negative for activity change, appetite change, chills, fatigue, fever and unexpected weight change.   HENT:  Negative for ear pain, hearing loss, nosebleeds, sore throat and trouble swallowing.    Eyes:  Negative for redness and visual disturbance.   Respiratory:  Negative for cough, chest tightness, shortness of breath and wheezing.    Cardiovascular:  Negative for chest pain and palpitations.   Gastrointestinal:  Negative for abdominal distention, abdominal pain, blood in stool, constipation, diarrhea, nausea and vomiting.   Genitourinary:  Negative for difficulty urinating, dysuria, frequency, hematuria and urgency.   Musculoskeletal:  Negative for arthralgias, back pain, gait problem, joint swelling and myalgias.   Skin:  Negative for rash.   Neurological:  Negative for tremors, seizures, speech difficulty, weakness and headaches.   Hematological:  Negative for adenopathy.   Psychiatric/Behavioral:  Negative for confusion, decreased concentration and sleep disturbance. The patient is not nervous/anxious.            Lab Results   Component Value Date    ALT <5 (L) 07/02/2024    AST 16 07/02/2024    GGT 45 01/16/2015    ALKPHOS 38 (L) 07/02/2024    BILITOT 1.0 07/02/2024     Past Medical History:   Diagnosis Date    Cirrhosis     HCC (hepatocellular carcinoma)     Hepatitis     Hypertension      Past Surgical History:   Procedure Laterality Date    APPENDECTOMY      HYSTERECTOMY       Current Outpatient Medications   Medication Sig    amLODIPine (NORVASC) 5 MG tablet Take 1 tablet (5 mg total) by mouth once daily.    carvediloL (COREG) 3.125 MG tablet Take 1 tablet (3.125 mg total) by mouth 2 (two) times daily with meals.    cyproheptadine (PERIACTIN) 4 mg tablet Take 1 tablet (4 mg total) by mouth 3 (three) times daily.    furosemide (LASIX) 20 MG tablet Take 2 tablets (40 mg total) by mouth once daily.    lisinopriL (PRINIVIL,ZESTRIL) 20 MG tablet Take 1 tablet (20 mg total) by mouth once  daily.    spironolactone (ALDACTONE) 100 MG tablet Take 1 tablet (100 mg total) by mouth once daily.    aspirin (ECOTRIN) 81 MG EC tablet Take 1 tablet (81 mg total) by mouth once daily.     No current facility-administered medications for this visit.       Objective:      Physical Exam  Constitutional:       General: She is not in acute distress.  HENT:      Head: Normocephalic.   Eyes:      Pupils: Pupils are equal, round, and reactive to light.   Neck:      Thyroid: No thyromegaly.      Vascular: No JVD.      Trachea: No tracheal deviation.   Cardiovascular:      Rate and Rhythm: Normal rate and regular rhythm.      Heart sounds: Normal heart sounds. No murmur heard.  Pulmonary:      Effort: Pulmonary effort is normal.      Breath sounds: Normal breath sounds. No stridor.   Abdominal:      General: There is distension.      Palpations: Abdomen is soft.   Musculoskeletal:      Right lower leg: Edema present.      Left lower leg: Edema present.   Lymphadenopathy:      Head:      Right side of head: No submental, submandibular, tonsillar, preauricular, posterior auricular or occipital adenopathy.      Left side of head: No submental, submandibular, tonsillar, preauricular, posterior auricular or occipital adenopathy.      Cervical: No cervical adenopathy.   Neurological:      Mental Status: She is alert. She is not disoriented.      Cranial Nerves: No cranial nerve deficit.      Sensory: No sensory deficit.         Assessment:       1. Other ascites    2. HCC (hepatocellular carcinoma)    3. History of hepatitis C    4. Stage 3a chronic kidney disease    5. Decompensated cirrhosis related to hepatitis C virus (HCV)          Plan:   She has an excellent functional status and has tolerated Y90 well. She feels relatively well but her ascites has not been helped by the diureic dose increase.    Her AFP is now normal from a peak of 460.    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    - ordered therapeutic paracentesis every 2 weeks  - labs today  - clinic in 2 months.

## 2024-07-03 DIAGNOSIS — C22.0 HCC (HEPATOCELLULAR CARCINOMA): Primary | ICD-10-CM

## 2024-07-05 PROBLEM — K74.69 DECOMPENSATED CIRRHOSIS RELATED TO HEPATITIS C VIRUS (HCV): Status: ACTIVE | Noted: 2024-07-05

## 2024-07-05 PROBLEM — B19.20 DECOMPENSATED CIRRHOSIS RELATED TO HEPATITIS C VIRUS (HCV): Status: ACTIVE | Noted: 2024-07-05

## 2024-07-19 ENCOUNTER — TELEPHONE (OUTPATIENT)
Dept: HEPATOLOGY | Facility: CLINIC | Age: 79
End: 2024-07-19
Payer: MEDICARE

## 2024-07-19 NOTE — TELEPHONE ENCOUNTER
----- Message from Faby Borden sent at 7/19/2024 11:23 AM CDT -----  Regarding: Patient advice  Contact: Pt  [phone]            Name of Caller: Seema      Contact Preference:  [phone]    Nature of Call:  Requesting a call back have issues and concerns with breathing

## 2024-07-19 NOTE — TELEPHONE ENCOUNTER
Spoke with patient regarding message below states she is having breathing issues. Informed patient she would have to go to the ER regarding her breathing. Patient states it's not that bad, informed patient I do understand but we recommend she go to the ER for evaluation and treatment if needed. Patient states ok if it gets any worse then she will go.

## 2024-07-19 NOTE — TELEPHONE ENCOUNTER
----- Message from Faby Borden sent at 7/19/2024 11:23 AM CDT -----  Regarding: Patient advice  Contact: Pt  [phone]            Name of Caller: Seema      Contact Preference:  [phone]    Nature of Call:  Requesting a call back have issues and concerns with breathing

## 2024-07-19 NOTE — TELEPHONE ENCOUNTER
----- Message from Pavan Lizarraga sent at 7/19/2024  9:17 AM CDT -----  Regarding: Fluid removal  Good morning,    Above patient is waiting to get schedule to get fluid removed from her stomach.

## 2024-08-22 ENCOUNTER — TELEPHONE (OUTPATIENT)
Dept: HEPATOLOGY | Facility: CLINIC | Age: 79
End: 2024-08-22
Payer: MEDICARE

## 2024-08-22 ENCOUNTER — NURSE TRIAGE (OUTPATIENT)
Dept: ADMINISTRATIVE | Facility: CLINIC | Age: 79
End: 2024-08-22
Payer: MEDICARE

## 2024-08-22 ENCOUNTER — HOSPITAL ENCOUNTER (OUTPATIENT)
Facility: HOSPITAL | Age: 79
Discharge: HOME | End: 2024-08-23
Attending: EMERGENCY MEDICINE | Admitting: HOSPITALIST
Payer: MEDICARE

## 2024-08-22 DIAGNOSIS — R07.9 CHEST PAIN: ICD-10-CM

## 2024-08-22 DIAGNOSIS — B19.20 DECOMPENSATED CIRRHOSIS RELATED TO HEPATITIS C VIRUS (HCV): Chronic | ICD-10-CM

## 2024-08-22 DIAGNOSIS — J90 PLEURAL EFFUSION ON LEFT: Primary | ICD-10-CM

## 2024-08-22 DIAGNOSIS — K74.69 DECOMPENSATED CIRRHOSIS RELATED TO HEPATITIS C VIRUS (HCV): Chronic | ICD-10-CM

## 2024-08-22 DIAGNOSIS — E87.6 HYPOKALEMIA: ICD-10-CM

## 2024-08-22 DIAGNOSIS — R18.8 OTHER ASCITES: ICD-10-CM

## 2024-08-22 PROBLEM — E44.0 MODERATE PROTEIN-CALORIE MALNUTRITION: Chronic | Status: ACTIVE | Noted: 2024-08-22

## 2024-08-22 PROBLEM — C22.0 HCC (HEPATOCELLULAR CARCINOMA): Chronic | Status: ACTIVE | Noted: 2023-07-19

## 2024-08-22 PROBLEM — N18.31 STAGE 3A CHRONIC KIDNEY DISEASE: Chronic | Status: ACTIVE | Noted: 2024-03-05

## 2024-08-22 PROBLEM — D61.818 PANCYTOPENIA: Chronic | Status: ACTIVE | Noted: 2023-11-21

## 2024-08-22 LAB
ALBUMIN SERPL BCP-MCNC: 2.5 G/DL (ref 3.5–5.2)
ALP SERPL-CCNC: 46 U/L (ref 55–135)
ALT SERPL W/O P-5'-P-CCNC: 6 U/L (ref 10–44)
ANION GAP SERPL CALC-SCNC: 7 MMOL/L (ref 8–16)
APTT PPP: 24.7 SEC (ref 21–32)
AST SERPL-CCNC: 22 U/L (ref 10–40)
BASOPHILS # BLD AUTO: 0.02 K/UL (ref 0–0.2)
BASOPHILS NFR BLD: 0.6 % (ref 0–1.9)
BILIRUB SERPL-MCNC: 1.6 MG/DL (ref 0.1–1)
BUN SERPL-MCNC: 10 MG/DL (ref 8–23)
CALCIUM SERPL-MCNC: 8.4 MG/DL (ref 8.7–10.5)
CHLORIDE SERPL-SCNC: 107 MMOL/L (ref 95–110)
CO2 SERPL-SCNC: 24 MMOL/L (ref 23–29)
CREAT SERPL-MCNC: 1.4 MG/DL (ref 0.5–1.4)
DIFFERENTIAL METHOD BLD: ABNORMAL
EOSINOPHIL # BLD AUTO: 0.1 K/UL (ref 0–0.5)
EOSINOPHIL NFR BLD: 1.9 % (ref 0–8)
ERYTHROCYTE [DISTWIDTH] IN BLOOD BY AUTOMATED COUNT: 13.7 % (ref 11.5–14.5)
EST. GFR  (NO RACE VARIABLE): 38.7 ML/MIN/1.73 M^2
GLUCOSE SERPL-MCNC: 144 MG/DL (ref 70–110)
HCT VFR BLD AUTO: 29.8 % (ref 37–48.5)
HGB BLD-MCNC: 9.9 G/DL (ref 12–16)
IMM GRANULOCYTES # BLD AUTO: 0.02 K/UL (ref 0–0.04)
IMM GRANULOCYTES NFR BLD AUTO: 0.6 % (ref 0–0.5)
INR PPP: 1.2 (ref 0.8–1.2)
LYMPHOCYTES # BLD AUTO: 0.6 K/UL (ref 1–4.8)
LYMPHOCYTES NFR BLD: 15.5 % (ref 18–48)
MAGNESIUM SERPL-MCNC: 1.8 MG/DL (ref 1.6–2.6)
MCH RBC QN AUTO: 31.8 PG (ref 27–31)
MCHC RBC AUTO-ENTMCNC: 33.2 G/DL (ref 32–36)
MCV RBC AUTO: 96 FL (ref 82–98)
MONOCYTES # BLD AUTO: 0.2 K/UL (ref 0.3–1)
MONOCYTES NFR BLD: 5.8 % (ref 4–15)
NEUTROPHILS # BLD AUTO: 2.7 K/UL (ref 1.8–7.7)
NEUTROPHILS NFR BLD: 75.6 % (ref 38–73)
NRBC BLD-RTO: 0 /100 WBC
PLATELET # BLD AUTO: 126 K/UL (ref 150–450)
PMV BLD AUTO: 11.7 FL (ref 9.2–12.9)
POTASSIUM SERPL-SCNC: 3.4 MMOL/L (ref 3.5–5.1)
PROT SERPL-MCNC: 7.9 G/DL (ref 6–8.4)
PROTHROMBIN TIME: 13.3 SEC (ref 9–12.5)
RBC # BLD AUTO: 3.11 M/UL (ref 4–5.4)
SODIUM SERPL-SCNC: 138 MMOL/L (ref 136–145)
TROPONIN I SERPL DL<=0.01 NG/ML-MCNC: 0.07 NG/ML (ref 0–0.03)
WBC # BLD AUTO: 3.62 K/UL (ref 3.9–12.7)

## 2024-08-22 PROCEDURE — 25000003 PHARM REV CODE 250: Performed by: EMERGENCY MEDICINE

## 2024-08-22 PROCEDURE — 96376 TX/PRO/DX INJ SAME DRUG ADON: CPT

## 2024-08-22 PROCEDURE — 85730 THROMBOPLASTIN TIME PARTIAL: CPT | Performed by: EMERGENCY MEDICINE

## 2024-08-22 PROCEDURE — 63600175 PHARM REV CODE 636 W HCPCS: Performed by: EMERGENCY MEDICINE

## 2024-08-22 PROCEDURE — 83735 ASSAY OF MAGNESIUM: CPT | Performed by: EMERGENCY MEDICINE

## 2024-08-22 PROCEDURE — 96365 THER/PROPH/DIAG IV INF INIT: CPT

## 2024-08-22 PROCEDURE — 85025 COMPLETE CBC W/AUTO DIFF WBC: CPT | Mod: 91 | Performed by: EMERGENCY MEDICINE

## 2024-08-22 PROCEDURE — 96375 TX/PRO/DX INJ NEW DRUG ADDON: CPT

## 2024-08-22 PROCEDURE — G0378 HOSPITAL OBSERVATION PER HR: HCPCS

## 2024-08-22 PROCEDURE — 84484 ASSAY OF TROPONIN QUANT: CPT | Mod: 91 | Performed by: EMERGENCY MEDICINE

## 2024-08-22 PROCEDURE — 93010 ELECTROCARDIOGRAM REPORT: CPT | Mod: ,,, | Performed by: INTERNAL MEDICINE

## 2024-08-22 PROCEDURE — 85610 PROTHROMBIN TIME: CPT | Performed by: EMERGENCY MEDICINE

## 2024-08-22 PROCEDURE — 80053 COMPREHEN METABOLIC PANEL: CPT | Mod: 91 | Performed by: EMERGENCY MEDICINE

## 2024-08-22 PROCEDURE — 63600175 PHARM REV CODE 636 W HCPCS: Performed by: STUDENT IN AN ORGANIZED HEALTH CARE EDUCATION/TRAINING PROGRAM

## 2024-08-22 PROCEDURE — 99285 EMERGENCY DEPT VISIT HI MDM: CPT | Mod: 25

## 2024-08-22 PROCEDURE — 93005 ELECTROCARDIOGRAM TRACING: CPT

## 2024-08-22 RX ORDER — CYPROHEPTADINE HYDROCHLORIDE 4 MG/1
4 TABLET ORAL 3 TIMES DAILY
Status: DISCONTINUED | OUTPATIENT
Start: 2024-08-23 | End: 2024-08-23 | Stop reason: HOSPADM

## 2024-08-22 RX ORDER — FUROSEMIDE 10 MG/ML
40 INJECTION INTRAMUSCULAR; INTRAVENOUS
Status: COMPLETED | OUTPATIENT
Start: 2024-08-22 | End: 2024-08-22

## 2024-08-22 RX ORDER — TALC
6 POWDER (GRAM) TOPICAL NIGHTLY PRN
Status: DISCONTINUED | OUTPATIENT
Start: 2024-08-22 | End: 2024-08-23 | Stop reason: HOSPADM

## 2024-08-22 RX ORDER — AMLODIPINE BESYLATE 5 MG/1
5 TABLET ORAL DAILY
Status: DISCONTINUED | OUTPATIENT
Start: 2024-08-23 | End: 2024-08-23 | Stop reason: HOSPADM

## 2024-08-22 RX ORDER — ACETAMINOPHEN 325 MG/1
650 TABLET ORAL EVERY 4 HOURS PRN
Status: DISCONTINUED | OUTPATIENT
Start: 2024-08-22 | End: 2024-08-23 | Stop reason: HOSPADM

## 2024-08-22 RX ORDER — ACETAMINOPHEN 325 MG/1
650 TABLET ORAL EVERY 8 HOURS PRN
Status: DISCONTINUED | OUTPATIENT
Start: 2024-08-22 | End: 2024-08-23 | Stop reason: HOSPADM

## 2024-08-22 RX ORDER — IBUPROFEN 200 MG
24 TABLET ORAL
Status: DISCONTINUED | OUTPATIENT
Start: 2024-08-22 | End: 2024-08-23 | Stop reason: HOSPADM

## 2024-08-22 RX ORDER — SODIUM CHLORIDE 0.9 % (FLUSH) 0.9 %
1-10 SYRINGE (ML) INJECTION EVERY 12 HOURS PRN
Status: DISCONTINUED | OUTPATIENT
Start: 2024-08-22 | End: 2024-08-23 | Stop reason: HOSPADM

## 2024-08-22 RX ORDER — SPIRONOLACTONE 100 MG/1
100 TABLET, FILM COATED ORAL DAILY
Status: DISCONTINUED | OUTPATIENT
Start: 2024-08-23 | End: 2024-08-23 | Stop reason: HOSPADM

## 2024-08-22 RX ORDER — NALOXONE HCL 0.4 MG/ML
0.02 VIAL (ML) INJECTION
Status: DISCONTINUED | OUTPATIENT
Start: 2024-08-22 | End: 2024-08-23 | Stop reason: HOSPADM

## 2024-08-22 RX ORDER — SIMETHICONE 80 MG
1 TABLET,CHEWABLE ORAL 4 TIMES DAILY PRN
Status: DISCONTINUED | OUTPATIENT
Start: 2024-08-22 | End: 2024-08-23 | Stop reason: HOSPADM

## 2024-08-22 RX ORDER — ONDANSETRON 8 MG/1
8 TABLET, ORALLY DISINTEGRATING ORAL EVERY 8 HOURS PRN
Status: DISCONTINUED | OUTPATIENT
Start: 2024-08-22 | End: 2024-08-23 | Stop reason: HOSPADM

## 2024-08-22 RX ORDER — POLYETHYLENE GLYCOL 3350 17 G/17G
17 POWDER, FOR SOLUTION ORAL DAILY PRN
Status: DISCONTINUED | OUTPATIENT
Start: 2024-08-22 | End: 2024-08-23 | Stop reason: HOSPADM

## 2024-08-22 RX ORDER — LISINOPRIL 20 MG/1
20 TABLET ORAL DAILY
Status: DISCONTINUED | OUTPATIENT
Start: 2024-08-23 | End: 2024-08-23 | Stop reason: HOSPADM

## 2024-08-22 RX ORDER — POTASSIUM CHLORIDE 7.45 MG/ML
10 INJECTION INTRAVENOUS
Status: COMPLETED | OUTPATIENT
Start: 2024-08-22 | End: 2024-08-22

## 2024-08-22 RX ORDER — HEPARIN SODIUM 5000 [USP'U]/ML
5000 INJECTION, SOLUTION INTRAVENOUS; SUBCUTANEOUS EVERY 8 HOURS
Status: DISCONTINUED | OUTPATIENT
Start: 2024-08-23 | End: 2024-08-23 | Stop reason: HOSPADM

## 2024-08-22 RX ORDER — CARVEDILOL 3.12 MG/1
3.12 TABLET ORAL 2 TIMES DAILY WITH MEALS
Status: DISCONTINUED | OUTPATIENT
Start: 2024-08-23 | End: 2024-08-23 | Stop reason: HOSPADM

## 2024-08-22 RX ORDER — ASPIRIN 81 MG/1
81 TABLET ORAL DAILY
Status: DISCONTINUED | OUTPATIENT
Start: 2024-08-24 | End: 2024-08-23 | Stop reason: HOSPADM

## 2024-08-22 RX ORDER — ALUMINUM HYDROXIDE, MAGNESIUM HYDROXIDE, AND SIMETHICONE 1200; 120; 1200 MG/30ML; MG/30ML; MG/30ML
30 SUSPENSION ORAL 4 TIMES DAILY PRN
Status: DISCONTINUED | OUTPATIENT
Start: 2024-08-22 | End: 2024-08-23 | Stop reason: HOSPADM

## 2024-08-22 RX ORDER — GLUCAGON 1 MG
1 KIT INJECTION
Status: DISCONTINUED | OUTPATIENT
Start: 2024-08-22 | End: 2024-08-23 | Stop reason: HOSPADM

## 2024-08-22 RX ORDER — IBUPROFEN 200 MG
16 TABLET ORAL
Status: DISCONTINUED | OUTPATIENT
Start: 2024-08-22 | End: 2024-08-23 | Stop reason: HOSPADM

## 2024-08-22 RX ADMIN — FUROSEMIDE 40 MG: 10 INJECTION, SOLUTION INTRAVENOUS at 10:08

## 2024-08-22 RX ADMIN — POTASSIUM CHLORIDE 10 MEQ: 7.46 INJECTION, SOLUTION INTRAVENOUS at 09:08

## 2024-08-22 RX ADMIN — FUROSEMIDE 40 MG: 10 INJECTION, SOLUTION INTRAVENOUS at 09:08

## 2024-08-22 RX ADMIN — SODIUM CHLORIDE 100 ML: 9 INJECTION, SOLUTION INTRAVENOUS at 09:08

## 2024-08-22 NOTE — FIRST PROVIDER EVALUATION
"Medical screening examination initiated.  I have conducted a focused provider triage encounter, findings are as follows:    Brief history of present illness:  self transferred from [facility]. There today and was supposed to be transferred but transport taking too long. Went in today for SOB. Reported "belly and lung are full of fluid and it has to be drained here."  Hx of hepatocellular carcinoma, not on chemo. Per note, needs IR drainage.  Labs earlier showed low K    Vitals:    08/22/24 1802   BP: 132/85   BP Location: Right arm   Pulse: 96   Resp: 18   Temp: 98.7 °F (37.1 °C)   TempSrc: Oral   SpO2: 95%   Weight: 54.6 kg (120 lb 5.9 oz)   Height: 5' 4" (1.626 m)       Pertinent physical exam:  thin, frail, not in distress    Brief workup plan:  repeat labs, specifically K + troponin, add on inr/ptt and mag    Preliminary workup initiated; this workup will be continued and followed by the physician or advanced practice provider that is assigned to the patient when roomed.  "

## 2024-08-22 NOTE — TELEPHONE ENCOUNTER
"Pt is transferred to this NT via call center as an escalation. Pt states "I'm full of fluid and having a hard time." Pt describes looking 5-6 months pregnant. She states the the swelling is located in her abd and legs. Pt is followed by Dr. Ivy. Pt reports that she was taken off all of her diuretics. Pt describes this swelling as new and worse than previously experienced.     Care Advice recommends that pt go to ED now. Pt verbalizes understanding and is instructed to call back with any new/worsening sxs, questions, or concerns.    Reason for Disposition   MODERATE - SEVERE SWELLING of abdomen (e.g., looks very distended or swollen) that is NEW-ONSET or much worse    Additional Information   Negative: Sounds like a life-threatening emergency to the triager    Protocols used: Abdomen Bloating and Swelling-A-OH    "

## 2024-08-22 NOTE — TELEPHONE ENCOUNTER
I called patient to ask her how she is feeling.  No answer.  Left message to call back or to go to the ER.

## 2024-08-23 ENCOUNTER — PATIENT MESSAGE (OUTPATIENT)
Dept: CARDIOLOGY | Facility: CLINIC | Age: 79
End: 2024-08-23
Payer: MEDICARE

## 2024-08-23 VITALS
BODY MASS INDEX: 20.55 KG/M2 | WEIGHT: 120.38 LBS | SYSTOLIC BLOOD PRESSURE: 105 MMHG | OXYGEN SATURATION: 98 % | HEART RATE: 76 BPM | RESPIRATION RATE: 20 BRPM | HEIGHT: 64 IN | DIASTOLIC BLOOD PRESSURE: 72 MMHG | TEMPERATURE: 99 F

## 2024-08-23 PROBLEM — Z66 DNR (DO NOT RESUSCITATE): Status: ACTIVE | Noted: 2024-08-23

## 2024-08-23 LAB
ANION GAP SERPL CALC-SCNC: 6 MMOL/L (ref 8–16)
APPEARANCE FLD: NORMAL
BASOPHILS # BLD AUTO: 0.02 K/UL (ref 0–0.2)
BASOPHILS NFR BLD: 0.6 % (ref 0–1.9)
BODY FLD TYPE: NORMAL
BUN SERPL-MCNC: 9 MG/DL (ref 8–23)
CALCIUM SERPL-MCNC: 8.1 MG/DL (ref 8.7–10.5)
CHLORIDE SERPL-SCNC: 109 MMOL/L (ref 95–110)
CO2 SERPL-SCNC: 22 MMOL/L (ref 23–29)
COLOR FLD: YELLOW
CREAT SERPL-MCNC: 1.3 MG/DL (ref 0.5–1.4)
DIFFERENTIAL METHOD BLD: ABNORMAL
EOSINOPHIL # BLD AUTO: 0.1 K/UL (ref 0–0.5)
EOSINOPHIL NFR BLD: 3.4 % (ref 0–8)
ERYTHROCYTE [DISTWIDTH] IN BLOOD BY AUTOMATED COUNT: 13.2 % (ref 11.5–14.5)
EST. GFR  (NO RACE VARIABLE): 42.4 ML/MIN/1.73 M^2
GLUCOSE SERPL-MCNC: 92 MG/DL (ref 70–110)
HCT VFR BLD AUTO: 27.6 % (ref 37–48.5)
HGB BLD-MCNC: 9 G/DL (ref 12–16)
IMM GRANULOCYTES # BLD AUTO: 0.01 K/UL (ref 0–0.04)
IMM GRANULOCYTES NFR BLD AUTO: 0.3 % (ref 0–0.5)
LYMPHOCYTES # BLD AUTO: 0.6 K/UL (ref 1–4.8)
LYMPHOCYTES NFR BLD: 15.9 % (ref 18–48)
LYMPHOCYTES NFR FLD MANUAL: 78 %
MAGNESIUM SERPL-MCNC: 1.6 MG/DL (ref 1.6–2.6)
MCH RBC QN AUTO: 31.1 PG (ref 27–31)
MCHC RBC AUTO-ENTMCNC: 32.6 G/DL (ref 32–36)
MCV RBC AUTO: 96 FL (ref 82–98)
MESOTHL CELL NFR FLD MANUAL: 1 %
MONOCYTES # BLD AUTO: 0.3 K/UL (ref 0.3–1)
MONOCYTES NFR BLD: 7 % (ref 4–15)
MONOS+MACROS NFR FLD MANUAL: 17 %
NEUTROPHILS # BLD AUTO: 2.6 K/UL (ref 1.8–7.7)
NEUTROPHILS NFR BLD: 72.8 % (ref 38–73)
NEUTROPHILS NFR FLD MANUAL: 4 %
NRBC BLD-RTO: 0 /100 WBC
PLATELET # BLD AUTO: 114 K/UL (ref 150–450)
PMV BLD AUTO: 11.4 FL (ref 9.2–12.9)
POTASSIUM SERPL-SCNC: 3.2 MMOL/L (ref 3.5–5.1)
RBC # BLD AUTO: 2.89 M/UL (ref 4–5.4)
SODIUM SERPL-SCNC: 137 MMOL/L (ref 136–145)
WBC # BLD AUTO: 3.58 K/UL (ref 3.9–12.7)
WBC # FLD: 48 /CU MM

## 2024-08-23 PROCEDURE — 25000003 PHARM REV CODE 250: Performed by: STUDENT IN AN ORGANIZED HEALTH CARE EDUCATION/TRAINING PROGRAM

## 2024-08-23 PROCEDURE — G0378 HOSPITAL OBSERVATION PER HR: HCPCS

## 2024-08-23 PROCEDURE — 80048 BASIC METABOLIC PNL TOTAL CA: CPT | Performed by: STUDENT IN AN ORGANIZED HEALTH CARE EDUCATION/TRAINING PROGRAM

## 2024-08-23 PROCEDURE — 83735 ASSAY OF MAGNESIUM: CPT | Performed by: STUDENT IN AN ORGANIZED HEALTH CARE EDUCATION/TRAINING PROGRAM

## 2024-08-23 PROCEDURE — 87070 CULTURE OTHR SPECIMN AEROBIC: CPT | Performed by: HOSPITALIST

## 2024-08-23 PROCEDURE — 87205 SMEAR GRAM STAIN: CPT | Performed by: HOSPITALIST

## 2024-08-23 PROCEDURE — 89051 BODY FLUID CELL COUNT: CPT | Performed by: HOSPITALIST

## 2024-08-23 PROCEDURE — 85025 COMPLETE CBC W/AUTO DIFF WBC: CPT | Performed by: STUDENT IN AN ORGANIZED HEALTH CARE EDUCATION/TRAINING PROGRAM

## 2024-08-23 RX ADMIN — LISINOPRIL 20 MG: 20 TABLET ORAL at 09:08

## 2024-08-23 RX ADMIN — AMLODIPINE BESYLATE 5 MG: 5 TABLET ORAL at 09:08

## 2024-08-23 RX ADMIN — CYPROHEPTADINE HYDROCHLORIDE 4 MG: 4 TABLET ORAL at 03:08

## 2024-08-23 RX ADMIN — CARVEDILOL 3.12 MG: 3.12 TABLET, FILM COATED ORAL at 09:08

## 2024-08-23 RX ADMIN — CYPROHEPTADINE HYDROCHLORIDE 4 MG: 4 TABLET ORAL at 09:08

## 2024-08-23 RX ADMIN — CARVEDILOL 3.12 MG: 3.12 TABLET, FILM COATED ORAL at 06:08

## 2024-08-23 RX ADMIN — SPIRONOLACTONE 100 MG: 100 TABLET ORAL at 09:08

## 2024-08-23 NOTE — ED NOTES
Received report from Mariza HAWLEY. Assumed care of patient. Patient is alert and resting comfortably in bed in NAD. BP, cardiac, and O2 monitoring continued. Family member at bedside. Patient updated on plan of care, pt denies any needs or complaints at this time. Bed locked in lowest position, side rails up x2, call light within reach. VSS.

## 2024-08-23 NOTE — ASSESSMENT & PLAN NOTE
Renal function remains around baseline; Estimated Creatinine Clearance: 29 mL/min (based on SCr of 1.4 mg/dL). according to latest data. Based on current GFR, CKD stage is stage 3 - GFR 30-59. Will avoid nephrotoxic agents as able, and renally dose all meds as applicable.

## 2024-08-23 NOTE — H&P
Inpatient Radiology Pre-procedure Note    History of Present Illness:  Seema Gann is a 78 y.o. female who presents for US guided paracentesis.    Admission H&P reviewed.  Past Medical History:   Diagnosis Date    Cirrhosis     HCC (hepatocellular carcinoma)     Hepatitis     Hypertension      Past Surgical History:   Procedure Laterality Date    APPENDECTOMY      HYSTERECTOMY         Review of Systems:   As documented in primary team H&P    Home Meds:   Prior to Admission medications    Medication Sig Start Date End Date Taking? Authorizing Provider   amLODIPine (NORVASC) 5 MG tablet Take 1 tablet (5 mg total) by mouth once daily. 11/30/23 11/29/24  Guilherme Gregorio, NANDINI   carvediloL (COREG) 3.125 MG tablet Take 1 tablet (3.125 mg total) by mouth 2 (two) times daily with meals. 1/8/24   Guilherme Gregorio, NP   cyproheptadine (PERIACTIN) 4 mg tablet Take 1 tablet (4 mg total) by mouth 3 (three) times daily. 11/21/23   Bethany Mesa MD   furosemide (LASIX) 20 MG tablet Take 2 tablets (40 mg total) by mouth once daily. 5/27/24 5/27/25  TherapJasiel levine MD   lisinopriL (PRINIVIL,ZESTRIL) 20 MG tablet Take 1 tablet (20 mg total) by mouth once daily. 1/8/24   Guilherme Gregorio NP   spironolactone (ALDACTONE) 100 MG tablet Take 1 tablet (100 mg total) by mouth once daily. 1/8/24   Guilherme Gregorio NP   aspirin (ECOTRIN) 81 MG EC tablet Take 1 tablet (81 mg total) by mouth once daily. 11/27/23 8/23/24  Roshan Jesus MD     Scheduled Meds:    amLODIPine  5 mg Oral Daily    [START ON 8/24/2024] aspirin  81 mg Oral Daily    carvediloL  3.125 mg Oral BID WM    cyproheptadine  4 mg Oral TID    heparin (porcine)  5,000 Units Subcutaneous Q8H    lisinopriL  20 mg Oral Daily    spironolactone  100 mg Oral Daily     Continuous Infusions:   PRN Meds:  Current Facility-Administered Medications:     acetaminophen, 650 mg, Oral, Q8H PRN    acetaminophen, 650 mg, Oral, Q4H PRN    aluminum-magnesium  hydroxide-simethicone, 30 mL, Oral, QID PRN    glucagon (human recombinant), 1 mg, Intramuscular, PRN    glucose, 16 g, Oral, PRN    glucose, 24 g, Oral, PRN    melatonin, 6 mg, Oral, Nightly PRN    naloxone, 0.02 mg, Intravenous, PRN    ondansetron, 8 mg, Oral, Q8H PRN    polyethylene glycol, 17 g, Oral, Daily PRN    simethicone, 1 tablet, Oral, QID PRN    sodium chloride 0.9%, 1-10 mL, Intravenous, Q12H PRN  Anticoagulants/Antiplatelets: no anticoagulation    Allergies: Review of patient's allergies indicates:  No Known Allergies  Sedation Hx: have not been any systemic reactions    Vitals:  Temp: 98.4 °F (36.9 °C) (08/23/24 1104)  Pulse: 72 (08/23/24 1150)  Resp: 18 (08/23/24 1150)  BP: 115/72 (08/23/24 1150)  SpO2: 97 % (08/23/24 1150)     Physical Exam:  ASA: 2  Mallampati: n/a    General: no acute distress  Mental Status: alert and oriented to person, place and time  HEENT: normocephalic, atraumatic  Chest: unlabored breathing  Heart: regular heart rate  Abdomen: distended  Extremity: moves all extremities    Plan: US guided paracentesis  Sedation Plan: local    Shirin Segovia PA-C  Interventional Radiology  [phone]

## 2024-08-23 NOTE — PLAN OF CARE
[facility] - Telemetry Stepdown  Discharge Final Note    Primary Care Provider: Bethany Mesa MD    Expected Discharge Date: 8/23/2024      Patient discharged to home with spouse and daughter. Follow up appointments scheduled and placed in AVS. Discharge Plan A and Plan B have been determined by review of patient's clinical status, future medical and therapeutic needs, and coverage/benefits for post-acute care in coordination with multidisciplinary team members.  Final Discharge Note (most recent)       Final Note - 08/23/24 1645          Final Note    Assessment Type Final Discharge Note (P)      Anticipated Discharge Disposition Home or Self Care (P)      Hospital Resources/Appts/Education Provided Appointments scheduled and added to AVS (P)         Post-Acute Status    Discharge Delays None known at this time (P)                      Important Message from Medicare             Contact Info       Guilherme Gregorio, NP   Specialty: Cardiology    [address] [address]   Phone: [phone]       Next Steps: Follow up on 8/26/2024    Instructions: 3 PM          .  MAGALI Aragon  Case Management  [phone]

## 2024-08-23 NOTE — ED PROVIDER NOTES
"Encounter Date: 8/22/2024       History     Chief Complaint   Patient presents with    Shortness of Breath     Pt. Was at [facility] and left AMA. Pt. Was supposed to be transferred to our facility, but stated transport was taking too long so her family brought her POV. Pt. Needing IR services for ascites. Also dx. With pleural effusion at previous facility.      Ms. Gann is a pleasant 78 y.o female with a PMHx of Liver cirrhosis 2/2 HCC, hepatitis C, CKD 3a, PVD, HTN. She presents to [facility] ED  today with SOB, abdominal distension and leg swelling. Pt presented to [facility] prepared for transfer to [facility] for IR procedure, however, pt left AMA as "transportation taking too long" and "hungry". Pt has had worsening abdominal and leg swelling over past few days, and swelling initially started since stopping lasix on 7/2/2024. Per Hepatology note, pt was scheduled to receive paracentesis every 2 weeks, however, pt has not undergone any procedures to date. According to the pt she is scheduled for paracentesis on 08/30.    Her vitals were stable with RR of 20, Labs show pancytopenia and hypokalemia. Chest xray shows large left-sided pleural effusion.      The history is provided by the patient and medical records. No  was used.     Review of patient's allergies indicates:  No Known Allergies  Past Medical History:   Diagnosis Date    Cirrhosis     HCC (hepatocellular carcinoma)     Hepatitis     Hypertension      Past Surgical History:   Procedure Laterality Date    APPENDECTOMY      HYSTERECTOMY       No family history on file.  Social History     Tobacco Use    Smoking status: Never     Passive exposure: Never    Smokeless tobacco: Never   Substance Use Topics    Alcohol use: No    Drug use: No     Review of Systems   Constitutional:  Positive for appetite change and unexpected weight change. Negative for chills and fever.   HENT:  Negative for congestion, rhinorrhea and sore throat. "    Eyes:  Negative for pain and discharge.   Respiratory:  Positive for shortness of breath. Negative for cough and wheezing.    Cardiovascular:  Positive for leg swelling (Bilateral pitting edema). Negative for chest pain and palpitations.   Gastrointestinal:  Positive for abdominal distention. Negative for abdominal pain, diarrhea, nausea and vomiting.   Genitourinary:  Positive for decreased urine volume. Negative for difficulty urinating and flank pain.   Musculoskeletal: Negative.    Skin:  Negative for rash and wound.   Neurological: Negative.    Psychiatric/Behavioral: Negative.         Physical Exam     Initial Vitals [08/22/24 1802]   BP Pulse Resp Temp SpO2   132/85 96 18 98.7 °F (37.1 °C) 95 %      MAP       --         Physical Exam    Constitutional: She is not diaphoretic. No distress.   HENT:   Head: Normocephalic and atraumatic.   Right Ear: External ear normal.   Left Ear: External ear normal.   Nose: Nose normal.   Mouth/Throat: Oropharynx is clear and moist.   Eyes: Conjunctivae are normal. Pupils are equal, round, and reactive to light. Right eye exhibits no discharge. Left eye exhibits discharge.   Neck:   Normal range of motion.  Cardiovascular:  Normal rate, regular rhythm and intact distal pulses.           Murmur heard.  Pulmonary/Chest: Breath sounds normal. No respiratory distress. She exhibits no tenderness.   Abdominal: She exhibits distension.   Musculoskeletal:      Cervical back: Normal range of motion.           ED Course   Procedures  Labs Reviewed   TROPONIN I - Abnormal       Result Value    Troponin I 0.069 (*)    COMPREHENSIVE METABOLIC PANEL - Abnormal    Sodium 138      Potassium 3.4 (*)     Chloride 107      CO2 24      Glucose 144 (*)     BUN 10      Creatinine 1.4      Calcium 8.4 (*)     Total Protein 7.9      Albumin 2.5 (*)     Total Bilirubin 1.6 (*)     Alkaline Phosphatase 46 (*)     AST 22      ALT 6 (*)     eGFR 38.7 (*)     Anion Gap 7 (*)    CBC W/ AUTO  DIFFERENTIAL - Abnormal    WBC 3.62 (*)     RBC 3.11 (*)     Hemoglobin 9.9 (*)     Hematocrit 29.8 (*)     MCV 96      MCH 31.8 (*)     MCHC 33.2      RDW 13.7      Platelets 126 (*)     MPV 11.7      Immature Granulocytes 0.6 (*)     Gran # (ANC) 2.7      Immature Grans (Abs) 0.02      Lymph # 0.6 (*)     Mono # 0.2 (*)     Eos # 0.1      Baso # 0.02      nRBC 0      Gran % 75.6 (*)     Lymph % 15.5 (*)     Mono % 5.8      Eosinophil % 1.9      Basophil % 0.6      Differential Method Automated     PROTIME-INR - Abnormal    Prothrombin Time 13.3 (*)     INR 1.2     APTT    aPTT 24.7     MAGNESIUM    Magnesium 1.8            Imaging Results    None          Medications   sodium chloride 0.9% bolus 100 mL 100 mL (100 mLs Intravenous New Bag 8/22/24 2139)   furosemide injection 40 mg (has no administration in time range)   potassium chloride 10 mEq in 100 mL IVPB (10 mEq Intravenous New Bag 8/22/24 2136)   furosemide injection 40 mg (40 mg Intravenous Given 8/22/24 2126)     Medical Decision Making  Ms Gann c/o worsening abdominal and leg swelling over past few days. Her vitals were stable with RR of 20, Labs show pancytopenia and hypokalemia. Chest xray shows large left-sided pleural effusion.    Ass: Decompensated Liver Cirrhosis    Plan:  - Lasix  - Paracentesis  -Likely admission      Risk  Prescription drug management.  Decision regarding hospitalization.                                      Clinical Impression:  Final diagnoses:  [E87.6] Hypokalemia  [J90] Pleural effusion on left (Primary)  [R18.8] Other ascites          ED Disposition Condition    Observation Stable                Brianne Dover MD  Resident  08/22/24 6964

## 2024-08-23 NOTE — ED TRIAGE NOTES
Pt presents to the ED for SOB after a visit to the ED in [place]. Pt states she was diagnosed with a pleural effusion in [place] and was to be transferred here, however, she left AMA and came here on her own. Pt denies chest pain, nausea, vomiting, diarrhea, and headache.

## 2024-08-23 NOTE — ASSESSMENT & PLAN NOTE
Chronic, controlled. Latest blood pressure and vitals reviewed-     Temp:  [98.2 °F (36.8 °C)-98.7 °F (37.1 °C)]   Pulse:  [78-96]   Resp:  [18-20]   BP: (121-142)/(69-85)   SpO2:  [95 %-99 %] .   Home meds for hypertension were reviewed and noted below.   Hypertension Medications               amLODIPine (NORVASC) 5 MG tablet Take 1 tablet (5 mg total) by mouth once daily.    carvediloL (COREG) 3.125 MG tablet Take 1 tablet (3.125 mg total) by mouth 2 (two) times daily with meals.    furosemide (LASIX) 20 MG tablet Take 2 tablets (40 mg total) by mouth once daily.    lisinopriL (PRINIVIL,ZESTRIL) 20 MG tablet Take 1 tablet (20 mg total) by mouth once daily.    spironolactone (ALDACTONE) 100 MG tablet Take 1 tablet (100 mg total) by mouth once daily.            While in the hospital, will manage blood pressure as follows; Adjust home antihypertensive regimen as follows- switch to IV lasix from PO.    Will utilize p.r.n. blood pressure medication only if patient's blood pressure greater than 220/110 and she develops symptoms such as worsening chest pain or shortness of breath.

## 2024-08-23 NOTE — H&P
Michael maia - Emergency Dept  [facility] Medicine  History & Physical    Patient Name: Seema Gann  MRN: [id number]  Patient Class: OP- Observation  Admission Date: 8/22/2024  Attending Physician: Del Nuñez MD   Primary Care Provider: Bethany Mesa MD         Patient information was obtained from patient and past medical records.     Subjective:     Principal Problem:Decompensated cirrhosis related to hepatitis C virus (HCV)    Chief Complaint:   Chief Complaint   Patient presents with    Shortness of Breath     Pt. Was at [facility] and left AMA. Pt. Was supposed to be transferred to our facility, but stated transport was taking too long so her family brought her POV. Pt. Needing IR services for ascites. Also dx. With pleural effusion at previous facility.         HPI: Seema Gann is a 78 y.o. female with HCV cirrhosis with recurrent ascites, pancytopenia, portal HTN with vein occlusion, h/o HCC s/p Y90, CKD 3b-4, HTN who presents today with SOB and abdominal distension.     She reports weeks to months of increasing abdominal distension, not improved with increase in home diuretic dosing. Particularly, this has worsened over the past few days, as well as her chronic SOB. Denies fever, n/v, dysuria or decreased urination, or diarrhea. She denies confusion as well. Her abdominal distension has led to increased pain. She was supposed to start getting every two week paracentesis, however these have not been arranged yet. Due to her increasing symptoms, she presented here today.     Past Medical History:   Diagnosis Date    Cirrhosis     HCC (hepatocellular carcinoma)     Hepatitis     Hypertension        Past Surgical History:   Procedure Laterality Date    APPENDECTOMY      HYSTERECTOMY         Review of patient's allergies indicates:  No Known Allergies    Current Facility-Administered Medications on File Prior to Encounter   Medication    [COMPLETED] potassium chloride SA CR  tablet 40 mEq     Current Outpatient Medications on File Prior to Encounter   Medication Sig    amLODIPine (NORVASC) 5 MG tablet Take 1 tablet (5 mg total) by mouth once daily.    aspirin (ECOTRIN) 81 MG EC tablet Take 1 tablet (81 mg total) by mouth once daily.    carvediloL (COREG) 3.125 MG tablet Take 1 tablet (3.125 mg total) by mouth 2 (two) times daily with meals.    cyproheptadine (PERIACTIN) 4 mg tablet Take 1 tablet (4 mg total) by mouth 3 (three) times daily.    furosemide (LASIX) 20 MG tablet Take 2 tablets (40 mg total) by mouth once daily.    lisinopriL (PRINIVIL,ZESTRIL) 20 MG tablet Take 1 tablet (20 mg total) by mouth once daily.    spironolactone (ALDACTONE) 100 MG tablet Take 1 tablet (100 mg total) by mouth once daily.     Family History    None       Tobacco Use    Smoking status: Never     Passive exposure: Never    Smokeless tobacco: Never   Substance and Sexual Activity    Alcohol use: No    Drug use: No    Sexual activity: Yes     Review of Systems   All other systems reviewed and are negative.    Objective:     Vital Signs (Most Recent):  Temp: 98.7 °F (37.1 °C) (08/22/24 1802)  Pulse: 77 (08/22/24 2202)  Resp: 20 (08/22/24 2030)  BP: 139/70 (08/22/24 2202)  SpO2: (!) 94 % (08/22/24 2202) Vital Signs (24h Range):  Temp:  [98.2 °F (36.8 °C)-98.7 °F (37.1 °C)] 98.7 °F (37.1 °C)  Pulse:  [77-96] 77  Resp:  [18-20] 20  SpO2:  [94 %-99 %] 94 %  BP: (121-142)/(69-85) 139/70     Weight: 54.6 kg (120 lb 5.9 oz)  Body mass index is 20.66 kg/m².     Physical Exam  Vitals and nursing note reviewed.   Constitutional:       General: She is not in acute distress.     Appearance: She is well-developed. She is not ill-appearing or diaphoretic.   HENT:      Head: Normocephalic and atraumatic.   Eyes:      General: No scleral icterus.     Conjunctiva/sclera: Conjunctivae normal.   Neck:      Vascular: No JVD.   Cardiovascular:      Rate and Rhythm: Normal rate and regular rhythm.   Pulmonary:      Effort:  Pulmonary effort is normal. No respiratory distress.      Breath sounds: No wheezing.      Comments: Decreased breath sounds in L lung, but without respiratory distress  Abdominal:      General: There is distension (significant distension without TTP).      Comments: Umbilical hernia present     Musculoskeletal:      Right lower leg: Edema present.      Left lower leg: Edema present.      Comments: Pitting edema to BL LEs   Skin:     Coloration: Skin is not jaundiced or pale.   Neurological:      Mental Status: She is alert and oriented to person, place, and time.      Motor: No abnormal muscle tone.   Psychiatric:         Mood and Affect: Mood normal.         Behavior: Behavior normal.                Significant Labs: All pertinent labs within the past 24 hours have been reviewed.  CBC:   Recent Labs   Lab 08/22/24  1353 08/22/24  1911   WBC 2.85* 3.62*   HGB 9.3* 9.9*   HCT 28.0* 29.8*   * 126*     CMP:   Recent Labs   Lab 08/22/24  1353 08/22/24 1911    138   K 2.7* 3.4*    107   CO2 22* 24    144*   BUN 9 10   CREATININE 1.4 1.4   CALCIUM 8.4* 8.4*   PROT 7.6 7.9   ALBUMIN 2.4* 2.5*   BILITOT 1.6* 1.6*   ALKPHOS 41* 46*   AST 17 22   ALT 6* 6*   ANIONGAP 10 7*       Significant Imaging: I have reviewed all pertinent imaging results/findings within the past 24 hours.  Assessment/Plan:     * Decompensated cirrhosis related to hepatitis C virus (HCV)  Patient with known advanced cirrhosis due to HCV. Co-morbidities are present and inclusive of ascites, portal hypertension, portal venous thrombosis, malnutrition, and anemia/pancytopenia.  MELD-Na score calculated; MELD 3.0: 16 at 8/22/2024  7:11 PM  MELD-Na: 13 at 8/22/2024  7:11 PM  Calculated from:  Serum Creatinine: 1.4 mg/dL at 8/22/2024  7:11 PM  Serum Sodium: 138 mmol/L (Using max of 137 mmol/L) at 8/22/2024  7:11 PM  Total Bilirubin: 1.6 mg/dL at 8/22/2024  7:11 PM  Serum Albumin: 2.5 g/dL at 8/22/2024  7:11 PM  INR(ratio): 1.2 at  "8/22/2024  7:11 PM  Age at listing (hypothetical): 78 years  Sex: Female at 8/22/2024  7:11 PM    -recent recurrent ascites and anasarca, with plans to perform Q2 week therapeutic paracentesis, however this has not yet been set up. Will consult IR for therapeutic paracentesis. For now, continue IV lasix given anasarca.   -may consider Hepatology consultation.   -Continue chronic meds. Etiology likely Hepatitis. Will avoid any hepatotoxic meds, and monitor CBC/CMP/INR for synthetic function.     Pleural effusion  Patient found to have large pleural effusion on imaging. I have personally reviewed and interpreted the following imaging: Xray. A thoracentesis was deferred. Most likely etiology includes Cirrhosis. Management to include Diuresis.  Chronic pleural effusions (small BL noted on prior CT 3/2024), and TTE this year with normal LVEF and no diastolic dysfunction. Large L and small R pleural effusion noted on XR today.   Highly suspect due to hypoalbuminemia and cirrhosis. Will continue diuresis with IV lasix while monitoring. If no improvement, may consider IR diagnostic and therapeutic thoracentesis as well.      DNR (do not resuscitate)  Advance Care Planning  I spent less than 16 minutes discussing code status with patient on admission. she states that she would never want CPR or intubation/mechanical ventilation in the event of cardiopulmonary arrest, in agreement with DNR status. States "just let me go" and that she "has two undertakers on standby," and she has discussed this with her children. Her daughter is her surrogate decision maker.          Hypokalemia  Likely due to diuretics.   Patient's most recent potassium results are listed below.   Recent Labs     08/22/24  1353 08/22/24  1911   K 2.7* 3.4*     Plan  - Replete potassium per protocol. Mg 1.8.  - Monitor potassium Daily    Moderate protein-calorie malnutrition  Due to cirrhosis. May consider RD consultation.          Stage 3a chronic kidney " disease  Renal function remains around baseline; Estimated Creatinine Clearance: 29 mL/min (based on SCr of 1.4 mg/dL). according to latest data. Based on current GFR, CKD stage is stage 3 - GFR 30-59. Will avoid nephrotoxic agents as able, and renally dose all meds as applicable.      Pancytopenia  This patient is found to have pancytopenia, the likely etiology is  advanced cirrhosis . Stable without indication for transfusion. Will transfuse red blood cells if the hemoglobin is <7g/dL (or <8 in the setting of ACS). Will transfuse platelets if platelet count is <10k. Hold DVT prophylaxis if platelets are <50k. The patient's hemoglobin, white blood cell count, and platelet count results have been reviewed and are listed below.  Recent Labs   Lab 08/22/24 1911   HGB 9.9*   WBC 3.62*   *             HCC (hepatocellular carcinoma)  S/p Y90 x 2, leading to cirrhosis. No residual disease per hepatology.       Primary hypertension  Chronic, controlled. Latest blood pressure and vitals reviewed-     Temp:  [98.2 °F (36.8 °C)-98.7 °F (37.1 °C)]   Pulse:  [78-96]   Resp:  [18-20]   BP: (121-142)/(69-85)   SpO2:  [95 %-99 %] .   Home meds for hypertension were reviewed and noted below.   Hypertension Medications               amLODIPine (NORVASC) 5 MG tablet Take 1 tablet (5 mg total) by mouth once daily.    carvediloL (COREG) 3.125 MG tablet Take 1 tablet (3.125 mg total) by mouth 2 (two) times daily with meals.    furosemide (LASIX) 20 MG tablet Take 2 tablets (40 mg total) by mouth once daily.    lisinopriL (PRINIVIL,ZESTRIL) 20 MG tablet Take 1 tablet (20 mg total) by mouth once daily.    spironolactone (ALDACTONE) 100 MG tablet Take 1 tablet (100 mg total) by mouth once daily.            While in the hospital, will manage blood pressure as follows; Adjust home antihypertensive regimen as follows- switch to IV lasix from PO.    Will utilize p.r.n. blood pressure medication only if patient's blood pressure greater  than 220/110 and she develops symptoms such as worsening chest pain or shortness of breath.      VTE Risk Mitigation (From admission, onward)           Ordered     heparin (porcine) injection 5,000 Units  Every 8 hours         08/22/24 2221     IP VTE HIGH RISK PATIENT  Once         08/22/24 2221     Place sequential compression device  Until discontinued         08/22/24 2221                       On 08/23/2024, patient should be placed in hospital observation services under my care.             Francesco Weller MD  Department of Hospital Medicine  [facility] - Emergency Dept

## 2024-08-23 NOTE — ASSESSMENT & PLAN NOTE
Patient with known advanced cirrhosis due to HCV. Co-morbidities are present and inclusive of ascites, portal hypertension, portal venous thrombosis, malnutrition, and anemia/pancytopenia.  MELD-Na score calculated; MELD 3.0: 16 at 8/22/2024  7:11 PM  MELD-Na: 13 at 8/22/2024  7:11 PM  Calculated from:  Serum Creatinine: 1.4 mg/dL at 8/22/2024  7:11 PM  Serum Sodium: 138 mmol/L (Using max of 137 mmol/L) at 8/22/2024  7:11 PM  Total Bilirubin: 1.6 mg/dL at 8/22/2024  7:11 PM  Serum Albumin: 2.5 g/dL at 8/22/2024  7:11 PM  INR(ratio): 1.2 at 8/22/2024  7:11 PM  Age at listing (hypothetical): 78 years  Sex: Female at 8/22/2024  7:11 PM    -recent recurrent ascites and anasarca, with plans to perform Q2 week therapeutic paracentesis, however this has not yet been set up. Will consult IR for therapeutic paracentesis. For now, continue IV lasix given anasarca.   -may consider Hepatology consultation.   -Continue chronic meds. Etiology likely Hepatitis. Will avoid any hepatotoxic meds, and monitor CBC/CMP/INR for synthetic function.

## 2024-08-23 NOTE — HPI
Seema Gann is a 78 year old Black woman with hypertension, chronic hepatitis C treated with Harvoni in 2015, cirrhosis with recurrent ascites, portal hypertension, portal vein occlusion, hepatocellular carcinoma treated with Y90, pancytopenia, pleural effusion, chronic kidney disease stage 3, history of appendectomy, history of hysterectomy. She lives in [place], [place]. She is . She worked for the [employer] and is retired. Her primary care physician is Dr. Bethany Mesa. Her hepatologist is Dr. Jasiel Hernandez.    She had been having weeks to months of increasing abdominal distension without improvement from increasing home diuretics, causing shortness of breath. Dr. Hernandez was trying to get her therapeutic paracentesis scheduled with Interventional Radiology every two weeks, but due to being unable to manage her symptoms, she went to [place] [facility] on 8/22/2024. However, [place] does not have Interventional Radiology, so transfer was recommended to a hospital that did, such as [facility] or [facility]. She decided to leave and go to [facility] Emergency [facility] herself. She was admitted to Hospital Medicine Team X.

## 2024-08-23 NOTE — PROCEDURES
Radiology Post-Procedure Note    Pre Op Diagnosis: Ascites  Post Op Diagnosis: Same    Procedure: Ultrasound Guided Paracentesis    Procedure performed by: Shirin Segovia PA-C    Written Informed Consent Obtained: Yes  Specimen Removed: YES (2.6L yellow, clear)   Estimated Blood Loss: Minimal    Findings:   Successful paracentesis from RLQ.  Albumin administered PRN per protocol.    Patient tolerated procedure well.    Shirin Segovia PA-C  Interventional Radiology  [phone]

## 2024-08-23 NOTE — ASSESSMENT & PLAN NOTE
This patient is found to have pancytopenia, the likely etiology is  advanced cirrhosis . Stable without indication for transfusion. Will transfuse red blood cells if the hemoglobin is <7g/dL (or <8 in the setting of ACS). Will transfuse platelets if platelet count is <10k. Hold DVT prophylaxis if platelets are <50k. The patient's hemoglobin, white blood cell count, and platelet count results have been reviewed and are listed below.  Recent Labs   Lab 08/22/24  1911   HGB 9.9*   WBC 3.62*   *

## 2024-08-23 NOTE — PLAN OF CARE
[facility] - Telemetry Stepdown  Initial Discharge Assessment       Primary Care Provider: Bethany Mesa MD    Admission Diagnosis: Hypokalemia [E87.6]  Other ascites [R18.8]  Pleural effusion on left [J90]  Chest pain [R07.9]    Admission Date: 8/22/2024  Expected Discharge Date: 8/23/2024    Transition of Care Barriers: (P) None    Payor: HU[employer] MANAGED MEDICARE / Plan: HU[employer] SNP HMO PPO SPECIAL NEEDS / Product Type: Medicare Advantage /     Extended Emergency Contact Information  Primary Emergency Contact: Bri Hermosillo  Address: [address]           [place] [address] [facility]  Home Phone: [phone]  Mobile Phone: [phone]  Relation: Daughter  Secondary Emergency Contact: Jerome Teixeira  Address: [address] [place] [address]           [address]           [place] [address] [facility]  Home Phone: [phone]  Mobile Phone: [phone]  Relation: Spouse    Discharge Plan A: (P) Home with family  Discharge Plan B: (P) Home      [facility] [place] - [address]  [address]  [facility] [place] [address]  Phone: [phone] Fax: [phone]    [address] - [address] [facility] [address]  [address]  [place] [address]  Phone: [phone] Fax: [phone]    CM  met with patient to discuss discharge planning. Patient's spouse Jerome Teixeira (spouse) [phone]  and daughter Bri was present during the visit. Patient states that she lives with her spouse in a single story home with no steps to enter. Prior to hospital admission, patient was independent with ADLs and states that she does not use any medical equipment. Discharge plan is home with family. Patient's will provide transportation home. Discharge Plan A and Plan B have been determined by review of patient's clinical status, future medical and therapeutic needs, and coverage/benefits for post-acute care in coordination with multidisciplinary team members.  Initial Assessment (most recent)        Adult Discharge Assessment - 08/23/24 1119          Discharge Assessment    Assessment Type Discharge Planning Assessment     Confirmed/corrected address, phone number and insurance Yes     Confirmed Demographics Correct on Facesheet     Source of Information patient     Does patient/caregiver understand observation status Yes     Communicated LETA with patient/caregiver Yes     Reason For Admission Decompensated cirrhosis re;ated to hepatitis C virus     People in Home spouse     Facility Arrived From: home     Do you expect to return to your current living situation? Yes     Do you have help at home or someone to help you manage your care at home? Yes     Who are your caregiver(s) and their phone number(s)? Jerome Teixeira (spouse) [phone]     Prior to hospitilization cognitive status: Alert/Oriented     Current cognitive status: Alert/Oriented     Walking or Climbing Stairs Difficulty no (P)      Dressing/Bathing Difficulty no (P)      Home Layout Able to live on 1st floor (P)      Equipment Currently Used at Home none (P)      Readmission within 30 days? No (P)      Patient currently being followed by outpatient case management? No (P)      Do you currently have service(s) that help you manage your care at home? No (P)      Do you take prescription medications? Yes (P)      Do you have any problems affording any of your prescribed medications? Yes (P)      If yes, what medications? [employer] (P)      Is the patient taking medications as prescribed? yes (P)      Who is going to help you get home at discharge? Jerome Teixeira (spouse) [phone] (P)      How do you get to doctors appointments? family or friend will provide (P)      Are you on dialysis? No (P)      Do you take coumadin? No (P)      Discharge Plan A Home with family (P)      Discharge Plan B Home (P)      DME Needed Upon Discharge  none (P)      Discharge Plan discussed with: Patient (P)      Transition of Care Barriers None (P)          Physical Activity    On average, how many days per week do you engage in moderate to strenuous exercise (like a brisk walk)? 0 days (P)      On average, how many minutes do you engage in exercise at this level? 0 min (P)         Financial Resource Strain    How hard is it for you to pay for the very basics like food, housing, medical care, and heating? Not very hard (P)         Housing Stability    In the last 12 months, was there a time when you were not able to pay the mortgage or rent on time? No (P)      At any time in the past 12 months, were you homeless or living in a shelter (including now)? No (P)         Transportation Needs    Has the lack of transportation kept you from medical appointments, meetings, work or from getting things needed for daily living? No (P)         Food Insecurity    Within the past 12 months, you worried that your food would run out before you got the money to buy more. Never true (P)      Within the past 12 months, the food you bought just didn't last and you didn't have money to get more. Never true (P)         Stress    Do you feel stress - tense, restless, nervous, or anxious, or unable to sleep at night because your mind is troubled all the time - these days? Not at all (P)         Social Isolation    How often do you feel lonely or isolated from those around you?  Rarely (P)         Alcohol Use    Q1: How often do you have a drink containing alcohol? Patient declined (P)      Q2: How many drinks containing alcohol do you have on a typical day when you are drinking? Patient declined (P)      Q3: How often do you have six or more drinks on one occasion? Patient declined (P)         [place]    In the past 12 months has the electric, gas, oil, or water company threatened to shut off services in your home? No (P)         Health Literacy    How often do you need to have someone help you when you read instructions, pamphlets, or other written material from your doctor or  pharmacy? Sometimes (P)         OTHER    Name(s) of People in Home Jerome Teixeira (spouse) [phone] (P)                      Jovi Nieves RNCM  Case Management  [phone]

## 2024-08-23 NOTE — ASSESSMENT & PLAN NOTE
"Advance Care Planning   I spent less than 16 minutes discussing code status with patient on admission. she states that she would never want CPR or intubation/mechanical ventilation in the event of cardiopulmonary arrest, in agreement with DNR status. States "just let me go" and that she "has two undertakers on standby," and she has discussed this with her children. Her daughter is her surrogate decision maker.        "

## 2024-08-23 NOTE — SUBJECTIVE & OBJECTIVE
Past Medical History:   Diagnosis Date    Cirrhosis     HCC (hepatocellular carcinoma)     Hepatitis     Hypertension        Past Surgical History:   Procedure Laterality Date    APPENDECTOMY      HYSTERECTOMY         Review of patient's allergies indicates:  No Known Allergies    Current Facility-Administered Medications on File Prior to Encounter   Medication    [COMPLETED] potassium chloride SA CR tablet 40 mEq     Current Outpatient Medications on File Prior to Encounter   Medication Sig    amLODIPine (NORVASC) 5 MG tablet Take 1 tablet (5 mg total) by mouth once daily.    aspirin (ECOTRIN) 81 MG EC tablet Take 1 tablet (81 mg total) by mouth once daily.    carvediloL (COREG) 3.125 MG tablet Take 1 tablet (3.125 mg total) by mouth 2 (two) times daily with meals.    cyproheptadine (PERIACTIN) 4 mg tablet Take 1 tablet (4 mg total) by mouth 3 (three) times daily.    furosemide (LASIX) 20 MG tablet Take 2 tablets (40 mg total) by mouth once daily.    lisinopriL (PRINIVIL,ZESTRIL) 20 MG tablet Take 1 tablet (20 mg total) by mouth once daily.    spironolactone (ALDACTONE) 100 MG tablet Take 1 tablet (100 mg total) by mouth once daily.     Family History    None       Tobacco Use    Smoking status: Never     Passive exposure: Never    Smokeless tobacco: Never   Substance and Sexual Activity    Alcohol use: No    Drug use: No    Sexual activity: Yes     Review of Systems   All other systems reviewed and are negative.    Objective:     Vital Signs (Most Recent):  Temp: 98.7 °F (37.1 °C) (08/22/24 1802)  Pulse: 77 (08/22/24 2202)  Resp: 20 (08/22/24 2030)  BP: 139/70 (08/22/24 2202)  SpO2: (!) 94 % (08/22/24 2202) Vital Signs (24h Range):  Temp:  [98.2 °F (36.8 °C)-98.7 °F (37.1 °C)] 98.7 °F (37.1 °C)  Pulse:  [77-96] 77  Resp:  [18-20] 20  SpO2:  [94 %-99 %] 94 %  BP: (121-142)/(69-85) 139/70     Weight: 54.6 kg (120 lb 5.9 oz)  Body mass index is 20.66 kg/m².     Physical Exam  Vitals and nursing note reviewed.    Constitutional:       General: She is not in acute distress.     Appearance: She is well-developed. She is not ill-appearing or diaphoretic.   HENT:      Head: Normocephalic and atraumatic.   Eyes:      General: No scleral icterus.     Conjunctiva/sclera: Conjunctivae normal.   Neck:      Vascular: No JVD.   Cardiovascular:      Rate and Rhythm: Normal rate and regular rhythm.   Pulmonary:      Effort: Pulmonary effort is normal. No respiratory distress.      Breath sounds: No wheezing.      Comments: Decreased breath sounds in L lung, but without respiratory distress  Abdominal:      General: There is distension (significant distension without TTP).      Comments: Umbilical hernia present     Musculoskeletal:      Right lower leg: Edema present.      Left lower leg: Edema present.      Comments: Pitting edema to BL LEs   Skin:     Coloration: Skin is not jaundiced or pale.   Neurological:      Mental Status: She is alert and oriented to person, place, and time.      Motor: No abnormal muscle tone.   Psychiatric:         Mood and Affect: Mood normal.         Behavior: Behavior normal.                Significant Labs: All pertinent labs within the past 24 hours have been reviewed.  CBC:   Recent Labs   Lab 08/22/24  1353 08/22/24 1911   WBC 2.85* 3.62*   HGB 9.3* 9.9*   HCT 28.0* 29.8*   * 126*     CMP:   Recent Labs   Lab 08/22/24  1353 08/22/24 1911    138   K 2.7* 3.4*    107   CO2 22* 24    144*   BUN 9 10   CREATININE 1.4 1.4   CALCIUM 8.4* 8.4*   PROT 7.6 7.9   ALBUMIN 2.4* 2.5*   BILITOT 1.6* 1.6*   ALKPHOS 41* 46*   AST 17 22   ALT 6* 6*   ANIONGAP 10 7*       Significant Imaging: I have reviewed all pertinent imaging results/findings within the past 24 hours.

## 2024-08-23 NOTE — NURSING
Discharge instructions given to patient and patients daughter, both verbalize understanding. Patient AAOX4, NAD noted awaiting on transport for wheelchair.

## 2024-08-23 NOTE — DISCHARGE SUMMARY
[facility]  Discharge Summary      Patient Name: Seema Gann  MRN: [id number]  MOY: [id number]  Patient Class: OP- Observation  Admission Date: 8/22/2024  Hospital Length of Stay: 0 days  Discharge Date and Time: 8/23/2024  7:05 PM  Attending Physician: Del Nuñez MD   Discharging Provider: Del Nuñez MD  Primary Care Provider: Bethany Mesa MD  [facility] Medicine Team: [facility] HOSP MED X Del Nuñez MD  Primary Care Team: [facility] MED X    HPI:   Semea Gann is a 78 year old Black woman with hypertension, chronic hepatitis C treated with Harvoni in 2015, cirrhosis with recurrent ascites, portal hypertension, portal vein occlusion, hepatocellular carcinoma treated with Y90, pancytopenia, pleural effusion, chronic kidney disease stage 3, history of appendectomy, history of hysterectomy. She lives in [place], [place]. She is . She worked for the [employer] and is retired. Her primary care physician is Dr. Bethany Mesa. Her hepatologist is Dr. Jasiel Hernandez.    She had been having weeks to months of increasing abdominal distension without improvement from increasing home diuretics, causing shortness of breath. Dr. Hernandez was trying to get her therapeutic paracentesis scheduled with Interventional Radiology every two weeks, but due to being unable to manage her symptoms, she went to [facility] Emergency Department on 8/22/2024. However, [facility] does not have Interventional Radiology, so transfer was recommended to a hospital that did, such as [facility] or [facility]. She decided to leave and go to [facility] Emergency Department herself. She was admitted to Hospital Medicine Team X.       Hospital Course:   Interventional Radiology was consulted for paracentesis. She was discharged home afterward.      Goals of Care Treatment Preferences:  Code Status: DNR          What is most important right now is to focus on avoiding the hospital.  Accordingly, we have decided that the best plan to meet the patient's goals includes continuing with treatment.         Consults: Interventional Radiology    Final Active Diagnoses:    Diagnosis Date Noted POA    PRINCIPAL PROBLEM:  Decompensated cirrhosis related to hepatitis C virus (HCV) [B19.20, K74.69] 07/05/2024 Yes     Chronic    DNR (do not resuscitate) [Z66] 08/23/2024 Yes    Moderate protein-calorie malnutrition [E44.0] 08/22/2024 Yes     Chronic    Hypokalemia [E87.6] 08/22/2024 Yes     Chronic    Pleural effusion [J90] 08/22/2024 Yes     Chronic    Stage 3a chronic kidney disease [N18.31] 03/05/2024 Yes     Chronic    Pancytopenia [D61.818] 11/21/2023 Yes     Chronic    HCC (hepatocellular carcinoma) [C22.0] 07/19/2023 Yes     Chronic    Primary hypertension [I10] 01/16/2015 Yes     Chronic      Problems Resolved During this Admission:       Discharged Condition: good    Disposition: Home or Self Care    Follow Up:   Follow-up Information       Guilherme Gregorio, NP Follow up on 8/26/2024.    Specialty: Cardiology  Why: 3 PM  Contact information:  [place] [address] [phone]                           Patient Instructions:      Diet Adult Regular     Order Specific Question Answer Comments   Na restriction, if any: 2gNa      Notify your health care provider if you experience any of the following:  temperature >100.4     Notify your health care provider if you experience any of the following:  severe uncontrolled pain     Notify your health care provider if you experience any of the following:  persistent dizziness, light-headedness, or visual disturbances     Notify your health care provider if you experience any of the following:  increased confusion or weakness     Activity as tolerated       Significant Diagnostic Studies:     Pending  Diagnostic Studies:       Procedure Component Value Units Date/Time    IR Paracentesis with Imaging [[id number]]     Order Status: Sent Lab Status: No result            Medications:  Reconciled Home Medications:      Medication List        CONTINUE taking these medications      amLODIPine 5 MG tablet  Commonly known as: NORVASC  Take 1 tablet (5 mg total) by mouth once daily.     aspirin 81 MG EC tablet  Commonly known as: ECOTRIN  Take 1 tablet (81 mg total) by mouth once daily.     carvediloL 3.125 MG tablet  Commonly known as: COREG  Take 1 tablet (3.125 mg total) by mouth 2 (two) times daily with meals.     cyproheptadine 4 mg tablet  Commonly known as: PERIACTIN  Take 1 tablet (4 mg total) by mouth 3 (three) times daily.     furosemide 20 MG tablet  Commonly known as: LASIX  Take 2 tablets (40 mg total) by mouth once daily.     lisinopriL 20 MG tablet  Commonly known as: PRINIVIL,ZESTRIL  Take 1 tablet (20 mg total) by mouth once daily.     spironolactone 100 MG tablet  Commonly known as: ALDACTONE  Take 1 tablet (100 mg total) by mouth once daily.              Indwelling Lines/Drains at time of discharge: None      Time spent on the discharge of patient: 35 minutes         Del Nuñez MD  Department of Hospital Medicine  [facility] - Telemetry Stepdown

## 2024-08-23 NOTE — ASSESSMENT & PLAN NOTE
Patient found to have large pleural effusion on imaging. I have personally reviewed and interpreted the following imaging: Xray. A thoracentesis was deferred. Most likely etiology includes Cirrhosis. Management to include Diuresis.  Chronic pleural effusions (small BL noted on prior CT 3/2024), and TTE this year with normal LVEF and no diastolic dysfunction. Large L and small R pleural effusion noted on XR today.   Highly suspect due to hypoalbuminemia and cirrhosis. Will continue diuresis with IV lasix while monitoring. If no improvement, may consider IR diagnostic and therapeutic thoracentesis as well.

## 2024-08-23 NOTE — ASSESSMENT & PLAN NOTE
Likely due to diuretics.   Patient's most recent potassium results are listed below.   Recent Labs     08/22/24  1353 08/22/24  1911   K 2.7* 3.4*     Plan  - Replete potassium per protocol. Mg 1.8.  - Monitor potassium Daily

## 2024-08-23 NOTE — ED NOTES
Assumed care of patient at this time. Pt placed in hospital gown and currently lying in stretcher. Vital signs are stable, provided pt with warm blanket. Pt denied restroom use. No other complaints from pt at this time. Care ongoing.

## 2024-08-24 LAB
OHS QRS DURATION: 110 MS
OHS QTC CALCULATION: 502 MS

## 2024-08-26 ENCOUNTER — OFFICE VISIT (OUTPATIENT)
Dept: CARDIOLOGY | Facility: CLINIC | Age: 79
End: 2024-08-26
Payer: MEDICARE

## 2024-08-26 VITALS
SYSTOLIC BLOOD PRESSURE: 110 MMHG | OXYGEN SATURATION: 99 % | HEIGHT: 64 IN | BODY MASS INDEX: 19.46 KG/M2 | WEIGHT: 114 LBS | HEART RATE: 92 BPM | DIASTOLIC BLOOD PRESSURE: 82 MMHG

## 2024-08-26 DIAGNOSIS — R60.0 BILATERAL LOWER EXTREMITY EDEMA: ICD-10-CM

## 2024-08-26 DIAGNOSIS — I10 PRIMARY HYPERTENSION: Primary | Chronic | ICD-10-CM

## 2024-08-26 DIAGNOSIS — B18.2 CHRONIC HEPATITIS C WITH CIRRHOSIS: Chronic | ICD-10-CM

## 2024-08-26 DIAGNOSIS — K74.60 CHRONIC HEPATITIS C WITH CIRRHOSIS: Chronic | ICD-10-CM

## 2024-08-26 DIAGNOSIS — N18.31 STAGE 3A CHRONIC KIDNEY DISEASE: Chronic | ICD-10-CM

## 2024-08-26 DIAGNOSIS — C22.0 HCC (HEPATOCELLULAR CARCINOMA): Chronic | ICD-10-CM

## 2024-08-26 DIAGNOSIS — I73.9 PERIPHERAL VASCULAR DISEASE, UNSPECIFIED: ICD-10-CM

## 2024-08-26 PROCEDURE — 3288F FALL RISK ASSESSMENT DOCD: CPT | Mod: CPTII,S$GLB,,

## 2024-08-26 PROCEDURE — 3074F SYST BP LT 130 MM HG: CPT | Mod: CPTII,S$GLB,,

## 2024-08-26 PROCEDURE — 1159F MED LIST DOCD IN RCRD: CPT | Mod: CPTII,S$GLB,,

## 2024-08-26 PROCEDURE — 1126F AMNT PAIN NOTED NONE PRSNT: CPT | Mod: CPTII,S$GLB,,

## 2024-08-26 PROCEDURE — 99214 OFFICE O/P EST MOD 30 MIN: CPT | Mod: S$GLB,,,

## 2024-08-26 PROCEDURE — 99999 PR PBB SHADOW E&M-EST. PATIENT-LVL IV: CPT | Mod: PBBFAC,,,

## 2024-08-26 PROCEDURE — 1101F PT FALLS ASSESS-DOCD LE1/YR: CPT | Mod: CPTII,S$GLB,,

## 2024-08-26 PROCEDURE — 1160F RVW MEDS BY RX/DR IN RCRD: CPT | Mod: CPTII,S$GLB,,

## 2024-08-26 PROCEDURE — 3079F DIAST BP 80-89 MM HG: CPT | Mod: CPTII,S$GLB,,

## 2024-08-26 NOTE — ASSESSMENT & PLAN NOTE
-Bilateral LE US 4/23/24-no DVT, Lt prox calf accessory vein reflux time =[id number], the left superficial femoral middle vein has reflux.  -followed by Vascular medicine, last visit 5/23/24- continue medical therapy   -compression stockings   -Elevate legs when sitting   -continue furosemide 40 mg daily for swelling

## 2024-08-26 NOTE — ASSESSMENT & PLAN NOTE
Followed by hepatology.  -continue medical therapy furosemide increased to 40 mg, spironolactone 100 mg   -Planned therapeutic paracentesis scheduled with Interventional Radiology every two weeks.  -recent admission 8/22/24- s/p paracentesis, removal of 2600 cc of serous fluid.

## 2024-08-26 NOTE — ASSESSMENT & PLAN NOTE
Goal BP < 140/90.  Compliant w/ meds.    -controlled   -in office 110/82  - current medication regimen: amlodipine 5 mg, carvedilol 3.125 mg, lisinopril 20 mg, spironolactone 100 mg, furosemide 40 mg   - enrolling in digital medicine prgm  - risk factor and lifestyle modifications

## 2024-08-26 NOTE — PROGRESS NOTES
Subjective:    Patient ID:  Seema Gann is a 78 y.o. female who presents for follow-up of No chief complaint on file.      PCP: Bethany Mesa MD     Referring Provider: Roshan Jesus MD     HPI: Pt is a 79 yo F w/ PMH of HTN, Biliary Cirrhosis w/ enlarging hepatic mass since 2015, HCV, HCC s/p radio embolization, cirrhosis with recurrent ascites, portal hypertension, portal vein occlusion, hepatocellular carcinoma treated with Y90, pancytopenia, pleural effusion, chronic kidney disease stage 3,  who presents today or  f/u appt. She was last seen on 5/26/24 and was continued on medical therapy. She is followed by Hepatology and was seen on 5/27/24- furosemide increased to 40 mg daily along with spironolactone w additional testing. She was last seen by Hepatology, Dr. Hernandez  on 7/2/24 and was trying to get her therapeutic paracentesis scheduled with Interventional Radiology every two weeks.  Recent admission 8/22/24-8/23/24 increased abdominal distention. She is s/p paracentesis, removal of 2600 cc of serous fluid. She was discharged home. She reports doing well from cardiac standpoint. She denies cp, sob, edema, orthopnea, PND, presyncope, LOC, palpitations, and claudication. She continue to report increased abdominal fluid. Patient reports BLE edema. She notes compliance w/ meds and denies side effects. She does not monitor her BP at home.She does not exercise however states she is very active w/ gardening and denies cp or sob.     5/24/24: Patient presents today for f/u appt. She was last seen on 4/9/24 for f/u appt and was continued on medical therapy.   She was seen by Vascular medicine, Dr. Vinicio Dominguez  on 5/23/24 and was continued on medical therapy. NM stress test negative for ischemia, Cardiac echo LVEF 73%. She reports doing well from cardiac standpoint but reports diarrhea.  She denies cp, sob, edema, orthopnea, PND, presyncope, LOC, palpitations, and claudication.  Patient reports  BLE edema, but is slowly decreasing w daily furosemide. She notes compliance w/ meds and denies side effects. She does not monitor her BP at home.  She does not exercise however states she is very active w/ gardening and denies cp or sob.      4/9/24: Presented today for f/u appt. She was last seen on 1/8/24 for f/u and was continued on medical therapy for HTN management. She had an ETT which noted normal exercise tolerance and was negative for ischemia.  She reports doing well from cardiac standpoint but reports insomnia. She denies cp, sob, edema, orthopnea, PND, presyncope, LOC, palpitations, and claudication.  Patient reports BLE edema  X  1 month. She was seen by PCP on 3/5/24 and started on furosemide 20 mg , 1 tablet daily X 3 days and then as needed for swelling. Patient notes she was taking the furosemide daily. She notes compliance w/ meds and denies side effects. She does not monitor her BP at home.  She does not exercise however states she is very active w/ gardening and denies cp or sob.      Past Medical History:   Diagnosis Date    Cirrhosis     HCC (hepatocellular carcinoma)     Hepatitis     Hypertension      Past Surgical History:   Procedure Laterality Date    APPENDECTOMY      HYSTERECTOMY       Social History     Socioeconomic History    Marital status:    Tobacco Use    Smoking status: Never     Passive exposure: Never    Smokeless tobacco: Never   Substance and Sexual Activity    Alcohol use: No    Drug use: No    Sexual activity: Yes   Social History Narrative    ** Merged History Encounter **          Social Determinants of Health     Financial Resource Strain: Low Risk  (8/23/2024)    Overall Financial Resource Strain (CARDIA)     Difficulty of Paying Living Expenses: Not very hard   Food Insecurity: No Food Insecurity (8/23/2024)    Hunger Vital Sign     Worried About Running Out of Food in the Last Year: Never true     Ran Out of Food in the Last Year: Never true   Transportation  Needs: No Transportation Needs (8/23/2024)    TRANSPORTATION NEEDS     Transportation : No   Physical Activity: Inactive (8/23/2024)    Exercise Vital Sign     Days of Exercise per Week: 0 days     Minutes of Exercise per Session: 0 min   Stress: No Stress Concern Present (8/23/2024)    [employer] - Occupational Stress Questionnaire     Feeling of Stress : Not at all   Housing Stability: Low Risk  (8/23/2024)    Housing Stability Vital Sign     Unable to Pay for Housing in the Last Year: No     Homeless in the Last Year: No     No family history on file.    Review of patient's allergies indicates:  No Known Allergies    Medication List with Changes/Refills   Current Medications    AMLODIPINE (NORVASC) 5 MG TABLET    Take 1 tablet (5 mg total) by mouth once daily.    CARVEDILOL (COREG) 3.125 MG TABLET    Take 1 tablet (3.125 mg total) by mouth 2 (two) times daily with meals.    CYPROHEPTADINE (PERIACTIN) 4 MG TABLET    Take 1 tablet (4 mg total) by mouth 3 (three) times daily.    FUROSEMIDE (LASIX) 20 MG TABLET    Take 2 tablets (40 mg total) by mouth once daily.    LISINOPRIL (PRINIVIL,ZESTRIL) 20 MG TABLET    Take 1 tablet (20 mg total) by mouth once daily.    SPIRONOLACTONE (ALDACTONE) 100 MG TABLET    Take 1 tablet (100 mg total) by mouth once daily.       Review of Systems   Constitutional: Negative for diaphoresis and fever.   HENT:  Negative for congestion and hearing loss.    Eyes:  Negative for blurred vision and pain.   Cardiovascular:  Positive for leg swelling. Negative for chest pain, claudication, dyspnea on exertion, near-syncope, palpitations and syncope.   Respiratory:  Negative for shortness of breath and sleep disturbances due to breathing.    Hematologic/Lymphatic: Negative for bleeding problem. Does not bruise/bleed easily.   Skin:  Negative for color change and poor wound healing.   Gastrointestinal:  Negative for abdominal pain and nausea.   Genitourinary:   "Negative for bladder incontinence and flank pain.   Neurological:  Negative for focal weakness and light-headedness.        Objective:   /82 (BP Location: Left arm, Patient Position: Sitting, BP Method: Large (Manual))   Pulse 92   Ht 5' 4" (1.626 m)   Wt 51.7 kg (113 lb 15.7 oz)   SpO2 99%   BMI 19.56 kg/m²    Physical Exam  Constitutional:       Appearance: She is well-developed. She is not diaphoretic.   HENT:      Head: Normocephalic and atraumatic.   Eyes:      General: No scleral icterus.     Pupils: Pupils are equal, round, and reactive to light.   Neck:      Vascular: No JVD.   Cardiovascular:      Rate and Rhythm: Normal rate and regular rhythm.      Pulses: Intact distal pulses.      Heart sounds: S1 normal and S2 normal. Murmur heard.      High-pitched blowing holosystolic murmur is present with a grade of 2/6 at the apex.      No friction rub. No gallop.   Pulmonary:      Effort: Pulmonary effort is normal. No respiratory distress.      Breath sounds: Normal breath sounds. No wheezing or rales.   Chest:      Chest wall: No tenderness.   Abdominal:      General: Abdomen is protuberant. Bowel sounds are normal. There is distension.      Palpations: Abdomen is soft. There is no mass.      Tenderness: There is no abdominal tenderness. There is no rebound.   Musculoskeletal:         General: No tenderness. Normal range of motion.      Cervical back: Normal range of motion and neck supple.      Right lower le+ Pitting Edema present.      Left lower le+ Pitting Edema present.   Skin:     General: Skin is warm and dry.      Coloration: Skin is not pale.   Neurological:      Mental Status: She is alert and oriented to person, place, and time.      Coordination: Coordination normal.      Deep Tendon Reflexes: Reflexes normal.   Psychiatric:         Behavior: Behavior normal.         Judgment: Judgment normal.           NM stress test 24  Interpretation Summary  Show Result Comparison     " The ECG portion of the study is abnormal but not diagnostic due to resting ST-T abnormalities.    The patient reported no chest pain during the stress test.    There were no arrhythmias during stress.    The nuclear portion of this study will be reported separately.    FINDINGS:  There is no fixed or reversible perfusion defect.  The stress and rest end-diastolic volumes each measure 47 mL.  The stress and rest end systolic volumes each measure 9 mL respectively.  The stress and rest ejection fraction measure 81%.     Impression:     No acute findings.    CV US BLE Veins 4/23/2024  Interpretation Summary    There is no evidence of a right lower extremity DVT.    There is no evidence of a left lower extremity DVT.    The right greater saphenous vein has reflux.  Mostly around calf    The left greater saphenous vein has reflux.    Lt prox calf accessory vein reflux time =7753mx    The left superficial femoral middle vein has reflux.     Cardiac echo 4/22/24  Summary    Left Ventricle: The left ventricle is normal in size. Normal wall thickness. Regional wall motion abnormalities present very mild hypokinesia apical lateral wall. . There is normal systolic function with a visually estimated ejection fraction of 65 - 70%. Biplane (2D) method of discs ejection fraction is 73%. There is normal diastolic function.    Right Ventricle: Normal right ventricular cavity size. Wall thickness is normal. Right ventricle wall motion  is normal. Systolic function is normal.    Left Atrium: Left atrium is mildly dilated.    Aortic Valve: There is mild aortic valve sclerosis. There is mild stenosis. Aortic valve area by VTI is 1.92 cm². Aortic valve peak velocity is 1.40 m/s. Mean gradient is 4 mmHg. The dimensionless index is 0.84. There is mild aortic regurgitation.    Mitral Valve: There is mild regurgitation.    Pulmonary Artery: The estimated pulmonary artery systolic pressure is 35 mmHg.    IVC/SVC: Normal venous pressure at 3  mmHg.    EKG 23- reviewed - SR w SA, RBBB  TTE 23  Summary    Left Ventricle: The left ventricle is normal in size. Normal wall thickness. There is concentric remodeling. Normal wall motion. There is normal systolic function. Biplane (2D) method of discs ejection fraction is 60%. There is normal diastolic function.    Right Ventricle: Normal right ventricular cavity size. Wall thickness is normal. Right ventricle wall motion  is normal. Systolic function is normal.    Aortic Valve: The aortic valve is a trileaflet valve. There is moderate aortic valve sclerosis.    Tricuspid Valve: There is mild to moderate regurgitation.    Pulmonary Artery: The estimated pulmonary artery systolic pressure is 25 mmHg.    IVC/SVC: Normal venous pressure at 3 mmHg.    EC2022- reviewed.  Sinus katherine w/ PACs, RBBB, LAFB.       Echo: 2022- reviewed.    Summary  The left ventricle is normal in size with concentric remodeling and normal systolic function.  The estimated ejection fraction is 70%.  Normal left ventricular diastolic function.  Normal right ventricular size with normal right ventricular systolic function.  Mild aortic regurgitation.  Mild tricuspid regurgitation.  Mild pulmonic regurgitation.  Mild mitral regurgitation.  Normal central venous pressure (3 mmHg).  The estimated PA systolic pressure is 32 mmHg.     ETT: 2022- reviewed.    Conclusion    The patient exercised for 7 minutes 30 seconds on a Abel protocol, corresponding to a functional capacity of 9 METS, achieving a peak heart rate of 157 bpm, which is 113 % of the age predicted maximum heart rate. The patient experienced no angina during the test. Their exercise capacity was above average. The Duke Treadmill Score was 8.    The ECG portion of the study is negative for ischemia.    The patient reported no chest pain during the stress test.    The blood pressure response to stress was normal.    The Duke Treadmill Score was 8.     During stress, occasional PACs are noted. , During stress, occasional PVCs are noted.    The exercise capacity was above average.    Assessment:       1. Primary hypertension    2. Peripheral vascular disease, unspecified    3. Stage 3a chronic kidney disease    4. HCC (hepatocellular carcinoma)    5. Chronic hepatitis C with cirrhosis    6. Bilateral lower extremity edema             Plan:         Primary hypertension  Goal BP < 140/90.  Compliant w/ meds.    -controlled   -in office 110/82  - current medication regimen: amlodipine 5 mg, carvedilol 3.125 mg, lisinopril 20 mg, spironolactone 100 mg, furosemide 40 mg   - enrolling in digital medicine pr  - risk factor and lifestyle modifications     Peripheral vascular disease, unspecified  CV US BLE Veins 4/23/2024  Interpretation Summary    There is no evidence of a right lower extremity DVT.    There is no evidence of a left lower extremity DVT.    The right greater saphenous vein has reflux.  Mostly around calf    The left greater saphenous vein has reflux.    Lt prox calf accessory vein reflux time =7753mx    The left superficial femoral middle vein has reflux.\    -followed by Vascular medicine, last visit 5/23/24- continue medical therapy         Stage 3a chronic kidney disease  -followed by PCP     HCC (hepatocellular carcinoma)  S/p Y90 x 2, leading to cirrhosis. No residual disease per hepatology.     Chronic hepatitis C with cirrhosis  Followed by hepatology.  -continue medical therapy furosemide increased to 40 mg, spironolactone 100 mg   -Planned therapeutic paracentesis scheduled with Interventional Radiology every two weeks.  -recent admission 8/22/24- s/p paracentesis, removal of 2600 cc of serous fluid.     Bilateral lower extremity edema  -Bilateral LE US 4/23/24-no DVT, Lt prox calf accessory vein reflux time =7753mx, the left superficial femoral middle vein has reflux.  -followed by Vascular medicine, last visit 5/23/24- continue medical therapy    -compression stockings   -Elevate legs when sitting   -continue furosemide 40 mg daily for swelling     Total duration of face to face visit time 30 minutes.  Total time spent counseling greater than fifty percent of total visit time.  Counseling included discussion regarding imaging findings, diagnosis, possibilities, treatment options, risks and benefits.  The patient had many questions regarding the options and long-term effects      Guilherme Gregorio, DNP  Cardiology

## 2024-08-27 ENCOUNTER — TELEPHONE (OUTPATIENT)
Dept: HEPATOLOGY | Facility: CLINIC | Age: 79
End: 2024-08-27
Payer: MEDICARE

## 2024-08-27 LAB
BACTERIA FLD AEROBE CULT: NO GROWTH
GRAM STN SPEC: NORMAL
GRAM STN SPEC: NORMAL

## 2024-08-27 NOTE — TELEPHONE ENCOUNTER
Call returned to Bri, daughter of the patient.  Bri was requesting an appt for her mother Seema for a Paracentesis every 2 weeks at the [facility].    Bri informed we will send the message to [facility] for scheduling for an appt.

## 2024-08-27 NOTE — TELEPHONE ENCOUNTER
----- Message from Sid Hanson MA sent at 8/27/2024  4:46 PM CDT -----  Regarding: FW: IR Paracentesis    ----- Message -----  From: Pavan Lizarraga  Sent: 8/27/2024  12:57 PM CDT  To: Cindy Weathers Staff  Subject: IR Paracentesis                                  Hello,    Patient's daughter is calling number to get IR Paracentesis appt set up.  Patient was seen in ED at [facility] on Friday 8/22/24, was transferred to [facility] for SOB because of fluid in her bell, and had IR Paracentesis done at [facility] on 8/23/24 and discharged.  Patient states she was told some one would call her to schedule IR Paracentesis appt but she did not get a call to schedule appt.  Daughter would like IR Paracentesis appt set up in [place] so this will not happen again.  Please call Bri at [phone].

## 2024-08-29 ENCOUNTER — TELEPHONE (OUTPATIENT)
Dept: FAMILY MEDICINE | Facility: CLINIC | Age: 79
End: 2024-08-29
Payer: MEDICARE

## 2024-08-29 ENCOUNTER — TELEPHONE (OUTPATIENT)
Dept: HEPATOLOGY | Facility: CLINIC | Age: 79
End: 2024-08-29
Payer: MEDICARE

## 2024-08-29 NOTE — TELEPHONE ENCOUNTER
Spoke to pts daughter Milla referred her to the ER per Dr. Rdz. Pts daughter verbally understood.

## 2024-08-29 NOTE — TELEPHONE ENCOUNTER
----- Message from Pavan Lizarraga sent at 8/29/2024 12:43 PM CDT -----  Regarding: CHRISTOPHER Thompson,    Pt's daughter Milla is calling, IR Paracentesis has not been scheduled for patient

## 2024-08-29 NOTE — TELEPHONE ENCOUNTER
Call returned to Milla, daughter of the patient.  Nurse asked Milla to call back if Paracentesis appt was not scheduled yet.    Milla called at [phone].  Milla stated her stomach is really getting swollen.  Milla informed we have sent another message today to the schedulers.    If Ms Stephenson gets SOB or too uncomfortable and we have not called you, you may have to bring her to the ED. Milla stated we are trying not to do that. I will try to wait on the call and bring if necessary.

## 2024-08-29 NOTE — TELEPHONE ENCOUNTER
----- Message from Sid Hanson MA sent at 8/29/2024  1:29 PM CDT -----  Regarding: FW: IR    ----- Message -----  From: Pavan Lizarraga  Sent: 8/29/2024  12:45 PM CDT  To: Cindy Weathers Staff  Subject: CHRISTOPHER Thompson,    Pt's daughter Milla is calling, IR Paracentesis has not been scheduled for patient

## 2024-08-30 ENCOUNTER — TELEPHONE (OUTPATIENT)
Dept: HEPATOLOGY | Facility: CLINIC | Age: 79
End: 2024-08-30
Payer: MEDICARE

## 2024-08-30 ENCOUNTER — HOSPITAL ENCOUNTER (INPATIENT)
Facility: HOSPITAL | Age: 79
LOS: 2 days | Discharge: HOME | DRG: 433 | End: 2024-09-02
Attending: EMERGENCY MEDICINE | Admitting: STUDENT IN AN ORGANIZED HEALTH CARE EDUCATION/TRAINING PROGRAM
Payer: MEDICARE

## 2024-08-30 ENCOUNTER — TELEPHONE (OUTPATIENT)
Dept: INTERVENTIONAL RADIOLOGY/VASCULAR | Facility: CLINIC | Age: 79
End: 2024-08-30
Payer: MEDICARE

## 2024-08-30 DIAGNOSIS — R68.89 MULTIPLE COMPLAINTS: ICD-10-CM

## 2024-08-30 DIAGNOSIS — C22.0 HCC (HEPATOCELLULAR CARCINOMA): Chronic | ICD-10-CM

## 2024-08-30 DIAGNOSIS — B18.2 CHRONIC HEPATITIS C WITH CIRRHOSIS: Chronic | ICD-10-CM

## 2024-08-30 DIAGNOSIS — R07.9 CHEST PAIN: ICD-10-CM

## 2024-08-30 DIAGNOSIS — K74.69 DECOMPENSATED CIRRHOSIS RELATED TO HEPATITIS C VIRUS (HCV): Primary | Chronic | ICD-10-CM

## 2024-08-30 DIAGNOSIS — B19.20 DECOMPENSATED CIRRHOSIS RELATED TO HEPATITIS C VIRUS (HCV): Primary | Chronic | ICD-10-CM

## 2024-08-30 DIAGNOSIS — K74.60 CHRONIC HEPATITIS C WITH CIRRHOSIS: Chronic | ICD-10-CM

## 2024-08-30 PROBLEM — R79.89 ELEVATED TROPONIN: Status: ACTIVE | Noted: 2024-08-30

## 2024-08-30 LAB
ALBUMIN SERPL BCP-MCNC: 2.5 G/DL (ref 3.5–5.2)
ALP SERPL-CCNC: 45 U/L (ref 55–135)
ALT SERPL W/O P-5'-P-CCNC: 5 U/L (ref 10–44)
ANION GAP SERPL CALC-SCNC: 9 MMOL/L (ref 8–16)
APTT PPP: 30.8 SEC (ref 21–32)
AST SERPL-CCNC: 22 U/L (ref 10–40)
BASOPHILS # BLD AUTO: 0.01 K/UL (ref 0–0.2)
BASOPHILS NFR BLD: 0.3 % (ref 0–1.9)
BILIRUB SERPL-MCNC: 1.6 MG/DL (ref 0.1–1)
BILIRUB UR QL STRIP: NEGATIVE
BNP SERPL-MCNC: 151 PG/ML (ref 0–99)
BUN SERPL-MCNC: 9 MG/DL (ref 8–23)
CALCIUM SERPL-MCNC: 8.6 MG/DL (ref 8.7–10.5)
CHLORIDE SERPL-SCNC: 108 MMOL/L (ref 95–110)
CLARITY UR REFRACT.AUTO: ABNORMAL
CO2 SERPL-SCNC: 22 MMOL/L (ref 23–29)
COLOR UR AUTO: YELLOW
CREAT SERPL-MCNC: 1.4 MG/DL (ref 0.5–1.4)
DIFFERENTIAL METHOD BLD: ABNORMAL
EOSINOPHIL # BLD AUTO: 0.1 K/UL (ref 0–0.5)
EOSINOPHIL NFR BLD: 1.9 % (ref 0–8)
ERYTHROCYTE [DISTWIDTH] IN BLOOD BY AUTOMATED COUNT: 13.5 % (ref 11.5–14.5)
EST. GFR  (NO RACE VARIABLE): 38.7 ML/MIN/1.73 M^2
GLUCOSE SERPL-MCNC: 102 MG/DL (ref 70–110)
GLUCOSE UR QL STRIP: NEGATIVE
HCT VFR BLD AUTO: 30.8 % (ref 37–48.5)
HGB BLD-MCNC: 10 G/DL (ref 12–16)
HGB UR QL STRIP: NEGATIVE
IMM GRANULOCYTES # BLD AUTO: 0.01 K/UL (ref 0–0.04)
IMM GRANULOCYTES NFR BLD AUTO: 0.3 % (ref 0–0.5)
INR PPP: 1.2 (ref 0.8–1.2)
KETONES UR QL STRIP: NEGATIVE
LEUKOCYTE ESTERASE UR QL STRIP: NEGATIVE
LYMPHOCYTES # BLD AUTO: 0.8 K/UL (ref 1–4.8)
LYMPHOCYTES NFR BLD: 22.6 % (ref 18–48)
MAGNESIUM SERPL-MCNC: 1.7 MG/DL (ref 1.6–2.6)
MCH RBC QN AUTO: 31.3 PG (ref 27–31)
MCHC RBC AUTO-ENTMCNC: 32.5 G/DL (ref 32–36)
MCV RBC AUTO: 97 FL (ref 82–98)
MONOCYTES # BLD AUTO: 0.3 K/UL (ref 0.3–1)
MONOCYTES NFR BLD: 7.5 % (ref 4–15)
NEUTROPHILS # BLD AUTO: 2.5 K/UL (ref 1.8–7.7)
NEUTROPHILS NFR BLD: 67.4 % (ref 38–73)
NITRITE UR QL STRIP: NEGATIVE
NRBC BLD-RTO: 0 /100 WBC
OHS QRS DURATION: 106 MS
OHS QTC CALCULATION: 500 MS
PH UR STRIP: 6 [PH] (ref 5–8)
PLATELET # BLD AUTO: 125 K/UL (ref 150–450)
PMV BLD AUTO: 11.2 FL (ref 9.2–12.9)
POTASSIUM SERPL-SCNC: 3.1 MMOL/L (ref 3.5–5.1)
PROT SERPL-MCNC: 7.7 G/DL (ref 6–8.4)
PROT UR QL STRIP: NEGATIVE
PROTHROMBIN TIME: 13.4 SEC (ref 9–12.5)
RBC # BLD AUTO: 3.19 M/UL (ref 4–5.4)
SODIUM SERPL-SCNC: 139 MMOL/L (ref 136–145)
SP GR UR STRIP: 1.01 (ref 1–1.03)
TROPONIN I SERPL DL<=0.01 NG/ML-MCNC: 0.14 NG/ML (ref 0–0.03)
TROPONIN I SERPL DL<=0.01 NG/ML-MCNC: 0.15 NG/ML (ref 0–0.03)
TROPONIN I SERPL DL<=0.01 NG/ML-MCNC: 0.15 NG/ML (ref 0–0.03)
URN SPEC COLLECT METH UR: ABNORMAL
WBC # BLD AUTO: 3.72 K/UL (ref 3.9–12.7)

## 2024-08-30 PROCEDURE — 84484 ASSAY OF TROPONIN QUANT: CPT | Mod: 91

## 2024-08-30 PROCEDURE — 80053 COMPREHEN METABOLIC PANEL: CPT | Mod: 91 | Performed by: EMERGENCY MEDICINE

## 2024-08-30 PROCEDURE — G0378 HOSPITAL OBSERVATION PER HR: HCPCS

## 2024-08-30 PROCEDURE — 99285 EMERGENCY DEPT VISIT HI MDM: CPT | Mod: 25

## 2024-08-30 PROCEDURE — 85730 THROMBOPLASTIN TIME PARTIAL: CPT | Performed by: EMERGENCY MEDICINE

## 2024-08-30 PROCEDURE — 83735 ASSAY OF MAGNESIUM: CPT | Performed by: EMERGENCY MEDICINE

## 2024-08-30 PROCEDURE — 63600175 PHARM REV CODE 636 W HCPCS

## 2024-08-30 PROCEDURE — 25000003 PHARM REV CODE 250

## 2024-08-30 PROCEDURE — 85025 COMPLETE CBC W/AUTO DIFF WBC: CPT | Mod: 91 | Performed by: EMERGENCY MEDICINE

## 2024-08-30 PROCEDURE — 96374 THER/PROPH/DIAG INJ IV PUSH: CPT

## 2024-08-30 PROCEDURE — 81003 URINALYSIS AUTO W/O SCOPE: CPT

## 2024-08-30 PROCEDURE — 83880 ASSAY OF NATRIURETIC PEPTIDE: CPT | Performed by: EMERGENCY MEDICINE

## 2024-08-30 PROCEDURE — 85610 PROTHROMBIN TIME: CPT | Mod: 91 | Performed by: EMERGENCY MEDICINE

## 2024-08-30 PROCEDURE — 84484 ASSAY OF TROPONIN QUANT: CPT | Performed by: EMERGENCY MEDICINE

## 2024-08-30 RX ORDER — PROCHLORPERAZINE EDISYLATE 5 MG/ML
5 INJECTION INTRAMUSCULAR; INTRAVENOUS EVERY 6 HOURS PRN
Status: DISCONTINUED | OUTPATIENT
Start: 2024-08-30 | End: 2024-09-02 | Stop reason: HOSPADM

## 2024-08-30 RX ORDER — ALUMINUM HYDROXIDE, MAGNESIUM HYDROXIDE, AND SIMETHICONE 1200; 120; 1200 MG/30ML; MG/30ML; MG/30ML
30 SUSPENSION ORAL 4 TIMES DAILY PRN
Status: DISCONTINUED | OUTPATIENT
Start: 2024-08-30 | End: 2024-09-02 | Stop reason: HOSPADM

## 2024-08-30 RX ORDER — SODIUM CHLORIDE 0.9 % (FLUSH) 0.9 %
5 SYRINGE (ML) INJECTION
Status: DISCONTINUED | OUTPATIENT
Start: 2024-08-30 | End: 2024-09-02 | Stop reason: HOSPADM

## 2024-08-30 RX ORDER — TALC
6 POWDER (GRAM) TOPICAL NIGHTLY PRN
Status: DISCONTINUED | OUTPATIENT
Start: 2024-08-30 | End: 2024-09-02 | Stop reason: HOSPADM

## 2024-08-30 RX ORDER — FUROSEMIDE 10 MG/ML
40 INJECTION INTRAMUSCULAR; INTRAVENOUS ONCE
Status: COMPLETED | OUTPATIENT
Start: 2024-08-30 | End: 2024-08-30

## 2024-08-30 RX ORDER — NALOXONE HCL 0.4 MG/ML
0.02 VIAL (ML) INJECTION
Status: DISCONTINUED | OUTPATIENT
Start: 2024-08-30 | End: 2024-09-02 | Stop reason: HOSPADM

## 2024-08-30 RX ORDER — ACETAMINOPHEN 325 MG/1
650 TABLET ORAL EVERY 4 HOURS PRN
Status: DISCONTINUED | OUTPATIENT
Start: 2024-08-30 | End: 2024-09-02 | Stop reason: HOSPADM

## 2024-08-30 RX ORDER — SIMETHICONE 80 MG
1 TABLET,CHEWABLE ORAL 4 TIMES DAILY PRN
Status: DISCONTINUED | OUTPATIENT
Start: 2024-08-30 | End: 2024-09-02 | Stop reason: HOSPADM

## 2024-08-30 RX ORDER — FUROSEMIDE 10 MG/ML
40 INJECTION INTRAMUSCULAR; INTRAVENOUS DAILY
Status: DISCONTINUED | OUTPATIENT
Start: 2024-08-31 | End: 2024-09-02

## 2024-08-30 RX ORDER — AMLODIPINE BESYLATE 5 MG/1
5 TABLET ORAL DAILY
Status: DISCONTINUED | OUTPATIENT
Start: 2024-08-31 | End: 2024-08-31

## 2024-08-30 RX ORDER — POTASSIUM CHLORIDE 20 MEQ/1
20 TABLET, EXTENDED RELEASE ORAL ONCE
Status: COMPLETED | OUTPATIENT
Start: 2024-08-30 | End: 2024-08-30

## 2024-08-30 RX ORDER — ACETAMINOPHEN 500 MG
1000 TABLET ORAL EVERY 8 HOURS PRN
Status: DISCONTINUED | OUTPATIENT
Start: 2024-08-30 | End: 2024-09-02 | Stop reason: HOSPADM

## 2024-08-30 RX ORDER — POLYETHYLENE GLYCOL 3350 17 G/17G
17 POWDER, FOR SOLUTION ORAL 2 TIMES DAILY PRN
Status: DISCONTINUED | OUTPATIENT
Start: 2024-08-30 | End: 2024-09-02 | Stop reason: HOSPADM

## 2024-08-30 RX ORDER — CYPROHEPTADINE HYDROCHLORIDE 4 MG/1
4 TABLET ORAL 3 TIMES DAILY
Status: DISCONTINUED | OUTPATIENT
Start: 2024-08-30 | End: 2024-08-30

## 2024-08-30 RX ORDER — IPRATROPIUM BROMIDE AND ALBUTEROL SULFATE 2.5; .5 MG/3ML; MG/3ML
3 SOLUTION RESPIRATORY (INHALATION) EVERY 4 HOURS PRN
Status: DISCONTINUED | OUTPATIENT
Start: 2024-08-30 | End: 2024-09-02 | Stop reason: HOSPADM

## 2024-08-30 RX ORDER — CARVEDILOL 3.12 MG/1
3.12 TABLET ORAL 2 TIMES DAILY WITH MEALS
Status: DISCONTINUED | OUTPATIENT
Start: 2024-08-30 | End: 2024-09-02

## 2024-08-30 RX ORDER — ONDANSETRON 8 MG/1
8 TABLET, ORALLY DISINTEGRATING ORAL EVERY 8 HOURS PRN
Status: DISCONTINUED | OUTPATIENT
Start: 2024-08-30 | End: 2024-09-02 | Stop reason: HOSPADM

## 2024-08-30 RX ORDER — SPIRONOLACTONE 50 MG/1
100 TABLET, FILM COATED ORAL DAILY
Status: DISCONTINUED | OUTPATIENT
Start: 2024-08-31 | End: 2024-09-02 | Stop reason: HOSPADM

## 2024-08-30 RX ORDER — LISINOPRIL 5 MG/1
20 TABLET ORAL DAILY
Status: DISCONTINUED | OUTPATIENT
Start: 2024-08-31 | End: 2024-08-31

## 2024-08-30 RX ADMIN — FUROSEMIDE 40 MG: 10 INJECTION, SOLUTION INTRAVENOUS at 03:08

## 2024-08-30 RX ADMIN — CARVEDILOL 3.12 MG: 3.12 TABLET, FILM COATED ORAL at 05:08

## 2024-08-30 RX ADMIN — POTASSIUM BICARBONATE 20 MEQ: 391 TABLET, EFFERVESCENT ORAL at 01:08

## 2024-08-30 RX ADMIN — POTASSIUM CHLORIDE 20 MEQ: 1500 TABLET, EXTENDED RELEASE ORAL at 03:08

## 2024-08-30 NOTE — ASSESSMENT & PLAN NOTE
Patient with known Cirrhosis with Child's class B. Co-morbidities are present and inclusive of ascites, portal hypertension, portal venous thrombosis, malnutrition, and anemia/pancytopenia.  MELD-Na score calculated; MELD 3.0: 16 at 8/30/2024 11:55 AM  MELD-Na: 13 at 8/30/2024 11:55 AM  Calculated from:  Serum Creatinine: 1.4 mg/dL at 8/30/2024 11:55 AM  Serum Sodium: 139 mmol/L (Using max of 137 mmol/L) at 8/30/2024 11:55 AM  Total Bilirubin: 1.6 mg/dL at 8/30/2024 11:55 AM  Serum Albumin: 2.5 g/dL at 8/30/2024 11:55 AM  INR(ratio): 1.2 at 8/30/2024 11:55 AM  Age at listing (hypothetical): 78 years  Sex: Female at 8/30/2024 11:55 AM    Continue chronic meds. Etiology likely Hepatitis. Will avoid any hepatotoxic meds, and monitor CBC/CMP/INR for synthetic function.   - she has been unable to obtain OP paracentesis. IR consulted for therapeutic paracentesis.   - start IV lasix 40 mg daily   - will hold DVT ppx for para  - patient/family need assistance setting up OP scheduled paracentesis

## 2024-08-30 NOTE — ASSESSMENT & PLAN NOTE
- controlled on admit  - continue amlodipine 5 mg, coreg 3.125 mg BID, lisinopril 20 mg, and aldactone 100 mg  - IV diuresis as above

## 2024-08-30 NOTE — ASSESSMENT & PLAN NOTE
S/p Y90 x 2, leading to cirrhosis. No residual disease per hepatology.   - MRI abd ordered for today, but unable to be performed due to her symptoms

## 2024-08-30 NOTE — ED TRIAGE NOTES
"Pt presents to the ED complaining of SOB and fluid on her stomach. Pt states she was scheduled to get an MRI today, however, it was cancelled due to her "stomach being full of fluid" and she was unable to lie flat because of SOB. Pt denies chest pain, nausea, vomiting, and diarrhea at this time.   "

## 2024-08-30 NOTE — SUBJECTIVE & OBJECTIVE
Past Medical History:   Diagnosis Date    Cirrhosis     HCC (hepatocellular carcinoma)     Hepatitis     Hypertension        Past Surgical History:   Procedure Laterality Date    APPENDECTOMY      HYSTERECTOMY         Review of patient's allergies indicates:  No Known Allergies    No current facility-administered medications on file prior to encounter.     Current Outpatient Medications on File Prior to Encounter   Medication Sig    amLODIPine (NORVASC) 5 MG tablet Take 1 tablet (5 mg total) by mouth once daily.    carvediloL (COREG) 3.125 MG tablet Take 1 tablet (3.125 mg total) by mouth 2 (two) times daily with meals.    cyproheptadine (PERIACTIN) 4 mg tablet Take 1 tablet (4 mg total) by mouth 3 (three) times daily.    furosemide (LASIX) 20 MG tablet Take 2 tablets (40 mg total) by mouth once daily.    lisinopriL (PRINIVIL,ZESTRIL) 20 MG tablet Take 1 tablet (20 mg total) by mouth once daily.    spironolactone (ALDACTONE) 100 MG tablet Take 1 tablet (100 mg total) by mouth once daily.     Family History    None       Tobacco Use    Smoking status: Never     Passive exposure: Never    Smokeless tobacco: Never   Substance and Sexual Activity    Alcohol use: No    Drug use: No    Sexual activity: Yes     Review of Systems   Constitutional:  Negative for activity change, chills and fever.   HENT:  Negative for trouble swallowing.    Eyes:  Negative for photophobia and visual disturbance.   Respiratory:  Positive for shortness of breath. Negative for cough and chest tightness.    Cardiovascular:  Positive for leg swelling. Negative for chest pain and palpitations.   Gastrointestinal:  Positive for abdominal distention. Negative for abdominal pain, constipation, diarrhea, nausea and vomiting.   Genitourinary:  Negative for dysuria, frequency and hematuria.   Musculoskeletal:  Negative for back pain, gait problem and neck pain.   Skin:  Negative for rash and wound.   Neurological:  Negative for dizziness, syncope,  speech difficulty and light-headedness.   Psychiatric/Behavioral:  Negative for agitation and confusion. The patient is not nervous/anxious.      Objective:     Vital Signs (Most Recent):  Temp: 97.9 °F (36.6 °C) (08/30/24 1356)  Pulse: 96 (08/30/24 1356)  Resp: 20 (08/30/24 1356)  BP: 108/77 (08/30/24 1356)  SpO2: 100 % (08/30/24 1356) Vital Signs (24h Range):  Temp:  [97.7 °F (36.5 °C)-97.9 °F (36.6 °C)] 97.9 °F (36.6 °C)  Pulse:  [] 96  Resp:  [18-20] 20  SpO2:  [98 %-100 %] 100 %  BP: (108-135)/(76-85) 108/77     Weight: 50.8 kg (112 lb)  Body mass index is 19.22 kg/m².     Physical Exam  Vitals and nursing note reviewed.   Constitutional:       General: She is not in acute distress.     Appearance: She is well-developed.   HENT:      Head: Normocephalic and atraumatic.      Mouth/Throat:      Pharynx: No oropharyngeal exudate.   Eyes:      Conjunctiva/sclera: Conjunctivae normal.      Pupils: Pupils are equal, round, and reactive to light.   Cardiovascular:      Rate and Rhythm: Normal rate and regular rhythm.      Heart sounds: Murmur heard.   Pulmonary:      Effort: Pulmonary effort is normal. No respiratory distress.      Breath sounds: Rales present. No wheezing.      Comments: Decreased air movement to L base. Satting well on RA  Abdominal:      General: Bowel sounds are normal. There is distension.      Palpations: Abdomen is soft.      Tenderness: There is no abdominal tenderness. There is no guarding.   Musculoskeletal:         General: No tenderness. Normal range of motion.      Cervical back: Normal range of motion and neck supple.      Right lower leg: Edema (2+ pitting) present.      Left lower leg: Edema (2+ pitting) present.   Lymphadenopathy:      Cervical: No cervical adenopathy.   Skin:     General: Skin is warm and dry.      Capillary Refill: Capillary refill takes less than 2 seconds.      Findings: No rash.   Neurological:      Mental Status: She is alert and oriented to person, place,  and time.      Cranial Nerves: No cranial nerve deficit.      Sensory: No sensory deficit.      Coordination: Coordination normal.   Psychiatric:         Behavior: Behavior normal.         Thought Content: Thought content normal.         Judgment: Judgment normal.              CRANIAL NERVES     CN III, IV, VI   Pupils are equal, round, and reactive to light.       Significant Labs: All pertinent labs within the past 24 hours have been reviewed.  CBC:   Recent Labs   Lab 08/30/24  0934 08/30/24  1155   WBC 3.87* 3.72*   HGB 9.4* 10.0*   HCT 29.2* 30.8*   * 125*     CMP:   Recent Labs   Lab 08/30/24  0934 08/30/24  1155    139   K 3.4* 3.1*    108   CO2 22* 22*    102   BUN 9 9   CREATININE 1.4 1.4   CALCIUM 8.7 8.6*   PROT 7.5 7.7   ALBUMIN 2.4* 2.5*   BILITOT 1.5* 1.6*   ALKPHOS 39* 45*   AST 19 22   ALT 7* 5*   ANIONGAP 10 9     Cardiac Markers:   Recent Labs   Lab 08/30/24  1155   *     Coagulation:   Recent Labs   Lab 08/30/24  1155   INR 1.2   APTT 30.8     Troponin:   Recent Labs   Lab 08/30/24  1155   TROPONINI 0.152*       Significant Imaging: I have reviewed all pertinent imaging results/findings within the past 24 hours.  Imaging Results              X-Ray Chest AP Portable (Final result)  Result time 08/30/24 15:21:53      Final result by Dov Mcmahan MD (08/30/24 15:21:53)                   Impression:      Stable examination findings of large left-sided layering pleural effusion and trace right-sided pleural effusion.    Unchanged right basilar airspace opacity.      Electronically signed by: Dov Mcmahan MD  Date:    08/30/2024  Time:    15:21               Narrative:    EXAMINATION:  XR CHEST AP PORTABLE    CLINICAL HISTORY:  Shortness of breath.    TECHNIQUE:  Single frontal view of the chest was performed.    COMPARISON:  08/22/2024.    11/26/2023.    FINDINGS:  Monitoring EKG leads are present.  The trachea is unremarkable.  There is unchanged appearance of  obscuration of the left aspect of the cardiomediastinal silhouette.  There is no evidence of free air beneath hemidiaphragms.  There is a trace right-sided pleural effusion.  There is unchanged appearance of a large layering left-sided pleural effusion.  There is no evidence of a pneumothorax.  There is no evidence of pneumomediastinum.  The pulmonary vascularity is stable.  There is unchanged right basilar airspace opacity.  There are degenerative changes in the osseous structures.

## 2024-08-30 NOTE — ASSESSMENT & PLAN NOTE
- trop 0.152; will trend q6h  - denies chest pain   - likely 2/2 demand ischemia in setting of hypervolemia in decompensated cirrhosis  - EKG with low QRS, incomplete RBBB. Appears similar to priors  - recent echo reviewed. Consider repeat echo pending trop trend  - recent stress test in May negative for ischemia   - CBC, CMP (goal Mag>2, K>4) daily  - cardiac telemetry

## 2024-08-30 NOTE — H&P
[facility] Dept  Hospital Medicine  History & Physical    Patient Name: Seema Gann  MRN: [id number]  Patient Class: OP- Observation  Admission Date: 8/30/2024  Attending Physician: Fernandez White MD   Primary Care Provider: Bethany Mesa MD         Patient information was obtained from patient and ER records.     Subjective:     Principal Problem:Decompensated cirrhosis related to hepatitis C virus (HCV)    Chief Complaint:   Chief Complaint   Patient presents with    Multiple complaints     My dr called me come to er to get fluid off stomach, daughter reports  mri cancelled due to sob    Shortness of Breath        HPI: Seema Gann is a 78 y.o. F with PMHx of HCV s/p Harvoni (2015), cirrhosis with recurrent ascites, pancytopenia, portal HTN with portal vein occlusion, h/o HCC s/p Y90, CKD 3b-4, HTN who presents today with SOB and abdominal distension. She had an Abdominal MRI today that was ordered by her hepatologist Dr. White, but was unable to lay flat due to SOB. She was recently hospitalized from 08/22- 08/23 for similar symptoms and received a paracentesis with 2.6L removed. She is supposed to be receiving therapeutic paracentesis every 2 weeks with IR OP, but this has not been set up yet. Her daughter Bri is at bedside and is very frustrated as she has been calling daily trying to get the patient's paracentesis scheduled (multiple telephone calls also noted on chart review). Patient has been compliant with all of her medications, including her lasix and aldactone. Denies fever, chills, chest pain, palpitations, cough, abdominal pain, n/v/d, dysuria, headaches.     In ED: Afebrile. HDS. Satting well on RA. CBC with chronic pancytopenia (at baseline). INR 1.2. CMP notable for K 3.1, ALP 45, T bili 1.6. Troponin 0.152. Given 20 mEq K. Placed in observation with hospital medicine for decompensated cirrhosis.      Past Medical History:   Diagnosis Date    Cirrhosis     HCC  (hepatocellular carcinoma)     Hepatitis     Hypertension        Past Surgical History:   Procedure Laterality Date    APPENDECTOMY      HYSTERECTOMY         Review of patient's allergies indicates:  No Known Allergies    No current facility-administered medications on file prior to encounter.     Current Outpatient Medications on File Prior to Encounter   Medication Sig    amLODIPine (NORVASC) 5 MG tablet Take 1 tablet (5 mg total) by mouth once daily.    carvediloL (COREG) 3.125 MG tablet Take 1 tablet (3.125 mg total) by mouth 2 (two) times daily with meals.    cyproheptadine (PERIACTIN) 4 mg tablet Take 1 tablet (4 mg total) by mouth 3 (three) times daily.    furosemide (LASIX) 20 MG tablet Take 2 tablets (40 mg total) by mouth once daily.    lisinopriL (PRINIVIL,ZESTRIL) 20 MG tablet Take 1 tablet (20 mg total) by mouth once daily.    spironolactone (ALDACTONE) 100 MG tablet Take 1 tablet (100 mg total) by mouth once daily.     Family History    None       Tobacco Use    Smoking status: Never     Passive exposure: Never    Smokeless tobacco: Never   Substance and Sexual Activity    Alcohol use: No    Drug use: No    Sexual activity: Yes     Review of Systems   Constitutional:  Negative for activity change, chills and fever.   HENT:  Negative for trouble swallowing.    Eyes:  Negative for photophobia and visual disturbance.   Respiratory:  Positive for shortness of breath. Negative for cough and chest tightness.    Cardiovascular:  Positive for leg swelling. Negative for chest pain and palpitations.   Gastrointestinal:  Positive for abdominal distention. Negative for abdominal pain, constipation, diarrhea, nausea and vomiting.   Genitourinary:  Negative for dysuria, frequency and hematuria.   Musculoskeletal:  Negative for back pain, gait problem and neck pain.   Skin:  Negative for rash and wound.   Neurological:  Negative for dizziness, syncope, speech difficulty and light-headedness.   Psychiatric/Behavioral:   Negative for agitation and confusion. The patient is not nervous/anxious.      Objective:     Vital Signs (Most Recent):  Temp: 97.9 °F (36.6 °C) (08/30/24 1356)  Pulse: 96 (08/30/24 1356)  Resp: 20 (08/30/24 1356)  BP: 108/77 (08/30/24 1356)  SpO2: 100 % (08/30/24 1356) Vital Signs (24h Range):  Temp:  [97.7 °F (36.5 °C)-97.9 °F (36.6 °C)] 97.9 °F (36.6 °C)  Pulse:  [] 96  Resp:  [18-20] 20  SpO2:  [98 %-100 %] 100 %  BP: (108-135)/(76-85) 108/77     Weight: 50.8 kg (112 lb)  Body mass index is 19.22 kg/m².     Physical Exam  Vitals and nursing note reviewed.   Constitutional:       General: She is not in acute distress.     Appearance: She is well-developed.   HENT:      Head: Normocephalic and atraumatic.      Mouth/Throat:      Pharynx: No oropharyngeal exudate.   Eyes:      Conjunctiva/sclera: Conjunctivae normal.      Pupils: Pupils are equal, round, and reactive to light.   Cardiovascular:      Rate and Rhythm: Normal rate and regular rhythm.      Heart sounds: Murmur heard.   Pulmonary:      Effort: Pulmonary effort is normal. No respiratory distress.      Breath sounds: Rales present. No wheezing.      Comments: Decreased air movement to L base. Satting well on RA  Abdominal:      General: Bowel sounds are normal. There is distension.      Palpations: Abdomen is soft.      Tenderness: There is no abdominal tenderness. There is no guarding.   Musculoskeletal:         General: No tenderness. Normal range of motion.      Cervical back: Normal range of motion and neck supple.      Right lower leg: Edema (2+ pitting) present.      Left lower leg: Edema (2+ pitting) present.   Lymphadenopathy:      Cervical: No cervical adenopathy.   Skin:     General: Skin is warm and dry.      Capillary Refill: Capillary refill takes less than 2 seconds.      Findings: No rash.   Neurological:      Mental Status: She is alert and oriented to person, place, and time.      Cranial Nerves: No cranial nerve deficit.       Sensory: No sensory deficit.      Coordination: Coordination normal.   Psychiatric:         Behavior: Behavior normal.         Thought Content: Thought content normal.         Judgment: Judgment normal.              CRANIAL NERVES     CN III, IV, VI   Pupils are equal, round, and reactive to light.       Significant Labs: All pertinent labs within the past 24 hours have been reviewed.  CBC:   Recent Labs   Lab 08/30/24  0934 08/30/24  1155   WBC 3.87* 3.72*   HGB 9.4* 10.0*   HCT 29.2* 30.8*   * 125*     CMP:   Recent Labs   Lab 08/30/24  0934 08/30/24  1155    139   K 3.4* 3.1*    108   CO2 22* 22*    102   BUN 9 9   CREATININE 1.4 1.4   CALCIUM 8.7 8.6*   PROT 7.5 7.7   ALBUMIN 2.4* 2.5*   BILITOT 1.5* 1.6*   ALKPHOS 39* 45*   AST 19 22   ALT 7* 5*   ANIONGAP 10 9     Cardiac Markers:   Recent Labs   Lab 08/30/24  1155   *     Coagulation:   Recent Labs   Lab 08/30/24  1155   INR 1.2   APTT 30.8     Troponin:   Recent Labs   Lab 08/30/24  1155   TROPONINI 0.152*       Significant Imaging: I have reviewed all pertinent imaging results/findings within the past 24 hours.  Imaging Results              X-Ray Chest AP Portable (Final result)  Result time 08/30/24 15:21:53      Final result by Dov Mcmahan MD (08/30/24 15:21:53)                   Impression:      Stable examination findings of large left-sided layering pleural effusion and trace right-sided pleural effusion.    Unchanged right basilar airspace opacity.      Electronically signed by: Dov Mcmahan MD  Date:    08/30/2024  Time:    15:21               Narrative:    EXAMINATION:  XR CHEST AP PORTABLE    CLINICAL HISTORY:  Shortness of breath.    TECHNIQUE:  Single frontal view of the chest was performed.    COMPARISON:  08/22/2024.    11/26/2023.    FINDINGS:  Monitoring EKG leads are present.  The trachea is unremarkable.  There is unchanged appearance of obscuration of the left aspect of the cardiomediastinal silhouette.   There is no evidence of free air beneath hemidiaphragms.  There is a trace right-sided pleural effusion.  There is unchanged appearance of a large layering left-sided pleural effusion.  There is no evidence of a pneumothorax.  There is no evidence of pneumomediastinum.  The pulmonary vascularity is stable.  There is unchanged right basilar airspace opacity.  There are degenerative changes in the osseous structures.                                      Assessment/Plan:     * Decompensated cirrhosis related to hepatitis C virus (HCV)  Patient with known Cirrhosis with Child's class B. Co-morbidities are present and inclusive of ascites, portal hypertension, portal venous thrombosis, malnutrition, and anemia/pancytopenia.  MELD-Na score calculated; MELD 3.0: 16 at 8/30/2024 11:55 AM  MELD-Na: 13 at 8/30/2024 11:55 AM  Calculated from:  Serum Creatinine: 1.4 mg/dL at 8/30/2024 11:55 AM  Serum Sodium: 139 mmol/L (Using max of 137 mmol/L) at 8/30/2024 11:55 AM  Total Bilirubin: 1.6 mg/dL at 8/30/2024 11:55 AM  Serum Albumin: 2.5 g/dL at 8/30/2024 11:55 AM  INR(ratio): 1.2 at 8/30/2024 11:55 AM  Age at listing (hypothetical): 78 years  Sex: Female at 8/30/2024 11:55 AM    Continue chronic meds. Etiology likely Hepatitis. Will avoid any hepatotoxic meds, and monitor CBC/CMP/INR for synthetic function.   - she has been unable to obtain OP paracentesis. IR consulted for therapeutic paracentesis.   - start IV lasix 40 mg daily   - will hold DVT ppx for para  - patient/family need assistance setting up OP scheduled paracentesis     Elevated troponin  - trop 0.152; will trend q6h  - denies chest pain   - likely 2/2 demand ischemia in setting of hypervolemia in decompensated cirrhosis  - EKG with low QRS, incomplete RBBB. Appears similar to priors  - recent echo reviewed. Consider repeat echo pending trop trend  - recent stress test in May negative for ischemia   - CBC, CMP (goal Mag>2, K>4) daily  - cardiac telemetry    Pleural  effusion  Patient found to have large pleural effusion on imaging. I have personally reviewed and interpreted the following imaging: Xray. A thoracentesis was deferred. Most likely etiology includes Cirrhosis. Management to include Diuresis-- start daily IV lasix 40 mg  - continue po spironolactone   - strict I/O    Pancytopenia  This patient is found to have pancytopenia, the likely etiology is  cirrhosis , will monitor CBC Daily. Will transfuse red blood cells if the hemoglobin is <7g/dL (or <8 in the setting of ACS). Will transfuse platelets if platelet count is <50k (if undergoing surgical procedure or have active bleeding). Hold DVT prophylaxis if platelets are <50k. The patient's hemoglobin, white blood cell count, and platelet count results have been reviewed and are listed below.  Recent Labs   Lab 08/30/24  1155   HGB 10.0*   WBC 3.72*   *   - all levels at baseline     Hypokalemia  Patient's most recent potassium results are listed below.   Recent Labs     08/30/24  0934 08/30/24  1155   K 3.4* 3.1*     Plan  - Replete potassium per protocol  - Monitor potassium Daily  - Patient's hypokalemia is stable    Stage 3a chronic kidney disease  Creatine stable for now. BMP reviewed- noted Estimated Creatinine Clearance: 27 mL/min (based on SCr of 1.4 mg/dL). according to latest data. Based on current GFR, CKD stage is stage 3 - GFR 30-59.  Monitor UOP and serial BMP and adjust therapy as needed. Renally dose meds. Avoid nephrotoxic medications and procedures.    HCC (hepatocellular carcinoma)  S/p Y90 x 2, leading to cirrhosis. No residual disease per hepatology.   - MRI abd ordered for today, but unable to be performed due to her symptoms    Primary hypertension  - controlled on admit  - continue amlodipine 5 mg, coreg 3.125 mg BID, lisinopril 20 mg, and aldactone 100 mg  - IV diuresis as above    Chronic hepatitis C with cirrhosis  - see decompensated cirrhosis above        VTE Risk Mitigation (From  admission, onward)           Ordered     IP VTE HIGH RISK PATIENT  Once         08/30/24 1404                         On 08/30/2024, patient should be placed in hospital observation services under my care in collaboration with Dr. Fernandez White.           Thuy Cortez PA-C  Department of Hospital Medicine  [facility] - Emergency Dept

## 2024-08-30 NOTE — HPI
Seema Gann is a 78 y.o. F with PMHx of HCV s/p Brooke (2015), cirrhosis with recurrent ascites, pancytopenia, portal HTN with portal vein occlusion, h/o HCC s/p Y90, CKD 3b-4, HTN who presents today with SOB and abdominal distension. She had an Abdominal MRI today that was ordered by her hepatologist Dr. White, but was unable to lay flat due to SOB. She was recently hospitalized from 08/22- 08/23 for similar symptoms and received a paracentesis with 2.6L removed. She is supposed to be receiving therapeutic paracentesis every 2 weeks with IR OP, but this has not been set up yet. Her daughter Bri is at bedside and is very frustrated as she has been calling daily trying to get the patient's paracentesis scheduled (multiple telephone calls also noted on chart review). Patient has been compliant with all of her medications, including her lasix and aldactone. Denies fever, chills, chest pain, palpitations, cough, abdominal pain, n/v/d, dysuria, headaches.     In ED: Afebrile. HDS. Satting well on RA. CBC with chronic pancytopenia (at baseline). INR 1.2. CMP notable for K 3.1, ALP 45, T bili 1.6. Troponin 0.152. Given 20 mEq K. Placed in observation with hospital medicine for decompensated cirrhosis.

## 2024-08-30 NOTE — TELEPHONE ENCOUNTER
----- Message from Edilma Wang MA sent at 8/30/2024  2:37 PM CDT -----  Regarding: FW: Consult/Advisory  Contact: :Bri estrada    ----- Message -----  From: Corina Gordon  Sent: 8/30/2024   2:25 PM CDT  To: [facility] Staff  Subject: Consult/Advisory                                    Consult/Advisory     Name Of Caller:Bri daughter        Contact Preference:[phone]      Nature of call:Patients daughter is calling to let staff know that she is at ER getting IR done. Patients daughter is also not happy with it not being schedule and states she's been trying to schedule it for weeks. Requesting a call back ASAP

## 2024-08-30 NOTE — FIRST PROVIDER EVALUATION
"Medical screening examination initiated.  I have conducted a focused provider triage encounter, findings are as follows:    Brief history of present illness:  79 yo F with PMH of HCC who presents with abdominal distension. Her doctor called her this morning and referred her to the ED. She was unable to lay flat for an MRI today due to the swelling causing her feel short of breath when supine. Reportedly referred her to the ED for paracentesis    Vitals:    08/30/24 1110   BP: 135/85   Pulse: 98   Resp: 18   Temp: 97.7 °F (36.5 °C)   TempSrc: Oral   SpO2: 98%   Weight: 50.8 kg (112 lb)   Height: 5' 4" (1.626 m)       Pertinent physical exam:  abdomen is firm, distended    Brief workup plan:  labs, IV    Preliminary workup initiated; this workup will be continued and followed by the physician or advanced practice provider that is assigned to the patient when roomed.  "

## 2024-08-30 NOTE — ASSESSMENT & PLAN NOTE
Creatine stable for now. BMP reviewed- noted Estimated Creatinine Clearance: 27 mL/min (based on SCr of 1.4 mg/dL). according to latest data. Based on current GFR, CKD stage is stage 3 - GFR 30-59.  Monitor UOP and serial BMP and adjust therapy as needed. Renally dose meds. Avoid nephrotoxic medications and procedures.

## 2024-08-30 NOTE — ED PROVIDER NOTES
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 Pt with recent admission for paracentesis. Since discharge she has been unable to schedule an outpatient appointment for a paracentesis. Daughter reports worsening abdominal distension. Pt unable to tolerate MRI 2/2 shortness of breath when lying flat today. She denies fever, chills, chest pain, nausea, vomiting, or abdominal pain. Denies any shortness of breath at this time.     The history is provided by the patient.     Review of patient's allergies indicates:  No Known Allergies  Past Medical History:   Diagnosis Date    Cirrhosis     HCC (hepatocellular carcinoma)     Hepatitis     Hypertension      Past Surgical History:   Procedure Laterality Date    APPENDECTOMY      HYSTERECTOMY       No family history on file.  Social History     Tobacco Use    Smoking status: Never     Passive exposure: Never    Smokeless tobacco: Never   Substance Use Topics    Alcohol use: No    Drug use: No     Review of Systems    Physical Exam     Initial Vitals [08/30/24 1110]   BP Pulse Resp Temp SpO2   135/85 98 18 97.7 °F (36.5 °C) 98 %      MAP       --         Physical Exam    Nursing note and vitals reviewed.  Constitutional: No distress.   Chronically ill appearing    HENT:   Head: Normocephalic and atraumatic.   Eyes: EOM are normal. Pupils are equal, round, and reactive to light.   Neck: Neck supple. No tracheal deviation present.   Normal range of motion.  Cardiovascular:  Normal rate, regular rhythm and intact distal pulses.           No murmur heard.  Pulmonary/Chest: No stridor. No respiratory distress.   Decreased breath sounds at the left  lung base    Abdominal: Abdomen is soft. She exhibits distension. There is no abdominal tenderness.   Musculoskeletal:         General: Edema present. Normal range of motion.      Cervical back: Normal range of motion and neck supple.     Neurological: She is alert and oriented to person, place, and time.   Skin: Skin is warm and dry. Capillary refill takes less than 2 seconds.         ED Course   Procedures  Labs Reviewed   PROTIME-INR - Abnormal       Result Value    Prothrombin Time 13.4 (*)     INR 1.2     CBC W/ AUTO DIFFERENTIAL - Abnormal    WBC 3.72 (*)     RBC 3.19 (*)     Hemoglobin 10.0 (*)     Hematocrit 30.8 (*)     MCV 97      MCH 31.3 (*)     MCHC 32.5      RDW 13.5      Platelets 125 (*)     MPV 11.2      Immature Granulocytes 0.3      Gran # (ANC) 2.5      Immature Grans (Abs) 0.01      Lymph # 0.8 (*)     Mono # 0.3      Eos # 0.1      Baso # 0.01      nRBC 0      Gran % 67.4      Lymph % 22.6      Mono % 7.5      Eosinophil % 1.9      Basophil % 0.3      Differential Method Automated     COMPREHENSIVE METABOLIC PANEL - Abnormal    Sodium 139      Potassium 3.1 (*)     Chloride 108      CO2 22 (*)     Glucose 102      BUN 9      Creatinine 1.4      Calcium 8.6 (*)     Total Protein 7.7      Albumin 2.5 (*)     Total Bilirubin 1.6 (*)     Alkaline Phosphatase 45 (*)     AST 22      ALT 5 (*)     eGFR 38.7 (*)     Anion Gap 9     TROPONIN I - Abnormal    Troponin I 0.152 (*)     Narrative:     ADD ON TROP PER SHAHEEN STAPLES JR., MD ORDER# [id number] @  08/30/2024    13:35   add on [id number] per Shaheen Staples MD  08/30/2024  13:00 [place]  ADD ON BNP AS PER DR. SHAHEEN JR.   B-TYPE NATRIURETIC PEPTIDE - Abnormal     (*)     Narrative:     ADD ON TROP PER SHAHEEN STAPLES JR., MD ORDER# [id number] @  08/30/2024    13:35   add on [id number] per Shaheen Staples MD  08/30/2024  13:00 [place]  ADD ON BNP AS PER DR. SHAHEEN JR.   APTT    aPTT 30.8     MAGNESIUM   MAGNESIUM    Magnesium 1.7       Narrative:     add on [id number] per Shaheen Staples MD  08/30/2024  13:00 Cad[place]   TROPONIN I   B-TYPE NATRIURETIC PEPTIDE   URINALYSIS, REFLEX TO URINE CULTURE   TROPONIN I   TROPONIN I          Imaging Results              X-Ray Chest AP Portable (Final result)  Result time 08/30/24 15:21:53      Final result by Dov Mcmahan MD (08/30/24 15:21:53)                   Impression:      Stable examination findings of large left-sided layering pleural effusion and trace right-sided pleural effusion.    Unchanged right basilar airspace opacity.      Electronically signed by: Dov Mcmahan MD  Date:    08/30/2024  Time:    15:21               Narrative:    EXAMINATION:  XR CHEST AP PORTABLE    CLINICAL HISTORY:  Shortness of breath.    TECHNIQUE:  Single frontal view of the chest was performed.    COMPARISON:  08/22/2024.    11/26/2023.    FINDINGS:  Monitoring EKG leads are present.  The trachea is unremarkable.  There is unchanged appearance of obscuration of the left aspect of the cardiomediastinal silhouette.  There is no evidence of free air beneath hemidiaphragms.  There is a trace right-sided pleural effusion.  There is unchanged appearance of a large layering left-sided pleural effusion.  There is no evidence of a pneumothorax.  There is no evidence of pneumomediastinum.  The pulmonary vascularity is stable.  There is unchanged right basilar airspace opacity.  There are degenerative changes in the osseous structures.                                       Medications   sodium chloride 0.9% flush 5 mL (has no administration in time range)   albuterol-ipratropium 2.5 mg-0.5 mg/3 mL nebulizer solution 3 mL (has no administration in time range)   melatonin tablet 6 mg (has no administration in time range)   ondansetron disintegrating tablet 8 mg (has no administration in time range)   prochlorperazine injection Soln 5 mg (has no administration in time range)   polyethylene glycol packet 17 g (has no administration  in time range)   simethicone chewable tablet 80 mg (has no administration in time range)   aluminum-magnesium hydroxide-simethicone 200-200-20 mg/5 mL suspension 30 mL (has no administration in time range)   acetaminophen tablet 650 mg (has no administration in time range)   acetaminophen tablet 1,000 mg (has no administration in time range)   naloxone 0.4 mg/mL injection 0.02 mg (has no administration in time range)   amLODIPine tablet 5 mg (has no administration in time range)   carvediloL tablet 3.125 mg (has no administration in time range)   cyproheptadine 4 mg tablet 4 mg (has no administration in time range)   lisinopriL tablet 20 mg (has no administration in time range)   spironolactone tablet 100 mg (has no administration in time range)   furosemide injection 40 mg (has no administration in time range)   potassium bicarbonate disintegrating tablet 20 mEq (20 mEq Oral Given 8/30/24 1333)   furosemide injection 40 mg (40 mg Intravenous Given 8/30/24 1545)   potassium chloride SA CR tablet 20 mEq (20 mEq Oral Given 8/30/24 1544)     Medical Decision Making  Pt presents for shortness of breath and abdominal distension. Ddx: volume overload, decompensated cirrhosis, ACS, arrhythmia, pneumonia. Pt chronically ill appearing although in no acute distress on exam. Afebrile, hemodynamically stable, and without hypoxia. Physical exam with diminished breath sounds on the left and significant abdominal distension. Pt denies abdominal pain or tenderness. Low suspicion for SBP. Hx and physical exam concerning for volume overload. BNP elevated at 151. CXR redemonstrates pleural effusion. EKG without ST elevation. Trop elevated at 0.15. Pt denies any chest pain, will trend trop. Case discussed with hospital medicine, plan for admission for further management and evaluation     Amount and/or Complexity of Data Reviewed  Labs: ordered.  Radiology: ordered.    Risk  Prescription drug management.                                       Clinical Impression:  Final diagnoses:  [R68.89] Multiple complaints          ED Disposition Condition    Observation                 Shaheen Staples Jr., MD  Resident  08/30/24 3585

## 2024-08-30 NOTE — PROVIDER PROGRESS NOTES - EMERGENCY DEPT.
Encounter Date: 8/30/2024    ED Physician Progress Notes         EKG - STEMI Decision  Initial Reading: No STEMI present.

## 2024-08-30 NOTE — TELEPHONE ENCOUNTER
Call returned to Bri, daughter of the patient.  She stated they never called us about an appt for the Para. So I had to bring her into the ER.    She can't afford to keep going to the ER. She is on a fixed income. Bri informed to speak to Financial Services about burden with coming to the ED.    Unfortunately I am unable to schedule in the Radiology Dept.  I have sent messages requesting an appt for a Para. They should be reaching out to you soon.  Just let them know what date you would like to come in.    Bri stated I have tried reaching them? What number do you have? I don't have it with me right now. Please take down this number also if you need to call - [phone].    Bri said I think I will call [facility].  Bri informed that is an option you can use for your Mother.   Bri said I got to go the Dr is here.

## 2024-08-30 NOTE — ASSESSMENT & PLAN NOTE
Patient's most recent potassium results are listed below.   Recent Labs     08/30/24  0934 08/30/24  1155   K 3.4* 3.1*     Plan  - Replete potassium per protocol  - Monitor potassium Daily  - Patient's hypokalemia is stable

## 2024-08-30 NOTE — ED NOTES
Assumed care of the patient. Report received from CHANDAN Green. Pt on continuous cardiac monitoring, continuous pulse oximetry, and automatic BP cuff cycling Q30min. Pt in hospital gown, side rails up X2, bed low and locked, and call light is placed within reach. One family/visitors at bedside at this time. Pt denies any complaints or needs.         LOC: The patient is awake, alert and aware of environment with an appropriate affect, the patient is oriented x 3 and speaking appropriately.   APPEARANCE: Patient appears comfortable and in no acute distress, patient is clean and well groomed.  SKIN: The skin is warm and dry, color consistent with ethnicity.   MUSCULOSKELETAL: Patient moving all extremities spontaneously, no swelling noted.  RESPIRATORY: Airway is open and patent, respirations are spontaneous, patient has a normal effort and rate, no accessory muscle use noted. Pt denies SOB at this time.  CARDIAC: Patient has a normal rate and regular rhythm, no edema noted, capillary refill < 3 seconds.   GASTRO: Distention noted.  : Pt denies any pain or frequency with urination.  NEURO: Pt opens eyes spontaneously, behavior appropriate to situation, follows commands.

## 2024-08-30 NOTE — ED NOTES
Telemetry Verification   Patient placed on Telemetry Box  Verified with War Room  Box # [id number]   Monitor Tech Tarsha   Rate 98   Rhythm NSR

## 2024-08-30 NOTE — ASSESSMENT & PLAN NOTE
This patient is found to have pancytopenia, the likely etiology is  cirrhosis , will monitor CBC Daily. Will transfuse red blood cells if the hemoglobin is <7g/dL (or <8 in the setting of ACS). Will transfuse platelets if platelet count is <50k (if undergoing surgical procedure or have active bleeding). Hold DVT prophylaxis if platelets are <50k. The patient's hemoglobin, white blood cell count, and platelet count results have been reviewed and are listed below.  Recent Labs   Lab 08/30/24  1155   HGB 10.0*   WBC 3.72*   *   - all levels at baseline

## 2024-08-30 NOTE — ASSESSMENT & PLAN NOTE
Patient found to have large pleural effusion on imaging. I have personally reviewed and interpreted the following imaging: Xray. A thoracentesis was deferred. Most likely etiology includes Cirrhosis. Management to include Diuresis-- start daily IV lasix 40 mg  - continue po spironolactone   - strict I/O

## 2024-08-31 LAB
ALBUMIN SERPL BCP-MCNC: 1.9 G/DL (ref 3.5–5.2)
ALP SERPL-CCNC: 37 U/L (ref 55–135)
ALT SERPL W/O P-5'-P-CCNC: 5 U/L (ref 10–44)
ANION GAP SERPL CALC-SCNC: 8 MMOL/L (ref 8–16)
AST SERPL-CCNC: 18 U/L (ref 10–40)
BILIRUB SERPL-MCNC: 1.6 MG/DL (ref 0.1–1)
BUN SERPL-MCNC: 11 MG/DL (ref 8–23)
CALCIUM SERPL-MCNC: 7.9 MG/DL (ref 8.7–10.5)
CHLORIDE SERPL-SCNC: 109 MMOL/L (ref 95–110)
CO2 SERPL-SCNC: 21 MMOL/L (ref 23–29)
CREAT SERPL-MCNC: 1.4 MG/DL (ref 0.5–1.4)
ERYTHROCYTE [DISTWIDTH] IN BLOOD BY AUTOMATED COUNT: 13.4 % (ref 11.5–14.5)
EST. GFR  (NO RACE VARIABLE): 38.7 ML/MIN/1.73 M^2
GLUCOSE SERPL-MCNC: 75 MG/DL (ref 70–110)
HCT VFR BLD AUTO: 24.5 % (ref 37–48.5)
HGB BLD-MCNC: 8.3 G/DL (ref 12–16)
INR PPP: 1.4 (ref 0.8–1.2)
MAGNESIUM SERPL-MCNC: 1.6 MG/DL (ref 1.6–2.6)
MCH RBC QN AUTO: 32 PG (ref 27–31)
MCHC RBC AUTO-ENTMCNC: 33.9 G/DL (ref 32–36)
MCV RBC AUTO: 95 FL (ref 82–98)
PLATELET # BLD AUTO: 99 K/UL (ref 150–450)
PMV BLD AUTO: 11.6 FL (ref 9.2–12.9)
POTASSIUM SERPL-SCNC: 3.8 MMOL/L (ref 3.5–5.1)
PROT SERPL-MCNC: 6 G/DL (ref 6–8.4)
PROTHROMBIN TIME: 14.9 SEC (ref 9–12.5)
RBC # BLD AUTO: 2.59 M/UL (ref 4–5.4)
SODIUM SERPL-SCNC: 138 MMOL/L (ref 136–145)
WBC # BLD AUTO: 2.81 K/UL (ref 3.9–12.7)

## 2024-08-31 PROCEDURE — 85027 COMPLETE CBC AUTOMATED: CPT

## 2024-08-31 PROCEDURE — 83735 ASSAY OF MAGNESIUM: CPT

## 2024-08-31 PROCEDURE — P9047 ALBUMIN (HUMAN), 25%, 50ML: HCPCS | Mod: JZ,JG | Performed by: STUDENT IN AN ORGANIZED HEALTH CARE EDUCATION/TRAINING PROGRAM

## 2024-08-31 PROCEDURE — 99223 1ST HOSP IP/OBS HIGH 75: CPT | Mod: ,,, | Performed by: INTERNAL MEDICINE

## 2024-08-31 PROCEDURE — 96376 TX/PRO/DX INJ SAME DRUG ADON: CPT

## 2024-08-31 PROCEDURE — 25000003 PHARM REV CODE 250: Performed by: STUDENT IN AN ORGANIZED HEALTH CARE EDUCATION/TRAINING PROGRAM

## 2024-08-31 PROCEDURE — 36415 COLL VENOUS BLD VENIPUNCTURE: CPT

## 2024-08-31 PROCEDURE — 20600001 HC STEP DOWN PRIVATE ROOM

## 2024-08-31 PROCEDURE — 63600175 PHARM REV CODE 636 W HCPCS: Mod: JZ,JG | Performed by: STUDENT IN AN ORGANIZED HEALTH CARE EDUCATION/TRAINING PROGRAM

## 2024-08-31 PROCEDURE — 25000003 PHARM REV CODE 250

## 2024-08-31 PROCEDURE — 85610 PROTHROMBIN TIME: CPT

## 2024-08-31 PROCEDURE — 80053 COMPREHEN METABOLIC PANEL: CPT

## 2024-08-31 PROCEDURE — 63600175 PHARM REV CODE 636 W HCPCS

## 2024-08-31 RX ORDER — MIDODRINE HYDROCHLORIDE 5 MG/1
5 TABLET ORAL 3 TIMES DAILY
Status: DISCONTINUED | OUTPATIENT
Start: 2024-08-31 | End: 2024-09-01

## 2024-08-31 RX ORDER — ALBUMIN HUMAN 250 G/1000ML
25 SOLUTION INTRAVENOUS ONCE
Status: COMPLETED | OUTPATIENT
Start: 2024-08-31 | End: 2024-08-31

## 2024-08-31 RX ADMIN — LISINOPRIL 20 MG: 5 TABLET ORAL at 10:08

## 2024-08-31 RX ADMIN — FUROSEMIDE 40 MG: 10 INJECTION, SOLUTION INTRAMUSCULAR; INTRAVENOUS at 10:08

## 2024-08-31 RX ADMIN — AMLODIPINE BESYLATE 5 MG: 5 TABLET ORAL at 10:08

## 2024-08-31 RX ADMIN — MIDODRINE HYDROCHLORIDE 5 MG: 5 TABLET ORAL at 08:08

## 2024-08-31 RX ADMIN — ALBUMIN (HUMAN) 25 G: 12.5 SOLUTION INTRAVENOUS at 04:08

## 2024-08-31 RX ADMIN — SPIRONOLACTONE 100 MG: 50 TABLET ORAL at 10:08

## 2024-08-31 RX ADMIN — MIDODRINE HYDROCHLORIDE 5 MG: 5 TABLET ORAL at 06:08

## 2024-08-31 RX ADMIN — CARVEDILOL 3.12 MG: 3.12 TABLET, FILM COATED ORAL at 10:08

## 2024-08-31 NOTE — PROGRESS NOTES
[facility] Medicine  Progress Note    Patient Name: Seema Gann  MRN: [id number]  Patient Class: IP- Inpatient   Admission Date: 8/30/2024  Length of Stay: 0 days  Attending Physician: Fernandez White MD  Primary Care Provider: Bethany Mesa MD        Subjective:     Principal Problem:Decompensated cirrhosis related to hepatitis C virus (HCV)        HPI:  Seema Gann is a 78 y.o. F with PMHx of HCV s/p Brooke (2015), cirrhosis with recurrent ascites, pancytopenia, portal HTN with portal vein occlusion, h/o HCC s/p Y90, CKD 3b-4, HTN who presents today with SOB and abdominal distension. She had an Abdominal MRI today that was ordered by her hepatologist Dr. White, but was unable to lay flat due to SOB. She was recently hospitalized from 08/22- 08/23 for similar symptoms and received a paracentesis with 2.6L removed. She is supposed to be receiving therapeutic paracentesis every 2 weeks with IR OP, but this has not been set up yet. Her daughter Bri is at bedside and is very frustrated as she has been calling daily trying to get the patient's paracentesis scheduled (multiple telephone calls also noted on chart review). Patient has been compliant with all of her medications, including her lasix and aldactone. Denies fever, chills, chest pain, palpitations, cough, abdominal pain, n/v/d, dysuria, headaches.     In ED: Afebrile. HDS. Satting well on RA. CBC with chronic pancytopenia (at baseline). INR 1.2. CMP notable for K 3.1, ALP 45, T bili 1.6. Troponin 0.152. Given 20 mEq K. Placed in observation with hospital medicine for decompensated cirrhosis.      Overview/Hospital Course:  8/31- Blood pressure low, midodrine 5mg TID started. Given 1x albumin. Paracentesis ordered inpatient and Consult to case management for helping arrange outpatient paracentesis.     Interval History    NAEON. Blood pressure on the lower side and albumin started and given 1time albumin.  Stephanie dd boost for her.     Review of Systems   Constitutional:  Negative for activity change, chills and fever.   HENT:  Negative for trouble swallowing.    Eyes:  Negative for visual disturbance.   Respiratory:  Positive for shortness of breath. Negative for cough and chest tightness.    Cardiovascular:  Positive for leg swelling. Negative for chest pain and palpitations.   Gastrointestinal:  Positive for abdominal distention. Negative for abdominal pain, diarrhea, nausea and vomiting.   Genitourinary:  Negative for dysuria and frequency.   Musculoskeletal:  Negative for back pain, gait problem and neck pain.   Skin:  Negative for rash.   Neurological:  Negative for dizziness and light-headedness.   Psychiatric/Behavioral:  Negative for agitation and confusion.      Objective:     Vital Signs (Most Recent):  Temp: 98.6 °F (37 °C) (08/31/24 1552)  Pulse: 66 (08/31/24 1552)  Resp: 18 (08/31/24 1552)  BP: (!) 83/53 (08/31/24 1552)  SpO2: 96 % (08/31/24 1552) Vital Signs (24h Range):  Temp:  [98.3 °F (36.8 °C)-99.5 °F (37.5 °C)] 98.6 °F (37 °C)  Pulse:  [66-96] 66  Resp:  [16-20] 18  SpO2:  [96 %-100 %] 96 %  BP: ()/(53-80) 83/53     Weight: 54.1 kg (119 lb 4.3 oz)  Body mass index is 20.47 kg/m².     Physical Exam  Vitals and nursing note reviewed.   Constitutional:       General: She is not in acute distress.     Appearance: She is well-developed.   HENT:      Head: Normocephalic and atraumatic.      Mouth/Throat:      Pharynx: No oropharyngeal exudate.   Eyes:      Conjunctiva/sclera: Conjunctivae normal.   Cardiovascular:      Rate and Rhythm: Normal rate and regular rhythm.      Heart sounds: Murmur heard.   Pulmonary:      Effort: Pulmonary effort is normal. No respiratory distress.      Breath sounds: Rales present. No wheezing.      Comments: Decreased air movement to L base. Satting well on RA  Abdominal:      General: Bowel sounds are normal. There is distension.      Palpations: Abdomen is soft.       Tenderness: There is no abdominal tenderness. There is no guarding.   Musculoskeletal:         General: No tenderness. Normal range of motion.      Cervical back: Normal range of motion and neck supple.      Right lower leg: Edema (2+ pitting) present.      Left lower leg: Edema (2+ pitting) present.   Lymphadenopathy:      Cervical: No cervical adenopathy.   Skin:     General: Skin is warm and dry.      Capillary Refill: Capillary refill takes less than 2 seconds.      Findings: No rash.   Neurological:      Mental Status: She is alert and oriented to person, place, and time.      Cranial Nerves: No cranial nerve deficit.      Sensory: No sensory deficit.      Coordination: Coordination normal.   Psychiatric:         Behavior: Behavior normal.         Thought Content: Thought content normal.         Judgment: Judgment normal.                Significant Labs: All pertinent labs within the past 24 hours have been reviewed.  CBC:   Recent Labs   Lab 08/30/24  0934 08/30/24  1155 08/31/24  0631   WBC 3.87* 3.72* 2.81*   HGB 9.4* 10.0* 8.3*   HCT 29.2* 30.8* 24.5*   * 125* 99*     CMP:   Recent Labs   Lab 08/30/24  0934 08/30/24  1155 08/31/24  0631    139 138   K 3.4* 3.1* 3.8    108 109   CO2 22* 22* 21*    102 75   BUN 9 9 11   CREATININE 1.4 1.4 1.4   CALCIUM 8.7 8.6* 7.9*   PROT 7.5 7.7 6.0   ALBUMIN 2.4* 2.5* 1.9*   BILITOT 1.5* 1.6* 1.6*   ALKPHOS 39* 45* 37*   AST 19 22 18   ALT 7* 5* 5*   ANIONGAP 10 9 8     Cardiac Markers:   Recent Labs   Lab 08/30/24  1155   *     Coagulation:   Recent Labs   Lab 08/30/24  1155 08/31/24  0631   INR 1.2 1.4*   APTT 30.8  --      Troponin:   Recent Labs   Lab 08/30/24  1155 08/30/24  1726 08/30/24  2147   TROPONINI 0.152* 0.151* 0.140*       Significant Imaging: I have reviewed all pertinent imaging results/findings within the past 24 hours.    Assessment/Plan:      * Decompensated cirrhosis related to hepatitis C virus (HCV)  Patient with  known Cirrhosis with Child's class B. Co-morbidities are present and inclusive of ascites, portal hypertension, portal venous thrombosis, malnutrition, and anemia/pancytopenia.  MELD-Na score calculated; MELD 3.0: 18 at 8/31/2024  6:31 AM  MELD-Na: 15 at 8/31/2024  6:31 AM  Calculated from:  Serum Creatinine: 1.4 mg/dL at 8/31/2024  6:31 AM  Serum Sodium: 138 mmol/L (Using max of 137 mmol/L) at 8/31/2024  6:31 AM  Total Bilirubin: 1.6 mg/dL at 8/31/2024  6:31 AM  Serum Albumin: 1.9 g/dL at 8/31/2024  6:31 AM  INR(ratio): 1.4 at 8/31/2024  6:31 AM  Age at listing (hypothetical): 78 years  Sex: Female at 8/31/2024  6:31 AM    Continue chronic meds. Etiology likely Hepatitis. Will avoid any hepatotoxic meds, and monitor CBC/CMP/INR for synthetic function.   - she has been unable to obtain OP paracentesis. IR consulted for therapeutic paracentesis.   - start IV lasix 40 mg daily   - will hold DVT ppx for para  - patient/family need assistance setting up OP scheduled paracentesis     Elevated troponin  - trop 0.152 -> 0.151-> 0.140- denies chest pain   - likely 2/2 demand ischemia in setting of hypervolemia in decompensated cirrhosis  - EKG with low QRS, incomplete RBBB. Appears similar to priors  - recent echo reviewed. Consider repeat echo pending trop trend  - recent stress test in May negative for ischemia   -  cardiac telemetry    Pleural effusion  Patient found to have large pleural effusion on imaging. I have personally reviewed and interpreted the following imaging: Xray. A thoracentesis was deferred. Most likely etiology includes Cirrhosis. Management to include Diuresis-- start daily IV lasix 40 mg  - continue po spironolactone   - strict I/O    Hypokalemia  Patient's most recent potassium results are listed below.   Recent Labs     08/30/24  0934 08/30/24  1155 08/31/24  0631   K 3.4* 3.1* 3.8       Plan  - Replete potassium per protocol  - Monitor potassium Daily  - Patient's hypokalemia is stable    Stage 3a  chronic kidney disease  Creatine stable for now. BMP reviewed- noted Estimated Creatinine Clearance: 28.7 mL/min (based on SCr of 1.4 mg/dL). according to latest data. Based on current GFR, CKD stage is stage 3 - GFR 30-59.  Monitor UOP and serial BMP and adjust therapy as needed. Renally dose meds. Avoid nephrotoxic medications and procedures.    Pancytopenia  This patient is found to have pancytopenia, the likely etiology is  cirrhosis , will monitor CBC Daily. Will transfuse red blood cells if the hemoglobin is <7g/dL (or <8 in the setting of ACS). Will transfuse platelets if platelet count is <50k (if undergoing surgical procedure or have active bleeding). Hold DVT prophylaxis if platelets are <50k. The patient's hemoglobin, white blood cell count, and platelet count results have been reviewed and are listed below.  Recent Labs   Lab 08/31/24  0631   HGB 8.3*   WBC 2.81*   PLT 99*     - all levels at baseline     HCC (hepatocellular carcinoma)  S/p Y90 x 2, leading to cirrhosis. No residual disease per hepatology.   - MRI abd was ordered outpatient but unable to be performed due to her symptoms, hepatology suggested to defer it for now     Primary hypertension  - controlled on admit  - coreg 3.125 mg BID,and aldactone 100 mg  - holding amlodipine and lisinopril as needing midodrine   - IV diuresis as above    Chronic hepatitis C with cirrhosis  - see decompensated cirrhosis above        VTE Risk Mitigation (From admission, onward)           Ordered     IP VTE HIGH RISK PATIENT  Once         08/30/24 1404                    Discharge Planning   LETA: 9/2/2024     Code Status: DNR   Is the patient medically ready for discharge?:     Reason for patient still in hospital (select all that apply): Patient trending condition, Laboratory test, Treatment, and Consult recommendations  Discharge Plan A: Home with family                  Fernandez White MD  Department of Hospital Medicine   [facility]

## 2024-08-31 NOTE — SUBJECTIVE & OBJECTIVE
Interval History    NAEON. Blood pressure on the lower side and albumin started and given 1time albumin. Stephanie dd boost for her.     Review of Systems   Constitutional:  Negative for activity change, chills and fever.   HENT:  Negative for trouble swallowing.    Eyes:  Negative for visual disturbance.   Respiratory:  Positive for shortness of breath. Negative for cough and chest tightness.    Cardiovascular:  Positive for leg swelling. Negative for chest pain and palpitations.   Gastrointestinal:  Positive for abdominal distention. Negative for abdominal pain, diarrhea, nausea and vomiting.   Genitourinary:  Negative for dysuria and frequency.   Musculoskeletal:  Negative for back pain, gait problem and neck pain.   Skin:  Negative for rash.   Neurological:  Negative for dizziness and light-headedness.   Psychiatric/Behavioral:  Negative for agitation and confusion.      Objective:     Vital Signs (Most Recent):  Temp: 98.6 °F (37 °C) (08/31/24 1552)  Pulse: 66 (08/31/24 1552)  Resp: 18 (08/31/24 1552)  BP: (!) 83/53 (08/31/24 1552)  SpO2: 96 % (08/31/24 1552) Vital Signs (24h Range):  Temp:  [98.3 °F (36.8 °C)-99.5 °F (37.5 °C)] 98.6 °F (37 °C)  Pulse:  [66-96] 66  Resp:  [16-20] 18  SpO2:  [96 %-100 %] 96 %  BP: ()/(53-80) 83/53     Weight: 54.1 kg (119 lb 4.3 oz)  Body mass index is 20.47 kg/m².     Physical Exam  Vitals and nursing note reviewed.   Constitutional:       General: She is not in acute distress.     Appearance: She is well-developed.   HENT:      Head: Normocephalic and atraumatic.      Mouth/Throat:      Pharynx: No oropharyngeal exudate.   Eyes:      Conjunctiva/sclera: Conjunctivae normal.   Cardiovascular:      Rate and Rhythm: Normal rate and regular rhythm.      Heart sounds: Murmur heard.   Pulmonary:      Effort: Pulmonary effort is normal. No respiratory distress.      Breath sounds: Rales present. No wheezing.      Comments: Decreased air movement to L base. Satting well on  RA  Abdominal:      General: Bowel sounds are normal. There is distension.      Palpations: Abdomen is soft.      Tenderness: There is no abdominal tenderness. There is no guarding.   Musculoskeletal:         General: No tenderness. Normal range of motion.      Cervical back: Normal range of motion and neck supple.      Right lower leg: Edema (2+ pitting) present.      Left lower leg: Edema (2+ pitting) present.   Lymphadenopathy:      Cervical: No cervical adenopathy.   Skin:     General: Skin is warm and dry.      Capillary Refill: Capillary refill takes less than 2 seconds.      Findings: No rash.   Neurological:      Mental Status: She is alert and oriented to person, place, and time.      Cranial Nerves: No cranial nerve deficit.      Sensory: No sensory deficit.      Coordination: Coordination normal.   Psychiatric:         Behavior: Behavior normal.         Thought Content: Thought content normal.         Judgment: Judgment normal.                Significant Labs: All pertinent labs within the past 24 hours have been reviewed.  CBC:   Recent Labs   Lab 08/30/24  0934 08/30/24  1155 08/31/24  0631   WBC 3.87* 3.72* 2.81*   HGB 9.4* 10.0* 8.3*   HCT 29.2* 30.8* 24.5*   * 125* 99*     CMP:   Recent Labs   Lab 08/30/24  0934 08/30/24  1155 08/31/24  0631    139 138   K 3.4* 3.1* 3.8    108 109   CO2 22* 22* 21*    102 75   BUN 9 9 11   CREATININE 1.4 1.4 1.4   CALCIUM 8.7 8.6* 7.9*   PROT 7.5 7.7 6.0   ALBUMIN 2.4* 2.5* 1.9*   BILITOT 1.5* 1.6* 1.6*   ALKPHOS 39* 45* 37*   AST 19 22 18   ALT 7* 5* 5*   ANIONGAP 10 9 8     Cardiac Markers:   Recent Labs   Lab 08/30/24  1155   *     Coagulation:   Recent Labs   Lab 08/30/24  1155 08/31/24  0631   INR 1.2 1.4*   APTT 30.8  --      Troponin:   Recent Labs   Lab 08/30/24  1155 08/30/24  1726 08/30/24  2147   TROPONINI 0.152* 0.151* 0.140*       Significant Imaging: I have reviewed all pertinent imaging results/findings within the past  24 hours.

## 2024-08-31 NOTE — PLAN OF CARE
08/31/24 1344   Readmission   Was this a planned readmission? No   Why were you hospitalized in the last 30 days? Decompensated cirrhosis   Why were you readmitted? Alarmed about signs/symptoms   When you left the hospital where did you go? Home with Family   Did patient/caregiver refused recommended DC plan? No   Tell me about what happened between when you left the hospital and the day you returned. Te same symptoms and problems started again8/28/24   When did you start not feeling well? 8/28/24   Did you call anyone? Yes   Did you try to see or did see a doctor or nurse before you came? No   Did you have  a follow-up appointment on discharge? Yes   Did you go? No   Why? Not feeling well

## 2024-08-31 NOTE — PLAN OF CARE
Patient is A/O x 3/ Patient lives at home with her -Jerome Teixeira. Patient is not on coumadin or dialysis. Patient will transport home at discharge with her family.   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 Yes   Do you have help at home or someone to help you manage your care at home? Yes   Who are your caregiver(s) and their phone number(s)? Jerome [phone]   Prior to hospitilization cognitive status: Alert/Oriented   Current cognitive status: Alert/Oriented   Walking or Climbing Stairs Difficulty no   Dressing/Bathing Difficulty no   Equipment Currently Used at Home none   Readmission within 30 days? Yes   Patient currently being followed by outpatient case management? No   Do you currently have service(s) that help you manage your care at home? No   Do you take prescription medications? Yes   Do you have prescription coverage? Yes   Coverage [employer] Managed Medicare-[facility]   Do you have any problems affording any of your prescribed medications? No   Is the patient taking medications as prescribed? yes   Who is going to help you get home at discharge? Jerome Teixeira-   How do you get to doctors appointments? family or friend will provide   Are you on dialysis? No   Do you take coumadin? No   Discharge Plan A Home with family   Discharge Plan B Home   DME Needed Upon Discharge  none   Transition of Care Barriers None   OTHER   Name(s) of People in Home Jerome Teixeira     [facility] - Transplant Stepdown  Initial Discharge Assessment       Primary Care  Provider: Bethany Mesa MD    Admission Diagnosis: Multiple complaints [R68.89]  Chest pain [R07.9]    Admission Date: 8/30/2024  Expected Discharge Date: 9/2/2024    Transition of Care Barriers: (P) None    Payor: HU[employer] MANAGED MEDICARE / Plan: HU[employer]NA SNP HMO PPO SPECIAL NEEDS / Product Type: Medicare Advantage /     Extended Emergency Contact Information  Primary Emergency Contact: Bri Hermosillo  Address: [address] [place] [address] [place]  Mobile Phone: [phone]  Relation: Daughter  Secondary Emergency Contact: Jerome Teixeira  Address: [address]           [place] [address] [facility]  Home Phone: [phone]  Mobile Phone: [phone]  Relation: Spouse    Discharge Plan A: (P) Home with family  Discharge Plan B: (P) Home      [facility] [place] [facility] [address]  [address]  [facility] [place] [address]  Phone: [phone] Fax: [phone]    [facility] - [place], [place] - [address]  [address]  [place] [address]  Phone: [phone] Fax: [phone]      Initial Assessment (most recent)       Adult Discharge Assessment - 08/31/24 1339          Discharge Assessment    Assessment Type Discharge Planning Assessment (P)      Confirmed/corrected address, phone number and insurance Yes (P)      Confirmed Demographics Correct on Facesheet (P)      Source of Information patient;family;health record (P)      Does patient/caregiver understand observation status Yes (P)      Reason For Admission Decompensated 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 Yes (P)      Do you have help at home or someone to help you manage your care at home? Yes (P)      Who are your caregiver(s) and their phone number(s)? Jerome [phone] (P)      Prior to hospitilization cognitive status: Alert/Oriented (P)       Current cognitive status: Alert/Oriented (P)      Walking or Climbing Stairs Difficulty no (P)      Dressing/Bathing Difficulty no (P)      Equipment Currently Used at Home none (P)      Readmission within 30 days? Yes (P)      Patient currently being followed by outpatient case management? No (P)      Do you currently have service(s) that help you manage your care at home? No (P)      Do you take prescription medications? Yes (P)      Do you have prescription coverage? Yes (P)      Coverage [employer] Managed Medicare-[facility] (P)      Do you have any problems affording any of your prescribed medications? No (P)      Is the patient taking medications as prescribed? yes (P)      Who is going to help you get home at discharge? Jerome Teixeira- (P)      How do you get to doctors appointments? family or friend will provide (P)      Are you on dialysis? No (P)      Do you take coumadin? No (P)      Discharge Plan A Home with family (P)      Discharge Plan B Home (P)      DME Needed Upon Discharge  none (P)      Transition of Care Barriers None (P)         OTHER    Name(s) of People in Home Jerome Teixeira (P)

## 2024-08-31 NOTE — NURSING
@Nurses Note -- 4 Eyes      8/31/2024   12:17 AM      Skin assessed during: Admit      [x] No Altered Skin Integrity Present    []Prevention Measures Documented      [] Yes- Altered Skin Integrity Present or Discovered   [] LDA Added if Not in Epic (Describe Wound)   [] New Altered Skin Integrity was Present on Admit and Documented in LDA   [] Wound Image Taken    Wound Care Consulted? No    Attending Nurse:  Starr Subramanian RN/Staff Member:   Tala

## 2024-08-31 NOTE — PLAN OF CARE
Orientedx4. VSS. Afebrile. On room air. Denies pain. No skin breakdown noted. OOB and ambulatory with standby assist. NPO since midnight for Liver U/S this morning. Paracentesis ordered.    Telemetry in place, NSR/Sinus arrhythmia with irregular R-R intervals.     Fall precautions maintained. Call light within reach. Pt verbalized understanding to call nurse as needed. Daughter at bedside, attentive to patient.

## 2024-08-31 NOTE — CONSULTS
[facility]  Hepatology  Consult Note    Patient Name: Seema Gann  MRN: [id number]  Admission Date: 8/30/2024  Hospital Length of Stay: 0 days  Code Status: DNR   Attending Provider: Fernandez White MD   Consulting Provider: Sandee Villanueva MD  Primary Care Physician: Bethany Mesa MD  Principal Problem:Decompensated cirrhosis related to hepatitis C virus (HCV)    Inpatient consult to Hepatology  Consult performed by: Sandee Villanueva MD  Consult ordered by: Thuy Cortez PA-C        Subjective:     HPI: Seema Gann is a 78 y.o. female with history of HCV s/p Harvoni (2015), cirrhosis with recurrent ascites, pancytopenia, portal vein occlusion, h/o HCC s/p Y90, CKD 3b-4, HTN who presents today with SOB and abdominal distension, recurrent ascites. Hepatology has been consulted for assistance with management.     She had an Abdominal MRI today that was ordered by her hepatologist Dr. White to eval RPV thrombosis, but was unable to lay flat due to SOB. She was recently hospitalized from 08/22- 08/23 for similar symptoms and received a paracentesis with 2.6L removed. She is supposed to be receiving therapeutic paracentesis every 2 weeks with IR OP, but this has not been set up yet due to scheduling difficulties.      In ED: Afebrile. HDS. Satting well on RA. CBC with chronic pancytopenia (at baseline). INR 1.2. CMP notable for K 3.1, ALP 45, T bili 1.6. Troponin 0.152. Given 20 mEq K. Placed in observation with hospital medicine for decompensated cirrhosis.        Past Medical History:   Diagnosis Date    Cirrhosis     HCC (hepatocellular carcinoma)     Hepatitis     Hypertension        Past Surgical History:   Procedure Laterality Date    APPENDECTOMY      HYSTERECTOMY         No family history on file.    Social History     Socioeconomic History    Marital status:    Tobacco Use    Smoking status: Never     Passive exposure: Never    Smokeless tobacco: Never    Substance and Sexual Activity    Alcohol use: No    Drug use: No    Sexual activity: Yes   Social History Narrative    ** Merged History Encounter **          Social Determinants of Health     Financial Resource Strain: Low Risk  (8/23/2024)    Overall Financial Resource Strain (CARDIA)     Difficulty of Paying Living Expenses: Not very hard   Food Insecurity: No Food Insecurity (8/23/2024)    Hunger Vital Sign     Worried About Running Out of Food in the Last Year: Never true     Ran Out of Food in the Last Year: Never true   Transportation Needs: No Transportation Needs (8/23/2024)    TRANSPORTATION NEEDS     Transportation : No   Physical Activity: Inactive (8/23/2024)    Exercise Vital Sign     Days of Exercise per Week: 0 days     Minutes of Exercise per Session: 0 min   Stress: No Stress Concern Present (8/23/2024)    [employer] - Occupational Stress Questionnaire     Feeling of Stress : Not at all   Housing Stability: Low Risk  (8/23/2024)    Housing Stability Vital Sign     Unable to Pay for Housing in the Last Year: No     Homeless in the Last Year: No       No current facility-administered medications on file prior to encounter.     Current Outpatient Medications on File Prior to Encounter   Medication Sig Dispense Refill    amLODIPine (NORVASC) 5 MG tablet Take 1 tablet (5 mg total) by mouth once daily. 30 tablet 11    carvediloL (COREG) 3.125 MG tablet Take 1 tablet (3.125 mg total) by mouth 2 (two) times daily with meals. 180 tablet 3    furosemide (LASIX) 20 MG tablet Take 2 tablets (40 mg total) by mouth once daily. 60 tablet 3    cyproheptadine (PERIACTIN) 4 mg tablet Take 1 tablet (4 mg total) by mouth 3 (three) times daily. 90 tablet 0    lisinopriL (PRINIVIL,ZESTRIL) 20 MG tablet Take 1 tablet (20 mg total) by mouth once daily. 90 tablet 3    spironolactone (ALDACTONE) 100 MG tablet Take 1 tablet (100 mg total) by mouth once daily. 90 tablet 3       Review of  patient's allergies indicates:  No Known Allergies    ROS - negative except for otherwise stated in HPI      Objective:     Vitals:    08/31/24 0503   BP: 102/64   Pulse: 78   Resp: 18   Temp: 99.5 °F (37.5 °C)     Physical Exam:  Constitutional: not in acute distress   HENT: Head: Normal, normocephalic.  Eyes: conjunctiva clear and sclera nonicteric  Cardiovascular: 2+ distal pulses  Respiratory: normal chest expansion & respiratory effort and no accessory muscle use  GI: soft, distended, non tender   Skin: normal color on visualized skin, BL LE edema   Neurological: alert, oriented x3  Psychiatric: mood and affect are within normal limits, pt is a good historian; no memory problems were noted      Significant Labs:  Recent Labs   Lab 08/30/24  0934 08/30/24  1155   HGB 9.4* 10.0*       Lab Results   Component Value Date    WBC 3.72 (L) 08/30/2024    HGB 10.0 (L) 08/30/2024    HCT 30.8 (L) 08/30/2024    MCV 97 08/30/2024     (L) 08/30/2024       Lab Results   Component Value Date     08/30/2024    K 3.1 (L) 08/30/2024     08/30/2024    CO2 22 (L) 08/30/2024    BUN 9 08/30/2024    CREATININE 1.4 08/30/2024    CALCIUM 8.6 (L) 08/30/2024    ANIONGAP 9 08/30/2024    ESTGFRAFRICA >60.0 03/05/2018    EGFRNONAA 55.5 (A) 03/05/2018       Lab Results   Component Value Date    ALT 5 (L) 08/30/2024    AST 22 08/30/2024    GGT 45 01/16/2015    ALKPHOS 45 (L) 08/30/2024    BILITOT 1.6 (H) 08/30/2024       Lab Results   Component Value Date    INR 1.2 08/30/2024    INR 1.3 (H) 08/30/2024    INR 1.2 08/22/2024       Significant Imaging:  Reviewed pertinent radiology findings.       Assessment/Plan:   Seema Gann is a 78 y.o. female with history of HCV s/p Brooke (2015), cirrhosis with recurrent ascites, pancytopenia, portal vein occlusion, h/o HCC s/p Y90, CKD 3b-4, HTN who presents today with SOB and abdominal distension, recurrent ascites. Hepatology has been consulted for assistance with management.      Patient had issues arranging outpatient IR para, has been compliant with medications.     Discussed pleurx drain for ascites, at this time patient deferring and requesting intermittent IR paracentesis.     Problem List:  Decompensated cirrhosis related to hepatitis C virus (HCV)     Recommendations:  -Assistance with SW/CM for arranging IR paracentesis   -Continue supportive care with diuretics and LVP   -Discussed with primary      Thank you for involving us in the care of Seema Gann. Please call with any additional questions, concerns or changes in the patient's clinical status.       Sandee Villanueva MD  Gastroenterology & Hepatology Fellow PGY IV  [facility]

## 2024-08-31 NOTE — PLAN OF CARE
Pt aaox4.  Bed in low and locked position.  Nonskid socks in use.  Call bell, phone, food, drink within reach in bed or on bedside table. Daughter and other family at bedside throughout day and active in care.  All aware to call if needing asisstance.  Denies pain.  Abd distended.  IR cx for para.  Liver us done this am.  Tele in use.  BP 80/50 at 4pm - albumin X1 and midodrine po 5mg TID started.  Renal 1.5L FR diet.  Standby assist to bathroom.  See flowsheet for full assessment and details

## 2024-09-01 LAB
ALBUMIN FLD-MCNC: 1 G/DL
ALBUMIN SERPL BCP-MCNC: 2.3 G/DL (ref 3.5–5.2)
ALP SERPL-CCNC: 34 U/L (ref 55–135)
ALT SERPL W/O P-5'-P-CCNC: <5 U/L (ref 10–44)
ANION GAP SERPL CALC-SCNC: 6 MMOL/L (ref 8–16)
APPEARANCE FLD: CLEAR
AST SERPL-CCNC: 16 U/L (ref 10–40)
BILIRUB SERPL-MCNC: 1.1 MG/DL (ref 0.1–1)
BODY FLD TYPE: NORMAL
BODY FLUID SOURCE, LDH: NORMAL
BUN SERPL-MCNC: 13 MG/DL (ref 8–23)
CALCIUM SERPL-MCNC: 8 MG/DL (ref 8.7–10.5)
CHLORIDE SERPL-SCNC: 109 MMOL/L (ref 95–110)
CO2 SERPL-SCNC: 22 MMOL/L (ref 23–29)
COLOR FLD: YELLOW
CREAT SERPL-MCNC: 1.6 MG/DL (ref 0.5–1.4)
ERYTHROCYTE [DISTWIDTH] IN BLOOD BY AUTOMATED COUNT: 13.6 % (ref 11.5–14.5)
EST. GFR  (NO RACE VARIABLE): 33 ML/MIN/1.73 M^2
GLUCOSE SERPL-MCNC: 96 MG/DL (ref 70–110)
GRAM STN SPEC: NORMAL
GRAM STN SPEC: NORMAL
HCT VFR BLD AUTO: 25.3 % (ref 37–48.5)
HGB BLD-MCNC: 8.1 G/DL (ref 12–16)
INR PPP: 1.4 (ref 0.8–1.2)
LDH FLD L TO P-CCNC: 80 U/L
LYMPHOCYTES NFR FLD MANUAL: 81 %
MAGNESIUM SERPL-MCNC: 1.6 MG/DL (ref 1.6–2.6)
MCH RBC QN AUTO: 31 PG (ref 27–31)
MCHC RBC AUTO-ENTMCNC: 32 G/DL (ref 32–36)
MCV RBC AUTO: 97 FL (ref 82–98)
MESOTHL CELL NFR FLD MANUAL: 1 %
MONOS+MACROS NFR FLD MANUAL: 14 %
NEUTROPHILS NFR FLD MANUAL: 4 %
PLATELET # BLD AUTO: 113 K/UL (ref 150–450)
PMV BLD AUTO: 11.7 FL (ref 9.2–12.9)
POTASSIUM SERPL-SCNC: 3.3 MMOL/L (ref 3.5–5.1)
PROT FLD-MCNC: 2.6 G/DL
PROT SERPL-MCNC: 5.9 G/DL (ref 6–8.4)
PROTHROMBIN TIME: 15 SEC (ref 9–12.5)
RBC # BLD AUTO: 2.61 M/UL (ref 4–5.4)
SODIUM SERPL-SCNC: 137 MMOL/L (ref 136–145)
SPECIMEN SOURCE: NORMAL
SPECIMEN SOURCE: NORMAL
WBC # BLD AUTO: 3.18 K/UL (ref 3.9–12.7)
WBC # FLD: 103 /CU MM

## 2024-09-01 PROCEDURE — 84157 ASSAY OF PROTEIN OTHER: CPT | Performed by: STUDENT IN AN ORGANIZED HEALTH CARE EDUCATION/TRAINING PROGRAM

## 2024-09-01 PROCEDURE — 83615 LACTATE (LD) (LDH) ENZYME: CPT | Performed by: STUDENT IN AN ORGANIZED HEALTH CARE EDUCATION/TRAINING PROGRAM

## 2024-09-01 PROCEDURE — 83735 ASSAY OF MAGNESIUM: CPT

## 2024-09-01 PROCEDURE — 36415 COLL VENOUS BLD VENIPUNCTURE: CPT

## 2024-09-01 PROCEDURE — 25000003 PHARM REV CODE 250

## 2024-09-01 PROCEDURE — 63600175 PHARM REV CODE 636 W HCPCS

## 2024-09-01 PROCEDURE — 87075 CULTR BACTERIA EXCEPT BLOOD: CPT | Performed by: STUDENT IN AN ORGANIZED HEALTH CARE EDUCATION/TRAINING PROGRAM

## 2024-09-01 PROCEDURE — 20600001 HC STEP DOWN PRIVATE ROOM

## 2024-09-01 PROCEDURE — 49082 ABD PARACENTESIS: CPT

## 2024-09-01 PROCEDURE — 87070 CULTURE OTHR SPECIMN AEROBIC: CPT | Performed by: STUDENT IN AN ORGANIZED HEALTH CARE EDUCATION/TRAINING PROGRAM

## 2024-09-01 PROCEDURE — 63600175 PHARM REV CODE 636 W HCPCS: Performed by: STUDENT IN AN ORGANIZED HEALTH CARE EDUCATION/TRAINING PROGRAM

## 2024-09-01 PROCEDURE — 89051 BODY FLUID CELL COUNT: CPT | Performed by: STUDENT IN AN ORGANIZED HEALTH CARE EDUCATION/TRAINING PROGRAM

## 2024-09-01 PROCEDURE — 87205 SMEAR GRAM STAIN: CPT | Performed by: STUDENT IN AN ORGANIZED HEALTH CARE EDUCATION/TRAINING PROGRAM

## 2024-09-01 PROCEDURE — 25000003 PHARM REV CODE 250: Performed by: STUDENT IN AN ORGANIZED HEALTH CARE EDUCATION/TRAINING PROGRAM

## 2024-09-01 PROCEDURE — 63600175 PHARM REV CODE 636 W HCPCS: Mod: JZ,JG

## 2024-09-01 PROCEDURE — 82042 OTHER SOURCE ALBUMIN QUAN EA: CPT | Performed by: STUDENT IN AN ORGANIZED HEALTH CARE EDUCATION/TRAINING PROGRAM

## 2024-09-01 PROCEDURE — 85610 PROTHROMBIN TIME: CPT

## 2024-09-01 PROCEDURE — 80053 COMPREHEN METABOLIC PANEL: CPT

## 2024-09-01 PROCEDURE — 0W9G3ZZ DRAINAGE OF PERITONEAL CAVITY, PERCUTANEOUS APPROACH: ICD-10-PCS

## 2024-09-01 PROCEDURE — P9047 ALBUMIN (HUMAN), 25%, 50ML: HCPCS | Mod: JZ,JG

## 2024-09-01 PROCEDURE — 85027 COMPLETE CBC AUTOMATED: CPT

## 2024-09-01 RX ORDER — ALBUMIN HUMAN 250 G/1000ML
25 SOLUTION INTRAVENOUS ONCE AS NEEDED
Status: COMPLETED | OUTPATIENT
Start: 2024-09-01 | End: 2024-09-01

## 2024-09-01 RX ORDER — MIDODRINE HYDROCHLORIDE 5 MG/1
10 TABLET ORAL 3 TIMES DAILY
Status: DISCONTINUED | OUTPATIENT
Start: 2024-09-01 | End: 2024-09-02

## 2024-09-01 RX ORDER — MAGNESIUM SULFATE HEPTAHYDRATE 40 MG/ML
4 INJECTION, SOLUTION INTRAVENOUS ONCE
Status: COMPLETED | OUTPATIENT
Start: 2024-09-01 | End: 2024-09-01

## 2024-09-01 RX ADMIN — POTASSIUM BICARBONATE 25 MEQ: 978 TABLET, EFFERVESCENT ORAL at 09:09

## 2024-09-01 RX ADMIN — FUROSEMIDE 40 MG: 10 INJECTION, SOLUTION INTRAMUSCULAR; INTRAVENOUS at 08:09

## 2024-09-01 RX ADMIN — MIDODRINE HYDROCHLORIDE 5 MG: 5 TABLET ORAL at 09:09

## 2024-09-01 RX ADMIN — SPIRONOLACTONE 100 MG: 50 TABLET ORAL at 09:09

## 2024-09-01 RX ADMIN — MAGNESIUM SULFATE HEPTAHYDRATE 4 G: 40 INJECTION, SOLUTION INTRAVENOUS at 09:09

## 2024-09-01 RX ADMIN — CARVEDILOL 3.12 MG: 3.12 TABLET, FILM COATED ORAL at 09:09

## 2024-09-01 RX ADMIN — MIDODRINE HYDROCHLORIDE 10 MG: 5 TABLET ORAL at 08:09

## 2024-09-01 RX ADMIN — MIDODRINE HYDROCHLORIDE 5 MG: 5 TABLET ORAL at 03:09

## 2024-09-01 RX ADMIN — ALBUMIN (HUMAN) 25 G: 12.5 SOLUTION INTRAVENOUS at 03:09

## 2024-09-01 NOTE — PLAN OF CARE
Patient AAOx4. OOB standby assist. VSS. Tele NSR. Renal diet, 1500 ml FR. Inpatient hospital med general consulted. Mg ++ 1.6, T bili 1.1, AST 16, ALT <5, Mg++ 4g IVPB administered. Paracentesis completed at bedside, body fluids sent for testing, 3.5 L off. Midodrine 10 mg PO TID given for BP support. Albumin 25 g IVPB administered. Bed lowest setting, locked, 2x rails up, call light within reach, pt verbalize use.

## 2024-09-01 NOTE — ASSESSMENT & PLAN NOTE
Patient with known Cirrhosis with Child's class B. Co-morbidities are present and inclusive of ascites, portal hypertension, portal venous thrombosis, malnutrition, and anemia/pancytopenia.  MELD-Na score calculated; MELD 3.0: 17 at 9/1/2024  6:22 AM  MELD-Na: 15 at 9/1/2024  6:22 AM  Calculated from:  Serum Creatinine: 1.6 mg/dL at 9/1/2024  6:22 AM  Serum Sodium: 137 mmol/L at 9/1/2024  6:22 AM  Total Bilirubin: 1.1 mg/dL at 9/1/2024  6:22 AM  Serum Albumin: 2.3 g/dL at 9/1/2024  6:22 AM  INR(ratio): 1.4 at 9/1/2024  6:22 AM  Age at listing (hypothetical): 78 years  Sex: Female at 9/1/2024  6:22 AM    Continue chronic meds. Etiology likely Hepatitis. Will avoid any hepatotoxic meds, and monitor CBC/CMP/INR for synthetic function.   - she has been unable to obtain OP paracentesis. IR consulted for therapeutic paracentesis.   - start IV lasix 40 mg daily   - will hold DVT ppx for para  - patient/family need assistance setting up OP scheduled paracentesis

## 2024-09-01 NOTE — ASSESSMENT & PLAN NOTE
- controlled on admit  - coreg 3.125 mg BID,and aldactone 100 mg  - holding amlodipine and lisinopril as needing midodrine   - IV diuresis as above

## 2024-09-01 NOTE — PROGRESS NOTES
[facility] - Transplant [facility] Medicine  Progress Note    Patient Name: Seema Gann  MRN: [id number]  Patient Class: IP- Inpatient   Admission Date: 8/30/2024  Length of Stay: 1 days  Attending Physician: Fernandez White MD  Primary Care Provider: Bethany Mesa MD        Subjective:     Principal Problem:Decompensated cirrhosis related to hepatitis C virus (HCV)        HPI:  Seema Gann is a 78 y.o. F with PMHx of HCV s/p Brooke (2015), cirrhosis with recurrent ascites, pancytopenia, portal HTN with portal vein occlusion, h/o HCC s/p Y90, CKD 3b-4, HTN who presents today with SOB and abdominal distension. She had an Abdominal MRI today that was ordered by her hepatologist Dr. White, but was unable to lay flat due to SOB. She was recently hospitalized from 08/22- 08/23 for similar symptoms and received a paracentesis with 2.6L removed. She is supposed to be receiving therapeutic paracentesis every 2 weeks with IR OP, but this has not been set up yet. Her daughter Bri is at bedside and is very frustrated as she has been calling daily trying to get the patient's paracentesis scheduled (multiple telephone calls also noted on chart review). Patient has been compliant with all of her medications, including her lasix and aldactone. Denies fever, chills, chest pain, palpitations, cough, abdominal pain, n/v/d, dysuria, headaches.     In ED: Afebrile. HDS. Satting well on RA. CBC with chronic pancytopenia (at baseline). INR 1.2. CMP notable for K 3.1, ALP 45, T bili 1.6. Troponin 0.152. Given 20 mEq K. Placed in observation with hospital medicine for decompensated cirrhosis.      Overview/Hospital Course:  8/31- Blood pressure low, midodrine 5mg TID started. Given 1x albumin. Paracentesis ordered inpatient and Consult to case management for helping arrange outpatient paracentesis.     9/1- Planned for paracenteisis, plan for OOB. Replacing K and Mg. Case management requested to check status  on outpatient paracentesis setup assistance.    Interval History    NAEON.  Planned for paracenteisis, plan for OOB. Replacing K and Mg.     Review of Systems   Constitutional:  Negative for activity change, chills and fever.   HENT:  Negative for trouble swallowing.    Eyes:  Negative for visual disturbance.   Respiratory:  Positive for shortness of breath. Negative for cough and chest tightness.    Cardiovascular:  Positive for leg swelling. Negative for chest pain and palpitations.   Gastrointestinal:  Positive for abdominal distention. Negative for abdominal pain, diarrhea, nausea and vomiting.   Genitourinary:  Negative for dysuria and frequency.   Musculoskeletal:  Negative for back pain, gait problem and neck pain.   Skin:  Negative for rash.   Neurological:  Negative for dizziness and light-headedness.   Psychiatric/Behavioral:  Negative for agitation and confusion.      Objective:     Vital Signs (Most Recent):  Temp: 98.4 °F (36.9 °C) (09/01/24 1147)  Pulse: 85 (09/01/24 1147)  Resp: 18 (09/01/24 1147)  BP: (!) 87/53 (09/01/24 1147)  SpO2: 97 % (09/01/24 1147) Vital Signs (24h Range):  Temp:  [98.3 °F (36.8 °C)-99.5 °F (37.5 °C)] 98.4 °F (36.9 °C)  Pulse:  [66-90] 85  Resp:  [18] 18  SpO2:  [91 %-99 %] 97 %  BP: ()/(44-69) 87/53     Weight: 54.1 kg (119 lb 4.3 oz)  Body mass index is 20.47 kg/m².     Physical Exam  Vitals and nursing note reviewed.   Constitutional:       General: She is not in acute distress.     Appearance: She is well-developed.   HENT:      Head: Normocephalic and atraumatic.      Mouth/Throat:      Pharynx: No oropharyngeal exudate.   Eyes:      Conjunctiva/sclera: Conjunctivae normal.   Cardiovascular:      Rate and Rhythm: Normal rate and regular rhythm.      Heart sounds: Murmur heard.   Pulmonary:      Effort: Pulmonary effort is normal. No respiratory distress.      Breath sounds: Rales present. No wheezing.      Comments: Decreased air movement to L base. Satting well on  "RA  Abdominal:      General: Bowel sounds are normal. There is distension.      Palpations: Abdomen is soft.      Tenderness: There is no abdominal tenderness. There is no guarding.   Musculoskeletal:         General: No tenderness. Normal range of motion.      Cervical back: Normal range of motion and neck supple.      Right lower leg: Edema (2+ pitting) present.      Left lower leg: Edema (2+ pitting) present.   Lymphadenopathy:      Cervical: No cervical adenopathy.   Skin:     General: Skin is warm and dry.      Capillary Refill: Capillary refill takes less than 2 seconds.      Findings: No rash.   Neurological:      Mental Status: She is alert and oriented to person, place, and time.      Cranial Nerves: No cranial nerve deficit.      Sensory: No sensory deficit.      Coordination: Coordination normal.   Psychiatric:         Behavior: Behavior normal.         Thought Content: Thought content normal.         Judgment: Judgment normal.                Significant Labs: All pertinent labs within the past 24 hours have been reviewed.  CBC:   Recent Labs   Lab 08/31/24  0631 09/01/24  0622   WBC 2.81* 3.18*   HGB 8.3* 8.1*   HCT 24.5* 25.3*   PLT 99* 113*     CMP:   Recent Labs   Lab 08/31/24  0631 09/01/24  0622    137   K 3.8 3.3*    109   CO2 21* 22*   GLU 75 96   BUN 11 13   CREATININE 1.4 1.6*   CALCIUM 7.9* 8.0*   PROT 6.0 5.9*   ALBUMIN 1.9* 2.3*   BILITOT 1.6* 1.1*   ALKPHOS 37* 34*   AST 18 16   ALT 5* <5*   ANIONGAP 8 6*     Cardiac Markers:   No results for input(s): "CKMB", "MYOGLOBIN", "BNP", "TROPISTAT" in the last 48 hours.    Coagulation:   Recent Labs   Lab 09/01/24  0622   INR 1.4*     Troponin:   Recent Labs   Lab 08/30/24  1726 08/30/24  2147   TROPONINI 0.151* 0.140*       Significant Imaging: I have reviewed all pertinent imaging results/findings within the past 24 hours.    Assessment/Plan:      * Decompensated cirrhosis related to hepatitis C virus (HCV)  Patient with known " Cirrhosis with Child's class B. Co-morbidities are present and inclusive of ascites, portal hypertension, portal venous thrombosis, malnutrition, and anemia/pancytopenia.  MELD-Na score calculated; MELD 3.0: 17 at 9/1/2024  6:22 AM  MELD-Na: 15 at 9/1/2024  6:22 AM  Calculated from:  Serum Creatinine: 1.6 mg/dL at 9/1/2024  6:22 AM  Serum Sodium: 137 mmol/L at 9/1/2024  6:22 AM  Total Bilirubin: 1.1 mg/dL at 9/1/2024  6:22 AM  Serum Albumin: 2.3 g/dL at 9/1/2024  6:22 AM  INR(ratio): 1.4 at 9/1/2024  6:22 AM  Age at listing (hypothetical): 78 years  Sex: Female at 9/1/2024  6:22 AM    Continue chronic meds. Etiology likely Hepatitis. Will avoid any hepatotoxic meds, and monitor CBC/CMP/INR for synthetic function.   - she has been unable to obtain OP paracentesis. IR consulted for therapeutic paracentesis.   - start IV lasix 40 mg daily   - will hold DVT ppx for para  - patient/family need assistance setting up OP scheduled paracentesis     Elevated troponin  - trop 0.152 -> 0.151-> 0.140- denies chest pain   - likely 2/2 demand ischemia in setting of hypervolemia in decompensated cirrhosis  - EKG with low QRS, incomplete RBBB. Appears similar to priors  - recent echo reviewed. Consider repeat echo pending trop trend  - recent stress test in May negative for ischemia   -  cardiac telemetry    Pleural effusion  Patient found to have large pleural effusion on imaging. I have personally reviewed and interpreted the following imaging: Xray. A thoracentesis was deferred. Most likely etiology includes Cirrhosis. Management to include Diuresis-- start daily IV lasix 40 mg  - continue po spironolactone   - strict I/O    Hypokalemia  Patient's most recent potassium results are listed below.   Recent Labs     08/30/24  1155 08/31/24  0631 09/01/24  0622   K 3.1* 3.8 3.3*       Plan  - Replete potassium per protocol  - Monitor potassium Daily  - Patient's hypokalemia is stable    Stage 3a chronic kidney disease  Creatine stable  for now. BMP reviewed- noted Estimated Creatinine Clearance: 25.1 mL/min (A) (based on SCr of 1.6 mg/dL (H)). according to latest data. Based on current GFR, CKD stage is stage 3 - GFR 30-59.  Monitor UOP and serial BMP and adjust therapy as needed. Renally dose meds. Avoid nephrotoxic medications and procedures.    Pancytopenia  This patient is found to have pancytopenia, the likely etiology is  cirrhosis , will monitor CBC Daily. Will transfuse red blood cells if the hemoglobin is <7g/dL (or <8 in the setting of ACS). Will transfuse platelets if platelet count is <50k (if undergoing surgical procedure or have active bleeding). Hold DVT prophylaxis if platelets are <50k. The patient's hemoglobin, white blood cell count, and platelet count results have been reviewed and are listed below.  Recent Labs   Lab 09/01/24  0622   HGB 8.1*   WBC 3.18*   *     - all levels at baseline     HCC (hepatocellular carcinoma)  S/p Y90 x 2, leading to cirrhosis. No residual disease per hepatology.   - MRI abd was ordered outpatient but unable to be performed due to her symptoms, hepatology suggested to defer it for now     Primary hypertension  - controlled on admit  - coreg 3.125 mg BID,and aldactone 100 mg  - holding amlodipine and lisinopril as needing midodrine   - IV diuresis as above    Chronic hepatitis C with cirrhosis  - see decompensated cirrhosis above        VTE Risk Mitigation (From admission, onward)           Ordered     IP VTE HIGH RISK PATIENT  Once         08/30/24 1404                    Discharge Planning   LETA: 9/2/2024     Code Status: DNR   Is the patient medically ready for discharge?:     Reason for patient still in hospital (select all that apply): Patient trending condition, Laboratory test, Treatment, and Consult recommendations  Discharge Plan A: Home with family                  Fernandez White MD  Department of Hospital Medicine   [facility] - Transplant Stepdown

## 2024-09-01 NOTE — ASSESSMENT & PLAN NOTE
Creatine stable for now. BMP reviewed- noted Estimated Creatinine Clearance: 28.7 mL/min (based on SCr of 1.4 mg/dL). according to latest data. Based on current GFR, CKD stage is stage 3 - GFR 30-59.  Monitor UOP and serial BMP and adjust therapy as needed. Renally dose meds. Avoid nephrotoxic medications and procedures.

## 2024-09-01 NOTE — ASSESSMENT & PLAN NOTE
Patient with known Cirrhosis with Child's class B. Co-morbidities are present and inclusive of ascites, portal hypertension, portal venous thrombosis, malnutrition, and anemia/pancytopenia.  MELD-Na score calculated; MELD 3.0: 18 at 8/31/2024  6:31 AM  MELD-Na: 15 at 8/31/2024  6:31 AM  Calculated from:  Serum Creatinine: 1.4 mg/dL at 8/31/2024  6:31 AM  Serum Sodium: 138 mmol/L (Using max of 137 mmol/L) at 8/31/2024  6:31 AM  Total Bilirubin: 1.6 mg/dL at 8/31/2024  6:31 AM  Serum Albumin: 1.9 g/dL at 8/31/2024  6:31 AM  INR(ratio): 1.4 at 8/31/2024  6:31 AM  Age at listing (hypothetical): 78 years  Sex: Female at 8/31/2024  6:31 AM    Continue chronic meds. Etiology likely Hepatitis. Will avoid any hepatotoxic meds, and monitor CBC/CMP/INR for synthetic function.   - she has been unable to obtain OP paracentesis. IR consulted for therapeutic paracentesis.   - start IV lasix 40 mg daily   - will hold DVT ppx for para  - patient/family need assistance setting up OP scheduled paracentesis

## 2024-09-01 NOTE — PROCEDURES
"Seema Gann is a 78 y.o. female patient.    Temp: 98.4 °F (36.9 °C) (24 1147)  Pulse: 82 (24 1353)  Resp: 18 (24 1147)  BP: 105/65 (24 1353)  SpO2: 97 % (24 114)  Weight: 54.1 kg (119 lb 4.3 oz) (24)  Height: 5' 4" (162.6 cm) (24)       Paracentesis    Date/Time: 2024 2:13 PM  Location procedure was performed: [facility] MEDICINE    Performed by: Erika Weathers MD  Authorized by: Erika Weathers MD  Consent Done: Yes  Consent: Verbal consent obtained. Written consent obtained.  Consent given by: patient  Patient understanding: patient states understanding of the procedure being performed  Patient consent: the patient's understanding of the procedure matches consent given  Procedure consent: procedure consent matches procedure scheduled  Relevant documents: relevant documents present and verified  Test results: test results available and properly labeled  Site marked: the operative site was marked  Patient identity confirmed: name, , verbally with patient and MRN  Time out: Immediately prior to procedure a "time out" was called to verify the correct patient, procedure, equipment, support staff and site/side marked as required.  Initial or subsequent exam: subsequent  Procedure purpose: diagnostic and therapeutic  Anesthesia: local infiltration    Anesthesia:  Local Anesthetic: lidocaine 1% without epinephrine  Anesthetic total: 5 mL    Patient sedated: no  Preparation: Patient was prepped and draped in the usual sterile fashion.  Ultrasound guidance: yes  Puncture site: right lower quadrant  Fluid removed: 3500(ml)  Fluid appearance: clear  Dressing: 4x4 sterile gauze  Complications: No  Estimated blood loss (mL): 2  Patient tolerance: Patient tolerated the procedure well with no immediate complications          2024    "

## 2024-09-01 NOTE — SUBJECTIVE & OBJECTIVE
Interval History    NAEON.  Planned for paracenteisis, plan for OOB. Replacing K and Mg.     Review of Systems   Constitutional:  Negative for activity change, chills and fever.   HENT:  Negative for trouble swallowing.    Eyes:  Negative for visual disturbance.   Respiratory:  Positive for shortness of breath. Negative for cough and chest tightness.    Cardiovascular:  Positive for leg swelling. Negative for chest pain and palpitations.   Gastrointestinal:  Positive for abdominal distention. Negative for abdominal pain, diarrhea, nausea and vomiting.   Genitourinary:  Negative for dysuria and frequency.   Musculoskeletal:  Negative for back pain, gait problem and neck pain.   Skin:  Negative for rash.   Neurological:  Negative for dizziness and light-headedness.   Psychiatric/Behavioral:  Negative for agitation and confusion.      Objective:     Vital Signs (Most Recent):  Temp: 98.4 °F (36.9 °C) (09/01/24 1147)  Pulse: 85 (09/01/24 1147)  Resp: 18 (09/01/24 1147)  BP: (!) 87/53 (09/01/24 1147)  SpO2: 97 % (09/01/24 1147) Vital Signs (24h Range):  Temp:  [98.3 °F (36.8 °C)-99.5 °F (37.5 °C)] 98.4 °F (36.9 °C)  Pulse:  [66-90] 85  Resp:  [18] 18  SpO2:  [91 %-99 %] 97 %  BP: ()/(44-69) 87/53     Weight: 54.1 kg (119 lb 4.3 oz)  Body mass index is 20.47 kg/m².     Physical Exam  Vitals and nursing note reviewed.   Constitutional:       General: She is not in acute distress.     Appearance: She is well-developed.   HENT:      Head: Normocephalic and atraumatic.      Mouth/Throat:      Pharynx: No oropharyngeal exudate.   Eyes:      Conjunctiva/sclera: Conjunctivae normal.   Cardiovascular:      Rate and Rhythm: Normal rate and regular rhythm.      Heart sounds: Murmur heard.   Pulmonary:      Effort: Pulmonary effort is normal. No respiratory distress.      Breath sounds: Rales present. No wheezing.      Comments: Decreased air movement to L base. Satting well on RA  Abdominal:      General: Bowel sounds are  "normal. There is distension.      Palpations: Abdomen is soft.      Tenderness: There is no abdominal tenderness. There is no guarding.   Musculoskeletal:         General: No tenderness. Normal range of motion.      Cervical back: Normal range of motion and neck supple.      Right lower leg: Edema (2+ pitting) present.      Left lower leg: Edema (2+ pitting) present.   Lymphadenopathy:      Cervical: No cervical adenopathy.   Skin:     General: Skin is warm and dry.      Capillary Refill: Capillary refill takes less than 2 seconds.      Findings: No rash.   Neurological:      Mental Status: She is alert and oriented to person, place, and time.      Cranial Nerves: No cranial nerve deficit.      Sensory: No sensory deficit.      Coordination: Coordination normal.   Psychiatric:         Behavior: Behavior normal.         Thought Content: Thought content normal.         Judgment: Judgment normal.                Significant Labs: All pertinent labs within the past 24 hours have been reviewed.  CBC:   Recent Labs   Lab 08/31/24 0631 09/01/24  0622   WBC 2.81* 3.18*   HGB 8.3* 8.1*   HCT 24.5* 25.3*   PLT 99* 113*     CMP:   Recent Labs   Lab 08/31/24 0631 09/01/24  0622    137   K 3.8 3.3*    109   CO2 21* 22*   GLU 75 96   BUN 11 13   CREATININE 1.4 1.6*   CALCIUM 7.9* 8.0*   PROT 6.0 5.9*   ALBUMIN 1.9* 2.3*   BILITOT 1.6* 1.1*   ALKPHOS 37* 34*   AST 18 16   ALT 5* <5*   ANIONGAP 8 6*     Cardiac Markers:   No results for input(s): "CKMB", "MYOGLOBIN", "BNP", "TROPISTAT" in the last 48 hours.    Coagulation:   Recent Labs   Lab 09/01/24  0622   INR 1.4*     Troponin:   Recent Labs   Lab 08/30/24  1726 08/30/24  2147   TROPONINI 0.151* 0.140*       Significant Imaging: I have reviewed all pertinent imaging results/findings within the past 24 hours.  "

## 2024-09-01 NOTE — ASSESSMENT & PLAN NOTE
Patient's most recent potassium results are listed below.   Recent Labs     08/30/24  1155 08/31/24  0631 09/01/24  0622   K 3.1* 3.8 3.3*       Plan  - Replete potassium per protocol  - Monitor potassium Daily  - Patient's hypokalemia is stable

## 2024-09-01 NOTE — ASSESSMENT & PLAN NOTE
Creatine stable for now. BMP reviewed- noted Estimated Creatinine Clearance: 25.1 mL/min (A) (based on SCr of 1.6 mg/dL (H)). according to latest data. Based on current GFR, CKD stage is stage 3 - GFR 30-59.  Monitor UOP and serial BMP and adjust therapy as needed. Renally dose meds. Avoid nephrotoxic medications and procedures.

## 2024-09-01 NOTE — ASSESSMENT & PLAN NOTE
This patient is found to have pancytopenia, the likely etiology is  cirrhosis , will monitor CBC Daily. Will transfuse red blood cells if the hemoglobin is <7g/dL (or <8 in the setting of ACS). Will transfuse platelets if platelet count is <50k (if undergoing surgical procedure or have active bleeding). Hold DVT prophylaxis if platelets are <50k. The patient's hemoglobin, white blood cell count, and platelet count results have been reviewed and are listed below.  Recent Labs   Lab 09/01/24  0622   HGB 8.1*   WBC 3.18*   *     - all levels at baseline

## 2024-09-01 NOTE — ASSESSMENT & PLAN NOTE
- trop 0.152 -> 0.151-> 0.140- denies chest pain   - likely 2/2 demand ischemia in setting of hypervolemia in decompensated cirrhosis  - EKG with low QRS, incomplete RBBB. Appears similar to priors  - recent echo reviewed. Consider repeat echo pending trop trend  - recent stress test in May negative for ischemia   -  cardiac telemetry   Addended by: SHELLIE CARR on: 7/17/2024 05:29 PM     Modules accepted: Orders

## 2024-09-01 NOTE — ASSESSMENT & PLAN NOTE
This patient is found to have pancytopenia, the likely etiology is  cirrhosis , will monitor CBC Daily. Will transfuse red blood cells if the hemoglobin is <7g/dL (or <8 in the setting of ACS). Will transfuse platelets if platelet count is <50k (if undergoing surgical procedure or have active bleeding). Hold DVT prophylaxis if platelets are <50k. The patient's hemoglobin, white blood cell count, and platelet count results have been reviewed and are listed below.  Recent Labs   Lab 08/31/24  0631   HGB 8.3*   WBC 2.81*   PLT 99*     - all levels at baseline

## 2024-09-01 NOTE — ASSESSMENT & PLAN NOTE
S/p Y90 x 2, leading to cirrhosis. No residual disease per hepatology.   - MRI abd was ordered outpatient but unable to be performed due to her symptoms, hepatology suggested to defer it for now    Detail Level: Detailed Detail Level: Generalized

## 2024-09-01 NOTE — PLAN OF CARE
Recommendations     1.) Recommend continuing with Renal diet as tolerated, fluid per MD.      2.) If PO intake <50%, recommend Novasource Renal daily to help meet needs.      3.) RD to monitor wt, PO intake, skin, labs.         Goals: to meet % of EEN/EPN by next RD f/u  Nutrition Goal Status: new  Communication of RD Recs:  (POC)

## 2024-09-01 NOTE — PROGRESS NOTES
Physician at bedside to perform paracentesis under ultrasound guidance.  3.5L taken off.  Fluid sent for for labs ordered as we as cultures. Pt tolerated well.   Report given to  bedside RN

## 2024-09-01 NOTE — ASSESSMENT & PLAN NOTE
Patient's most recent potassium results are listed below.   Recent Labs     08/30/24  0934 08/30/24  1155 08/31/24  0631   K 3.4* 3.1* 3.8       Plan  - Replete potassium per protocol  - Monitor potassium Daily  - Patient's hypokalemia is stable

## 2024-09-01 NOTE — CONSULTS
"  [facility] - Transplant Stepdown  Adult Nutrition  Consult Note    SUMMARY     Recommendations    1.) Recommend continuing with Renal diet as tolerated, fluid per MD.     2.) If PO intake <50%, recommend Novasource Renal daily to help meet needs.     3.) RD to monitor wt, PO intake, skin, labs.       Goals: to meet % of EEN/EPN by next RD f/u  Nutrition Goal Status: new  Communication of RD Recs:  (POC)    Assessment and Plan    Nutrition Problem  Increased PRO needs    Related to (etiology):   Increased physiological needs    Signs and Symptoms (as evidenced by):    Decompensated cirrhosis     Interventions/Recommendations (treatment strategy):  Collaboration of nutritional care with other providers.   ONS PRN    Nutrition Diagnosis Status:   New     Reason for Assessment    Reason For Assessment: consult  Diagnosis: liver disease (Decompensated cirrhosis related to hepatitis C virus)  Relevant Medical History: HCV, cirrhosis with recurrent ascites, pancytopenia, portal HTN with portal vein occlusion, h/o HCC, CKD 3b-4, HTN  Interdisciplinary Rounds: did not attend    General Information Comments: RD consulted for wt loss. Pt could not be seen by RD, pt was undergoing bedside procedure of paracentesis. Per chart review, Pt;s -120#, # (admit wt was 112#). Unable to perform NFPE at this time; RD to perform NFPE at f/u if medically warranted. RD team to continue to monitor and f/u.     Nutrition Discharge Planning: Renal diet, fluid per MD    Nutrition Related Social Determinants of Health: SDOH: Unable to assess at this time.      Nutrition Risk Screen    Nutrition Risk Screen: unintentional loss of 10 lbs or more in the past 2 months    Nutrition/Diet History    Spiritual, Cultural Beliefs, Judaism Practices, Values that Affect Care: no  Food Allergies: NKFA    Anthropometrics    Temp: 98.4 °F (36.9 °C)  Height Method: Stated  Height: 5' 4" (162.6 cm)  Height (inches): 64 in  Weight Method: Bed " Scale  Weight: 54.1 kg (119 lb 4.3 oz)  Weight (lb): 119.27 lb  Ideal Body Weight (IBW), Female: 120 lb  % Ideal Body Weight, Female (lb): 99.58 %  BMI (Calculated): 20.5    Lab/Procedures/Meds    Pertinent Labs Reviewed: reviewed  Pertinent Labs Comments: K: 3.3, Cr: 1.6, GFR: 33.0, Ca: 8.0, ALP: 34, PRO: 5.9, alb: 2.3, tbili: 1.1    Pertinent Medications Reviewed: reviewed  Pertinent Medications Comments: Lasix, midodrine, spironolactone    Estimated/Assessed Needs    Weight Used For Calorie Calculations: 54.1 kg (119 lb 4.3 oz)  Energy Calorie Requirements (kcal): 1314 kcal  Energy Need Method: Dorado-St Jeor (MSJ x 1.3)    Protein Requirements: 54- 65g (1.0-1.2g/kg)  Weight Used For Protein Calculations: 54 kg (119 lb 0.8 oz)    Fluid Requirements (mL): per MD  Estimated Fluid Requirement Method: RDA Method  RDA Method (mL): 1314    Nutrition Prescription Ordered    Current Diet Order: Renal Diet  Nutrition Order Comments: 1.5L FR    Evaluation of Received Nutrient/Fluid Intake    I/O: +600ml since admit  Fluid Required:  (as per MD)  Comments: LBM 9/1  Tolerance: tolerating  % Intake of Estimated Energy Needs: 25 - 50 %  % Meal Intake: 25 - 50 %    Nutrition Risk    Level of Risk/Frequency of Follow-up:  (RD to f/u x 1-2/week)     Monitor and Evaluation    Food and Nutrient Intake: food and beverage intake  Food and Nutrient Adminstration: diet order  Physical Activity and Function: nutrition-related ADLs and IADLs  Anthropometric Measurements: weight, weight change, body mass index  Biochemical Data, Medical Tests and Procedures: electrolyte and renal panel, gastrointestinal profile, glucose/endocrine profile, inflammatory profile, lipid profile  Nutrition-Focused Physical Findings: overall appearance, skin     Nutrition Follow-Up    RD Follow-up?: Yes

## 2024-09-01 NOTE — HOSPITAL COURSE
78 y.o. female with history of HCV s/p Bradenoni (2015), cirrhosis with recurrent ascites, pancytopenia, portal vein occlusion, h/o HCC s/p Y90, CKD 3b-4, HTN who presented with SOB and abdominal distension, recurrent ascites.      Patient has been compliant with medications however has had issues arranging outpatient IR para. Discussed pleurx drain for ascites, at this time patient deferring and requesting intermittent IR paracentesis. Hepatology evaluated inpatient and recommended continued paracentesis.    Blood pressure low, midodrine 5mg TID started. Given 1x albumin. Diuretics held on discharge and carvedilol held on discharge. Paracentesis completed 9/1. Negative for SBP. Case management requested to check status on outpatient paracentesis setup assistance. Referrals were sent.    Orthostatic vitals checked prior to discharge. Orthostatic negative however will continue midodrine and continue to hold diuretics and antihypertensives until outpatient followup        Review of Systems   Constitutional:  Negative for chills and fever.   Eyes:  Negative for visual disturbance.   Respiratory:  Negative for cough or shortness of breath.    Cardiovascular:  Positive for leg swelling. Negative for chest pain.   Gastrointestinal:  Positive for abdominal distention. Negative for abdominal pain and vomiting.   Genitourinary:  Negative for dysuria and frequency.   Musculoskeletal:  Negative for back pain.   Neurological:  Negative for dizziness and light-headedness. Negative for agitation and confusion.       Objective:      Vital Signs (Most Recent):  Temp: 97.8 °F (36.6 °C) (09/02/24 1107)  Pulse: (!) 54 (09/02/24 1204)  Resp: 18 (09/02/24 1107)  BP: (!) 98/52 (09/02/24 1107)  SpO2: 96 % (09/02/24 1107) Vital Signs (24h Range):      Weight: 54.1 kg (119 lb 4.3 oz)  Body mass index is 20.47 kg/m².    Physical Exam  Vitals and nursing note reviewed.   Constitutional:       General: She is not in acute distress.  HENT:       Head: Normocephalic and atraumatic.   Eyes:      Conjunctiva/sclera: Conjunctivae normal.   Cardiovascular:      Rate and Rhythm: Normal rate and regular rhythm. Murmur heard.   Pulmonary:      Effort: Pulmonary effort is normal. No respiratory distress.   Abdominal:      General: Bowel sounds are normal. There is distension.      Palpations: Abdomen is soft. There is no abdominal tenderness.   Musculoskeletal:         General: Normal range of motion.       Right lower leg: Edema present.      Left lower leg: Edema present.   Neurological:      Mental Status: She is alert and oriented to person, place, and time. Mental status is at baseline.

## 2024-09-02 VITALS
WEIGHT: 119.25 LBS | SYSTOLIC BLOOD PRESSURE: 98 MMHG | HEIGHT: 64 IN | OXYGEN SATURATION: 96 % | RESPIRATION RATE: 18 BRPM | HEART RATE: 54 BPM | DIASTOLIC BLOOD PRESSURE: 52 MMHG | BODY MASS INDEX: 20.36 KG/M2 | TEMPERATURE: 98 F

## 2024-09-02 LAB
ALBUMIN SERPL BCP-MCNC: 2.3 G/DL (ref 3.5–5.2)
ALP SERPL-CCNC: 28 U/L (ref 55–135)
ALT SERPL W/O P-5'-P-CCNC: 5 U/L (ref 10–44)
ANION GAP SERPL CALC-SCNC: 6 MMOL/L (ref 8–16)
AST SERPL-CCNC: 15 U/L (ref 10–40)
BILIRUB SERPL-MCNC: 0.9 MG/DL (ref 0.1–1)
BUN SERPL-MCNC: 14 MG/DL (ref 8–23)
CALCIUM SERPL-MCNC: 8 MG/DL (ref 8.7–10.5)
CHLORIDE SERPL-SCNC: 108 MMOL/L (ref 95–110)
CO2 SERPL-SCNC: 23 MMOL/L (ref 23–29)
CREAT SERPL-MCNC: 1.8 MG/DL (ref 0.5–1.4)
ERYTHROCYTE [DISTWIDTH] IN BLOOD BY AUTOMATED COUNT: 13.7 % (ref 11.5–14.5)
EST. GFR  (NO RACE VARIABLE): 28.7 ML/MIN/1.73 M^2
GLUCOSE SERPL-MCNC: 91 MG/DL (ref 70–110)
HCT VFR BLD AUTO: 25.3 % (ref 37–48.5)
HGB BLD-MCNC: 8.3 G/DL (ref 12–16)
INR PPP: 1.4 (ref 0.8–1.2)
MAGNESIUM SERPL-MCNC: 2 MG/DL (ref 1.6–2.6)
MCH RBC QN AUTO: 31.3 PG (ref 27–31)
MCHC RBC AUTO-ENTMCNC: 32.8 G/DL (ref 32–36)
MCV RBC AUTO: 96 FL (ref 82–98)
PLATELET # BLD AUTO: 108 K/UL (ref 150–450)
PMV BLD AUTO: 11.7 FL (ref 9.2–12.9)
POTASSIUM SERPL-SCNC: 3.3 MMOL/L (ref 3.5–5.1)
PROT SERPL-MCNC: 5.4 G/DL (ref 6–8.4)
PROTHROMBIN TIME: 15.5 SEC (ref 9–12.5)
RBC # BLD AUTO: 2.65 M/UL (ref 4–5.4)
SODIUM SERPL-SCNC: 137 MMOL/L (ref 136–145)
WBC # BLD AUTO: 3.72 K/UL (ref 3.9–12.7)

## 2024-09-02 PROCEDURE — 85610 PROTHROMBIN TIME: CPT

## 2024-09-02 PROCEDURE — 80053 COMPREHEN METABOLIC PANEL: CPT

## 2024-09-02 PROCEDURE — 36415 COLL VENOUS BLD VENIPUNCTURE: CPT

## 2024-09-02 PROCEDURE — 85027 COMPLETE CBC AUTOMATED: CPT

## 2024-09-02 PROCEDURE — 25000003 PHARM REV CODE 250: Performed by: STUDENT IN AN ORGANIZED HEALTH CARE EDUCATION/TRAINING PROGRAM

## 2024-09-02 PROCEDURE — 25000003 PHARM REV CODE 250

## 2024-09-02 PROCEDURE — 83735 ASSAY OF MAGNESIUM: CPT

## 2024-09-02 RX ORDER — MIDODRINE HYDROCHLORIDE 5 MG/1
15 TABLET ORAL 3 TIMES DAILY
Status: DISCONTINUED | OUTPATIENT
Start: 2024-09-02 | End: 2024-09-02 | Stop reason: HOSPADM

## 2024-09-02 RX ORDER — FUROSEMIDE 40 MG/1
40 TABLET ORAL DAILY
Status: DISCONTINUED | OUTPATIENT
Start: 2024-09-02 | End: 2024-09-02 | Stop reason: HOSPADM

## 2024-09-02 RX ORDER — MIDODRINE HYDROCHLORIDE 5 MG/1
15 TABLET ORAL 3 TIMES DAILY
Qty: 270 TABLET | Refills: 2 | Status: SHIPPED | OUTPATIENT
Start: 2024-09-02 | End: 2024-12-01

## 2024-09-02 RX ADMIN — MIDODRINE HYDROCHLORIDE 15 MG: 5 TABLET ORAL at 07:09

## 2024-09-02 RX ADMIN — FUROSEMIDE 40 MG: 40 TABLET ORAL at 08:09

## 2024-09-02 RX ADMIN — SPIRONOLACTONE 100 MG: 50 TABLET ORAL at 08:09

## 2024-09-02 NOTE — PROGRESS NOTES
Pt family member very concerned about financial issues with admitting patient through ER.  Explained it is Labor day and we do not have anymore here and we are glad to relay her conerns and all information to patient advocacy.   All information called to patient advocacy and message left.

## 2024-09-02 NOTE — NURSING
Notified DO Peggy of pt bp 87/52 w/ a map of 64. Pt currently on Midodrine 10mg TID. RN promoted DO to see if 0900 dose of Midodrine can be given early, as well as possible increase in dose mg. Awaiting response. Pt stable. Pt denies SOB, dizziness, etc. Pt remains AAOx4.

## 2024-09-02 NOTE — PROGRESS NOTES
Patient discharged from [facility] [id number] to home w/ family. Patient left unit via wheelchair per transport staff. PIV removed prior to discharge. AVS provided to and reviewed w/ patient and family - they verbalized understanding.

## 2024-09-03 DIAGNOSIS — B19.20 DECOMPENSATED CIRRHOSIS RELATED TO HEPATITIS C VIRUS (HCV): Primary | Chronic | ICD-10-CM

## 2024-09-03 DIAGNOSIS — K74.69 DECOMPENSATED CIRRHOSIS RELATED TO HEPATITIS C VIRUS (HCV): Primary | Chronic | ICD-10-CM

## 2024-09-05 LAB — BACTERIA SPEC AEROBE CULT: NO GROWTH

## 2024-09-06 ENCOUNTER — TELEPHONE (OUTPATIENT)
Dept: INTERVENTIONAL RADIOLOGY/VASCULAR | Facility: HOSPITAL | Age: 79
End: 2024-09-06
Payer: MEDICARE

## 2024-09-06 ENCOUNTER — OFFICE VISIT (OUTPATIENT)
Dept: FAMILY MEDICINE | Facility: CLINIC | Age: 79
End: 2024-09-06
Payer: MEDICARE

## 2024-09-06 VITALS
HEART RATE: 55 BPM | HEIGHT: 64 IN | WEIGHT: 110.25 LBS | SYSTOLIC BLOOD PRESSURE: 120 MMHG | TEMPERATURE: 98 F | DIASTOLIC BLOOD PRESSURE: 83 MMHG | BODY MASS INDEX: 18.82 KG/M2

## 2024-09-06 DIAGNOSIS — K76.7 HEPATORENAL SYNDROME: ICD-10-CM

## 2024-09-06 DIAGNOSIS — Z09 HOSPITAL DISCHARGE FOLLOW-UP: ICD-10-CM

## 2024-09-06 DIAGNOSIS — E87.8 ELECTROLYTE DEPLETION: ICD-10-CM

## 2024-09-06 DIAGNOSIS — Z23 NEED FOR PNEUMOCOCCAL VACCINATION: ICD-10-CM

## 2024-09-06 DIAGNOSIS — D61.818 PANCYTOPENIA: ICD-10-CM

## 2024-09-06 DIAGNOSIS — R63.0 ANOREXIA: ICD-10-CM

## 2024-09-06 DIAGNOSIS — N18.4 CHRONIC KIDNEY DISEASE (CKD), STAGE IV (SEVERE): ICD-10-CM

## 2024-09-06 DIAGNOSIS — C22.0 HEPATOCELLULAR CARCINOMA: ICD-10-CM

## 2024-09-06 PROCEDURE — 99999 PR PBB SHADOW E&M-EST. PATIENT-LVL IV: CPT | Mod: PBBFAC,,, | Performed by: STUDENT IN AN ORGANIZED HEALTH CARE EDUCATION/TRAINING PROGRAM

## 2024-09-06 RX ORDER — MIRTAZAPINE 7.5 MG/1
7.5 TABLET, FILM COATED ORAL NIGHTLY
Qty: 30 TABLET | Refills: 11 | Status: SHIPPED | OUTPATIENT
Start: 2024-09-06 | End: 2025-09-06

## 2024-09-06 NOTE — PROGRESS NOTES
[facility] Note    Chief Complaint      Hospital f/u visit   History of Present Illness     Seema Gann is a 78 y.o. female with sHTN, Biliary cirrhosis with hepatocellular carcinoma since 2015 due to HCV s/p chemotherapy now complicated by hepatorenal syndrome and pancytopenia, who presents for hospital f/u visit. She was recently evaluated at the [facility] ( 08/22/2024) after transfer from [facility] following complaints of progressively worsening abdominal swelling, SOB, leg swelling and fatigue found to be generalized anasarca with gross ascites, s/p therapeutic paracentesis with about 3L fluid removed and started on midodrine due to hypotension. She states symptoms have remarkably improved however has to force herself to eat daily.  She states no worsening baseline leg swelling, SOB , or abdominal swelling .     Problem List Addressed This Visit:    1. Hospital discharge follow-up    2. Hepatorenal syndrome    3. Hepatocellular carcinoma    4. Anorexia  -     mirtazapine (REMERON) 7.5 MG Tab; Take 1 tablet (7.5 mg total) by mouth every evening.  Dispense: 30 tablet; Refill: 11    5. Electrolyte depletion    6. Chronic kidney disease (CKD), stage IV (severe)    7. Pancytopenia    8. Need for pneumococcal vaccination  -     (VFC) PCV20 (Prevnar 20) IM vaccine (>/= 6 wks)         Health Maintenance   Topic Date Due    TETANUS VACCINE  Never done    Shingles Vaccine (1 of 2) Never done    DEXA Scan  02/09/2026    Lipid Panel  11/22/2028    Hepatitis C Screening  Completed       Past Medical History:   Diagnosis Date    Cirrhosis     HCC (hepatocellular carcinoma)     Hepatitis     Hypertension        Past Surgical History:   Procedure Laterality Date    APPENDECTOMY      HYSTERECTOMY         family history is not on file.    Social History     Tobacco Use    Smoking status: Never     Passive exposure: Never    Smokeless tobacco: Never   Substance Use Topics     Alcohol use: No    Drug use: No       Review of Systems   Constitutional:  Negative for fatigue and fever.   Respiratory:  Negative for cough and chest tightness.    Cardiovascular:  Positive for leg swelling. Negative for chest pain and palpitations.   Gastrointestinal:  Negative for abdominal pain, diarrhea and vomiting.   Endocrine: Negative for polydipsia and polyphagia.   Genitourinary:  Negative for difficulty urinating, dysuria and frequency.   Musculoskeletal:  Negative for arthralgias, back pain, gait problem and joint swelling.   Skin:  Negative for rash.   Neurological:  Negative for seizures, weakness, numbness and headaches.   Psychiatric/Behavioral:  Negative for sleep disturbance.        Outpatient Encounter Medications as of 9/6/2024   Medication Sig Dispense Refill    midodrine (PROAMATINE) 5 MG Tab Take 3 tablets (15 mg total) by mouth 3 (three) times daily. 270 tablet 2    mirtazapine (REMERON) 7.5 MG Tab Take 1 tablet (7.5 mg total) by mouth every evening. 30 tablet 11    [DISCONTINUED] amLODIPine (NORVASC) 5 MG tablet Take 1 tablet (5 mg total) by mouth once daily. 30 tablet 11    [DISCONTINUED] aspirin (ECOTRIN) 81 MG EC tablet Take 1 tablet (81 mg total) by mouth once daily. 30 tablet 2    [DISCONTINUED] carvediloL (COREG) 3.125 MG tablet Take 1 tablet (3.125 mg total) by mouth 2 (two) times daily with meals. 180 tablet 3    [DISCONTINUED] cyproheptadine (PERIACTIN) 4 mg tablet Take 1 tablet (4 mg total) by mouth 3 (three) times daily. 90 tablet 0    [DISCONTINUED] furosemide (LASIX) 20 MG tablet Take 2 tablets (40 mg total) by mouth once daily. 60 tablet 3    [DISCONTINUED] lisinopriL (PRINIVIL,ZESTRIL) 20 MG tablet Take 1 tablet (20 mg total) by mouth once daily. 90 tablet 3    [DISCONTINUED] spironolactone (ALDACTONE) 100 MG tablet Take 1 tablet (100 mg total) by mouth once daily. 90 tablet 3     Facility-Administered Encounter Medications as of 9/6/2024   Medication Dose Route Frequency  Provider Last Rate Last Admin    [COMPLETED] (VFC) PCV20 (Prevnar 20) IM vaccine (>/= 6 wks)  0.5 mL Intramuscular 1 time in Clinic/HOD    0.5 mL at 09/06/24 1445    [DISCONTINUED] acetaminophen tablet 1,000 mg  1,000 mg Oral Q8H PRN Thuy Cortez PA-C        [DISCONTINUED] acetaminophen tablet 650 mg  650 mg Oral Q8H PRN Francesco Weller MD        [DISCONTINUED] acetaminophen tablet 650 mg  650 mg Oral Q4H PRN Francesco Weller MD        [DISCONTINUED] acetaminophen tablet 650 mg  650 mg Oral Q4H PRN Thuy Cortez PA-C        [DISCONTINUED] albuterol-ipratropium 2.5 mg-0.5 mg/3 mL nebulizer solution 3 mL  3 mL Nebulization Q4H PRN Thuy Cortez PA-C        [DISCONTINUED] aluminum-magnesium hydroxide-simethicone 200-200-20 mg/5 mL suspension 30 mL  30 mL Oral QID PRN Francesco Weller MD        [DISCONTINUED] aluminum-magnesium hydroxide-simethicone 200-200-20 mg/5 mL suspension 30 mL  30 mL Oral QID PRN Thuy Cortez PA-C        [DISCONTINUED] amLODIPine tablet 5 mg  5 mg Oral Daily Francesco Weller MD   5 mg at 08/23/24 0920    [DISCONTINUED] amLODIPine tablet 5 mg  5 mg Oral Daily Thuy Cortez PA-C   5 mg at 08/31/24 1028    [DISCONTINUED] aspirin EC tablet 81 mg  81 mg Oral Daily Francesco Weller MD        [DISCONTINUED] carvediloL tablet 3.125 mg  3.125 mg Oral BID  Francesco Weller MD   3.125 mg at 08/23/24 1809    [DISCONTINUED] carvediloL tablet 3.125 mg  3.125 mg Oral BID  Fernandez White MD   3.125 mg at 09/01/24 0900    [DISCONTINUED] cyproheptadine 4 mg tablet 4 mg  4 mg Oral TID Francesco Weller MD   4 mg at 08/23/24 1509    [DISCONTINUED] furosemide injection 40 mg  40 mg Intravenous Daily Thuy Cortez PA-C   40 mg at 09/01/24 0859    [DISCONTINUED] glucagon (human recombinant) injection 1 mg  1 mg Intramuscular PRN Francesco Weller MD        [DISCONTINUED] glucose chewable tablet 16 g  16 g Oral PRN Nithin,  Francesco Tatum MD        [DISCONTINUED] glucose chewable tablet 24 g  24 g Oral PRN Francesco Weller MD        [DISCONTINUED] heparin (porcine) injection 5,000 Units  5,000 Units Subcutaneous Q8H Francesco Weller MD        [DISCONTINUED] lisinopriL tablet 20 mg  20 mg Oral Daily Francesco Weller MD   20 mg at 08/23/24 0920    [DISCONTINUED] lisinopriL tablet 20 mg  20 mg Oral Daily Thuy Cortez PA-C   20 mg at 08/31/24 1028    [DISCONTINUED] melatonin tablet 6 mg  6 mg Oral Nightly PRN Francesco Weller MD        [DISCONTINUED] melatonin tablet 6 mg  6 mg Oral Nightly PRN Thuy Cortez PA-C        [DISCONTINUED] midodrine tablet 10 mg  10 mg Oral TID Fernandez White MD   10 mg at 09/01/24 2055    [DISCONTINUED] midodrine tablet 5 mg  5 mg Oral TID Fernandez White MD   5 mg at 09/01/24 1528    [DISCONTINUED] naloxone 0.4 mg/mL injection 0.02 mg  0.02 mg Intravenous PRN Francesco Weller MD        [DISCONTINUED] naloxone 0.4 mg/mL injection 0.02 mg  0.02 mg Intravenous PRN Thuy Cortez PA-C        [DISCONTINUED] ondansetron disintegrating tablet 8 mg  8 mg Oral Q8H PRN Francesco Weller MD        [DISCONTINUED] ondansetron disintegrating tablet 8 mg  8 mg Oral Q8H PRN Thuy Cortez PA-C        [DISCONTINUED] polyethylene glycol packet 17 g  17 g Oral Daily PRN Francesco Weller MD        [DISCONTINUED] polyethylene glycol packet 17 g  17 g Oral BID PRN Thuy Cortez PA-C        [DISCONTINUED] prochlorperazine injection Soln 5 mg  5 mg Intravenous Q6H PRN Thuy Cortez PA-C        [DISCONTINUED] simethicone chewable tablet 80 mg  1 tablet Oral QID PRN Francesco Weller MD        [DISCONTINUED] simethicone chewable tablet 80 mg  1 tablet Oral QID PRN Thuy Cortez PA-C        [DISCONTINUED] sodium chloride 0.9% flush 1-10 mL  1-10 mL Intravenous Q12H PRN Francesco Weller MD        [DISCONTINUED] sodium chloride 0.9% flush 5 mL  5  "mL Intravenous PRN Thuy Cortez PA-C        [DISCONTINUED] spironolactone tablet 100 mg  100 mg Oral Daily Francesco Weller MD   100 mg at 08/23/24 0920    [DISCONTINUED] spironolactone tablet 100 mg  100 mg Oral Daily Thuy Cortez PA-C   100 mg at 09/02/24 0820        Review of patient's allergies indicates:  No Known Allergies    Physical Exam      Vital Signs  Temp: 98.1 °F (36.7 °C)  Pulse: (!) 55  BP: 120/83  BP Location: Right arm  Patient Position: Sitting  Pain Score: 0-No pain  Height and Weight  Height: 5' 4" (162.6 cm)  Weight: 50 kg (110 lb 3.7 oz)  BSA (Calculated - sq m): 1.5 sq meters  BMI (Calculated): 18.9  Weight in (lb) to have BMI = 25: 145.3]    Physical Exam  Vitals reviewed.   Constitutional:       Appearance: Normal appearance. She is normal weight.   HENT:      Head: Normocephalic and atraumatic.      Mouth/Throat:      Mouth: Mucous membranes are moist.      Pharynx: Oropharynx is clear.   Eyes:      Extraocular Movements: Extraocular movements intact.      Conjunctiva/sclera: Conjunctivae normal.      Pupils: Pupils are equal, round, and reactive to light.   Cardiovascular:      Rate and Rhythm: Normal rate and regular rhythm.      Pulses: Normal pulses.      Heart sounds: Normal heart sounds.   Pulmonary:      Effort: Pulmonary effort is normal.      Breath sounds: Normal breath sounds. Rales: few rales on lower posterior lung fields bilaterally.   Abdominal:      General: Bowel sounds are normal. There is distension (mildy distended).      Palpations: Abdomen is soft.      Tenderness: There is no abdominal tenderness. There is no guarding.   Musculoskeletal:      Cervical back: Normal range of motion.      Right lower leg: Edema ((1+ bilaterally)) present.      Left lower leg: Edema present.   Skin:     General: Skin is warm and dry.   Neurological:      General: No focal deficit present.      Mental Status: She is alert and oriented to person, place, and time.      " "Sensory: No sensory deficit.   Psychiatric:         Mood and Affect: Mood normal.         Behavior: Behavior normal.       Laboratory:  CBC:  Recent Labs   Lab Result Units 08/31/24  0631 09/01/24 0622 09/02/24 0624   WBC K/uL 2.81* 3.18* 3.72*   RBC M/uL 2.59* 2.61* 2.65*   Hemoglobin g/dL 8.3* 8.1* 8.3*   Hematocrit % 24.5* 25.3* 25.3*   Platelets K/uL 99* 113* 108*   MCV fL 95 97 96   MCH pg 32.0* 31.0 31.3*   MCHC g/dL 33.9 32.0 32.8     CMP:  Recent Labs   Lab Result Units 08/31/24 0631 09/01/24 0622 09/02/24 0624   Glucose mg/dL 75 96 91   Calcium mg/dL 7.9* 8.0* 8.0*   Albumin g/dL 1.9* 2.3* 2.3*   Total Protein g/dL 6.0 5.9* 5.4*   Sodium mmol/L 138 137 137   Potassium mmol/L 3.8 3.3* 3.3*   CO2 mmol/L 21* 22* 23   Chloride mmol/L 109 109 108   BUN mg/dL 11 13 14   Alkaline Phosphatase U/L 37* 34* 28*   ALT U/L 5* <5* 5*   AST U/L 18 16 15   Total Bilirubin mg/dL 1.6* 1.1* 0.9     URINALYSIS:  Recent Labs   Lab Result Units 08/30/24  1729   Color, UA  Yellow   Specific Gravity, UA  1.010   pH, UA  6.0   Protein, UA  Negative   Nitrite, UA  Negative   Leukocytes, UA  Negative      LIPIDS:  No results for input(s): "TSH", "HDL", "CHOL", "TRIG", "LDLCALC", "CHOLHDL", "NONHDLCHOL", "TOTALCHOLEST" in the last 2160 hours.  TSH:  No results for input(s): "TSH" in the last 2160 hours.  A1C:  No results for input(s): "HGBA1C" in the last 2160 hours.    Radiology:      Assessment/Plan     Seema Gann is a 78 y.o.female with:    1. Hospital discharge follow-up    2. Hepatorenal syndrome  -due to decompensated cirrhotic liver disease from HCC  -s/p therapeutic paracentesis  -on scheduled therapeutic paracentesis with IR now  -also started on midodrine 15mg TID, tolerating well    3. Hepatocellular carcinoma  -due  to HCV  -mildly hypervolemic today  -HCV RNA undetectable  -completed chemotherapy 2023  -followed by hepatology, oncology  -c/w zofran PRN for nausea    4. Anorexia  -likely cancer related " anorexia  -patient advised to supplement nutrition with ensure as well, eat 2-3hrly  -     mirtazapine (REMERON) 7.5 MG Tab; Take 1 tablet (7.5 mg total) by mouth every evening.  Dispense: 30 tablet; Refill: 11    5. Electrolyte depletion  -due to recent therapeutic paracentesis and prior diuretics  -in the setting of worsening renal function, will monitor for now  -now off lasix    6. Chronic kidney disease (CKD), stage IV (severe)  -refer to renal    7. Pancytopenia  -due to HCC  -will monitor for now  -declined all due vccines except PCV 20, done today    8. Need for pneumococcal vaccination  -     (VFC) PCV20 (Prevnar 20) IM vaccine (>/= 6 wks)      2. Essential hypertension  -well controlled on current regimen        -Continue current medications and maintain follow up with specialists.      Patient verbalizes understanding and agrees with current treatment plan.    Transitional Care Note    Family and/or Caretaker present at visit?  Yes.  Diagnostic tests reviewed/disposition: I have reviewed all completed as well as pending diagnostic tests at the time of discharge.  Disease/illness education: Yes  Home health/community services discussion/referrals: Patient does not have home health established from hospital visit.  They do not need home health.  If needed, we will set up home health for the patient.   Establishment or re-establishment of referral orders for community resources: No other necessary community resources.   Discussion with other health care providers: No discussion with other health care providers necessary.        Bethany Mesa MD  [facility] [place]

## 2024-09-09 ENCOUNTER — HOSPITAL ENCOUNTER (OUTPATIENT)
Dept: INTERVENTIONAL RADIOLOGY/VASCULAR | Facility: HOSPITAL | Age: 79
Discharge: HOME | End: 2024-09-09
Attending: INTERNAL MEDICINE
Payer: MEDICARE

## 2024-09-09 VITALS
DIASTOLIC BLOOD PRESSURE: 66 MMHG | OXYGEN SATURATION: 100 % | RESPIRATION RATE: 16 BRPM | HEART RATE: 71 BPM | SYSTOLIC BLOOD PRESSURE: 107 MMHG

## 2024-09-09 DIAGNOSIS — B19.20 DECOMPENSATED CIRRHOSIS RELATED TO HEPATITIS C VIRUS (HCV): Chronic | ICD-10-CM

## 2024-09-09 DIAGNOSIS — R18.8 OTHER ASCITES: ICD-10-CM

## 2024-09-09 DIAGNOSIS — K74.69 DECOMPENSATED CIRRHOSIS RELATED TO HEPATITIS C VIRUS (HCV): Chronic | ICD-10-CM

## 2024-09-09 DIAGNOSIS — R18.8 ASCITES: ICD-10-CM

## 2024-09-09 LAB
APPEARANCE FLD: CLEAR
BACTERIA SPEC ANAEROBE CULT: NORMAL
BODY FLD TYPE: NORMAL
COLOR FLD: YELLOW
LYMPHOCYTES NFR FLD MANUAL: 43 %
MESOTHL CELL NFR FLD MANUAL: 2 %
MONOS+MACROS NFR FLD MANUAL: 50 %
NEUTROPHILS NFR FLD MANUAL: 5 %
WBC # FLD: 109 /CU MM

## 2024-09-09 PROCEDURE — 49083 ABD PARACENTESIS W/IMAGING: CPT | Performed by: STUDENT IN AN ORGANIZED HEALTH CARE EDUCATION/TRAINING PROGRAM

## 2024-09-09 PROCEDURE — 49083 ABD PARACENTESIS W/IMAGING: CPT | Mod: ,,, | Performed by: PHYSICIAN ASSISTANT

## 2024-09-09 PROCEDURE — 89051 BODY FLUID CELL COUNT: CPT | Performed by: INTERNAL MEDICINE

## 2024-09-09 PROCEDURE — 25000003 PHARM REV CODE 250: Performed by: PHYSICIAN ASSISTANT

## 2024-09-09 RX ORDER — LIDOCAINE HYDROCHLORIDE 10 MG/ML
INJECTION, SOLUTION INFILTRATION; PERINEURAL
Status: COMPLETED | OUTPATIENT
Start: 2024-09-09 | End: 2024-09-09

## 2024-09-09 RX ADMIN — LIDOCAINE HYDROCHLORIDE 5 ML: 10 INJECTION, SOLUTION INFILTRATION; PERINEURAL at 11:09

## 2024-09-09 NOTE — DISCHARGE SUMMARY
Interventional Radiology Short Stay Discharge Summary      Admit Date: 9/9/2024  Discharge Date: 09/09/2024     Hospital Course: Uneventful    Discharge Diagnosis: ascites    Discharge Condition: Stable    Discharge Disposition: Home    Diet: Resume prior diet    Activity: activity as tolerated    Follow-up: With referring provider    Cindy Chava, PA-C [facility]

## 2024-09-09 NOTE — SEDATION DOCUMENTATION
IR Procedure - Paracentesis - is completed. Patient tolerated well. She is awake, alert, oriented. Vital signs are stable. 2900 mL clear yellow ascites is drained. Specimen is obtained, labeled, and prepared for transport to the lab for ordered test. Patient will return to IR pre/post to complete discharge.

## 2024-09-09 NOTE — H&P
Radiology History & Physical      SUBJECTIVE:     Chief Complaint: abdominal distention    History of Present Illness:  Seema Gann is a 78 y.o. female who presents for ultrasound guided paracentesis  Past Medical History:   Diagnosis Date    Cirrhosis     HCC (hepatocellular carcinoma)     Hepatitis     Hypertension      Past Surgical History:   Procedure Laterality Date    APPENDECTOMY      HYSTERECTOMY         Home Meds:   Prior to Admission medications    Medication Sig Start Date End Date Taking? Authorizing Provider   midodrine (PROAMATINE) 5 MG Tab Take 3 tablets (15 mg total) by mouth 3 (three) times daily. 9/2/24 12/1/24  Fernandez White MD   mirtazapine (REMERON) 7.5 MG Tab Take 1 tablet (7.5 mg total) by mouth every evening. 9/6/24 9/6/25  Bethany Mesa MD     Anticoagulants/Antiplatelets: no anticoagulation    Allergies: Review of patient's allergies indicates:  No Known Allergies  Sedation History:  no adverse reactions    Review of Systems:   Hematological: no known coagulopathies  Respiratory: no shortness of breath  Cardiovascular: no chest pain  Gastrointestinal: no abdominal pain  Genito-Urinary: no dysuria  Musculoskeletal: negative  Neurological: no TIA or stroke symptoms         OBJECTIVE:     Vital Signs (Most Recent)       Physical Exam:  ASA: 2  Mallampati: n/a    General: no acute distress  Mental Status: alert and oriented to person, place and time  HEENT: normocephalic, atraumatic  Chest: unlabored breathing  Heart: regular heart rate  Abdomen: distended  Extremity: moves all extremities    ASSESSMENT/PLAN:     Sedation Plan: local  Patient will undergo ultrasound guided paracentesis.    Patt Elliott PA-C

## 2024-09-09 NOTE — NURSING
Patient is discharged from IR. AVS is printed and reviewed. Upcoming appointments and the [facility] number is highlighted for convenience. The opportunity to ask questions is provided. Patient is ambulatory from the unit and directed to the front lobby to exit.

## 2024-09-09 NOTE — PROCEDURES
Radiology Post-Procedure Note    Pre Op Diagnosis: Ascites  Post Op Diagnosis: Same    Procedure: Ultrasound Guided Paracentesis    Procedure performed by: Patt Elliott PA-C    Written Informed Consent Obtained: Yes  Specimen Removed: YES   Estimated Blood Loss: Minimal    Findings:   Successful paracentesis.  2900 ml clear yellow fluid.  Albumin was not administered PRN per protocol.    Patient tolerated procedure well.    Patt Elliott PA-C

## 2024-09-10 NOTE — ASSESSMENT & PLAN NOTE
S/p Y90 x 2, leading to cirrhosis. No residual disease per hepatology.   - MRI abd was ordered outpatient but unable to be performed due to her symptoms, hepatology suggested to defer it for now

## 2024-09-10 NOTE — DISCHARGE SUMMARY
Michael Encarnacion - Transplant [facility] Medicine  Discharge Summary      Patient Name: Seema Gann  MRN: [id number]  MOY: [id number]  Patient Class: IP- Inpatient  Admission Date: 8/30/2024  Hospital Length of Stay: 2 days  Discharge Date and Time: 9/2/2024  1:53 PM  Attending Physician: Dalia att. providers found   Discharging Provider: Fernandez White MD  Primary Care Provider: Bethany Mesa MD  Hospital Medicine Team: [facility]  Fernandez White MD  Primary Care Team: [facility] MED     HPI:   Seema Gann is a 78 y.o. F with PMHx of HCV s/p Harvoni (2015), cirrhosis with recurrent ascites, pancytopenia, portal HTN with portal vein occlusion, h/o HCC s/p Y90, CKD 3b-4, HTN who presents today with SOB and abdominal distension. She had an Abdominal MRI today that was ordered by her hepatologist Dr. White, but was unable to lay flat due to SOB. She was recently hospitalized from 08/22- 08/23 for similar symptoms and received a paracentesis with 2.6L removed. She is supposed to be receiving therapeutic paracentesis every 2 weeks with IR OP, but this has not been set up yet. Her daughter Bri is at bedside and is very frustrated as she has been calling daily trying to get the patient's paracentesis scheduled (multiple telephone calls also noted on chart review). Patient has been compliant with all of her medications, including her lasix and aldactone. Denies fever, chills, chest pain, palpitations, cough, abdominal pain, n/v/d, dysuria, headaches.     In ED: Afebrile. HDS. Satting well on RA. CBC with chronic pancytopenia (at baseline). INR 1.2. CMP notable for K 3.1, ALP 45, T bili 1.6. Troponin 0.152. Given 20 mEq K. Placed in observation with hospital medicine for decompensated cirrhosis.      * No surgery found *      Hospital Course:   78 y.o. female with history of HCV s/p Harvoni (2015), cirrhosis with recurrent ascites, pancytopenia, portal vein occlusion, h/o HCC s/p Y90, CKD 3b-4, HTN who  presented with SOB and abdominal distension, recurrent ascites.      Patient has been compliant with medications however has had issues arranging outpatient IR para. Discussed pleurx drain for ascites, at this time patient deferring and requesting intermittent IR paracentesis. Hepatology evaluated inpatient and recommended continued paracentesis and diuretics.     Blood pressure low, midodrine 5mg TID started. Given 1x albumin. Paracentesis completed 9/1. Negative for SBP. Case management requested to check status on outpatient paracentesis setup assistance. Referrals were sent    Review of Systems   Constitutional:  Negative for chills and fever.   Eyes:  Negative for visual disturbance.   Respiratory:  Negative for cough or shortness of breath.    Cardiovascular:  Positive for leg swelling. Negative for chest pain.   Gastrointestinal:  Positive for abdominal distention. Negative for abdominal pain and vomiting.   Genitourinary:  Negative for dysuria and frequency.   Musculoskeletal:  Negative for back pain.   Neurological:  Negative for dizziness and light-headedness. Negative for agitation and confusion.       Objective:      Vital Signs (Most Recent):  Temp: 97.8 °F (36.6 °C) (09/02/24 1107)  Pulse: (!) 54 (09/02/24 1204)  Resp: 18 (09/02/24 1107)  BP: (!) 98/52 (09/02/24 1107)  SpO2: 96 % (09/02/24 1107) Vital Signs (24h Range):      Weight: 54.1 kg (119 lb 4.3 oz)  Body mass index is 20.47 kg/m².    Physical Exam  Vitals and nursing note reviewed.   Constitutional:       General: She is not in acute distress.  HENT:      Head: Normocephalic and atraumatic.   Eyes:      Conjunctiva/sclera: Conjunctivae normal.   Cardiovascular:      Rate and Rhythm: Normal rate and regular rhythm. Murmur heard.   Pulmonary:      Effort: Pulmonary effort is normal. No respiratory distress.   Abdominal:      General: Bowel sounds are normal. There is distension.      Palpations: Abdomen is soft. There is no abdominal tenderness.    Musculoskeletal:         General: Normal range of motion.       Right lower leg: Edema present.      Left lower leg: Edema present.   Neurological:      Mental Status: She is alert and oriented to person, place, and time. Mental status is at baseline.        Goals of Care Treatment Preferences:  Code Status: Full Code          What is most important right now is to focus on avoiding the hospital.  Accordingly, we have decided that the best plan to meet the patient's goals includes continuing with treatment.      SDOH Screening:  The patient was screened for utility difficulties, food insecurity, transport difficulties, housing insecurity, and interpersonal safety and there were no concerns identified this admission.     Consults:   Consults (From admission, onward)          Status Ordering Provider     Inpatient consult to Registered Dietitian/Nutritionist  Once        Provider:  (Not yet assigned)    Completed BG BENZ     Inpatient consult to Hepatology  Once        Provider:  (Not yet assigned)    Completed MERRILL SUE            Cardiac/Vascular  Elevated troponin  - trop 0.152 -> 0.151-> 0.140- denies chest pain   - likely 2/2 demand ischemia in setting of hypervolemia in decompensated cirrhosis  - EKG with low QRS, incomplete RBBB. Appears similar to priors  - recent echo reviewed. Consider repeat echo pending trop trend  - recent stress test in May negative for ischemia     Primary hypertension  - controlled on admit  - coreg 3.125 mg BID,and aldactone 100 mg  - holding coreg, aldactone, lasix, amlodipine and lisinopril as needing midodrine   - orthostatic vitals negative.   - Will continue to hold until outpatient follow up     Renal/  Stage 3a chronic kidney disease  Creatine stable for now. BMP reviewed-noted Estimated Creatinine Clearance: 25.1 mL/min (A) (based on SCr of 1.6 mg/dL (H)). according to latest data. Based on current GFR, CKD stage is stage 3 - GFR 30-59.  Monitor UOP and serial  BMP and adjust therapy as needed. Renally dose meds. Avoid nephrotoxic medications and procedures.    Oncology  HCC (hepatocellular carcinoma)  S/p Y90 x 2, leading to cirrhosis. No residual disease per hepatology.   - MRI abd was ordered outpatient but unable to be performed due to her symptoms, hepatology suggested to defer it for now     GI  * Decompensated cirrhosis related to hepatitis C virus (HCV)  Patient with known Cirrhosis with Child's class B. Co-morbidities are present and inclusive of ascites, portal hypertension, portal venous thrombosis, malnutrition, and anemia/pancytopenia.  MELD-Na score calculated; MELD 3.0: 18 at 9/2/2024  6:24 AM  MELD-Na: 16 at 9/2/2024  6:24 AM  Calculated from:  Serum Creatinine: 1.8 mg/dL at 9/2/2024  6:24 AM  Serum Sodium: 137 mmol/L at 9/2/2024  6:24 AM  Total Bilirubin: 0.9 mg/dL (Using min of 1 mg/dL) at 9/2/2024  6:24 AM  Serum Albumin: 2.3 g/dL at 9/2/2024  6:24 AM  INR(ratio): 1.4 at 9/2/2024  6:24 AM  Age at listing (hypothetical): 78 years  Sex: Female at 9/2/2024  6:24 AM    Continue chronic meds. Etiology likely Hepatitis. Will avoid any hepatotoxic meds, and monitor CBC/CMP/INR for synthetic function.   - she has been unable to obtain OP paracentesis.   - IR consulted for therapeutic paracentesis.   - patient/family need assistance setting up OP scheduled paracentesis. Case management notified and referral sent.       Final Active Diagnoses:    Diagnosis Date Noted POA    PRINCIPAL PROBLEM:  Decompensated cirrhosis related to hepatitis C virus (HCV) [B19.20, K74.69] 07/05/2024 Yes     Chronic    Elevated troponin [R79.89] 08/30/2024 Yes    Hypokalemia [E87.6] 08/22/2024 Yes     Chronic    Pleural effusion [J90] 08/22/2024 Yes     Chronic    Stage 3a chronic kidney disease [N18.31] 03/05/2024 Yes     Chronic    Pancytopenia [D61.818] 11/21/2023 Yes     Chronic    HCC (hepatocellular carcinoma) [C22.0] 07/19/2023 Yes     Chronic    Primary hypertension [I10]  01/16/2015 Yes     Chronic    Chronic hepatitis C with cirrhosis [B18.2, K74.60] 07/28/2014 Yes     Chronic      Problems Resolved During this Admission:       Discharged Condition: stable    Disposition: Home or Self Care    Follow Up:   Follow-up Information       Bethany Mesa MD Follow up in 1 week(s).    Specialty: Internal Medicine  Contact information:  [address]  [address]  [place] [address]  [phone]                           Patient Instructions:      Ambulatory referral/consult to Interventional Radiology   Standing Status: Future   Referral Priority: Routine Referral Type: Consultation   Referral Reason: Specialty Services Required   Requested Specialty: Interventional Radiology   Number of Visits Requested: 1     Diet Adult Regular     Order Specific Question Answer Comments   Na restriction, if any: 2gNa      Notify your health care provider if you experience any of the following:  temperature >100.4     Notify your health care provider if you experience any of the following:  persistent nausea and vomiting or diarrhea     Notify your health care provider if you experience any of the following:  severe uncontrolled pain     Notify your health care provider if you experience any of the following:  difficulty breathing or increased cough     Notify your health care provider if you experience any of the following:  persistent dizziness, light-headedness, or visual disturbances     Notify your health care provider if you experience any of the following:  increased confusion or weakness     Activity as tolerated       Significant Diagnostic Studies: Labs:     CBC:      Lab 08/31/24  0631   WBC 3.72   HGB 8.3*   HCT 25.3*   PLT 96       CMP:   Lab 08/31/24  0631      K 3.3      CO2 23   GLU 91   BUN 14   CREATININE 1.8   CALCIUM 8.0   PROT 5.4   ALBUMIN 2.3*   BILITOT 0.9*   ALKPHOS 28*   AST 15   ALT 5*   ANIONGAP 6         Pending Diagnostic Studies:       None            Medications:  Reconciled Home Medications:      Medication List        START taking these medications      midodrine 5 MG Tab  Commonly known as: PROAMATINE  Take 3 tablets (15 mg total) by mouth 3 (three) times daily.            STOP taking these medications      amLODIPine 5 MG tablet  Commonly known as: NORVASC     carvediloL 3.125 MG tablet  Commonly known as: COREG     cyproheptadine 4 mg tablet  Commonly known as: PERIACTIN     furosemide 20 MG tablet  Commonly known as: LASIX     lisinopriL 20 MG tablet  Commonly known as: PRINIVIL,ZESTRIL     spironolactone 100 MG tablet  Commonly known as: ALDACTONE              Indwelling Lines/Drains at time of discharge:   Lines/Drains/Airways       None                   Time spent on the discharge of patient: 60 minutes         Fernandez White MD  Department of Hospital Medicine  [facility] - Transplant Stepdown

## 2024-09-10 NOTE — ASSESSMENT & PLAN NOTE
- controlled on admit  - coreg 3.125 mg BID,and aldactone 100 mg  - holding coreg, aldactone, lasix, amlodipine and lisinopril as needing midodrine   - orthostatic vitals negative.   - Will continue to hold until outpatient follow up

## 2024-09-10 NOTE — ASSESSMENT & PLAN NOTE
- trop 0.152 -> 0.151-> 0.140- denies chest pain   - likely 2/2 demand ischemia in setting of hypervolemia in decompensated cirrhosis  - EKG with low QRS, incomplete RBBB. Appears similar to priors  - recent echo reviewed. Consider repeat echo pending trop trend  - recent stress test in May negative for ischemia

## 2024-09-10 NOTE — SUBJECTIVE & OBJECTIVE
Interval History    NAEON.  Planned for paracenteisis, plan for OOB. Replacing K and Mg.     Review of Systems   Constitutional:  Negative for chills and fever.   Eyes:  Negative for visual disturbance.   Respiratory:  Positive for shortness of breath. Negative for cough.    Cardiovascular:  Positive for leg swelling. Negative for chest pain.   Gastrointestinal:  Positive for abdominal distention. Negative for abdominal pain and vomiting.   Genitourinary:  Negative for dysuria and frequency.   Musculoskeletal:  Negative for back pain.   Neurological:  Negative for dizziness and light-headedness.   Psychiatric/Behavioral:  Negative for agitation and confusion.      Objective:     Vital Signs (Most Recent):  Temp: 97.8 °F (36.6 °C) (09/02/24 1107)  Pulse: (!) 54 (09/02/24 1204)  Resp: 18 (09/02/24 1107)  BP: (!) 98/52 (09/02/24 1107)  SpO2: 96 % (09/02/24 1107) Vital Signs (24h Range):        Weight: 54.1 kg (119 lb 4.3 oz)  Body mass index is 20.47 kg/m².     Physical Exam  Vitals and nursing note reviewed.   Constitutional:       General: She is not in acute distress.     Appearance: She is well-developed.   HENT:      Head: Normocephalic and atraumatic.   Eyes:      Conjunctiva/sclera: Conjunctivae normal.   Cardiovascular:      Rate and Rhythm: Normal rate and regular rhythm.      Heart sounds: Murmur heard.   Pulmonary:      Effort: Pulmonary effort is normal. No respiratory distress.      Breath sounds: No wheezing.      Comments: Decreased air movement to L base. Satting well on RA  Abdominal:      General: Bowel sounds are normal. There is distension.      Palpations: Abdomen is soft.      Tenderness: There is no abdominal tenderness.   Musculoskeletal:         General: Normal range of motion.      Cervical back: Normal range of motion and neck supple.      Right lower leg: Edema present.      Left lower leg: Edema present.   Skin:     General: Skin is warm and dry.      Capillary Refill: Capillary refill  takes less than 2 seconds.   Neurological:      Mental Status: She is alert and oriented to person, place, and time. Mental status is at baseline.   Psychiatric:         Thought Content: Thought content normal.                Significant Labs: All pertinent labs within the past 24 hours have been reviewed.  Significant Imaging: I have reviewed all pertinent imaging results/findings within the past 24 hours.

## 2024-09-10 NOTE — ASSESSMENT & PLAN NOTE
Creatine stable for now. BMP reviewed-noted Estimated Creatinine Clearance: 25.1 mL/min (A) (based on SCr of 1.6 mg/dL (H)). according to latest data. Based on current GFR, CKD stage is stage 3 - GFR 30-59.  Monitor UOP and serial BMP and adjust therapy as needed. Renally dose meds. Avoid nephrotoxic medications and procedures.

## 2024-09-10 NOTE — ASSESSMENT & PLAN NOTE
Patient with known Cirrhosis with Child's class B. Co-morbidities are present and inclusive of ascites, portal hypertension, portal venous thrombosis, malnutrition, and anemia/pancytopenia.  MELD-Na score calculated; MELD 3.0: 18 at 9/2/2024  6:24 AM  MELD-Na: 16 at 9/2/2024  6:24 AM  Calculated from:  Serum Creatinine: 1.8 mg/dL at 9/2/2024  6:24 AM  Serum Sodium: 137 mmol/L at 9/2/2024  6:24 AM  Total Bilirubin: 0.9 mg/dL (Using min of 1 mg/dL) at 9/2/2024  6:24 AM  Serum Albumin: 2.3 g/dL at 9/2/2024  6:24 AM  INR(ratio): 1.4 at 9/2/2024  6:24 AM  Age at listing (hypothetical): 78 years  Sex: Female at 9/2/2024  6:24 AM    Continue chronic meds. Etiology likely Hepatitis. Will avoid any hepatotoxic meds, and monitor CBC/CMP/INR for synthetic function.   - she has been unable to obtain OP paracentesis.   - IR consulted for therapeutic paracentesis.   - patient/family need assistance setting up OP scheduled paracentesis. Case management notified and referral sent.

## 2024-09-13 ENCOUNTER — TELEPHONE (OUTPATIENT)
Dept: INTERVENTIONAL RADIOLOGY/VASCULAR | Facility: HOSPITAL | Age: 79
End: 2024-09-13
Payer: MEDICARE

## 2024-09-16 ENCOUNTER — LAB VISIT (OUTPATIENT)
Dept: LAB | Facility: HOSPITAL | Age: 79
End: 2024-09-16
Attending: INTERNAL MEDICINE
Payer: MEDICARE

## 2024-09-16 ENCOUNTER — OFFICE VISIT (OUTPATIENT)
Dept: NEPHROLOGY | Facility: CLINIC | Age: 79
End: 2024-09-16
Payer: MEDICARE

## 2024-09-16 ENCOUNTER — HOSPITAL ENCOUNTER (OUTPATIENT)
Dept: INTERVENTIONAL RADIOLOGY/VASCULAR | Facility: HOSPITAL | Age: 79
Discharge: HOME | End: 2024-09-16
Attending: INTERNAL MEDICINE
Payer: MEDICARE

## 2024-09-16 VITALS
BODY MASS INDEX: 18.78 KG/M2 | OXYGEN SATURATION: 100 % | HEART RATE: 72 BPM | RESPIRATION RATE: 18 BRPM | DIASTOLIC BLOOD PRESSURE: 76 MMHG | WEIGHT: 110 LBS | SYSTOLIC BLOOD PRESSURE: 130 MMHG | HEIGHT: 64 IN | TEMPERATURE: 98 F

## 2024-09-16 VITALS
HEART RATE: 99 BPM | OXYGEN SATURATION: 98 % | HEIGHT: 64 IN | DIASTOLIC BLOOD PRESSURE: 73 MMHG | WEIGHT: 103.81 LBS | BODY MASS INDEX: 17.72 KG/M2 | SYSTOLIC BLOOD PRESSURE: 112 MMHG

## 2024-09-16 DIAGNOSIS — K76.7 HEPATORENAL SYNDROME: Primary | ICD-10-CM

## 2024-09-16 DIAGNOSIS — E87.8 ELECTROLYTE DEPLETION: ICD-10-CM

## 2024-09-16 DIAGNOSIS — B19.20 DECOMPENSATED CIRRHOSIS RELATED TO HEPATITIS C VIRUS (HCV): Chronic | ICD-10-CM

## 2024-09-16 DIAGNOSIS — D69.6 THROMBOCYTOPENIA: ICD-10-CM

## 2024-09-16 DIAGNOSIS — N18.4 CHRONIC KIDNEY DISEASE (CKD), STAGE IV (SEVERE): ICD-10-CM

## 2024-09-16 DIAGNOSIS — D47.2 MONOCLONAL GAMMOPATHY OF UNDETERMINED SIGNIFICANCE: ICD-10-CM

## 2024-09-16 DIAGNOSIS — K74.69 DECOMPENSATED CIRRHOSIS RELATED TO HEPATITIS C VIRUS (HCV): Chronic | ICD-10-CM

## 2024-09-16 DIAGNOSIS — N18.32 STAGE 3B CHRONIC KIDNEY DISEASE: ICD-10-CM

## 2024-09-16 DIAGNOSIS — D61.818 PANCYTOPENIA: ICD-10-CM

## 2024-09-16 DIAGNOSIS — R18.8 OTHER ASCITES: ICD-10-CM

## 2024-09-16 DIAGNOSIS — E55.9 VITAMIN D DEFICIENCY, UNSPECIFIED: ICD-10-CM

## 2024-09-16 LAB
25(OH)D3+25(OH)D2 SERPL-MCNC: 12 NG/ML (ref 30–96)
ALBUMIN SERPL BCP-MCNC: 2.6 G/DL (ref 3.5–5.2)
ALP SERPL-CCNC: 56 U/L (ref 55–135)
ALT SERPL W/O P-5'-P-CCNC: 8 U/L (ref 10–44)
ANION GAP SERPL CALC-SCNC: 11 MMOL/L (ref 8–16)
APPEARANCE FLD: CLEAR
AST SERPL-CCNC: 26 U/L (ref 10–40)
BASOPHILS # BLD AUTO: 0.02 K/UL (ref 0–0.2)
BASOPHILS NFR BLD: 0.5 % (ref 0–1.9)
BILIRUB SERPL-MCNC: 1.2 MG/DL (ref 0.1–1)
BODY FLD TYPE: NORMAL
BUN SERPL-MCNC: 12 MG/DL (ref 8–23)
CALCIUM SERPL-MCNC: 8.7 MG/DL (ref 8.7–10.5)
CHLORIDE SERPL-SCNC: 106 MMOL/L (ref 95–110)
CK SERPL-CCNC: 45 U/L (ref 20–180)
CO2 SERPL-SCNC: 21 MMOL/L (ref 23–29)
COLOR FLD: YELLOW
CREAT SERPL-MCNC: 1.6 MG/DL (ref 0.5–1.4)
DIFFERENTIAL METHOD BLD: ABNORMAL
EOSINOPHIL # BLD AUTO: 0.2 K/UL (ref 0–0.5)
EOSINOPHIL NFR BLD: 4.4 % (ref 0–8)
ERYTHROCYTE [DISTWIDTH] IN BLOOD BY AUTOMATED COUNT: 13.4 % (ref 11.5–14.5)
EST. GFR  (NO RACE VARIABLE): 33 ML/MIN/1.73 M^2
FERRITIN SERPL-MCNC: 654 NG/ML (ref 20–300)
FOLATE SERPL-MCNC: 5.5 NG/ML (ref 4–24)
GLUCOSE SERPL-MCNC: 138 MG/DL (ref 70–110)
HCT VFR BLD AUTO: 30.8 % (ref 37–48.5)
HGB BLD-MCNC: 9.8 G/DL (ref 12–16)
IMM GRANULOCYTES # BLD AUTO: 0.01 K/UL (ref 0–0.04)
IMM GRANULOCYTES NFR BLD AUTO: 0.3 % (ref 0–0.5)
INR PPP: 1.2 (ref 0.8–1.2)
IRON SERPL-MCNC: 82 UG/DL (ref 30–160)
LYMPHOCYTES # BLD AUTO: 0.9 K/UL (ref 1–4.8)
LYMPHOCYTES NFR BLD: 22.2 % (ref 18–48)
LYMPHOCYTES NFR FLD MANUAL: 84 %
MAGNESIUM SERPL-MCNC: 2 MG/DL (ref 1.6–2.6)
MCH RBC QN AUTO: 31 PG (ref 27–31)
MCHC RBC AUTO-ENTMCNC: 31.8 G/DL (ref 32–36)
MCV RBC AUTO: 98 FL (ref 82–98)
MESOTHL CELL NFR FLD MANUAL: 2 %
MONOCYTES # BLD AUTO: 0.3 K/UL (ref 0.3–1)
MONOCYTES NFR BLD: 7.7 % (ref 4–15)
MONOS+MACROS NFR FLD MANUAL: 6 %
NEUTROPHILS # BLD AUTO: 2.5 K/UL (ref 1.8–7.7)
NEUTROPHILS NFR BLD: 64.9 % (ref 38–73)
NEUTROPHILS NFR FLD MANUAL: 8 %
NRBC BLD-RTO: 0 /100 WBC
PHOSPHATE SERPL-MCNC: 2.7 MG/DL (ref 2.7–4.5)
PLATELET # BLD AUTO: 116 K/UL (ref 150–450)
PMV BLD AUTO: 12.2 FL (ref 9.2–12.9)
POTASSIUM SERPL-SCNC: 3.4 MMOL/L (ref 3.5–5.1)
PROT SERPL-MCNC: 7.3 G/DL (ref 6–8.4)
PROTHROMBIN TIME: 13 SEC (ref 9–12.5)
PTH-INTACT SERPL-MCNC: 107.7 PG/ML (ref 9–77)
RBC # BLD AUTO: 3.16 M/UL (ref 4–5.4)
SATURATED IRON: 49 % (ref 20–50)
SODIUM SERPL-SCNC: 138 MMOL/L (ref 136–145)
TOTAL IRON BINDING CAPACITY: 166 UG/DL (ref 250–450)
TRANSFERRIN SERPL-MCNC: 112 MG/DL (ref 200–375)
VIT B12 SERPL-MCNC: 785 PG/ML (ref 210–950)
WBC # BLD AUTO: 3.88 K/UL (ref 3.9–12.7)
WBC # FLD: 103 /CU MM

## 2024-09-16 PROCEDURE — 49083 ABD PARACENTESIS W/IMAGING: CPT | Mod: ,,, | Performed by: PHYSICIAN ASSISTANT

## 2024-09-16 PROCEDURE — 84100 ASSAY OF PHOSPHORUS: CPT | Performed by: INTERNAL MEDICINE

## 2024-09-16 PROCEDURE — 1159F MED LIST DOCD IN RCRD: CPT | Mod: CPTII,S$GLB,, | Performed by: INTERNAL MEDICINE

## 2024-09-16 PROCEDURE — 99999 PR PBB SHADOW E&M-EST. PATIENT-LVL III: CPT | Mod: PBBFAC,,, | Performed by: INTERNAL MEDICINE

## 2024-09-16 PROCEDURE — 83970 ASSAY OF PARATHORMONE: CPT | Performed by: INTERNAL MEDICINE

## 2024-09-16 PROCEDURE — 82550 ASSAY OF CK (CPK): CPT | Performed by: INTERNAL MEDICINE

## 2024-09-16 PROCEDURE — 80053 COMPREHEN METABOLIC PANEL: CPT | Performed by: INTERNAL MEDICINE

## 2024-09-16 PROCEDURE — 84165 PROTEIN E-PHORESIS SERUM: CPT | Performed by: INTERNAL MEDICINE

## 2024-09-16 PROCEDURE — 82306 VITAMIN D 25 HYDROXY: CPT | Performed by: INTERNAL MEDICINE

## 2024-09-16 PROCEDURE — 82746 ASSAY OF FOLIC ACID SERUM: CPT | Performed by: INTERNAL MEDICINE

## 2024-09-16 PROCEDURE — 1101F PT FALLS ASSESS-DOCD LE1/YR: CPT | Mod: CPTII,S$GLB,, | Performed by: INTERNAL MEDICINE

## 2024-09-16 PROCEDURE — 86334 IMMUNOFIX E-PHORESIS SERUM: CPT | Performed by: INTERNAL MEDICINE

## 2024-09-16 PROCEDURE — 89051 BODY FLUID CELL COUNT: CPT | Performed by: INTERNAL MEDICINE

## 2024-09-16 PROCEDURE — 82728 ASSAY OF FERRITIN: CPT | Performed by: INTERNAL MEDICINE

## 2024-09-16 PROCEDURE — 83521 IG LIGHT CHAINS FREE EACH: CPT | Mod: 59 | Performed by: INTERNAL MEDICINE

## 2024-09-16 PROCEDURE — 82607 VITAMIN B-12: CPT | Performed by: INTERNAL MEDICINE

## 2024-09-16 PROCEDURE — 1160F RVW MEDS BY RX/DR IN RCRD: CPT | Mod: CPTII,S$GLB,, | Performed by: INTERNAL MEDICINE

## 2024-09-16 PROCEDURE — 3078F DIAST BP <80 MM HG: CPT | Mod: CPTII,S$GLB,, | Performed by: INTERNAL MEDICINE

## 2024-09-16 PROCEDURE — 99204 OFFICE O/P NEW MOD 45 MIN: CPT | Mod: S$GLB,,, | Performed by: INTERNAL MEDICINE

## 2024-09-16 PROCEDURE — 1126F AMNT PAIN NOTED NONE PRSNT: CPT | Mod: CPTII,S$GLB,, | Performed by: INTERNAL MEDICINE

## 2024-09-16 PROCEDURE — 85610 PROTHROMBIN TIME: CPT | Performed by: INTERNAL MEDICINE

## 2024-09-16 PROCEDURE — 84165 PROTEIN E-PHORESIS SERUM: CPT | Mod: 26,,, | Performed by: PATHOLOGY

## 2024-09-16 PROCEDURE — 83735 ASSAY OF MAGNESIUM: CPT | Performed by: INTERNAL MEDICINE

## 2024-09-16 PROCEDURE — 3288F FALL RISK ASSESSMENT DOCD: CPT | Mod: CPTII,S$GLB,, | Performed by: INTERNAL MEDICINE

## 2024-09-16 PROCEDURE — 86334 IMMUNOFIX E-PHORESIS SERUM: CPT | Mod: 26,,, | Performed by: PATHOLOGY

## 2024-09-16 PROCEDURE — 3074F SYST BP LT 130 MM HG: CPT | Mod: CPTII,S$GLB,, | Performed by: INTERNAL MEDICINE

## 2024-09-16 PROCEDURE — 36415 COLL VENOUS BLD VENIPUNCTURE: CPT | Performed by: INTERNAL MEDICINE

## 2024-09-16 PROCEDURE — 84466 ASSAY OF TRANSFERRIN: CPT | Performed by: INTERNAL MEDICINE

## 2024-09-16 PROCEDURE — 85025 COMPLETE CBC W/AUTO DIFF WBC: CPT | Performed by: INTERNAL MEDICINE

## 2024-09-16 PROCEDURE — 49083 ABD PARACENTESIS W/IMAGING: CPT | Performed by: RADIOLOGY

## 2024-09-16 PROCEDURE — 1111F DSCHRG MED/CURRENT MED MERGE: CPT | Mod: CPTII,S$GLB,, | Performed by: INTERNAL MEDICINE

## 2024-09-16 RX ORDER — MIDODRINE HYDROCHLORIDE 5 MG/1
10 TABLET ORAL 3 TIMES DAILY
Start: 2024-09-16 | End: 2024-12-15

## 2024-09-16 NOTE — PROCEDURES
Radiology Post-Procedure Note    Pre Op Diagnosis: Ascites  Post Op Diagnosis: Same    Procedure: Ultrasound Guided Paracentesis    Procedure performed by: aPtt Elliott PA-C    Written Informed Consent Obtained: Yes  Specimen Removed: YES   Estimated Blood Loss: Minimal    Findings:   Successful paracentesis.  2200 ml removed RLQ. Albumin was not administered PRN per protocol.    Patient tolerated procedure well.    Patt Elliott PA-C

## 2024-09-16 NOTE — PROGRESS NOTES
REASON FOR CONSULT/CHIEF COMPLAIN: Renal dysfunction    REFERRING PHYSICIAN: Bethany Mesa MD    HISTORY OF PRESENT ILLNESS: 79 y.o. female who is new to me, referred here for abnormal renal function.  No chronic NSAID use, no known exposure to lithium, lead. No family history of Lupus, kidney disease or deafness. No ingestion of licorice. No kidney stones. No smoking. Hep C treated in 2015.  Denied any issues with urination including dysuria, hematuria, urgency, hesitancy, nocturia, incomplete voiding. Denied chest pain, nausea, vomiting, abd pain, diarrhea, shortness of breath, pedal edema, Orthopnea, PND.    ROS:  General: negative for chills, or fatigue  Respiratory: No cough, shortness of breath, or wheezing  Cardiovascular: No chest pain or dyspnea   Gastrointestinal: No abdominal pain, change in bowel habits    MEDICAL PROBLEMS:  Past Medical History:   Diagnosis Date    Cirrhosis     HCC (hepatocellular carcinoma)     Hepatitis     Hypertension        SURGICAL PROBLEMS:  Past Surgical History:   Procedure Laterality Date    APPENDECTOMY      HYSTERECTOMY         FAMILY HISTORY:   No family history on file.    SOCIAL HISTORY:  Social History     Socioeconomic History    Marital status:    Tobacco Use    Smoking status: Never     Passive exposure: Never    Smokeless tobacco: Never   Substance and Sexual Activity    Alcohol use: No    Drug use: No    Sexual activity: Yes   Social History Narrative    ** Merged History Encounter **          Social Drivers of Health     Financial Resource Strain: High Risk (9/5/2024)    Overall Financial Resource Strain (CARDIA)     Difficulty of Paying Living Expenses: Very hard   Food Insecurity: Food Insecurity Present (9/5/2024)    Hunger Vital Sign     Worried About Running Out of Food in the Last Year: Sometimes true     Ran Out of Food in the Last Year: Sometimes true   Transportation Needs: No Transportation Needs (9/1/2024)    TRANSPORTATION NEEDS      "Transportation : No   Physical Activity: Inactive (9/5/2024)    Exercise Vital Sign     Days of Exercise per Week: 0 days     Minutes of Exercise per Session: 0 min   Stress: Stress Concern Present (9/5/2024)    [employer] [place] [employer] - Occupational Stress Questionnaire     Feeling of Stress : To some extent   Housing Stability: Low Risk  (9/5/2024)    Housing Stability Vital Sign     Unable to Pay for Housing in the Last Year: No     Homeless in the Last Year: No       ALLERGIES:  Review of patient's allergies indicates:  No Known Allergies    MEDICATIONS:    Current Outpatient Medications:     mirtazapine (REMERON) 7.5 MG Tab, Take 1 tablet (7.5 mg total) by mouth every evening., Disp: 30 tablet, Rfl: 11    midodrine (PROAMATINE) 5 MG Tab, Take 2 tablets (10 mg total) by mouth 3 (three) times daily., Disp: , Rfl:    Medication List with Changes/Refills   Current Medications    MIRTAZAPINE (REMERON) 7.5 MG TAB    Take 1 tablet (7.5 mg total) by mouth every evening.   Changed and/or Refilled Medications    Modified Medication Previous Medication    MIDODRINE (PROAMATINE) 5 MG TAB midodrine (PROAMATINE) 5 MG Tab       Take 2 tablets (10 mg total) by mouth 3 (three) times daily.    Take 3 tablets (15 mg total) by mouth 3 (three) times daily.        PHYSICAL EXAM:  /73   Pulse 99   Ht 5' 4" (1.626 m)   Wt 47.1 kg (103 lb 13.4 oz)   SpO2 98%   BMI 17.82 kg/m²     General: No distress, No fever or chills  Neck: No adenopathy,no carotid bruit,no JVD  Lungs:Clear to auscultation bilaterally, No Crackles  Heart: Regular rate and rhythm, no murmur, gallops or rubs  Abdomen: Soft, no tenderness, bowel sounds normal  Extremities: Atraumatic, mild edema in LE  Skin: Turgor normal. No rashes  Neurologic: No focal weakness, oriented.  Dialysis Access: Non applicable         LABS:  Lab Results   Component Value Date    HGB 9.8 (L) 09/16/2024        Lab Results   Component Value Date    CREATININE 1.6 " (H) 09/16/2024       Prot/Creat Ratio, Urine   Date Value Ref Range Status   09/16/2024 0.04 0.00 - 0.20 Final       Lab Results   Component Value Date     09/16/2024    K 3.4 (L) 09/16/2024    CO2 21 (L) 09/16/2024       last PTH   Lab Results   Component Value Date    .7 (H) 09/16/2024    CALCIUM 8.7 09/16/2024    PHOS 2.7 09/16/2024       Lab Results   Component Value Date    HGBA1C 4.9 11/22/2023       Lab Results   Component Value Date    LDLCALC 87.8 11/22/2023         Creatinine, Urine   Date Value Ref Range Status   09/16/2024 299.0 15.0 - 325.0 mg/dL Final       Protein, Urine Random   Date Value Ref Range Status   09/16/2024 13 0 - 15 mg/dL Final       Prot/Creat Ratio, Urine   Date Value Ref Range Status   09/16/2024 0.04 0.00 - 0.20 Final         ASSESSMENT AND PLAN:    1) Chronic kidney disease stage 3b  2) Cirrhosis of liver  3) Faint IgG kappa band   Patients baseline creatinine has been trending up since atleast 2022, it is around 1.6 mg/dl now. Urine analysis is benign, CT scan from 2023 showed 8 and 9 cm kidney with no other obvious anatomical abnormalities. SPEP/IF acceptable ratio. Her CKD is likely a combination of age related nephron loss and HRS 2.   Electrolytes, Acid Base, Hemoglobin, in acceptable range.     - Increase midodrine to 10 mg TID,   - Agree with regular/as needed paracentesis as the diuretics did not help.  - Sent vitamin D to her pharmacy.  - Educated about CKD  - Educated about fluid restriction   - Avoid NSAIDs intake      RTC in 3 months    Diagnosis and plan of care explained to the patient.  Pt Verbalized understanding. Answered all questions.  Thanks for allowing me to participate in the care of this patient.     3:44 PM    Blaine Anderson MD  Nephrology & Critical Care    Medically complex.

## 2024-09-17 LAB
ALBUMIN SERPL ELPH-MCNC: 2.87 G/DL (ref 3.35–5.55)
ALPHA1 GLOB SERPL ELPH-MCNC: 0.3 G/DL (ref 0.17–0.41)
ALPHA2 GLOB SERPL ELPH-MCNC: 0.49 G/DL (ref 0.43–0.99)
B-GLOBULIN SERPL ELPH-MCNC: 1.09 G/DL (ref 0.5–1.1)
GAMMA GLOB SERPL ELPH-MCNC: 2.45 G/DL (ref 0.67–1.58)
INTERPRETATION SERPL IFE-IMP: NORMAL
KAPPA LC SER QL IA: 21.63 MG/DL (ref 0.33–1.94)
KAPPA LC/LAMBDA SER IA: 2.41 (ref 0.26–1.65)
LAMBDA LC SER QL IA: 8.99 MG/DL (ref 0.57–2.63)
PROT SERPL-MCNC: 7.2 G/DL (ref 6–8.4)

## 2024-09-19 LAB
PATHOLOGIST INTERPRETATION IFE: NORMAL
PATHOLOGIST INTERPRETATION SPE: NORMAL

## 2024-09-20 ENCOUNTER — TELEPHONE (OUTPATIENT)
Dept: INTERVENTIONAL RADIOLOGY/VASCULAR | Facility: HOSPITAL | Age: 79
End: 2024-09-20
Payer: MEDICARE

## 2024-09-20 NOTE — NURSING
Advised to arrive at 10:30. Regularly scheduled patient who is familiar with the procedure and the IR process.

## 2024-09-23 ENCOUNTER — HOSPITAL ENCOUNTER (OUTPATIENT)
Dept: INTERVENTIONAL RADIOLOGY/VASCULAR | Facility: HOSPITAL | Age: 79
Discharge: HOME | End: 2024-09-23
Attending: INTERNAL MEDICINE
Payer: MEDICARE

## 2024-09-23 DIAGNOSIS — R18.8 ASCITES: ICD-10-CM

## 2024-09-23 DIAGNOSIS — K74.69 DECOMPENSATED CIRRHOSIS RELATED TO HEPATITIS C VIRUS (HCV): Chronic | ICD-10-CM

## 2024-09-23 DIAGNOSIS — R18.8 OTHER ASCITES: ICD-10-CM

## 2024-09-23 DIAGNOSIS — B19.20 DECOMPENSATED CIRRHOSIS RELATED TO HEPATITIS C VIRUS (HCV): Chronic | ICD-10-CM

## 2024-09-23 LAB
APPEARANCE FLD: CLEAR
BODY FLD TYPE: NORMAL
COLOR FLD: YELLOW
LYMPHOCYTES NFR FLD MANUAL: 68 %
MESOTHL CELL NFR FLD MANUAL: 2 %
MONOS+MACROS NFR FLD MANUAL: 25 %
NEUTROPHILS NFR FLD MANUAL: 5 %
WBC # FLD: 101 /CU MM

## 2024-09-23 PROCEDURE — 49083 ABD PARACENTESIS W/IMAGING: CPT | Mod: ,,, | Performed by: PHYSICIAN ASSISTANT

## 2024-09-23 PROCEDURE — 89051 BODY FLUID CELL COUNT: CPT | Performed by: INTERNAL MEDICINE

## 2024-09-23 PROCEDURE — 49083 ABD PARACENTESIS W/IMAGING: CPT | Performed by: RADIOLOGY

## 2024-09-23 NOTE — PROCEDURES
Radiology Post-Procedure Note    Pre Op Diagnosis: Ascites  Post Op Diagnosis: Same    Procedure: Ultrasound Guided Paracentesis    Procedure performed by: Patt Elliott PA-C    Written Informed Consent Obtained: Yes  Specimen Removed: YES   Estimated Blood Loss: Minimal    Findings:   Successful paracentesis.  3400 ml yellow fluid rlq.  Albumin was not administered PRN per protocol.    Patient tolerated procedure well.    Patt Elliott PA-C

## 2024-09-23 NOTE — DISCHARGE SUMMARY
Interventional Radiology Short Stay Discharge Summary      Admit Date: 9/23/2024  Discharge Date: 09/23/2024     Hospital Course: Uneventful    Discharge Diagnosis: ascites    Discharge Condition: Stable    Discharge Disposition: Home    Diet: Resume prior diet    Activity: activity as tolerated    Follow-up: With referring provider    Cindy Chava, PA-C [facility] IR

## 2024-09-23 NOTE — H&P
Radiology History & Physical      SUBJECTIVE:     Chief Complaint: abdominal distention    History of Present Illness:  Seema Gann is a 79 y.o. female who presents for ultrasound guided paracentesis  Past Medical History:   Diagnosis Date    Cirrhosis     HCC (hepatocellular carcinoma)     Hepatitis     Hypertension      Past Surgical History:   Procedure Laterality Date    APPENDECTOMY      HYSTERECTOMY         Home Meds:   Prior to Admission medications    Medication Sig Start Date End Date Taking? Authorizing Provider   midodrine (PROAMATINE) 5 MG Tab Take 2 tablets (10 mg total) by mouth 3 (three) times daily. 9/16/24 12/15/24  Blaine Anderson MD   mirtazapine (REMERON) 7.5 MG Tab Take 1 tablet (7.5 mg total) by mouth every evening. 9/6/24 9/6/25  Bethany Mesa MD     Anticoagulants/Antiplatelets: no anticoagulation    Allergies: Review of patient's allergies indicates:  No Known Allergies  Sedation History:  no adverse reactions    Review of Systems:   Hematological: no known coagulopathies  Respiratory: no shortness of breath  Cardiovascular: no chest pain  Gastrointestinal: no abdominal pain  Genito-Urinary: no dysuria  Musculoskeletal: negative  Neurological: no TIA or stroke symptoms         OBJECTIVE:     Vital Signs (Most Recent)       Physical Exam:  ASA: 2  Mallampati: n/a    General: no acute distress  Mental Status: alert and oriented to person, place and time  HEENT: normocephalic, atraumatic  Chest: unlabored breathing  Heart: regular heart rate  Abdomen: distended  Extremity: moves all extremities    ASSESSMENT/PLAN:     Sedation Plan: local  Patient will undergo ultrasound guided paracentesis.    Patt Elliott PA-C

## 2024-09-24 ENCOUNTER — OFFICE VISIT (OUTPATIENT)
Dept: CARDIOLOGY | Facility: CLINIC | Age: 79
End: 2024-09-24
Payer: MEDICARE

## 2024-09-24 ENCOUNTER — OFFICE VISIT (OUTPATIENT)
Dept: HEPATOLOGY | Facility: CLINIC | Age: 79
End: 2024-09-24
Payer: MEDICARE

## 2024-09-24 VITALS
SYSTOLIC BLOOD PRESSURE: 112 MMHG | BODY MASS INDEX: 17.77 KG/M2 | HEIGHT: 64 IN | HEART RATE: 79 BPM | DIASTOLIC BLOOD PRESSURE: 74 MMHG | WEIGHT: 104.06 LBS

## 2024-09-24 VITALS
HEART RATE: 67 BPM | BODY MASS INDEX: 17.8 KG/M2 | OXYGEN SATURATION: 99 % | WEIGHT: 104.25 LBS | HEIGHT: 64 IN | SYSTOLIC BLOOD PRESSURE: 114 MMHG | DIASTOLIC BLOOD PRESSURE: 68 MMHG

## 2024-09-24 DIAGNOSIS — B18.2 CHRONIC HEPATITIS C WITH CIRRHOSIS: Chronic | ICD-10-CM

## 2024-09-24 DIAGNOSIS — K74.69 DECOMPENSATED CIRRHOSIS RELATED TO HEPATITIS C VIRUS (HCV): Chronic | ICD-10-CM

## 2024-09-24 DIAGNOSIS — R60.0 BILATERAL LOWER EXTREMITY EDEMA: ICD-10-CM

## 2024-09-24 DIAGNOSIS — N18.31 STAGE 3A CHRONIC KIDNEY DISEASE: Chronic | ICD-10-CM

## 2024-09-24 DIAGNOSIS — Z86.19 HISTORY OF HEPATITIS C: Primary | ICD-10-CM

## 2024-09-24 DIAGNOSIS — K74.60 CHRONIC HEPATITIS C WITH CIRRHOSIS: Chronic | ICD-10-CM

## 2024-09-24 DIAGNOSIS — B19.20 DECOMPENSATED CIRRHOSIS RELATED TO HEPATITIS C VIRUS (HCV): Chronic | ICD-10-CM

## 2024-09-24 DIAGNOSIS — I10 PRIMARY HYPERTENSION: Primary | Chronic | ICD-10-CM

## 2024-09-24 DIAGNOSIS — I73.9 PERIPHERAL VASCULAR DISEASE, UNSPECIFIED: ICD-10-CM

## 2024-09-24 DIAGNOSIS — C22.0 HCC (HEPATOCELLULAR CARCINOMA): Chronic | ICD-10-CM

## 2024-09-24 PROCEDURE — 99215 OFFICE O/P EST HI 40 MIN: CPT | Mod: S$GLB,,, | Performed by: INTERNAL MEDICINE

## 2024-09-24 PROCEDURE — 99999 PR PBB SHADOW E&M-EST. PATIENT-LVL III: CPT | Mod: PBBFAC,,, | Performed by: INTERNAL MEDICINE

## 2024-09-24 PROCEDURE — 3078F DIAST BP <80 MM HG: CPT | Mod: CPTII,S$GLB,, | Performed by: INTERNAL MEDICINE

## 2024-09-24 PROCEDURE — G2211 COMPLEX E/M VISIT ADD ON: HCPCS | Mod: S$GLB,,, | Performed by: INTERNAL MEDICINE

## 2024-09-24 PROCEDURE — 1101F PT FALLS ASSESS-DOCD LE1/YR: CPT | Mod: CPTII,S$GLB,, | Performed by: INTERNAL MEDICINE

## 2024-09-24 PROCEDURE — 1159F MED LIST DOCD IN RCRD: CPT | Mod: CPTII,S$GLB,, | Performed by: INTERNAL MEDICINE

## 2024-09-24 PROCEDURE — 99214 OFFICE O/P EST MOD 30 MIN: CPT | Mod: S$GLB,,, | Performed by: INTERNAL MEDICINE

## 2024-09-24 PROCEDURE — 3074F SYST BP LT 130 MM HG: CPT | Mod: CPTII,S$GLB,, | Performed by: INTERNAL MEDICINE

## 2024-09-24 PROCEDURE — 1160F RVW MEDS BY RX/DR IN RCRD: CPT | Mod: CPTII,S$GLB,, | Performed by: INTERNAL MEDICINE

## 2024-09-24 PROCEDURE — 3288F FALL RISK ASSESSMENT DOCD: CPT | Mod: CPTII,S$GLB,, | Performed by: INTERNAL MEDICINE

## 2024-09-24 PROCEDURE — 1111F DSCHRG MED/CURRENT MED MERGE: CPT | Mod: CPTII,S$GLB,, | Performed by: INTERNAL MEDICINE

## 2024-09-24 PROCEDURE — 1126F AMNT PAIN NOTED NONE PRSNT: CPT | Mod: CPTII,S$GLB,, | Performed by: INTERNAL MEDICINE

## 2024-09-24 PROCEDURE — 99999 PR PBB SHADOW E&M-EST. PATIENT-LVL II: CPT | Mod: PBBFAC,,, | Performed by: INTERNAL MEDICINE

## 2024-09-24 NOTE — PROGRESS NOTES
HISTORY:    78 yo F w a h/o hypertension, HCV cirrhosis, HCC s/p radioembolization presenting for 2nd visit with me.    Referred for B LE edema in early '24. Follows w NP Black.     On initial eval had been present for a month or so with sudden onset. Has since improved. Did have paracentesis a couple of times that she believes helped and has increased activity. Has been on diuretics long term. Currently on spironolactone and furosemide. Has tried compression stockings, which have not made a difference. Pt also with cirrhosis and abdominal ascites.    The patient denies any symptoms of chest pain, shortness of breath, or dyspnea on exertion.    Activity levels have increased since last visit. Complete ADLs and does yard/house work.    The patient denies any previous history of myocardial infarction, coronary artery disease, peripheral arterial disease, stroke, congestive heart failure, or cardiomyopathy.    Tolerates asa 81x1, spironolactone 25x1, furosemide 20x1.    PHYSICAL EXAM:    Vitals:    09/24/24 0832   BP: 112/74   Pulse: 79       NAD, A+Ox3.  No jvd, no bruit.  RRR nml s1,s2. No murmurs.  CTA B no wheezes or crackles.  Mild edema.    LABS/STUDIES (imaging reviewed during clinic visit):    September 2024 hemoglobin 9.8/MCV 98.  Creatinine 1.6/BUN 12/GFR 33 (baseline).  Albumin 2.6. 2023 TSH nml.  /LDL 88/HDL 39/TG 61.   ECG August 2024 SR, RBBB. No Qs/Sts.  TST 2022 7:30 on a Abel with no ischemia  TTE April '24 Nml LV size and fxn w an EF of 65-70%. Aortic sclerosis without stenosis. Mild AI. Mild MR. Nml RV size and fxn. CVP 3.   NST May 2024 Nml LVEF w no e/o ischemia.   Venous us April '24 No e/o DVT. B GSV reflux.  March '23 No e/o acute DVT. Thickened L SFV and pop venous walls and R SFV venous walls.      ASSESSMENT & PLAN:    1. Primary hypertension    2. Peripheral vascular disease, unspecified    3. Stage 3a chronic kidney disease    4. Chronic hepatitis C with cirrhosis    5. Bilateral  lower extremity edema                  C3 venous insufficiency bilaterally. 2/2 cirrhosis and an albumin of 2.6 with ascites. On carolynn 25x1 and furosemide 20x1. Has improved w paracentesisx2 and increased activity. Con't believe venous ablation will be helpful at this moment or address the primary problem which is cirrhosis. Cont current diuretic tx, compression stockings, and lifestyle modifications. Continue to increase ambulation time.     Follow up if symptoms worsen or fail to improve.      Vinicio Dominguez MD

## 2024-09-24 NOTE — PROGRESS NOTES
Subjective:       Patient ID: Seema Gann is a 79 y.o. female.    Chief Complaint: No chief complaint on file.      HPI  I saw this 79 y.o. lady in the liver clinic where she came alone.  Last seen in our clinic in May 2024 but she was recently in hospital with significant ascites.    She has been treated for her HCC with Y90 x2 and her HCC is under control. Unfortunately she has developed worsening ascites which now requires weekly large volume paracentesis.    HCV RNA neg in Jan 2016    - G1 HCV cirrhosis with SVR  - admitted to [facility] on 11/28/22 with chest pain and was found to have a large mass on US.    CT abdo: 3/2/2023  1. Cirrhotic hepatic morphology with 7.0 x 8.5 x 8.2-cm (previously 7.0 x 7.9 x 7.3-cm) homogeneous partially exophytic right hepatic lobe mass. Further evaluation is limited given the lack of IV contrast.    Liver biopsy on 5/30/2023  Hepatocellular carcinoma, moderately differentiated     AFP on 4/26/23= 460  AFP on 1/23/24= 3.6  AFP on 5/6/24= 2.1  AFP<2 on 8/30/24    Y90 on 6/27/2023  Y90 on 10/12/2023    MRI abdo: 5/6/24  Post treatment changes of the right hepatic lobe with no convincing evidence of local recurrent disease.  Probable infarction of the posterior right hepatic lobe.  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  Continued follow-up is recommended.  Large volume of ascites, increased compared to prior exam    MELD 3.0: 16 at 9/16/2024  4:28 PM  MELD-Na: 14 at 9/16/2024  4:28 PM  Calculated from:  Serum Creatinine: 1.6 mg/dL at 9/16/2024  4:28 PM  Serum Sodium: 138 mmol/L (Using max of 137 mmol/L) at 9/16/2024  4:28 PM  Total Bilirubin: 1.2 mg/dL at 9/16/2024  4:28 PM  Serum Albumin: 2.6 g/dL at 9/16/2024  4:28 PM  INR(ratio): 1.2 at 9/16/2024  4:28 PM  Age at listing (hypothetical): 78 years  Sex: Female at  9/16/2024  4:28 PM        PMH:  Hypertension  HCV cirrhosis    Review of Systems   Constitutional:  Negative for activity change, appetite change, chills, fatigue, fever and unexpected weight change.   HENT:  Negative for ear pain, hearing loss, nosebleeds, sore throat and trouble swallowing.    Eyes:  Negative for redness and visual disturbance.   Respiratory:  Negative for cough, chest tightness, shortness of breath and wheezing.    Cardiovascular:  Negative for chest pain and palpitations.   Gastrointestinal:  Negative for abdominal distention, abdominal pain, blood in stool, constipation, diarrhea, nausea and vomiting.   Genitourinary:  Negative for difficulty urinating, dysuria, frequency, hematuria and urgency.   Musculoskeletal:  Negative for arthralgias, back pain, gait problem, joint swelling and myalgias.   Skin:  Negative for rash.   Neurological:  Negative for tremors, seizures, speech difficulty, weakness and headaches.   Hematological:  Negative for adenopathy.   Psychiatric/Behavioral:  Negative for confusion, decreased concentration and sleep disturbance. The patient is not nervous/anxious.            Lab Results   Component Value Date    ALT 8 (L) 09/16/2024    AST 26 09/16/2024    GGT 45 01/16/2015    ALKPHOS 56 09/16/2024    BILITOT 1.2 (H) 09/16/2024     Past Medical History:   Diagnosis Date    Cirrhosis     HCC (hepatocellular carcinoma)     Hepatitis     Hypertension      Past Surgical History:   Procedure Laterality Date    APPENDECTOMY      HYSTERECTOMY       Current Outpatient Medications   Medication Sig    midodrine (PROAMATINE) 5 MG Tab Take 2 tablets (10 mg total) by mouth 3 (three) times daily.    mirtazapine (REMERON) 7.5 MG Tab Take 1 tablet (7.5 mg total) by mouth every evening.     No current facility-administered medications for this visit.       Objective:      Physical Exam  Constitutional:       General: She is not in acute distress.  HENT:      Head: Normocephalic.   Eyes:       Pupils: Pupils are equal, round, and reactive to light.   Neck:      Thyroid: No thyromegaly.      Vascular: No JVD.      Trachea: No tracheal deviation.   Cardiovascular:      Rate and Rhythm: Normal rate and regular rhythm.      Heart sounds: Normal heart sounds. No murmur heard.  Pulmonary:      Effort: Pulmonary effort is normal.      Breath sounds: Normal breath sounds. No stridor.   Abdominal:      General: There is distension.      Palpations: Abdomen is soft.   Musculoskeletal:      Right lower leg: Edema present.      Left lower leg: Edema present.   Lymphadenopathy:      Head:      Right side of head: No submental, submandibular, tonsillar, preauricular, posterior auricular or occipital adenopathy.      Left side of head: No submental, submandibular, tonsillar, preauricular, posterior auricular or occipital adenopathy.      Cervical: No cervical adenopathy.   Neurological:      Mental Status: She is alert. She is not disoriented.      Cranial Nerves: No cranial nerve deficit.      Sensory: No sensory deficit.         Assessment:       1. History of hepatitis C    2. HCC (hepatocellular carcinoma)    3. Decompensated cirrhosis related to hepatitis C virus (HCV)          Plan:   She has an excellent functional status and has tolerated Y90 well. She feels relatively well but her ascites has not been helped by diuretics which are now stopped- she requires weekly paracentesis.    Her AFP is now normal from a peak of 460.    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    1) Repeat MRI on the day of paracentesis- Mondays in [place]  2) Clinic in 3 months

## 2024-09-27 ENCOUNTER — TELEPHONE (OUTPATIENT)
Dept: INTERVENTIONAL RADIOLOGY/VASCULAR | Facility: HOSPITAL | Age: 79
End: 2024-09-27
Payer: MEDICARE

## 2024-09-28 PROBLEM — K76.7 HEPATORENAL SYNDROME: Status: ACTIVE | Noted: 2024-09-28

## 2024-09-28 PROBLEM — N18.32 STAGE 3B CHRONIC KIDNEY DISEASE: Status: ACTIVE | Noted: 2024-09-28

## 2024-09-28 PROBLEM — D47.2 MONOCLONAL GAMMOPATHY OF UNDETERMINED SIGNIFICANCE: Status: ACTIVE | Noted: 2024-09-28

## 2024-09-28 RX ORDER — ERGOCALCIFEROL 1.25 MG/1
50000 CAPSULE ORAL
Qty: 28 CAPSULE | Refills: 0 | Status: SHIPPED | OUTPATIENT
Start: 2024-09-28

## 2024-09-30 ENCOUNTER — HOSPITAL ENCOUNTER (OUTPATIENT)
Dept: INTERVENTIONAL RADIOLOGY/VASCULAR | Facility: HOSPITAL | Age: 79
Discharge: HOME | End: 2024-09-30
Attending: INTERNAL MEDICINE
Payer: MEDICARE

## 2024-09-30 VITALS
BODY MASS INDEX: 18.44 KG/M2 | DIASTOLIC BLOOD PRESSURE: 65 MMHG | HEIGHT: 64 IN | WEIGHT: 108 LBS | HEART RATE: 68 BPM | TEMPERATURE: 98 F | SYSTOLIC BLOOD PRESSURE: 109 MMHG | RESPIRATION RATE: 16 BRPM | OXYGEN SATURATION: 96 %

## 2024-09-30 DIAGNOSIS — R18.8 OTHER ASCITES: ICD-10-CM

## 2024-09-30 DIAGNOSIS — K74.69 DECOMPENSATED CIRRHOSIS RELATED TO HEPATITIS C VIRUS (HCV): Chronic | ICD-10-CM

## 2024-09-30 DIAGNOSIS — R18.8 ASCITES: ICD-10-CM

## 2024-09-30 DIAGNOSIS — B19.20 DECOMPENSATED CIRRHOSIS RELATED TO HEPATITIS C VIRUS (HCV): Chronic | ICD-10-CM

## 2024-09-30 LAB
APPEARANCE FLD: NORMAL
BODY FLD TYPE: NORMAL
COLOR FLD: YELLOW
LYMPHOCYTES NFR FLD MANUAL: 56 %
MESOTHL CELL NFR FLD MANUAL: 39 %
NEUTROPHILS NFR FLD MANUAL: 5 %
WBC # FLD: 119 /CU MM

## 2024-09-30 PROCEDURE — 49083 ABD PARACENTESIS W/IMAGING: CPT | Performed by: RADIOLOGY

## 2024-09-30 PROCEDURE — 49083 ABD PARACENTESIS W/IMAGING: CPT | Mod: ,,, | Performed by: RADIOLOGY

## 2024-09-30 PROCEDURE — 89051 BODY FLUID CELL COUNT: CPT | Performed by: INTERNAL MEDICINE

## 2024-09-30 PROCEDURE — 25000003 PHARM REV CODE 250: Performed by: RADIOLOGY

## 2024-09-30 RX ORDER — LIDOCAINE HYDROCHLORIDE 10 MG/ML
INJECTION, SOLUTION INFILTRATION; PERINEURAL
Status: COMPLETED | OUTPATIENT
Start: 2024-09-30 | End: 2024-09-30

## 2024-09-30 RX ADMIN — LIDOCAINE HYDROCHLORIDE 5 ML: 10 INJECTION, SOLUTION INFILTRATION; PERINEURAL at 10:09

## 2024-09-30 NOTE — PROCEDURES
Radiology Post-Procedure Note    Pre Op Diagnosis: Ascites  Post Op Diagnosis: Same    Procedure: US-guided paracentesis    Procedure performed by: Edilberto Gusman MD    Written Informed Consent Obtained: Yes  Specimen Removed: YES 3300 serous ascites  Estimated Blood Loss: Minimal    Findings:   Moderate ascites, no complications.     Patient tolerated procedure well.    Edilberto Gusman MD  Interventional Radiology  Department of Radiology

## 2024-09-30 NOTE — H&P
Radiology History & Physical      SUBJECTIVE:     Chief Complaint: ascites    History of Present Illness:  Seema Gann is a 79 y.o. female who presents for paracentesis  Past Medical History:   Diagnosis Date    Cirrhosis     HCC (hepatocellular carcinoma)     Hepatitis     Hypertension      Past Surgical History:   Procedure Laterality Date    APPENDECTOMY      HYSTERECTOMY         Home Meds:   Prior to Admission medications    Medication Sig Start Date End Date Taking? Authorizing Provider   ergocalciferol (ERGOCALCIFEROL) 50,000 unit Cap Take 1 capsule (50,000 Units total) by mouth every 7 days. 9/28/24  Yes Blaine Anderson MD   midodrine (PROAMATINE) 5 MG Tab Take 2 tablets (10 mg total) by mouth 3 (three) times daily. 9/16/24 12/15/24 Yes Blaine Anderson MD   mirtazapine (REMERON) 7.5 MG Tab Take 1 tablet (7.5 mg total) by mouth every evening. 9/6/24 9/6/25 Yes Bethany Mesa MD       Allergies: Review of patient's allergies indicates:  No Known Allergies         OBJECTIVE:     Vital Signs (Most Recent)  Temp: 98.1 °F (36.7 °C) (09/30/24 1016)  Pulse: 83 (09/30/24 1016)  Resp: 16 (09/30/24 1016)  BP: 115/76 (09/30/24 1019)  SpO2: 99 % (09/30/24 1016)    Physical Exam:  General: no acute distress  Mental Status: alert and oriented to person, place and time  HEENT: normocephalic, atraumatic  Chest: unlabored breathing  Heart: regular heart rate  Abdomen: nondistended  Extremity: moves all extremities    Laboratory  Lab Results   Component Value Date    INR 1.2 09/16/2024       Lab Results   Component Value Date    WBC 3.88 (L) 09/16/2024    HGB 9.8 (L) 09/16/2024    HCT 30.8 (L) 09/16/2024    MCV 98 09/16/2024     (L) 09/16/2024      Lab Results   Component Value Date     (H) 09/16/2024     09/16/2024    K 3.4 (L) 09/16/2024     09/16/2024    CO2 21 (L) 09/16/2024    BUN 12 09/16/2024    CREATININE 1.6 (H) 09/16/2024    CALCIUM 8.7 09/16/2024    MG 2.0 09/16/2024     ALT 8 (L) 09/16/2024    AST 26 09/16/2024    ALBUMIN 2.6 (L) 09/16/2024    BILITOT 1.2 (H) 09/16/2024    BILIDIR 0.3 10/10/2023       ASSESSMENT/PLAN:     Sedation Plan: local only  Patient will undergo US-guided paracentesis.    Edilberto Gusamn MD  Interventional Radiology  Department of Radiology

## 2024-09-30 NOTE — SEDATION DOCUMENTATION
IR Procedure - Paracentesis - is completed. Patient tolerated well. She is awake, alert, oriented. Vital signs are stable. 3300 mL cloudy, light yellow ascites is drained. Patient will return to IR pre/post to complete discharge.

## 2024-10-04 ENCOUNTER — TELEPHONE (OUTPATIENT)
Dept: INTERVENTIONAL RADIOLOGY/VASCULAR | Facility: HOSPITAL | Age: 79
End: 2024-10-04
Payer: MEDICARE

## 2024-10-07 ENCOUNTER — HOSPITAL ENCOUNTER (OUTPATIENT)
Dept: RADIOLOGY | Facility: HOSPITAL | Age: 79
Discharge: HOME | End: 2024-10-07
Attending: INTERNAL MEDICINE
Payer: MEDICARE

## 2024-10-07 ENCOUNTER — HOSPITAL ENCOUNTER (OUTPATIENT)
Dept: INTERVENTIONAL RADIOLOGY/VASCULAR | Facility: HOSPITAL | Age: 79
Discharge: HOME | End: 2024-10-07
Attending: INTERNAL MEDICINE
Payer: MEDICARE

## 2024-10-07 VITALS
BODY MASS INDEX: 19.12 KG/M2 | RESPIRATION RATE: 15 BRPM | TEMPERATURE: 98 F | WEIGHT: 112 LBS | DIASTOLIC BLOOD PRESSURE: 67 MMHG | HEART RATE: 64 BPM | OXYGEN SATURATION: 98 % | HEIGHT: 64 IN | SYSTOLIC BLOOD PRESSURE: 118 MMHG

## 2024-10-07 DIAGNOSIS — K74.69 DECOMPENSATED CIRRHOSIS RELATED TO HEPATITIS C VIRUS (HCV): Chronic | ICD-10-CM

## 2024-10-07 DIAGNOSIS — R18.8 OTHER ASCITES: ICD-10-CM

## 2024-10-07 DIAGNOSIS — B19.20 DECOMPENSATED CIRRHOSIS RELATED TO HEPATITIS C VIRUS (HCV): Chronic | ICD-10-CM

## 2024-10-07 LAB
APPEARANCE FLD: NORMAL
BODY FLD TYPE: NORMAL
COLOR FLD: YELLOW
LYMPHOCYTES NFR FLD MANUAL: 57 %
MESOTHL CELL NFR FLD MANUAL: 29 %
MONOS+MACROS NFR FLD MANUAL: 14 %
WBC # FLD: 99 /CU MM

## 2024-10-07 PROCEDURE — 74183 MRI ABD W/O CNTR FLWD CNTR: CPT | Mod: 26,,, | Performed by: RADIOLOGY

## 2024-10-07 PROCEDURE — 49083 ABD PARACENTESIS W/IMAGING: CPT | Performed by: RADIOLOGY

## 2024-10-07 PROCEDURE — 49083 ABD PARACENTESIS W/IMAGING: CPT | Mod: ,,, | Performed by: PHYSICIAN ASSISTANT

## 2024-10-07 PROCEDURE — 63600175 PHARM REV CODE 636 W HCPCS: Performed by: PHYSICIAN ASSISTANT

## 2024-10-07 PROCEDURE — 89051 BODY FLUID CELL COUNT: CPT | Performed by: INTERNAL MEDICINE

## 2024-10-07 PROCEDURE — 25500020 PHARM REV CODE 255: Performed by: INTERNAL MEDICINE

## 2024-10-07 PROCEDURE — A9585 GADOBUTROL INJECTION: HCPCS | Performed by: INTERNAL MEDICINE

## 2024-10-07 PROCEDURE — 74183 MRI ABD W/O CNTR FLWD CNTR: CPT | Mod: TC

## 2024-10-07 RX ORDER — GADOBUTROL 604.72 MG/ML
5 INJECTION INTRAVENOUS
Status: COMPLETED | OUTPATIENT
Start: 2024-10-07 | End: 2024-10-07

## 2024-10-07 RX ORDER — LIDOCAINE HYDROCHLORIDE 10 MG/ML
INJECTION, SOLUTION INFILTRATION; PERINEURAL
Status: COMPLETED | OUTPATIENT
Start: 2024-10-07 | End: 2024-10-07

## 2024-10-07 RX ADMIN — LIDOCAINE HYDROCHLORIDE 5 ML: 10 INJECTION, SOLUTION INFILTRATION; PERINEURAL at 11:10

## 2024-10-07 RX ADMIN — GADOBUTROL 5 ML: 604.72 INJECTION INTRAVENOUS at 12:10

## 2024-10-07 NOTE — PROCEDURES
Radiology Post-Procedure Note    Pre Op Diagnosis: Ascites  Post Op Diagnosis: Same    Procedure: Ultrasound Guided Paracentesis    Procedure performed by: Patt Elliott PA-C    Written Informed Consent Obtained: Yes  Specimen Removed: YES   Estimated Blood Loss: Minimal    Findings:   Successful paracentesis.  3250 ml yellow fluid.  Albumin was not administered PRN per protocol.    Patient tolerated procedure well.    Patt Elliott PA-C

## 2024-10-07 NOTE — DISCHARGE SUMMARY
Interventional Radiology Short Stay Discharge Summary      Admit Date: 10/7/2024  Discharge Date: 10/07/2024     Hospital Course: Uneventful    Discharge Diagnosis: ascites    Discharge Condition: Stable    Discharge Disposition: Home    Diet: Resume prior diet    Activity: activity as tolerated    Follow-up: With referring provider    Cindy Chava, PA-C [facility]

## 2024-10-07 NOTE — SEDATION DOCUMENTATION
Pt arrived to c arm for paracentesis. Pt oriented to unit and staff, Pt safely transferred from stretcher to procedural table. Fall risk reviewed and comfort measures utilized with interventions. Safety strap applied, position pillows to minimize pressure points. Blankets applied. Pt prepped and draped utilizing standard sterile technique. Patient placed on continuous monitoring, as required by sedation policy. Timeouts implemented utilizing standard universal time-out per department and facility policy. RN to remain at bedside with continuous monitoring. Pt resting comfortably. Denies pain/discomfort. Will continue to monitor. See flow sheets for monitoring, medication administration, and updates. patient verbalizes understanding.

## 2024-10-07 NOTE — PLAN OF CARE
Pt VSS and in NAD. CRM equipment removed. Discharge instructions and AVS provided. Pt verbalized understanding, questions and concerns addressed. No further needs at this time. Pt discharged from recovery area at 1130. IR care complete.

## 2024-10-07 NOTE — H&P
Radiology History & Physical      SUBJECTIVE:     Chief Complaint: abdominal distention    History of Present Illness:  Seema Gann is a 79 y.o. female who presents for ultrasound guided paracentesis  Past Medical History:   Diagnosis Date    Cirrhosis     HCC (hepatocellular carcinoma)     Hepatitis     Hypertension      Past Surgical History:   Procedure Laterality Date    APPENDECTOMY      HYSTERECTOMY         Home Meds:   Prior to Admission medications    Medication Sig Start Date End Date Taking? Authorizing Provider   ergocalciferol (ERGOCALCIFEROL) 50,000 unit Cap Take 1 capsule (50,000 Units total) by mouth every 7 days. 9/28/24  Yes Blaine Anderson MD   midodrine (PROAMATINE) 5 MG Tab Take 2 tablets (10 mg total) by mouth 3 (three) times daily. 9/16/24 12/15/24 Yes Blaine Anderson MD   mirtazapine (REMERON) 7.5 MG Tab Take 1 tablet (7.5 mg total) by mouth every evening. 9/6/24 9/6/25 Yes Bethany Mesa MD     Anticoagulants/Antiplatelets: no anticoagulation    Allergies: Review of patient's allergies indicates:  No Known Allergies  Sedation History:  no adverse reactions    Review of Systems:   Hematological: no known coagulopathies  Respiratory: no shortness of breath  Cardiovascular: no chest pain  Gastrointestinal: no abdominal pain  Genito-Urinary: no dysuria  Musculoskeletal: negative  Neurological: no TIA or stroke symptoms         OBJECTIVE:     Vital Signs (Most Recent)  Temp: 98.4 °F (36.9 °C) (10/07/24 1025)  Pulse: 76 (10/07/24 1025)  Resp: 14 (10/07/24 1025)  BP: 120/66 (10/07/24 1028)  SpO2: 97 % (10/07/24 1025)    Physical Exam:  ASA: 2  Mallampati: n/a    General: no acute distress  Mental Status: alert and oriented to person, place and time  HEENT: normocephalic, atraumatic  Chest: unlabored breathing  Heart: regular heart rate  Abdomen: distended  Extremity: moves all extremities    ASSESSMENT/PLAN:     Sedation Plan: local  Patient will undergo ultrasound guided  paracentesis.    Patt Elliott PA-C

## 2024-10-07 NOTE — SEDATION DOCUMENTATION
Procedure completed. Patient tolerated well; VSS. Site CDI. 3250 ml of clear, yellow ascites drained. Patient to be discharged per orders.

## 2024-10-11 ENCOUNTER — TELEPHONE (OUTPATIENT)
Dept: INTERVENTIONAL RADIOLOGY/VASCULAR | Facility: HOSPITAL | Age: 79
End: 2024-10-11
Payer: MEDICARE

## 2024-10-14 ENCOUNTER — HOSPITAL ENCOUNTER (OUTPATIENT)
Dept: INTERVENTIONAL RADIOLOGY/VASCULAR | Facility: HOSPITAL | Age: 79
Discharge: HOME | End: 2024-10-14
Attending: INTERNAL MEDICINE
Payer: MEDICARE

## 2024-10-14 VITALS
SYSTOLIC BLOOD PRESSURE: 115 MMHG | OXYGEN SATURATION: 98 % | DIASTOLIC BLOOD PRESSURE: 85 MMHG | RESPIRATION RATE: 16 BRPM | HEART RATE: 70 BPM

## 2024-10-14 DIAGNOSIS — R18.8 ASCITES: ICD-10-CM

## 2024-10-14 DIAGNOSIS — K74.69 DECOMPENSATED CIRRHOSIS RELATED TO HEPATITIS C VIRUS (HCV): Chronic | ICD-10-CM

## 2024-10-14 DIAGNOSIS — B19.20 DECOMPENSATED CIRRHOSIS RELATED TO HEPATITIS C VIRUS (HCV): Chronic | ICD-10-CM

## 2024-10-14 DIAGNOSIS — R18.8 OTHER ASCITES: ICD-10-CM

## 2024-10-14 LAB
APPEARANCE FLD: NORMAL
BODY FLD TYPE: NORMAL
COLOR FLD: YELLOW
LYMPHOCYTES NFR FLD MANUAL: 82 %
MESOTHL CELL NFR FLD MANUAL: 2 %
MONOS+MACROS NFR FLD MANUAL: 14 %
NEUTROPHILS NFR FLD MANUAL: 2 %
WBC # FLD: 87 /CU MM

## 2024-10-14 PROCEDURE — 49083 ABD PARACENTESIS W/IMAGING: CPT | Mod: ,,, | Performed by: PHYSICIAN ASSISTANT

## 2024-10-14 PROCEDURE — 89051 BODY FLUID CELL COUNT: CPT | Performed by: INTERNAL MEDICINE

## 2024-10-14 PROCEDURE — 49083 ABD PARACENTESIS W/IMAGING: CPT | Performed by: RADIOLOGY

## 2024-10-14 NOTE — PROCEDURES
Radiology Post-Procedure Note    Pre Op Diagnosis: Ascites  Post Op Diagnosis: Same    Procedure: Ultrasound Guided Paracentesis    Procedure performed by: Patt Elliott PA-C    Written Informed Consent Obtained: Yes  Specimen Removed: YES   Estimated Blood Loss: Minimal    Findings:   Successful paracentesis.  2650 ml removed from RLQ.  Albumin was not administered PRN per protocol.    Patient tolerated procedure well.    Patt Elliott PA-C

## 2024-10-14 NOTE — NURSING
Patient is discharged from IR. Patient declined printed AVS, confirming she has access to MyChart. The opportunity to ask questions is provided. Patient is ambulatory from the unit and directed to the front lobby to exit.

## 2024-10-14 NOTE — H&P
Radiology History & Physical      SUBJECTIVE:     Chief Complaint: abdominal distention    History of Present Illness:  Seema Gann is a 79 y.o. female who presents for ultrasound guided paracentesis  Past Medical History:   Diagnosis Date    Cirrhosis     HCC (hepatocellular carcinoma)     Hepatitis     Hypertension      Past Surgical History:   Procedure Laterality Date    APPENDECTOMY      HYSTERECTOMY         Home Meds:   Prior to Admission medications    Medication Sig Start Date End Date Taking? Authorizing Provider   ergocalciferol (ERGOCALCIFEROL) 50,000 unit Cap Take 1 capsule (50,000 Units total) by mouth every 7 days. 9/28/24   Blaine Anderson MD   midodrine (PROAMATINE) 5 MG Tab Take 2 tablets (10 mg total) by mouth 3 (three) times daily. 9/16/24 12/15/24  Blaine Anderson MD   mirtazapine (REMERON) 7.5 MG Tab Take 1 tablet (7.5 mg total) by mouth every evening. 9/6/24 9/6/25  Bethany Mesa MD     Anticoagulants/Antiplatelets: no anticoagulation    Allergies: Review of patient's allergies indicates:  No Known Allergies  Sedation History:  no adverse reactions    Review of Systems:   Hematological: no known coagulopathies  Respiratory: no shortness of breath  Cardiovascular: no chest pain  Gastrointestinal: no abdominal pain  Genito-Urinary: no dysuria  Musculoskeletal: negative  Neurological: no TIA or stroke symptoms         OBJECTIVE:     Vital Signs (Most Recent)       Physical Exam:  ASA: 2  Mallampati: n/a    General: no acute distress  Mental Status: alert and oriented to person, place and time  HEENT: normocephalic, atraumatic  Chest: unlabored breathing  Heart: regular heart rate  Abdomen: distended  Extremity: moves all extremities    ASSESSMENT/PLAN:     Sedation Plan: local  Patient will undergo ultrasound guided paracentesis.    Patt Elliott PA-C

## 2024-10-14 NOTE — DISCHARGE SUMMARY
Interventional Radiology Short Stay Discharge Summary      Admit Date: 10/14/2024  Discharge Date: 10/14/2024     Hospital Course: Uneventful    Discharge Diagnosis: ascites    Discharge Condition: Stable    Discharge Disposition: Home    Diet: Resume prior diet    Activity: activity as tolerated    Follow-up: With referring provider    Cindy Chava, PA-C [facility]

## 2024-10-18 ENCOUNTER — TELEPHONE (OUTPATIENT)
Dept: INTERVENTIONAL RADIOLOGY/VASCULAR | Facility: HOSPITAL | Age: 79
End: 2024-10-18
Payer: MEDICARE

## 2024-10-21 ENCOUNTER — HOSPITAL ENCOUNTER (OUTPATIENT)
Dept: INTERVENTIONAL RADIOLOGY/VASCULAR | Facility: HOSPITAL | Age: 79
Discharge: HOME | End: 2024-10-21
Attending: INTERNAL MEDICINE
Payer: MEDICARE

## 2024-10-21 VITALS
BODY MASS INDEX: 18.78 KG/M2 | SYSTOLIC BLOOD PRESSURE: 95 MMHG | OXYGEN SATURATION: 100 % | DIASTOLIC BLOOD PRESSURE: 57 MMHG | TEMPERATURE: 98 F | WEIGHT: 110 LBS | RESPIRATION RATE: 16 BRPM | HEIGHT: 64 IN | HEART RATE: 64 BPM

## 2024-10-21 DIAGNOSIS — R18.8 ASCITES: ICD-10-CM

## 2024-10-21 DIAGNOSIS — B19.20 DECOMPENSATED CIRRHOSIS RELATED TO HEPATITIS C VIRUS (HCV): Chronic | ICD-10-CM

## 2024-10-21 DIAGNOSIS — R18.8 OTHER ASCITES: ICD-10-CM

## 2024-10-21 DIAGNOSIS — K74.69 DECOMPENSATED CIRRHOSIS RELATED TO HEPATITIS C VIRUS (HCV): Chronic | ICD-10-CM

## 2024-10-21 LAB
APPEARANCE FLD: NORMAL
BODY FLD TYPE: NORMAL
COLOR FLD: YELLOW
LYMPHOCYTES NFR FLD MANUAL: 26 %
MESOTHL CELL NFR FLD MANUAL: 3 %
MONOS+MACROS NFR FLD MANUAL: 65 %
NEUTROPHILS NFR FLD MANUAL: 6 %
WBC # FLD: 69 /CU MM

## 2024-10-21 PROCEDURE — 49083 ABD PARACENTESIS W/IMAGING: CPT | Mod: ,,, | Performed by: PHYSICIAN ASSISTANT

## 2024-10-21 PROCEDURE — 89051 BODY FLUID CELL COUNT: CPT | Performed by: INTERNAL MEDICINE

## 2024-10-21 PROCEDURE — 63600175 PHARM REV CODE 636 W HCPCS: Performed by: PHYSICIAN ASSISTANT

## 2024-10-21 PROCEDURE — 49083 ABD PARACENTESIS W/IMAGING: CPT | Performed by: STUDENT IN AN ORGANIZED HEALTH CARE EDUCATION/TRAINING PROGRAM

## 2024-10-21 RX ORDER — LIDOCAINE HYDROCHLORIDE 10 MG/ML
INJECTION, SOLUTION INFILTRATION; PERINEURAL
Status: COMPLETED | OUTPATIENT
Start: 2024-10-21 | End: 2024-10-21

## 2024-10-21 RX ADMIN — LIDOCAINE HYDROCHLORIDE 5 ML: 10 INJECTION, SOLUTION INFILTRATION; PERINEURAL at 11:10

## 2024-10-21 NOTE — SEDATION DOCUMENTATION
IR Procedure - Paracentesis - is completed. Patient tolerated well. She is awake, alert, oriented. Vital signs are stable. 3800 mL cloudy, yellow ascites is drained. One specimen sent to lab for ordered tests. Patient will return to IR pre/post to complete discharge.

## 2024-10-21 NOTE — PROCEDURES
Radiology Post-Procedure Note    Pre Op Diagnosis: Ascites  Post Op Diagnosis: Same    Procedure: Ultrasound Guided Paracentesis    Procedure performed by: Patt Elliott PA-C    Written Informed Consent Obtained: Yes  Specimen Removed: YES   Estimated Blood Loss: Minimal    Findings:   Successful paracentesis.  3800 ml RLQ.  Albumin was not administered PRN per protocol.    Patient tolerated procedure well.    Patt Elliott PA-C

## 2024-10-21 NOTE — DISCHARGE SUMMARY
Interventional Radiology Short Stay Discharge Summary      Admit Date: 10/21/2024  Discharge Date: 10/21/2024     Hospital Course: Uneventful    Discharge Diagnosis: ascites    Discharge Condition: Stable    Discharge Disposition: Home    Diet: Resume prior diet    Activity: activity as tolerated    Follow-up: With referring provider    Cindy Chava, PA-C [facility]

## 2024-10-21 NOTE — H&P
Radiology History & Physical      SUBJECTIVE:     Chief Complaint: abdominal distention    History of Present Illness:  Seema Gann is a 79 y.o. female who presents for ultrasound guided paracentesis  Past Medical History:   Diagnosis Date    Cirrhosis     HCC (hepatocellular carcinoma)     Hepatitis     Hypertension      Past Surgical History:   Procedure Laterality Date    APPENDECTOMY      HYSTERECTOMY         Home Meds:   Prior to Admission medications    Medication Sig Start Date End Date Taking? Authorizing Provider   ergocalciferol (ERGOCALCIFEROL) 50,000 unit Cap Take 1 capsule (50,000 Units total) by mouth every 7 days. 9/28/24  Yes Blaine Anderson MD   midodrine (PROAMATINE) 5 MG Tab Take 2 tablets (10 mg total) by mouth 3 (three) times daily. 9/16/24 12/15/24 Yes Blaine Anderson MD   mirtazapine (REMERON) 7.5 MG Tab Take 1 tablet (7.5 mg total) by mouth every evening. 9/6/24 9/6/25 Yes Bethany Mesa MD     Anticoagulants/Antiplatelets: no anticoagulation    Allergies: Review of patient's allergies indicates:  No Known Allergies  Sedation History:  no adverse reactions    Review of Systems:   Hematological: no known coagulopathies  Respiratory: no shortness of breath  Cardiovascular: no chest pain  Gastrointestinal: no abdominal pain  Genito-Urinary: no dysuria  Musculoskeletal: negative  Neurological: no TIA or stroke symptoms         OBJECTIVE:     Vital Signs (Most Recent)  Temp: 97.6 °F (36.4 °C) (10/21/24 1033)  Pulse: 70 (10/21/24 1033)  Resp: 16 (10/21/24 1033)  BP: 104/64 (10/21/24 1033)  SpO2: 100 % (10/21/24 1033)    Physical Exam:  ASA: 2  Mallampati: n/a    General: no acute distress  Mental Status: alert and oriented to person, place and time  HEENT: normocephalic, atraumatic  Chest: unlabored breathing  Heart: regular heart rate  Abdomen: distended  Extremity: moves all extremities    ASSESSMENT/PLAN:     Sedation Plan: local  Patient will undergo ultrasound guided  paracentesis.    Patt Elliott PA-C

## 2024-10-25 ENCOUNTER — TELEPHONE (OUTPATIENT)
Dept: INTERVENTIONAL RADIOLOGY/VASCULAR | Facility: HOSPITAL | Age: 79
End: 2024-10-25
Payer: MEDICARE

## 2024-10-28 ENCOUNTER — HOSPITAL ENCOUNTER (OUTPATIENT)
Dept: INTERVENTIONAL RADIOLOGY/VASCULAR | Facility: HOSPITAL | Age: 79
Discharge: HOME | End: 2024-10-28
Attending: INTERNAL MEDICINE
Payer: MEDICARE

## 2024-10-28 VITALS
WEIGHT: 110 LBS | RESPIRATION RATE: 18 BRPM | DIASTOLIC BLOOD PRESSURE: 61 MMHG | TEMPERATURE: 98 F | HEIGHT: 64 IN | HEART RATE: 69 BPM | OXYGEN SATURATION: 100 % | BODY MASS INDEX: 18.78 KG/M2 | SYSTOLIC BLOOD PRESSURE: 98 MMHG

## 2024-10-28 DIAGNOSIS — K74.69 DECOMPENSATED CIRRHOSIS RELATED TO HEPATITIS C VIRUS (HCV): Chronic | ICD-10-CM

## 2024-10-28 DIAGNOSIS — R18.8 ASCITES: ICD-10-CM

## 2024-10-28 DIAGNOSIS — B19.20 DECOMPENSATED CIRRHOSIS RELATED TO HEPATITIS C VIRUS (HCV): Chronic | ICD-10-CM

## 2024-10-28 PROCEDURE — 63600175 PHARM REV CODE 636 W HCPCS: Performed by: PHYSICIAN ASSISTANT

## 2024-10-28 PROCEDURE — 49083 ABD PARACENTESIS W/IMAGING: CPT | Performed by: RADIOLOGY

## 2024-10-28 PROCEDURE — 49083 ABD PARACENTESIS W/IMAGING: CPT | Mod: ,,, | Performed by: PHYSICIAN ASSISTANT

## 2024-10-28 RX ORDER — LIDOCAINE HYDROCHLORIDE 10 MG/ML
INJECTION, SOLUTION INFILTRATION; PERINEURAL
Status: COMPLETED | OUTPATIENT
Start: 2024-10-28 | End: 2024-10-28

## 2024-10-28 RX ADMIN — LIDOCAINE HYDROCHLORIDE 3 ML: 10 INJECTION, SOLUTION INFILTRATION; PERINEURAL at 10:10

## 2024-11-04 ENCOUNTER — TELEPHONE (OUTPATIENT)
Dept: INTERVENTIONAL RADIOLOGY/VASCULAR | Facility: CLINIC | Age: 79
End: 2024-11-04
Payer: MEDICARE

## 2024-11-04 ENCOUNTER — TELEPHONE (OUTPATIENT)
Dept: FAMILY MEDICINE | Facility: CLINIC | Age: 79
End: 2024-11-04

## 2024-11-04 ENCOUNTER — PATIENT MESSAGE (OUTPATIENT)
Dept: HEPATOLOGY | Facility: CLINIC | Age: 79
End: 2024-11-04
Payer: MEDICARE

## 2024-11-04 NOTE — TELEPHONE ENCOUNTER
----- Message from Bethany Mesa MD sent at 11/4/2024 10:38 AM CST -----  Please let the daughter know we do not put her on schedule for fluid drainage, the IR department does that. Since symptoms of SOB and fluid accumulation is worsening , they should take her to the ER so it can be drained immediately .

## 2024-11-04 NOTE — TELEPHONE ENCOUNTER
PT was advised that the IR was the one to schedule the draining. Pt also advised to go to ED for SOB and accumulation of fluids. Pt was hesitant and not sure of going. Pt was educated on the dangers of waiting if she was suffering from fluid accumulation and SOB already and that the ED was her best course of action. Pt expressed verbal understanding but gave no clear answer. Pt was advised that she could go POV at this time but at any time she call call 911.

## 2024-11-05 ENCOUNTER — HOSPITAL ENCOUNTER (OUTPATIENT)
Dept: INTERVENTIONAL RADIOLOGY/VASCULAR | Facility: HOSPITAL | Age: 79
Discharge: HOME | End: 2024-11-05
Attending: INTERNAL MEDICINE
Payer: MEDICARE

## 2024-11-05 VITALS
RESPIRATION RATE: 18 BRPM | HEART RATE: 86 BPM | DIASTOLIC BLOOD PRESSURE: 56 MMHG | OXYGEN SATURATION: 98 % | SYSTOLIC BLOOD PRESSURE: 121 MMHG

## 2024-11-05 DIAGNOSIS — R18.8 OTHER ASCITES: ICD-10-CM

## 2024-11-05 PROCEDURE — P9047 ALBUMIN (HUMAN), 25%, 50ML: HCPCS | Mod: JZ,JG

## 2024-11-05 PROCEDURE — 49083 ABD PARACENTESIS W/IMAGING: CPT | Mod: ,,,

## 2024-11-05 PROCEDURE — 63600175 PHARM REV CODE 636 W HCPCS: Mod: JZ,JG

## 2024-11-05 PROCEDURE — 49083 ABD PARACENTESIS W/IMAGING: CPT | Performed by: RADIOLOGY

## 2024-11-05 RX ORDER — ALBUMIN HUMAN 250 G/1000ML
12.5 SOLUTION INTRAVENOUS ONCE
Status: DISCONTINUED | OUTPATIENT
Start: 2024-11-05 | End: 2024-11-05

## 2024-11-05 RX ORDER — ALBUMIN HUMAN 250 G/1000ML
SOLUTION INTRAVENOUS
Status: COMPLETED
Start: 2024-11-05 | End: 2024-11-05

## 2024-11-05 RX ORDER — ALBUMIN HUMAN 250 G/1000ML
25 SOLUTION INTRAVENOUS ONCE
Status: COMPLETED | OUTPATIENT
Start: 2024-11-05 | End: 2024-11-05

## 2024-11-05 RX ADMIN — ALBUMIN (HUMAN) 25 G: 12.5 SOLUTION INTRAVENOUS at 11:11

## 2024-11-05 RX ADMIN — ALBUMIN HUMAN 25 G: 250 SOLUTION INTRAVENOUS at 11:11

## 2024-11-05 NOTE — PROCEDURES
Radiology Post-Procedure Note    Pre Op Diagnosis: Ascites  Post Op Diagnosis: Same    Procedure: Ultrasound Guided Paracentesis    Procedure performed by: Shirin Segovia PA-C    Written Informed Consent Obtained: Yes  Specimen Removed: YES (yellow, clear)  Estimated Blood Loss: Minimal    Findings:   Successful paracentesis from RLQ.  Albumin administered PRN per protocol.    Patient tolerated procedure well.    Shirin Segovia PA-C  Interventional Radiology  [phone]

## 2024-11-05 NOTE — H&P
Radiology History & Physical      SUBJECTIVE:     Chief Complaint: Abdominal Distension    History of Present Illness:  Seema Gann is a 79 y.o. female who presents for US guided paracentesis.     Past Medical History:   Diagnosis Date    Cirrhosis     HCC (hepatocellular carcinoma)     Hepatitis     Hypertension      Past Surgical History:   Procedure Laterality Date    APPENDECTOMY      HYSTERECTOMY         Home Meds:   Prior to Admission medications    Medication Sig Start Date End Date Taking? Authorizing Provider   ergocalciferol (ERGOCALCIFEROL) 50,000 unit Cap Take 1 capsule (50,000 Units total) by mouth every 7 days. 9/28/24   Blaine Anderson MD   midodrine (PROAMATINE) 5 MG Tab Take 2 tablets (10 mg total) by mouth 3 (three) times daily. 9/16/24 12/15/24  Blaine Anderson MD   mirtazapine (REMERON) 7.5 MG Tab Take 1 tablet (7.5 mg total) by mouth every evening. 9/6/24 9/6/25  Bethany Mesa MD     Anticoagulants/Antiplatelets: no anticoagulation    Allergies: Review of patient's allergies indicates:  No Known Allergies  Sedation History:  no adverse reactions    Review of Systems:   Hematological: no known coagulopathies  Respiratory: no shortness of breath  Cardiovascular: no chest pain  Gastrointestinal: no abdominal pain  Genito-Urinary: no dysuria  Musculoskeletal: negative  Neurological: no TIA or stroke symptoms         OBJECTIVE:     Vital Signs (Most Recent)  Pulse: 80 (11/05/24 1020)  Resp: 18 (11/05/24 1020)  BP: 120/67 (11/05/24 1020)  SpO2: 98 % (11/05/24 1020)    Physical Exam:  ASA: 2  Mallampati: n/a    General: no acute distress  Mental Status: alert and oriented to person, place and time  HEENT: normocephalic, atraumatic  Chest: unlabored breathing  Heart: regular heart rate  Abdomen: distended  Extremity: moves all extremities    ASSESSMENT/PLAN:     Sedation Plan: Local  Patient will undergo US guided paracentesis.    Shirin Segovia PA-C  Interventional  Radiology  [phone]

## 2024-11-06 ENCOUNTER — TELEPHONE (OUTPATIENT)
Dept: NEPHROLOGY | Facility: CLINIC | Age: 79
End: 2024-11-06
Payer: MEDICARE

## 2024-11-14 ENCOUNTER — TELEPHONE (OUTPATIENT)
Dept: FAMILY MEDICINE | Facility: CLINIC | Age: 79
End: 2024-11-14
Payer: MEDICARE

## 2024-11-14 ENCOUNTER — PATIENT MESSAGE (OUTPATIENT)
Dept: HEPATOLOGY | Facility: CLINIC | Age: 79
End: 2024-11-14
Payer: MEDICARE

## 2024-11-14 ENCOUNTER — TELEPHONE (OUTPATIENT)
Dept: INTERVENTIONAL RADIOLOGY/VASCULAR | Facility: CLINIC | Age: 79
End: 2024-11-14
Payer: MEDICARE

## 2024-11-14 NOTE — TELEPHONE ENCOUNTER
----- Message from Bethany Mesa MD sent at 11/14/2024  9:40 AM CST -----  Please inform the daughter , for question regarding fluid in the belly , shortness of breath related to fluid accumulation that need to be drained , this is not E-visit or virtual or clinic possible, she should always take her back to ER for this. We can not help in the clinic neither can urgent care help well . Thanks

## 2024-11-15 ENCOUNTER — TELEPHONE (OUTPATIENT)
Dept: INTERVENTIONAL RADIOLOGY/VASCULAR | Facility: HOSPITAL | Age: 79
End: 2024-11-15
Payer: MEDICARE

## 2024-11-15 NOTE — NURSING
Request to come in early. Advised to arrive at 10:15 for a new appointment time of 10:45. Reassured that if unable to, original appointment time would be honored. Regularly scheduled patient who is familiar with procedure and department process.

## 2024-11-18 ENCOUNTER — HOSPITAL ENCOUNTER (OUTPATIENT)
Dept: INTERVENTIONAL RADIOLOGY/VASCULAR | Facility: HOSPITAL | Age: 79
Discharge: HOME | End: 2024-11-18
Attending: INTERNAL MEDICINE
Payer: MEDICARE

## 2024-11-18 VITALS
DIASTOLIC BLOOD PRESSURE: 64 MMHG | RESPIRATION RATE: 18 BRPM | OXYGEN SATURATION: 97 % | SYSTOLIC BLOOD PRESSURE: 113 MMHG | HEART RATE: 71 BPM

## 2024-11-18 DIAGNOSIS — R18.8 OTHER ASCITES: ICD-10-CM

## 2024-11-18 DIAGNOSIS — R18.8 ASCITES: ICD-10-CM

## 2024-11-18 PROCEDURE — 63600175 PHARM REV CODE 636 W HCPCS: Performed by: PHYSICIAN ASSISTANT

## 2024-11-18 PROCEDURE — 49083 ABD PARACENTESIS W/IMAGING: CPT | Performed by: STUDENT IN AN ORGANIZED HEALTH CARE EDUCATION/TRAINING PROGRAM

## 2024-11-18 RX ORDER — LIDOCAINE HYDROCHLORIDE 10 MG/ML
INJECTION, SOLUTION INFILTRATION; PERINEURAL
Status: COMPLETED | OUTPATIENT
Start: 2024-11-18 | End: 2024-11-18

## 2024-11-18 RX ADMIN — LIDOCAINE HYDROCHLORIDE 5 ML: 10 INJECTION, SOLUTION INFILTRATION; PERINEURAL at 10:11

## 2024-11-18 NOTE — SEDATION DOCUMENTATION
IR Procedure - Paracentesis - is completed. Patient tolerated well. He/She is awake, alert, oriented. Vital signs are stable. 4200  mL cloudy, yellow ascites is drained. Patient will return to IR pre/post to complete discharge.

## 2024-11-18 NOTE — DISCHARGE SUMMARY
Interventional Radiology Short Stay Discharge Summary      Admit Date: 11/18/2024  Discharge Date: 11/18/2024     Hospital Course: Uneventful    Discharge Diagnosis: ascites    Discharge Condition: Stable    Discharge Disposition: Home    Diet: Resume prior diet    Activity: activity as tolerated    Follow-up: With referring provider    Cindy Chava, PA-C [facility]

## 2024-11-18 NOTE — PROCEDURES
Radiology Post-Procedure Note    Pre Op Diagnosis: Ascites  Post Op Diagnosis: Same    Procedure: Ultrasound Guided Paracentesis    Procedure performed by: Patt Elliott PA-C    Written Informed Consent Obtained: Yes  Specimen Removed: none sent  Estimated Blood Loss: Minimal    Findings:   Successful paracentesis.  4200 ml clear yellow fluid from RLQ.  Albumin was not administered PRN per protocol.    Patient tolerated procedure well.    Patt Elliott PA-C

## 2024-11-26 ENCOUNTER — OFFICE VISIT (OUTPATIENT)
Dept: CARDIOLOGY | Facility: CLINIC | Age: 79
End: 2024-11-26
Payer: MEDICARE

## 2024-11-26 VITALS
HEIGHT: 64 IN | WEIGHT: 116.19 LBS | HEART RATE: 71 BPM | OXYGEN SATURATION: 97 % | SYSTOLIC BLOOD PRESSURE: 100 MMHG | DIASTOLIC BLOOD PRESSURE: 60 MMHG | BODY MASS INDEX: 19.84 KG/M2

## 2024-11-26 DIAGNOSIS — B18.2 CHRONIC HEPATITIS C WITH CIRRHOSIS: Chronic | ICD-10-CM

## 2024-11-26 DIAGNOSIS — N18.32 STAGE 3B CHRONIC KIDNEY DISEASE: ICD-10-CM

## 2024-11-26 DIAGNOSIS — I10 PRIMARY HYPERTENSION: Primary | Chronic | ICD-10-CM

## 2024-11-26 DIAGNOSIS — K74.60 CHRONIC HEPATITIS C WITH CIRRHOSIS: Chronic | ICD-10-CM

## 2024-11-26 DIAGNOSIS — R60.0 BILATERAL LOWER EXTREMITY EDEMA: ICD-10-CM

## 2024-11-26 DIAGNOSIS — C22.0 HCC (HEPATOCELLULAR CARCINOMA): Chronic | ICD-10-CM

## 2024-11-26 DIAGNOSIS — I73.9 PERIPHERAL VASCULAR DISEASE, UNSPECIFIED: ICD-10-CM

## 2024-11-26 PROCEDURE — 1101F PT FALLS ASSESS-DOCD LE1/YR: CPT | Mod: CPTII,S$GLB,,

## 2024-11-26 PROCEDURE — 3074F SYST BP LT 130 MM HG: CPT | Mod: CPTII,S$GLB,,

## 2024-11-26 PROCEDURE — 3288F FALL RISK ASSESSMENT DOCD: CPT | Mod: CPTII,S$GLB,,

## 2024-11-26 PROCEDURE — 1126F AMNT PAIN NOTED NONE PRSNT: CPT | Mod: CPTII,S$GLB,,

## 2024-11-26 PROCEDURE — 1159F MED LIST DOCD IN RCRD: CPT | Mod: CPTII,S$GLB,,

## 2024-11-26 PROCEDURE — 99999 PR PBB SHADOW E&M-EST. PATIENT-LVL III: CPT | Mod: PBBFAC,,,

## 2024-11-26 PROCEDURE — 1160F RVW MEDS BY RX/DR IN RCRD: CPT | Mod: CPTII,S$GLB,,

## 2024-11-26 PROCEDURE — 3078F DIAST BP <80 MM HG: CPT | Mod: CPTII,S$GLB,,

## 2024-11-26 PROCEDURE — 99214 OFFICE O/P EST MOD 30 MIN: CPT | Mod: S$GLB,,,

## 2024-11-26 NOTE — ASSESSMENT & PLAN NOTE
Followed by hepatology.  -Therapeutic paracentesis scheduled with Interventional Radiology every two weeks.

## 2024-11-26 NOTE — ASSESSMENT & PLAN NOTE
CV US BLE Veins 4/23/2024  Interpretation Summary    There is no evidence of a right lower extremity DVT.    There is no evidence of a left lower extremity DVT.    The right greater saphenous vein has reflux.  Mostly around calf    The left greater saphenous vein has reflux.    Lt prox calf accessory vein reflux time =7753mx    The left superficial femoral middle vein has reflux.\    -followed by Vascular medicine, last visit 9/24/24 - continue medical therapy

## 2024-11-26 NOTE — ASSESSMENT & PLAN NOTE
Goal BP < 140/90.  Compliant w/ meds.    -controlled   -in office 100/60  - not currently on any antihypertensive medications   -continue Midodrine 10 mg TID ( per Nephrology)   - enrolling in digital medicine prgm  - risk factor and lifestyle modifications

## 2024-11-26 NOTE — ASSESSMENT & PLAN NOTE
-Bilateral LE US 4/23/24-no DVT, Lt prox calf accessory vein reflux time =[id number], the left superficial femoral middle vein has reflux.  -followed by Vascular medicine, last visit 5/23/24- continue medical therapy   -compression stockings   -Elevate legs when sitting

## 2024-11-26 NOTE — ASSESSMENT & PLAN NOTE
-Followed by Nephrology - last visit 9/16/24 and was continued on medical therapy   -midodrine 10 mg TID ( per Dr. Anderson)   -GFR 20.2

## 2024-11-26 NOTE — PROGRESS NOTES
Subjective:    Patient ID:  Seema Gann is a 79 y.o. female who presents for follow-up of No chief complaint on file.      PCP: Bethany Mesa MD     Referring Provider: Roshan Jesus MD     HPI: Pt is a 77 yo F w/ PMH of HTN, Biliary Cirrhosis w/ enlarging hepatic mass since 2015, HCV, HCC s/p radio embolization, cirrhosis with recurrent ascites, portal hypertension, portal vein occlusion, hepatocellular carcinoma treated with Y90, pancytopenia, pleural effusion, chronic kidney disease stage 3, who presents today for f/u appt.She was last seen on 8/26/24 and was continued on medical therapy. She is followed by Hepatology, Dr. Hernandez and was last seen on 9/24/24 and is receiving  therapeutic paracentesis with Interventional Radiology every two weeks. She is also followed by Nephrology and was last seen on 9/16/24.  She reports doing well from cardiac standpoint and states since she is having the fluid removed she is more active. She denies cp, sob, edema, orthopnea, PND, presyncope, LOC, palpitations, and claudication. She continue to report increased abdominal fluid. Patient reports BLE edema. She notes compliance w/ meds and denies side effects. She does not monitor her BP at home.She does not exercise however states she is very active w/ gardening and denies cp or sob.     8/26/24: Patient presents today or  f/u appt. She was last seen on 5/26/24 and was continued on medical therapy. She is followed by Hepatology and was seen on 5/27/24- furosemide increased to 40 mg daily along with spironolactone w additional testing. She was last seen by Hepatology, Dr. Hernandez  on 7/2/24 and was trying to get her therapeutic paracentesis scheduled with Interventional Radiology every two weeks.  Recent admission 8/22/24-8/23/24 increased abdominal distention. She is s/p paracentesis, removal of 2600 cc of serous fluid. She was discharged home. She reports doing well from cardiac standpoint. She denies  cp, sob, edema, orthopnea, PND, presyncope, LOC, palpitations, and claudication. She continue to report increased abdominal fluid. Patient reports BLE edema. She notes compliance w/ meds and denies side effects. She does not monitor her BP at home.She does not exercise however states she is very active w/ gardening and denies cp or sob.     5/24/24: Patient presents today for f/u appt. She was last seen on 4/9/24 for f/u appt and was continued on medical therapy.   She was seen by Vascular medicine, Dr. Vinicio Dominguez  on 5/23/24 and was continued on medical therapy. NM stress test negative for ischemia, Cardiac echo LVEF 73%. She reports doing well from cardiac standpoint but reports diarrhea.  She denies cp, sob, edema, orthopnea, PND, presyncope, LOC, palpitations, and claudication.  Patient reports BLE edema, but is slowly decreasing w daily furosemide. She notes compliance w/ meds and denies side effects. She does not monitor her BP at home.  She does not exercise however states she is very active w/ gardening and denies cp or sob.      4/9/24: Presented today for f/u appt. She was last seen on 1/8/24 for f/u and was continued on medical therapy for HTN management. She had an ETT which noted normal exercise tolerance and was negative for ischemia.  She reports doing well from cardiac standpoint but reports insomnia. She denies cp, sob, edema, orthopnea, PND, presyncope, LOC, palpitations, and claudication.  Patient reports BLE edema  X  1 month. She was seen by PCP on 3/5/24 and started on furosemide 20 mg , 1 tablet daily X 3 days and then as needed for swelling. Patient notes she was taking the furosemide daily. She notes compliance w/ meds and denies side effects. She does not monitor her BP at home.  She does not exercise however states she is very active w/ gardening and denies cp or sob.      Past Medical History:   Diagnosis Date    Cirrhosis     HCC (hepatocellular carcinoma)     Hepatitis     Hypertension       Past Surgical History:   Procedure Laterality Date    APPENDECTOMY      HYSTERECTOMY       Social History     Socioeconomic History    Marital status:    Tobacco Use    Smoking status: Never     Passive exposure: Never    Smokeless tobacco: Never   Substance and Sexual Activity    Alcohol use: No    Drug use: No    Sexual activity: Yes   Social History Narrative    ** Merged History Encounter **          Social Drivers of Health     Financial Resource Strain: High Risk (9/5/2024)    Overall Financial Resource Strain (CARDIA)     Difficulty of Paying Living Expenses: Very hard   Food Insecurity: Food Insecurity Present (9/5/2024)    Hunger Vital Sign     Worried About Running Out of Food in the Last Year: Sometimes true     Ran Out of Food in the Last Year: Sometimes true   Transportation Needs: No Transportation Needs (9/1/2024)    TRANSPORTATION NEEDS     Transportation : No   Physical Activity: Inactive (9/5/2024)    Exercise Vital Sign     Days of Exercise per Week: 0 days     Minutes of Exercise per Session: 0 min   Stress: Stress Concern Present (9/5/2024)    [employer] [employer] - Occupational Stress Questionnaire     Feeling of Stress : To some extent   Housing Stability: Low Risk  (9/5/2024)    Housing Stability Vital Sign     Unable to Pay for Housing in the Last Year: No     Homeless in the Last Year: No     No family history on file.    Review of patient's allergies indicates:  No Known Allergies    Medication List with Changes/Refills   Current Medications    ERGOCALCIFEROL (ERGOCALCIFEROL) 50,000 UNIT CAP    Take 1 capsule (50,000 Units total) by mouth every 7 days.    MIDODRINE (PROAMATINE) 5 MG TAB    Take 2 tablets (10 mg total) by mouth 3 (three) times daily.    MIRTAZAPINE (REMERON) 7.5 MG TAB    Take 1 tablet (7.5 mg total) by mouth every evening.       Review of Systems   Constitutional: Negative for diaphoresis and fever.   HENT:  Negative for congestion and  "hearing loss.    Eyes:  Negative for blurred vision and pain.   Cardiovascular:  Positive for leg swelling. Negative for chest pain, claudication, dyspnea on exertion, near-syncope, palpitations and syncope.   Respiratory:  Negative for shortness of breath and sleep disturbances due to breathing.    Hematologic/Lymphatic: Negative for bleeding problem. Does not bruise/bleed easily.   Skin:  Negative for color change and poor wound healing.   Gastrointestinal:  Negative for abdominal pain and nausea.   Genitourinary:  Negative for bladder incontinence and flank pain.   Neurological:  Negative for focal weakness and light-headedness.        Objective:   /60 (BP Location: Right arm, Patient Position: Sitting)   Pulse 71   Ht 5' 4" (1.626 m)   Wt 52.7 kg (116 lb 2.9 oz)   SpO2 97%   BMI 19.94 kg/m²    Physical Exam  Constitutional:       Appearance: She is well-developed. She is not diaphoretic.   HENT:      Head: Normocephalic and atraumatic.   Eyes:      General: No scleral icterus.     Pupils: Pupils are equal, round, and reactive to light.   Neck:      Vascular: No JVD.   Cardiovascular:      Rate and Rhythm: Normal rate and regular rhythm.      Pulses: Intact distal pulses.      Heart sounds: S1 normal and S2 normal. Murmur heard.      High-pitched blowing holosystolic murmur is present with a grade of 2/6 at the apex.      No friction rub. No gallop.   Pulmonary:      Effort: Pulmonary effort is normal. No respiratory distress.      Breath sounds: Normal breath sounds. No wheezing or rales.   Chest:      Chest wall: No tenderness.   Abdominal:      General: Abdomen is protuberant. Bowel sounds are normal. There is distension.      Palpations: Abdomen is soft. There is no mass.      Tenderness: There is no abdominal tenderness. There is no rebound.   Musculoskeletal:         General: No tenderness. Normal range of motion.      Cervical back: Normal range of motion and neck supple.      Right lower leg: " 2+ Edema present.      Left lower le+ Edema present.   Skin:     General: Skin is warm and dry.      Coloration: Skin is not pale.   Neurological:      Mental Status: She is alert and oriented to person, place, and time.      Coordination: Coordination normal.      Deep Tendon Reflexes: Reflexes normal.   Psychiatric:         Behavior: Behavior normal.         Judgment: Judgment normal.           NM stress test 24  Interpretation Summary    The ECG portion of the study is abnormal but not diagnostic due to resting ST-T abnormalities.    The patient reported no chest pain during the stress test.    There were no arrhythmias during stress.    The nuclear portion of this study will be reported separately.    FINDINGS:  There is no fixed or reversible perfusion defect.  The stress and rest end-diastolic volumes each measure 47 mL.  The stress and rest end systolic volumes each measure 9 mL respectively.  The stress and rest ejection fraction measure 81%.     Impression:     No acute findings.    CV US BLE Veins 2024  Interpretation Summary    There is no evidence of a right lower extremity DVT.    There is no evidence of a left lower extremity DVT.    The right greater saphenous vein has reflux.  Mostly around calf    The left greater saphenous vein has reflux.    Lt prox calf accessory vein reflux time =7753mx    The left superficial femoral middle vein has reflux.     Cardiac echo 24  Summary    Left Ventricle: The left ventricle is normal in size. Normal wall thickness. Regional wall motion abnormalities present very mild hypokinesia apical lateral wall. . There is normal systolic function with a visually estimated ejection fraction of 65 - 70%. Biplane (2D) method of discs ejection fraction is 73%. There is normal diastolic function.    Right Ventricle: Normal right ventricular cavity size. Wall thickness is normal. Right ventricle wall motion  is normal. Systolic function is normal.    Left  Atrium: Left atrium is mildly dilated.    Aortic Valve: There is mild aortic valve sclerosis. There is mild stenosis. Aortic valve area by VTI is 1.92 cm². Aortic valve peak velocity is 1.40 m/s. Mean gradient is 4 mmHg. The dimensionless index is 0.84. There is mild aortic regurgitation.    Mitral Valve: There is mild regurgitation.    Pulmonary Artery: The estimated pulmonary artery systolic pressure is 35 mmHg.    IVC/SVC: Normal venous pressure at 3 mmHg.    EKG 23- reviewed - SR w SA, RBBB  TTE 23  Summary    Left Ventricle: The left ventricle is normal in size. Normal wall thickness. There is concentric remodeling. Normal wall motion. There is normal systolic function. Biplane (2D) method of discs ejection fraction is 60%. There is normal diastolic function.    Right Ventricle: Normal right ventricular cavity size. Wall thickness is normal. Right ventricle wall motion  is normal. Systolic function is normal.    Aortic Valve: The aortic valve is a trileaflet valve. There is moderate aortic valve sclerosis.    Tricuspid Valve: There is mild to moderate regurgitation.    Pulmonary Artery: The estimated pulmonary artery systolic pressure is 25 mmHg.    IVC/SVC: Normal venous pressure at 3 mmHg.    EC2022- reviewed.  Sinus katherine w/ PACs, RBBB, LAFB.       Echo: 2022- reviewed.    Summary  The left ventricle is normal in size with concentric remodeling and normal systolic function.  The estimated ejection fraction is 70%.  Normal left ventricular diastolic function.  Normal right ventricular size with normal right ventricular systolic function.  Mild aortic regurgitation.  Mild tricuspid regurgitation.  Mild pulmonic regurgitation.  Mild mitral regurgitation.  Normal central venous pressure (3 mmHg).  The estimated PA systolic pressure is 32 mmHg.     ETT: 2022- reviewed.    Conclusion    The patient exercised for 7 minutes 30 seconds on a Abel protocol, corresponding to a functional  capacity of 9 METS, achieving a peak heart rate of 157 bpm, which is 113 % of the age predicted maximum heart rate. The patient experienced no angina during the test. Their exercise capacity was above average. The Duke Treadmill Score was 8.    The ECG portion of the study is negative for ischemia.    The patient reported no chest pain during the stress test.    The blood pressure response to stress was normal.    The Duke Treadmill Score was 8.    During stress, occasional PACs are noted. , During stress, occasional PVCs are noted.    The exercise capacity was above average.    Assessment:       1. Primary hypertension    2. Peripheral vascular disease, unspecified    3. Stage 3b chronic kidney disease    4. HCC (hepatocellular carcinoma)    5. Chronic hepatitis C with cirrhosis    6. Bilateral lower extremity edema         Plan:         Primary hypertension  Goal BP < 140/90.  Compliant w/ meds.    -controlled   -in office 100/60  - not currently on any antihypertensive medications   -continue Midodrine 10 mg TID ( per Nephrology)   - enrolling in digital medicine prgm  - risk factor and lifestyle modifications     Peripheral vascular disease, unspecified  CV US BLE Veins 4/23/2024  Interpretation Summary    There is no evidence of a right lower extremity DVT.    There is no evidence of a left lower extremity DVT.    The right greater saphenous vein has reflux.  Mostly around calf    The left greater saphenous vein has reflux.    Lt prox calf accessory vein reflux time =7753mx    The left superficial femoral middle vein has reflux.\    -followed by Vascular medicine, last visit 9/24/24 - continue medical therapy         Stage 3b chronic kidney disease  -Followed by Nephrology - last visit 9/16/24 and was continued on medical therapy   -midodrine 10 mg TID ( per Dr. Anderson)   -GFR 20.2      HCC (hepatocellular carcinoma)  S/p Y90 x 2, leading to cirrhosis. No residual disease per hepatology.     Chronic hepatitis  C with cirrhosis  Followed by hepatology.  -Therapeutic paracentesis scheduled with Interventional Radiology every two weeks.    Bilateral lower extremity edema  -Bilateral LE US 4/23/24-no DVT, Lt prox calf accessory vein reflux time =[id number], the left superficial femoral middle vein has reflux.  -followed by Vascular medicine, last visit 5/23/24- continue medical therapy   -compression stockings   -Elevate legs when sitting       Total duration of face to face visit time 30 minutes.  Total time spent counseling greater than fifty percent of total visit time.  Counseling included discussion regarding imaging findings, diagnosis, possibilities, treatment options, risks and benefits.  The patient had many questions regarding the options and long-term effects      Guilherme Gregorio, MOE  Cardiology

## 2024-12-01 ENCOUNTER — PATIENT MESSAGE (OUTPATIENT)
Dept: HEPATOLOGY | Facility: CLINIC | Age: 79
End: 2024-12-01
Payer: MEDICARE

## 2024-12-02 ENCOUNTER — TELEPHONE (OUTPATIENT)
Dept: INTERVENTIONAL RADIOLOGY/VASCULAR | Facility: CLINIC | Age: 79
End: 2024-12-02
Payer: MEDICARE

## 2024-12-03 ENCOUNTER — HOSPITAL ENCOUNTER (OUTPATIENT)
Dept: INTERVENTIONAL RADIOLOGY/VASCULAR | Facility: HOSPITAL | Age: 79
Discharge: HOME | End: 2024-12-03
Attending: INTERNAL MEDICINE
Payer: MEDICARE

## 2024-12-03 VITALS
TEMPERATURE: 98 F | WEIGHT: 116 LBS | HEIGHT: 64 IN | SYSTOLIC BLOOD PRESSURE: 114 MMHG | RESPIRATION RATE: 18 BRPM | OXYGEN SATURATION: 97 % | DIASTOLIC BLOOD PRESSURE: 63 MMHG | HEART RATE: 78 BPM | BODY MASS INDEX: 19.81 KG/M2

## 2024-12-03 DIAGNOSIS — R18.8 OTHER ASCITES: ICD-10-CM

## 2024-12-03 DIAGNOSIS — R18.8 ASCITES: ICD-10-CM

## 2024-12-03 PROCEDURE — 49083 ABD PARACENTESIS W/IMAGING: CPT | Mod: HCNC | Performed by: INTERNAL MEDICINE

## 2024-12-03 PROCEDURE — 63600175 PHARM REV CODE 636 W HCPCS: Mod: HCNC | Performed by: INTERNAL MEDICINE

## 2024-12-03 RX ORDER — LIDOCAINE HYDROCHLORIDE 10 MG/ML
INJECTION, SOLUTION INFILTRATION; PERINEURAL
Status: COMPLETED | OUTPATIENT
Start: 2024-12-03 | End: 2024-12-03

## 2024-12-03 RX ADMIN — LIDOCAINE HYDROCHLORIDE 10 ML: 10 INJECTION, SOLUTION INFILTRATION; PERINEURAL at 03:12

## 2024-12-03 NOTE — H&P
VIR Pre-Procedure H&P      SUBJECTIVE:          History of Present Illness:  Seema Gann is a 79 y.o. female who presents for paracentesis   Past Medical History:   Diagnosis Date    Cirrhosis     HCC (hepatocellular carcinoma)     Hepatitis     Hypertension      Past Surgical History:   Procedure Laterality Date    APPENDECTOMY      HYSTERECTOMY         Home Meds:   Prior to Admission medications    Medication Sig Start Date End Date Taking? Authorizing Provider   ergocalciferol (ERGOCALCIFEROL) 50,000 unit Cap Take 1 capsule (50,000 Units total) by mouth every 7 days. 9/28/24   Blaine Anderson MD   midodrine (PROAMATINE) 5 MG Tab Take 2 tablets (10 mg total) by mouth 3 (three) times daily. 9/16/24 12/15/24  Blaine Anderson MD   mirtazapine (REMERON) 7.5 MG Tab Take 1 tablet (7.5 mg total) by mouth every evening. 9/6/24 9/6/25  Bethany Mesa MD          Allergies: Review of patient's allergies indicates:  No Known Allergies            OBJECTIVE:     Vital Signs (Most Recent)  Temp: 98.2 °F (36.8 °C) (12/03/24 1428)  Pulse: 78 (12/03/24 1428)  Resp: 18 (12/03/24 1428)  BP: 114/63 (12/03/24 1430)  SpO2: 97 % (12/03/24 1428)    Physical Exam:     General: no acute distress  Mental Status: alert and oriented to person, place and time  HEENT: normocephalic, atraumatic  Chest: unlabored breathing  Heart: regular heart rate  Abdomen:  distended  Extremity: moves all extremities    Laboratory  Lab Results   Component Value Date    INR 1.2 09/16/2024       Lab Results   Component Value Date    WBC 3.88 (L) 09/16/2024    HGB 9.8 (L) 09/16/2024    HCT 30.8 (L) 09/16/2024    MCV 98 09/16/2024     (L) 09/16/2024      Lab Results   Component Value Date     (H) 11/05/2024     (L) 11/05/2024    K 3.3 (L) 11/05/2024     11/05/2024    CO2 19 (L) 11/05/2024    BUN 23 11/05/2024    CREATININE 2.4 (H) 11/05/2024    CALCIUM 8.0 (L) 11/05/2024    MG 2.0 09/16/2024    ALT 15 11/05/2024    AST  34 11/05/2024    ALBUMIN 2.1 (L) 11/05/2024    BILITOT 1.1 (H) 11/05/2024    BILIDIR 0.3 10/10/2023       ASSESSMENT/PLAN:     Sedation Plan: local  Patient will undergo paracentesis .      Tae Mendez MD  VIR

## 2024-12-03 NOTE — DISCHARGE SUMMARY
VIR Discharge Summary      Hospital Course: No complications    Admit Date: 12/3/2024  Discharge Date: 12/03/2024     Instructions Given to Patient: Yes  Diet: Resume prior diet  Activity:   Activity as tolerated and no driving for today.    Description of Condition on Discharge: Stable  Vital Signs (Most Recent): Temp: 98.2 °F (36.8 °C) (12/03/24 1428)  Pulse: 78 (12/03/24 1428)  Resp: 18 (12/03/24 1428)  BP: 114/63 (12/03/24 1430)  SpO2: 97 % (12/03/24 1428)    Discharge Disposition: Home    Discharge Diagnosis: ascites.    Tae Mendez MD  VIR

## 2024-12-03 NOTE — SEDATION DOCUMENTATION
Procedure completed. Patient tolerated well; VSS. Site CDI. Patient to be transported to back to pre post no monitoring needed

## 2024-12-03 NOTE — PROCEDURES
Radiology Procedure Note    Pre Op Diagnosis: Ascites  Post Op Diagnosis: Same    Procedure: Paracentesis    Procedure performed by:  LUIS Mendez MD    Written Informed Consent Obtained:  Yes  Specimen Removed: Yes  Estimated Blood Loss: None    Findings:     Successful paracentesis.        Tae Mendez MD  VIR

## 2024-12-03 NOTE — Clinical Note
Anesthesia Type: Local Only Detail Level: Detailed Quality 226: Preventive Care And Screening: Tobacco Use: Screening And Cessation Intervention: Patient screened for tobacco use and is an ex/non-smoker

## 2024-12-03 NOTE — SEDATION DOCUMENTATION
Pt arrived to C arm for Paracentesis. Pt oriented to unit and staff, Pt safely transferred from stretcher to procedural table. Fall risk reviewed and comfort measures utilized with interventions. Safety strap applied, position pillows to minimize pressure points. Blankets applied. Pt prepped and draped utilizing standard sterile technique. Patient placed on continuous monitoring, . Timeouts implemented utilizing standard universal time-out per department and facility policy. RN to remain at bedside with continuous monitoring. Pt resting comfortably. Denies pain/discomfort.

## 2024-12-09 ENCOUNTER — TELEPHONE (OUTPATIENT)
Dept: NEPHROLOGY | Facility: CLINIC | Age: 79
End: 2024-12-09
Payer: MEDICARE

## 2024-12-09 DIAGNOSIS — N18.32 STAGE 3B CHRONIC KIDNEY DISEASE: Primary | ICD-10-CM

## 2024-12-09 NOTE — TELEPHONE ENCOUNTER
----- Message from Luisa sent at 12/9/2024 12:46 PM CST -----  Regarding: Appt  Who call ? Pt daughter Milla     What is the request Details : Milla  calling to speak with someone in provider office regards r/s appt on 12/9. No appt available. Please call  back.      Can clinic  use patient portal  : No     What number to call back : [phone]

## 2024-12-10 ENCOUNTER — OFFICE VISIT (OUTPATIENT)
Dept: NEPHROLOGY | Facility: CLINIC | Age: 79
End: 2024-12-10
Payer: MEDICARE

## 2024-12-10 ENCOUNTER — LAB VISIT (OUTPATIENT)
Dept: LAB | Facility: HOSPITAL | Age: 79
End: 2024-12-10
Attending: INTERNAL MEDICINE
Payer: MEDICARE

## 2024-12-10 VITALS
BODY MASS INDEX: 20.1 KG/M2 | HEART RATE: 76 BPM | RESPIRATION RATE: 18 BRPM | SYSTOLIC BLOOD PRESSURE: 104 MMHG | HEIGHT: 64 IN | WEIGHT: 117.75 LBS | DIASTOLIC BLOOD PRESSURE: 69 MMHG

## 2024-12-10 DIAGNOSIS — N18.4 CKD (CHRONIC KIDNEY DISEASE), STAGE IV: ICD-10-CM

## 2024-12-10 DIAGNOSIS — K76.7 HEPATORENAL SYNDROME: ICD-10-CM

## 2024-12-10 DIAGNOSIS — E55.9 VITAMIN D DEFICIENCY, UNSPECIFIED: ICD-10-CM

## 2024-12-10 DIAGNOSIS — D47.2 MONOCLONAL GAMMOPATHY OF UNDETERMINED SIGNIFICANCE: ICD-10-CM

## 2024-12-10 DIAGNOSIS — K76.7 HEPATORENAL SYNDROME: Primary | ICD-10-CM

## 2024-12-10 LAB
ANION GAP SERPL CALC-SCNC: 5 MMOL/L (ref 8–16)
BUN SERPL-MCNC: 36 MG/DL (ref 8–23)
CALCIUM SERPL-MCNC: 8 MG/DL (ref 8.7–10.5)
CHLORIDE SERPL-SCNC: 118 MMOL/L (ref 95–110)
CO2 SERPL-SCNC: 17 MMOL/L (ref 23–29)
CREAT SERPL-MCNC: 2.5 MG/DL (ref 0.5–1.4)
EST. GFR  (NO RACE VARIABLE): 19.2 ML/MIN/1.73 M^2
GLUCOSE SERPL-MCNC: 105 MG/DL (ref 70–110)
POTASSIUM SERPL-SCNC: 3.3 MMOL/L (ref 3.5–5.1)
SODIUM SERPL-SCNC: 140 MMOL/L (ref 136–145)

## 2024-12-10 PROCEDURE — 3078F DIAST BP <80 MM HG: CPT | Mod: HCNC,CPTII,S$GLB, | Performed by: INTERNAL MEDICINE

## 2024-12-10 PROCEDURE — 1126F AMNT PAIN NOTED NONE PRSNT: CPT | Mod: HCNC,CPTII,S$GLB, | Performed by: INTERNAL MEDICINE

## 2024-12-10 PROCEDURE — 80048 BASIC METABOLIC PNL TOTAL CA: CPT | Mod: HCNC | Performed by: INTERNAL MEDICINE

## 2024-12-10 PROCEDURE — 3288F FALL RISK ASSESSMENT DOCD: CPT | Mod: HCNC,CPTII,S$GLB, | Performed by: INTERNAL MEDICINE

## 2024-12-10 PROCEDURE — 1101F PT FALLS ASSESS-DOCD LE1/YR: CPT | Mod: HCNC,CPTII,S$GLB, | Performed by: INTERNAL MEDICINE

## 2024-12-10 PROCEDURE — 3074F SYST BP LT 130 MM HG: CPT | Mod: HCNC,CPTII,S$GLB, | Performed by: INTERNAL MEDICINE

## 2024-12-10 PROCEDURE — 36415 COLL VENOUS BLD VENIPUNCTURE: CPT | Mod: HCNC | Performed by: INTERNAL MEDICINE

## 2024-12-10 PROCEDURE — 1159F MED LIST DOCD IN RCRD: CPT | Mod: HCNC,CPTII,S$GLB, | Performed by: INTERNAL MEDICINE

## 2024-12-10 PROCEDURE — 99999 PR PBB SHADOW E&M-EST. PATIENT-LVL II: CPT | Mod: PBBFAC,HCNC,, | Performed by: INTERNAL MEDICINE

## 2024-12-10 PROCEDURE — 99214 OFFICE O/P EST MOD 30 MIN: CPT | Mod: HCNC,S$GLB,, | Performed by: INTERNAL MEDICINE

## 2024-12-10 NOTE — PROGRESS NOTES
Progress Note  Nephrology      Referring physician: Bethany Mesa MD    Reason for visit: CKD     SUBJECTIVE:   79 y.o. female  has a past medical history of Cirrhosis, HCC (hepatocellular carcinoma), Hepatitis, and Hypertension. who has been following up in renal clinic for ckd management   The patient denies taking NSAIDs or new antibiotics, recreational drugs, recent episode of dehydration, diarrhea, nausea or vomiting, acute illness, hospitalization or exposure to IV radiocontrast.     ROS:  General: negative for chills, or fatigue  ENT: No epistaxis or headaches  Hematological and Lymphatic: No bleeding problems or blood clots.  Endocrine: No skin changes or temperature intolerance  Respiratory: No cough, shortness of breath, or wheezing  Cardiovascular: No chest pain or dyspnea   Gastrointestinal: No abdominal pain, change in bowel habits  Genito-Urinary: No dysuria, trouble voiding, or hematuria  Musculoskeletal: ROS: negative for - joint pain, joint stiffness, joint swelling, muscle pain or muscular weakness  Neurological: No focal weakness, no numbness  Dermatological: No rash or ulcers    OBJECTIVE:     There were no vitals filed for this visit.       Physical Exam:  General: no distress, well nourished  HENT: PERRLA, Normal mouth nose and ears.  Neck: no JVD and thyroid not enlarged, symmetric, no tenderness/mass/nodules  Lungs: clear to auscultation bilaterally and normal respiratory effort  Cardiovascular: regular rate and rhythm, S1, S2 normal, no murmur, click, rub or gallop.   Abdomen: soft, non-tender ,distented; bowel sounds normal  Skin: No rashes or lesions  Musculoskeletal:no edema in LE, no deformities.   Lymph Nodes: No cervical or supraclavicular adenopathy  Neurologic: Normal strength and tone. No focal numbness or weakness          Medications:    Current Outpatient Medications:     ergocalciferol (ERGOCALCIFEROL) 50,000 unit Cap, Take 1 capsule (50,000 Units total) by mouth every  7 days., Disp: 28 capsule, Rfl: 0    midodrine (PROAMATINE) 5 MG Tab, Take 2 tablets (10 mg total) by mouth 3 (three) times daily., Disp: , Rfl:     mirtazapine (REMERON) 7.5 MG Tab, Take 1 tablet (7.5 mg total) by mouth every evening., Disp: 30 tablet, Rfl: 11         Laboratory:  Lab Results   Component Value Date    CREATININE 2.8 (H) 12/09/2024       Prot/Creat Ratio, Urine   Date Value Ref Range Status   12/09/2024 0.05 0.00 - 0.20 Final   09/16/2024 0.04 0.00 - 0.20 Final       Lab Results   Component Value Date     12/09/2024    K 4.2 12/09/2024    CO2 18 (L) 12/09/2024       last PTH   Lab Results   Component Value Date    .7 (H) 09/16/2024    CALCIUM 8.4 (L) 12/09/2024    PHOS 2.7 09/16/2024       Lab Results   Component Value Date    HGB 9.5 (L) 12/09/2024        Lab Results   Component Value Date    HGBA1C 4.9 11/22/2023       Lab Results   Component Value Date    LDLCALC 87.8 11/22/2023       Other Labs were reviewed      ASSESSMENT/PLAN:     1) Chronic kidney disease stage IV ( worsening)  Patients baseline creatinine has been trending up since atleast 2022, it is 2.8 now from 1.6 on 9/2024 and GFR 17 . Urine analysis is benign, CT scan from 2023 showed 8 and 9 cm kidney with no other obvious anatomical abnormalities. SPEP/IF acceptable ratio. Her CKD is likely a combination of age related nephron loss and HRS 2. Will repeat BMP today   - midodrine to 10 mg TID,   - Agree with regular/as needed paracentesis as the diuretics did not help.  - Educated about CKD  - Educated about fluid restriction   - Avoid NSAIDs intake    2) Cirrhosis of liver  3) Faint IgG kappa band  4) vitamin D deficiency           RTC in 3-4m      KIRK JORDAN MD  NEPHROLOGY ATTENDING

## 2024-12-11 ENCOUNTER — PATIENT MESSAGE (OUTPATIENT)
Dept: FAMILY MEDICINE | Facility: CLINIC | Age: 79
End: 2024-12-11
Payer: MEDICARE

## 2024-12-11 ENCOUNTER — TELEPHONE (OUTPATIENT)
Dept: INTERVENTIONAL RADIOLOGY/VASCULAR | Facility: CLINIC | Age: 79
End: 2024-12-11
Payer: MEDICARE

## 2024-12-11 ENCOUNTER — PATIENT MESSAGE (OUTPATIENT)
Dept: NEPHROLOGY | Facility: CLINIC | Age: 79
End: 2024-12-11
Payer: MEDICARE

## 2024-12-11 NOTE — TELEPHONE ENCOUNTER
Called pt's daughter Mrs. Gregory who states pt's abd is constantly distended even with paracentesis, which is causing concern. Pt would like to be on speaker phone in the next clinic visit due to not being able to attend. Was informed that was up to the pt to give consent to that.      Bri sts Pt did she Dr. Maria with Nephrology who is thinking of admitting pt in attempt to find a reason for frequent need of paracentesis and distension. Would like Dr. Rdz to advise if this is normal (the constant distended abd) at the pt's current stage.

## 2024-12-12 ENCOUNTER — HOSPITAL ENCOUNTER (OUTPATIENT)
Dept: INTERVENTIONAL RADIOLOGY/VASCULAR | Facility: HOSPITAL | Age: 79
Discharge: HOME | End: 2024-12-12
Attending: INTERNAL MEDICINE
Payer: MEDICARE

## 2024-12-12 VITALS
DIASTOLIC BLOOD PRESSURE: 57 MMHG | OXYGEN SATURATION: 100 % | HEART RATE: 70 BPM | BODY MASS INDEX: 19.81 KG/M2 | SYSTOLIC BLOOD PRESSURE: 96 MMHG | TEMPERATURE: 98 F | HEIGHT: 64 IN | WEIGHT: 116 LBS | RESPIRATION RATE: 16 BRPM

## 2024-12-12 DIAGNOSIS — R18.8 OTHER ASCITES: ICD-10-CM

## 2024-12-12 PROCEDURE — 49083 ABD PARACENTESIS W/IMAGING: CPT | Mod: HCNC | Performed by: RADIOLOGY

## 2024-12-12 PROCEDURE — 63600175 PHARM REV CODE 636 W HCPCS: Mod: HCNC | Performed by: PHYSICIAN ASSISTANT

## 2024-12-12 RX ORDER — LIDOCAINE HYDROCHLORIDE 10 MG/ML
INJECTION, SOLUTION INFILTRATION; PERINEURAL
Status: COMPLETED | OUTPATIENT
Start: 2024-12-12 | End: 2024-12-12

## 2024-12-12 RX ADMIN — LIDOCAINE HYDROCHLORIDE 5 ML: 10 INJECTION, SOLUTION INFILTRATION; PERINEURAL at 08:12

## 2024-12-12 NOTE — H&P
Radiology History & Physical      SUBJECTIVE:     Chief Complaint: abdominal distention    History of Present Illness:  Seema Gann is a 79 y.o. female who presents for ultrasound guided paracentesis  Past Medical History:   Diagnosis Date    Cirrhosis     HCC (hepatocellular carcinoma)     Hepatitis     Hypertension      Past Surgical History:   Procedure Laterality Date    APPENDECTOMY      HYSTERECTOMY         Home Meds:   Prior to Admission medications    Medication Sig Start Date End Date Taking? Authorizing Provider   ergocalciferol (ERGOCALCIFEROL) 50,000 unit Cap Take 1 capsule (50,000 Units total) by mouth every 7 days. 9/28/24  Yes Blaine Anderson MD   midodrine (PROAMATINE) 5 MG Tab Take 2 tablets (10 mg total) by mouth 3 (three) times daily. 9/16/24 12/15/24 Yes Blaine Anderson MD   mirtazapine (REMERON) 7.5 MG Tab Take 1 tablet (7.5 mg total) by mouth every evening. 9/6/24 9/6/25 Yes Bethany Mesa MD     Anticoagulants/Antiplatelets: no anticoagulation    Allergies: Review of patient's allergies indicates:  No Known Allergies  Sedation History:  no adverse reactions    Review of Systems:   Hematological: no known coagulopathies  Respiratory: no shortness of breath  Cardiovascular: no chest pain  Gastrointestinal: no abdominal pain  Genito-Urinary: no dysuria  Musculoskeletal: negative  Neurological: no TIA or stroke symptoms         OBJECTIVE:     Vital Signs (Most Recent)  Temp: 97.9 °F (36.6 °C) (12/12/24 0751)  Pulse: 76 (12/12/24 0751)  BP: 117/61 (12/12/24 0754)  SpO2: 100 % (12/12/24 0751)    Physical Exam:  ASA: 2  Mallampati: n/a    General: no acute distress  Mental Status: alert and oriented to person, place and time  HEENT: normocephalic, atraumatic  Chest: unlabored breathing  Heart: regular heart rate  Abdomen: distended  Extremity: moves all extremities    ASSESSMENT/PLAN:     Sedation Plan: local  Patient will undergo ultrasound guided paracentesis.    Patt Elliott  NAMITA

## 2024-12-12 NOTE — PLAN OF CARE
Pt VSS and in NAD. CRM equipment removed. Discharge instructions and AVS provided. Pt verbalized understanding, questions and concerns addressed. No further needs at this time. Pt discharged from recovery area at 0855.

## 2024-12-12 NOTE — DISCHARGE SUMMARY
Interventional Radiology Short Stay Discharge Summary      Admit Date: 12/12/2024  Discharge Date: 12/12/2024     Hospital Course: Uneventful    Discharge Diagnosis: ascites    Discharge Condition: Stable    Discharge Disposition: Home    Diet: Resume prior diet    Activity: activity as tolerated    Follow-up: With referring provider    Cindy Chava, PA-C [facility]

## 2024-12-12 NOTE — SEDATION DOCUMENTATION
Procedure completed. Patient tolerated well; VSS. Site CDI. 3900ml of clear, yellow ascites drained. Patient to be discharged per orders.

## 2024-12-12 NOTE — PROCEDURES
Radiology Post-Procedure Note    Pre Op Diagnosis: Ascites  Post Op Diagnosis: Same    Procedure: Ultrasound Guided Paracentesis    Procedure performed by: Patt Elliott PA-C    Written Informed Consent Obtained: Yes  Specimen Removed: none sent  Estimated Blood Loss: Minimal    Findings:   Successful paracentesis.  3900 ml clear yellow fluid RLQ. Albumin was not administered PRN per protocol.    Patient tolerated procedure well.    Patt Elliott PA-C

## 2024-12-13 ENCOUNTER — TELEPHONE (OUTPATIENT)
Dept: HEPATOLOGY | Facility: CLINIC | Age: 79
End: 2024-12-13
Payer: MEDICARE

## 2024-12-13 ENCOUNTER — OFFICE VISIT (OUTPATIENT)
Dept: FAMILY MEDICINE | Facility: CLINIC | Age: 79
End: 2024-12-13
Payer: MEDICARE

## 2024-12-13 VITALS
BODY MASS INDEX: 18.97 KG/M2 | HEART RATE: 81 BPM | DIASTOLIC BLOOD PRESSURE: 60 MMHG | OXYGEN SATURATION: 95 % | TEMPERATURE: 98 F | RESPIRATION RATE: 16 BRPM | WEIGHT: 111.13 LBS | HEIGHT: 64 IN | SYSTOLIC BLOOD PRESSURE: 94 MMHG

## 2024-12-13 DIAGNOSIS — K76.7 HEPATORENAL SYNDROME: ICD-10-CM

## 2024-12-13 DIAGNOSIS — D61.818 PANCYTOPENIA: ICD-10-CM

## 2024-12-13 DIAGNOSIS — K64.4 INFLAMED EXTERNAL HEMORRHOID: ICD-10-CM

## 2024-12-13 DIAGNOSIS — C22.0 HCC (HEPATOCELLULAR CARCINOMA): Primary | ICD-10-CM

## 2024-12-13 DIAGNOSIS — N18.4 CHRONIC KIDNEY DISEASE (CKD), STAGE IV (SEVERE): ICD-10-CM

## 2024-12-13 PROCEDURE — 99999 PR PBB SHADOW E&M-EST. PATIENT-LVL IV: CPT | Mod: PBBFAC,HCNC,, | Performed by: STUDENT IN AN ORGANIZED HEALTH CARE EDUCATION/TRAINING PROGRAM

## 2024-12-13 NOTE — PROGRESS NOTES
[facility] Note    Chief Complaint      F/u visit     History of Present Illness     Seema Gann is a 79 y.o. female with sHTN, Biliary cirrhosis with hepatocellular carcinoma since 2015 due to HCV s/p chemotherapy now complicated by hepatorenal syndrome and pancytopenia, who presents for f/u visit. She is s/p multiple ER visit for abdominal paracentesis for recurrent massive ascites with ongoing discussion as to whether pleural catheter an option but patient does not want any aggressive measure according to discussion had with her today and does not want involvement of her daughter ( Rochelley in making decision on her behalf since she is still mentally capacitated to make her own decision . She has also tried explaining ongoing situation to her but states daughter is in total denial . Last paracentesis was on 12/09/2024, about 4-5liters again drained from her abdomen. She was started on midodrine due to symptomatic hypotension and lasix/aldactone has since been discontinued .  She states no worsening baseline leg swelling, SOB , or abdominal swelling  today but c/o painful hemorrhoids today , no bloody stool.     Problem List Addressed This Visit:    1. HCC (hepatocellular carcinoma)  -     Ambulatory referral/consult to Palliative/Hospice Care; Future; Expected date: 12/20/2024    2. Hepatorenal syndrome  -     Ambulatory referral/consult to Palliative/Hospice Care; Future; Expected date: 12/20/2024    3. Inflamed external hemorrhoid  -     CMPD hydrocortisone 2%- LIDOcaine 3% suppository (RECTAL ROCKET); Place 1 suppository rectally every evening. Insert 1 suppository 3/4 of the way onto rectum. Wet for easier application. Repeat for 3 nights. for 10 days  Dispense: 1 each; Refill: 3         Health Maintenance   Topic Date Due    RSV Vaccine (Age 60+ and Pregnant patients) (1 - 1-dose 75+ series) 12/13/2024 (Originally 9/23/2021)    TETANUS VACCINE  12/13/2025 (Originally 9/23/1963)     Shingles Vaccine (1 of 2) 12/13/2025 (Originally 9/23/1964)    COVID-19 Vaccine (4 - 2024-25 season) 12/13/2025 (Originally 9/1/2024)    DEXA Scan  02/09/2026    Lipid Panel  11/22/2028    Hepatitis C Screening  Completed    Pneumococcal Vaccines (Age 65+)  Completed    Influenza Vaccine  Addressed       Past Medical History:   Diagnosis Date    Cirrhosis     HCC (hepatocellular carcinoma)     Hepatitis     Hypertension        Past Surgical History:   Procedure Laterality Date    APPENDECTOMY      HYSTERECTOMY         family history is not on file.    Social History     Tobacco Use    Smoking status: Never     Passive exposure: Never    Smokeless tobacco: Never   Substance Use Topics    Alcohol use: No    Drug use: No       Review of Systems   Constitutional:  Negative for fatigue and fever.   Respiratory:  Negative for cough and chest tightness.    Cardiovascular:  Positive for leg swelling. Negative for chest pain and palpitations.   Gastrointestinal:  Negative for abdominal pain, diarrhea and vomiting.   Endocrine: Negative for polydipsia and polyphagia.   Genitourinary:  Negative for difficulty urinating, dysuria and frequency.   Musculoskeletal:  Negative for arthralgias, back pain, gait problem and joint swelling.   Skin:  Negative for rash.   Neurological:  Negative for seizures, weakness, numbness and headaches.   Psychiatric/Behavioral:  Negative for sleep disturbance.        Outpatient Encounter Medications as of 12/13/2024   Medication Sig Dispense Refill    ergocalciferol (ERGOCALCIFEROL) 50,000 unit Cap Take 1 capsule (50,000 Units total) by mouth every 7 days. 28 capsule 0    midodrine (PROAMATINE) 5 MG Tab Take 2 tablets (10 mg total) by mouth 3 (three) times daily.      mirtazapine (REMERON) 7.5 MG Tab Take 1 tablet (7.5 mg total) by mouth every evening. 30 tablet 11    CMPD hydrocortisone 2%- LIDOcaine 3% suppository (RECTAL ROCKET) Place 1 suppository rectally every evening. Insert 1 suppository  "3/4 of the way onto rectum. Wet for easier application. Repeat for 3 nights. for 10 days 1 each 3    [] LIDOcaine HCL 10 mg/ml (1%) injection        No facility-administered encounter medications on file as of 2024.        Review of patient's allergies indicates:  No Known Allergies    Physical Exam      Vital Signs  Temp: 97.7 °F (36.5 °C)  Temp Source: Temporal  Pulse: 81  Resp: 16  SpO2: 95 %  BP: 94/60  BP Location: Right arm  Patient Position: Sitting  Pain Score: 0-No pain  Height and Weight  Height: 5' 4" (162.6 cm)  Weight: 50.4 kg (111 lb 1.8 oz)  BSA (Calculated - sq m): 1.51 sq meters  BMI (Calculated): 19.1  Weight in (lb) to have BMI = 25: 145.3]    Physical Exam  Vitals reviewed.   Constitutional:       Appearance: Normal appearance. She is normal weight.   HENT:      Head: Normocephalic and atraumatic.      Mouth/Throat:      Mouth: Mucous membranes are moist.      Pharynx: Oropharynx is clear.   Eyes:      Extraocular Movements: Extraocular movements intact.      Conjunctiva/sclera: Conjunctivae normal.      Pupils: Pupils are equal, round, and reactive to light.   Cardiovascular:      Rate and Rhythm: Normal rate and regular rhythm.      Pulses: Normal pulses.      Heart sounds: Normal heart sounds.   Pulmonary:      Effort: Pulmonary effort is normal.      Breath sounds: Normal breath sounds. Rales: few rales on lower posterior lung fields bilaterally.   Abdominal:      General: Bowel sounds are normal. There is distension (mildy distended).      Palpations: Abdomen is soft.      Tenderness: There is no abdominal tenderness. There is no guarding.   Musculoskeletal:      Cervical back: Normal range of motion.      Right lower leg: Edema ((1+ bilaterally)) present.      Left lower leg: Edema present.   Skin:     General: Skin is warm and dry.   Neurological:      General: No focal deficit present.      Mental Status: She is alert and oriented to person, place, and time.      Sensory: No " "sensory deficit.   Psychiatric:         Mood and Affect: Mood normal.         Behavior: Behavior normal.       Laboratory:  CBC:  Recent Labs   Lab Result Units 09/16/24  1628 12/09/24  1337   WBC K/uL 3.88* 4.26   RBC M/uL 3.16* 3.02*   Hemoglobin g/dL 9.8* 9.5*   Hematocrit % 30.8* 28.6*   Platelets K/uL 116* 88*   MCV fL 98 95   MCH pg 31.0 31.5*   MCHC g/dL 31.8* 33.2     CMP:  Recent Labs   Lab Result Units 09/16/24  1628 11/05/24  0928 12/09/24  1337 12/10/24  1342   Glucose mg/dL 138* 138*   < > 105   Calcium mg/dL 8.7 8.0*   < > 8.0*   Albumin g/dL 2.6* 2.1*  --   --    Total Protein g/dL 7.3 6.8  --   --    Sodium mmol/L 138 134*   < > 140   Potassium mmol/L 3.4* 3.3*   < > 3.3*   CO2 mmol/L 21* 19*   < > 17*   Chloride mmol/L 106 109   < > 118*   BUN mg/dL 12 23   < > 36*   Alkaline Phosphatase U/L 56 100  --   --    ALT U/L 8* 15  --   --    AST U/L 26 34  --   --    Total Bilirubin mg/dL 1.2* 1.1*  --   --     < > = values in this interval not displayed.     URINALYSIS:  Recent Labs   Lab Result Units 12/09/24  1328   Color, UA  Yellow   Specific Gravity, UA  1.020   pH, UA  6.0   Protein, UA  Negative   Nitrite, UA  Negative   Leukocytes, UA  Negative   Urobilinogen, UA EU/dL Negative      LIPIDS:  No results for input(s): "TSH", "HDL", "CHOL", "TRIG", "LDLCALC", "CHOLHDL", "NONHDLCHOL", "TOTALCHOLEST" in the last 2160 hours.  TSH:  No results for input(s): "TSH" in the last 2160 hours.  A1C:  No results for input(s): "HGBA1C" in the last 2160 hours.    Radiology:      Assessment/Plan     Seema Gann is a 79 y.o.female with:    1. Hepatorenal syndrome  -due to decompensated cirrhotic liver disease from HCC  -s/p multiple  therapeutic paracentesis  -on scheduled therapeutic paracentesis with IR now  -also started on midodrine 10mg TID tolerating well  -s/p hepatology evaluation, recs appreciated  -patient does not want aggressive measures  -amenable to palliative referral, ordered today     2. " Hepatocellular carcinoma  -due  to HCV  -mildly hypervolemic today  -HCV RNA undetectable  -completed chemotherapy 2023  -followed by hepatology, oncology  -c/w zofran PRN for nausea    3. Anorexia  -likely cancer related anorexia  -patient advised to supplement nutrition with ensure as well, eat 2-3hrly  -     mirtazapine (REMERON) 7.5 MG Tab; Take 1 tablet (7.5 mg total) by mouth every evening.  Dispense: 30 tablet; Refill: 11    4. Hemorrhoid  -likely due to portal hypertension  -hydrocort-lidocaine ordered for symptomatic relief     5. Chronic kidney disease (CKD), stage IV (severe)  -refer to renal, at scheduled for 12/28/2024    6. Pancytopenia  -due to HCC  -will monitor for now  -declined all due vccines except PCV 20    -Continue current medications and maintain follow up with specialists.      Patient verbalizes understanding and agrees with current treatment plan.    Attempted to call Daughter Jaxon Gregory ) on mobile number [phone] twice with no response     43 minutes of total time spent on the encounter, which includes face to face time and non-face to face time preparing to see the patient (eg, review of tests), Obtaining and/or reviewing separately obtained history, Documenting clinical information in the electronic or other health record, Independently interpreting results (not separately reported) and communicating results to the patient/family/caregiver, or Care coordination (not separately reported).     Bethany Mesa MD  Internal Medicine   [facility]

## 2024-12-13 NOTE — TELEPHONE ENCOUNTER
Spoke with daughter Bri and discussed Mrs Gann's deteriorating condition.  - increasing ascites despite weekly paracentesis  - I don't think there are any good options for this and I discussed the idea of palliative care/hospice + pleurex catheter for draining at home.    Bri wll introduce the idea and I will further discuss with Mrs Gann at her next visit on Dec 31.

## 2024-12-26 ENCOUNTER — TELEPHONE (OUTPATIENT)
Dept: UROLOGY | Facility: CLINIC | Age: 79
End: 2024-12-26
Payer: MEDICARE

## 2024-12-26 ENCOUNTER — HOSPITAL ENCOUNTER (EMERGENCY)
Facility: HOSPITAL | Age: 79
Discharge: LEFT BEFORE DISCHARGE | End: 2024-12-26
Payer: MEDICARE

## 2024-12-26 VITALS
WEIGHT: 111 LBS | HEIGHT: 64 IN | OXYGEN SATURATION: 99 % | SYSTOLIC BLOOD PRESSURE: 97 MMHG | BODY MASS INDEX: 18.95 KG/M2 | RESPIRATION RATE: 18 BRPM | TEMPERATURE: 98 F | DIASTOLIC BLOOD PRESSURE: 54 MMHG | HEART RATE: 73 BPM

## 2024-12-26 DIAGNOSIS — R06.02 SOB (SHORTNESS OF BREATH): ICD-10-CM

## 2024-12-26 PROCEDURE — 99281 EMR DPT VST MAYX REQ PHY/QHP: CPT | Mod: HCNC

## 2024-12-26 NOTE — ED TRIAGE NOTES
Pt reports ascites that was causing her to be SOB last night. She denies any s/s today. She last had the fluid drained three weeks ago and has to frequently have it done.

## 2024-12-26 NOTE — TELEPHONE ENCOUNTER
Spoke to Mrs. Gann who states her breathing is still compromised, does have a little relief when sitting but still compromised. Advised pt to go to ED ASAP. Pt expressed verbal understanding and expressed she would be going to [facility].       Pt is looking to have orders placed for regular paracentesis.         ----- Message from Michelle sent at 12/26/2024 12:21 PM CST -----  Type: Orders     Who Called: Pt   Does the patient know what this is regarding?: requesting Paracentesis orders, pt states she could not breathe last night while trying to sleep   Would the patient rather a call back or a response via [facility]? Call   Best Call Back Number:[phone]   Additional Information:

## 2024-12-26 NOTE — FIRST PROVIDER EVALUATION
Emergency Department TeleTriage Encounter Note      CHIEF COMPLAINT    Chief Complaint   Patient presents with    Ascites     Pt presents c/o ascites, states last paracentesis was 3 weeks ago, pt feels like she is having a harder time breathing because of the fluid. Denies N/V/D.        VITAL SIGNS   Initial Vitals [12/26/24 1509]   BP Pulse Resp Temp SpO2   (!) 97/54 73 18 98.1 °F (36.7 °C) 99 %      MAP       --            ALLERGIES    Review of patient's allergies indicates:  No Known Allergies    PROVIDER TRIAGE NOTE  79 year old female with history of biliary cirrhosis with hepatocellular carcinoma since 2015 due to HCV s/p chemotherapy now complicated by hepatorenal syndrome and pancytopenia presents to the ER today with complaints of felling bloated and SOB, especially when lying flat at night. Denies CP. Reports she gets paracentesis PRN. Last time she had this was 3 weeks ago. Denies cough or fever or abdominal pain. Reports worsening ascites and peripheral edema.        AAOx3, respirations even and non- labored, stable vitals, normal coloration of skin, sitting upright in triage chair, appears in no acute distress.          ORDERS  Labs Reviewed - No data to display    ED Orders (720h ago, onward)      None              Virtual Visit Note: The provider triage portion of this emergency department evaluation and documentation was performed via [employer], a HIPAA-compliant telemedicine application, in concert with a tele-presenter in the room. A face to face patient evaluation with one of my colleagues will occur once the patient is placed in an emergency department room.      DISCLAIMER: This note was prepared with [employer] voice recognition transcription software. Garbled syntax, mangled pronouns, and other bizarre constructions may be attributed to that software system.

## 2024-12-27 ENCOUNTER — HOSPITAL ENCOUNTER (EMERGENCY)
Facility: HOSPITAL | Age: 79
Discharge: HOME | End: 2024-12-27
Attending: EMERGENCY MEDICINE
Payer: MEDICARE

## 2024-12-27 ENCOUNTER — TELEPHONE (OUTPATIENT)
Dept: HEPATOLOGY | Facility: CLINIC | Age: 79
End: 2024-12-27
Payer: MEDICARE

## 2024-12-27 ENCOUNTER — TELEPHONE (OUTPATIENT)
Dept: INTERVENTIONAL RADIOLOGY/VASCULAR | Facility: CLINIC | Age: 79
End: 2024-12-27
Payer: MEDICARE

## 2024-12-27 VITALS
DIASTOLIC BLOOD PRESSURE: 69 MMHG | HEIGHT: 64 IN | HEART RATE: 72 BPM | OXYGEN SATURATION: 99 % | BODY MASS INDEX: 19.63 KG/M2 | RESPIRATION RATE: 18 BRPM | WEIGHT: 115 LBS | TEMPERATURE: 98 F | SYSTOLIC BLOOD PRESSURE: 109 MMHG

## 2024-12-27 DIAGNOSIS — K74.60 CIRRHOSIS OF LIVER WITH ASCITES, UNSPECIFIED HEPATIC CIRRHOSIS TYPE: Primary | ICD-10-CM

## 2024-12-27 DIAGNOSIS — R18.8 CIRRHOSIS OF LIVER WITH ASCITES, UNSPECIFIED HEPATIC CIRRHOSIS TYPE: Primary | ICD-10-CM

## 2024-12-27 LAB
ALBUMIN SERPL BCP-MCNC: 2 G/DL (ref 3.5–5.2)
ALP SERPL-CCNC: 67 U/L (ref 40–150)
ALT SERPL W/O P-5'-P-CCNC: 11 U/L (ref 10–44)
ANION GAP SERPL CALC-SCNC: 5 MMOL/L (ref 8–16)
AST SERPL-CCNC: 24 U/L (ref 10–40)
BASOPHILS # BLD AUTO: 0 K/UL (ref 0–0.2)
BASOPHILS NFR BLD: 0 % (ref 0–1.9)
BILIRUB SERPL-MCNC: 0.8 MG/DL (ref 0.1–1)
BUN SERPL-MCNC: 45 MG/DL (ref 8–23)
CALCIUM SERPL-MCNC: 8 MG/DL (ref 8.7–10.5)
CHLORIDE SERPL-SCNC: 119 MMOL/L (ref 95–110)
CO2 SERPL-SCNC: 18 MMOL/L (ref 23–29)
CREAT SERPL-MCNC: 2.8 MG/DL (ref 0.5–1.4)
DIFFERENTIAL METHOD BLD: ABNORMAL
EOSINOPHIL # BLD AUTO: 0.1 K/UL (ref 0–0.5)
EOSINOPHIL NFR BLD: 3.3 % (ref 0–8)
ERYTHROCYTE [DISTWIDTH] IN BLOOD BY AUTOMATED COUNT: 12.9 % (ref 11.5–14.5)
EST. GFR  (NO RACE VARIABLE): 16.8 ML/MIN/1.73 M^2
GLUCOSE SERPL-MCNC: 144 MG/DL (ref 70–110)
HCT VFR BLD AUTO: 26.3 % (ref 37–48.5)
HGB BLD-MCNC: 8.8 G/DL (ref 12–16)
HIV 1+2 AB+HIV1 P24 AG SERPL QL IA: NORMAL
IMM GRANULOCYTES # BLD AUTO: 0.01 K/UL (ref 0–0.04)
IMM GRANULOCYTES NFR BLD AUTO: 0.3 % (ref 0–0.5)
INR PPP: 1.2 (ref 0.8–1.2)
LYMPHOCYTES # BLD AUTO: 0.7 K/UL (ref 1–4.8)
LYMPHOCYTES NFR BLD: 18.9 % (ref 18–48)
MCH RBC QN AUTO: 31 PG (ref 27–31)
MCHC RBC AUTO-ENTMCNC: 33.5 G/DL (ref 32–36)
MCV RBC AUTO: 93 FL (ref 82–98)
MONOCYTES # BLD AUTO: 0.3 K/UL (ref 0.3–1)
MONOCYTES NFR BLD: 8.7 % (ref 4–15)
NEUTROPHILS # BLD AUTO: 2.5 K/UL (ref 1.8–7.7)
NEUTROPHILS NFR BLD: 68.8 % (ref 38–73)
NRBC BLD-RTO: 0 /100 WBC
PLATELET # BLD AUTO: 81 K/UL (ref 150–450)
PMV BLD AUTO: 13 FL (ref 9.2–12.9)
POTASSIUM SERPL-SCNC: 3.2 MMOL/L (ref 3.5–5.1)
PROT SERPL-MCNC: 6.8 G/DL (ref 6–8.4)
PROTHROMBIN TIME: 13 SEC (ref 9–12.5)
RBC # BLD AUTO: 2.84 M/UL (ref 4–5.4)
SODIUM SERPL-SCNC: 142 MMOL/L (ref 136–145)
WBC # BLD AUTO: 3.66 K/UL (ref 3.9–12.7)

## 2024-12-27 PROCEDURE — P9047 ALBUMIN (HUMAN), 25%, 50ML: HCPCS | Mod: JZ,JG,HCNC

## 2024-12-27 PROCEDURE — 63600175 PHARM REV CODE 636 W HCPCS: Mod: JZ,JG,HCNC

## 2024-12-27 PROCEDURE — 87389 HIV-1 AG W/HIV-1&-2 AB AG IA: CPT | Mod: HCNC | Performed by: PHYSICIAN ASSISTANT

## 2024-12-27 PROCEDURE — 85610 PROTHROMBIN TIME: CPT | Mod: HCNC | Performed by: EMERGENCY MEDICINE

## 2024-12-27 PROCEDURE — 99284 EMERGENCY DEPT VISIT MOD MDM: CPT | Mod: 25

## 2024-12-27 PROCEDURE — 85025 COMPLETE CBC W/AUTO DIFF WBC: CPT | Mod: HCNC | Performed by: EMERGENCY MEDICINE

## 2024-12-27 PROCEDURE — 80053 COMPREHEN METABOLIC PANEL: CPT | Mod: HCNC | Performed by: EMERGENCY MEDICINE

## 2024-12-27 RX ORDER — ALBUMIN HUMAN 250 G/1000ML
SOLUTION INTRAVENOUS
Status: COMPLETED
Start: 2024-12-27 | End: 2024-12-27

## 2024-12-27 RX ADMIN — ALBUMIN (HUMAN): 12.5 SOLUTION INTRAVENOUS at 04:12

## 2024-12-27 NOTE — PROCEDURES
Radiology Post-Procedure Note    Pre Op Diagnosis: Ascites  Post Op Diagnosis: Same    Procedure: Ultrasound Guided Paracentesis    Procedure performed by: Shirin Segovia PA-C    Written Informed Consent Obtained: Yes  Specimen Removed: YES (yellow, clear)  Estimated Blood Loss: Minimal    Findings:   Successful paracentesis from the right.  Albumin administered PRN per protocol.    Patient tolerated procedure well.    Shirin Segovia PA-C  Interventional Radiology  [phone]

## 2024-12-27 NOTE — PLAN OF CARE
Procedure completed. Patient drained 4.6L.  Labs collected.  Patient tolerated well; VSS. RLQ Site CDI. Albumin given per Dr. Troy.  Patient to be transported back to [facility].

## 2024-12-27 NOTE — H&P
Inpatient Radiology Pre-procedure Note    History of Present Illness:  Seema Gann is a 79 y.o. female who presents for US guided paracentesis.     Admission H&P reviewed.  Past Medical History:   Diagnosis Date    Cirrhosis     HCC (hepatocellular carcinoma)     Hepatitis     Hypertension      Past Surgical History:   Procedure Laterality Date    APPENDECTOMY      HYSTERECTOMY         Review of Systems:   As documented in primary team H&P    Home Meds:   Prior to Admission medications    Medication Sig Start Date End Date Taking? Authorizing Provider   ergocalciferol (ERGOCALCIFEROL) 50,000 unit Cap Take 1 capsule (50,000 Units total) by mouth every 7 days. 9/28/24   Blaine Anderson MD   midodrine (PROAMATINE) 5 MG Tab Take 2 tablets (10 mg total) by mouth 3 (three) times daily. 9/16/24 12/15/24  Blaine Anderson MD   mirtazapine (REMERON) 7.5 MG Tab Take 1 tablet (7.5 mg total) by mouth every evening. 9/6/24 9/6/25  Bethany Mesa MD     Scheduled Meds:   Continuous Infusions:   PRN Meds:  Anticoagulants/Antiplatelets: no anticoagulation    Allergies: Review of patient's allergies indicates:  No Known Allergies  Sedation Hx: have not been any systemic reactions    Vitals:  Temp: 98.3 °F (36.8 °C) (12/27/24 1151)  Pulse: 73 (12/27/24 1437)  Resp: 18 (12/27/24 1437)  BP: 123/67 (12/27/24 1437)  SpO2: 97 % (12/27/24 1437)     Physical Exam:  ASA: 3  Mallampati: n/a    General: no acute distress  Mental Status: alert and oriented to person, place and time  HEENT: normocephalic, atraumatic  Chest: unlabored breathing  Heart: regular heart rate  Abdomen: distended  Extremity: moves all extremities    Plan: US guided paracentesis.   Sedation Plan: Neli Segovia PA-C  Interventional Radiology  [phone]

## 2024-12-27 NOTE — ED PROVIDER NOTES
Encounter Date: 12/27/2024       History     Chief Complaint   Patient presents with    Multiplecomplaints     Lwbs at [facility], needing paracentesis,       79-year-old female with a history of cirrhosis, HCC, hypertension, followed by hepatology, gets weekly to biweekly paracentesis, recently seen by her PCP 2 weeks ago during which time, she had discussed goals of care and patient wanting to take a more comfort focused approach, not wanting any aggressive measures so an outpatient referral to palliative Care was placed.  Last paracentesis was performed 15 days ago.  Patient would normally be due this week but she says because of the holidays, no appointment was scheduled.  She is here asking for a paracentesis.  She has no new complaints otherwise, denies any chest pain, shortness of breath, fever, severe abdominal pain.  She is scheduled to see hepatology early next week.    The history is provided by the patient.     Review of patient's allergies indicates:  No Known Allergies  Past Medical History:   Diagnosis Date    Cirrhosis     HCC (hepatocellular carcinoma)     Hepatitis     Hypertension      Past Surgical History:   Procedure Laterality Date    APPENDECTOMY      HYSTERECTOMY       No family history on file.  Social History     Tobacco Use    Smoking status: Never     Passive exposure: Never    Smokeless tobacco: Never   Substance Use Topics    Alcohol use: No    Drug use: No     Review of Systems    Physical Exam     Initial Vitals [12/27/24 1151]   BP Pulse Resp Temp SpO2   (!) 105/57 86 20 98.3 °F (36.8 °C) 100 %      MAP       --         Physical Exam    Nursing note and vitals reviewed.  Constitutional: Vital signs are normal. She appears well-developed and well-nourished. She is not diaphoretic.  Non-toxic appearance. She does not appear ill. No distress.   HENT:   Head: Normocephalic and atraumatic. Mouth/Throat: Mucous membranes are normal. Mucous membranes are not dry.   Eyes: Conjunctivae and  lids are normal.   Neck: Neck supple.   Normal range of motion.  Cardiovascular:  Normal rate.           Pulmonary/Chest: No respiratory distress.   Abdominal: Abdomen is soft. She exhibits distension. There is no abdominal tenderness.   Moderate ascites in abdomen.  Not tense. There is no rebound and no guarding.   Musculoskeletal:         General: Edema present. No tenderness.      Cervical back: Normal range of motion and neck supple.     Neurological: She is alert and oriented to person, place, and time. She has normal strength. No cranial nerve deficit or sensory deficit. GCS score is 15. GCS eye subscore is 4. GCS verbal subscore is 5. GCS motor subscore is 6.   Skin: Skin is dry and intact. No pallor.   Psychiatric: She has a normal mood and affect. Her speech is normal and behavior is normal.         ED Course   Procedures  Labs Reviewed   CBC W/ AUTO DIFFERENTIAL - Abnormal       Result Value    WBC 3.66 (*)     RBC 2.84 (*)     Hemoglobin 8.8 (*)     Hematocrit 26.3 (*)     MCV 93      MCH 31.0      MCHC 33.5      RDW 12.9      Platelets 81 (*)     MPV 13.0 (*)     Immature Granulocytes 0.3      Gran # (ANC) 2.5      Immature Grans (Abs) 0.01      Lymph # 0.7 (*)     Mono # 0.3      Eos # 0.1      Baso # 0.00      nRBC 0      Gran % 68.8      Lymph % 18.9      Mono % 8.7      Eosinophil % 3.3      Basophil % 0.0      Differential Method Automated     COMPREHENSIVE METABOLIC PANEL - Abnormal    Sodium 142      Potassium 3.2 (*)     Chloride 119 (*)     CO2 18 (*)     Glucose 144 (*)     BUN 45 (*)     Creatinine 2.8 (*)     Calcium 8.0 (*)     Total Protein 6.8      Albumin 2.0 (*)     Total Bilirubin 0.8      Alkaline Phosphatase 67      AST 24      ALT 11      eGFR 16.8 (*)     Anion Gap 5 (*)    PROTIME-INR - Abnormal    Prothrombin Time 13.0 (*)     INR 1.2     HIV 1 / 2 ANTIBODY    HIV 1/2 Ag/Ab Non-reactive      Narrative:     Release to patient->Immediate          Imaging Results              IR  Paracentesis with Imaging (Final result)  Result time 12/27/24 16:40:39      Final result by Tommy Lutz MD (12/27/24 16:40:39)                   Impression:      Ultrasound-guided paracentesis with drainage of 4600 mL of serous fluid.    _______________________________________________________________    Electronically signed by resident: Shirin Segovia  Date:    12/27/2024  Time:    16:05    Electronically signed by: Tommy Lutz  Date:    12/27/2024  Time:    16:40               Narrative:    EXAMINATION:  Ultrasound-guided paracentesis    Procedural Personnel    Attending physician(s): Tommy Lutz MD    Fellow physician(s): None    Resident physician(s): None    Advanced practice provider(s): Shirin Segovia PA-C    Pre-procedure diagnosis: Ascites    Post-procedure diagnosis: Same    Complications: No immediate complications.    CLINICAL HISTORY:  Recurrent Ascites    TECHNIQUE:  - Ultrasound-guided paracentesis    COMPARISON:  Paracentesis 12/12/2024    FINDINGS:  Pre-procedure    Consent: Informed consent for the procedure was obtained and time-out was performed prior to the procedure.    Preparation: The site was prepared and draped using maximal sterile barrier technique including cutaneous antisepsis.    Anesthesia/sedation    Level of anesthesia/sedation: No sedation    Anesthesia/sedation administered by: Not applicable    Total intra-service sedation time (minutes): 0    Limited abdominal ultrasound    Limited abdominal ultrasound was performed.    Moderate ascites. A safe window for paracentesis was identified.    Paracentesis    Local anesthesia was administered. The peritoneal cavity was accessed on the right lower quadrant and fluid return confirmed position. Ascites was drained.    Paracentesis access technique: Real-time ultrasound guidance    Catheter placed: 5F One Step    Closure    The catheter was removed. A sterile bandage was applied.    Post-drainage ultrasound: Not  performed    Additional Details    Additional description of procedure: None    Equipment details: None    Specimens removed: Abdominal fluid    Estimated blood loss (mL): Less than 10    Standardized report: SIR_Paracentesis_v2    Attestation    Signer name: Tommy Lutz MD    I attest that I reviewed the stored images and agree with the report as written.                        Preliminary result by Shirin Segovia PA-C (12/27/24 16:07:11)                   Impression:      Ultrasound-guided paracentesis with drainage of 4600 mL of serous fluid.    _______________________________________________________________    Electronically signed by resident: Shirin Segovia  Date:    12/27/2024  Time:    16:05                 Narrative:    EXAMINATION:  Ultrasound-guided paracentesis    Procedural Personnel    Attending physician(s): Tommy Lutz MD    Fellow physician(s): None    Resident physician(s): None    Advanced practice provider(s): Shirin Segovia PA-C    Pre-procedure diagnosis: Ascites    Post-procedure diagnosis: Same    Complications: No immediate complications.    CLINICAL HISTORY:  Recurrent Ascites    TECHNIQUE:  - Ultrasound-guided paracentesis    COMPARISON:  Paracentesis 12/12/2024    FINDINGS:  Pre-procedure    Consent: Informed consent for the procedure was obtained and time-out was performed prior to the procedure.    Preparation: The site was prepared and draped using maximal sterile barrier technique including cutaneous antisepsis.    Anesthesia/sedation    Level of anesthesia/sedation: No sedation    Anesthesia/sedation administered by: Not applicable    Total intra-service sedation time (minutes): 0    Limited abdominal ultrasound    Limited abdominal ultrasound was performed.    Moderate ascites. A safe window for paracentesis was identified.    Paracentesis    Local anesthesia was administered. The peritoneal cavity was accessed on the right lower quadrant and fluid return confirmed position.  Ascites was drained.    Paracentesis access technique: Real-time ultrasound guidance    Catheter placed: 5F One Step    Closure    The catheter was removed. A sterile bandage was applied.    Post-drainage ultrasound: Not performed    Additional Details    Additional description of procedure: None    Equipment details: None    Specimens removed: Abdominal fluid    Estimated blood loss (mL): Less than 10    Standardized report: SIR_Paracentesis_v2    Attestation    Signer name: Tommy Lutz MD    I attest that I reviewed the stored images and agree with the report as written.                                       Medications   albumin human 25% 25 % bottle (  New Bag 12/27/24 1630)     Medical Decision Making  Decompensated cirrhosis, chronic, with scheduled biweekly paracentesis, now requesting a therapeutic paracentesis given that patient was unable to have hers this week because of the holidays.  She has no other medical complaints today.    Given that patient's goals are largely comfort focused.  It would make sense for her to have a PleurX catheter drained to spare her the burden of having to come back and forth to the hospital for these procedures.    She has an appointment with hepatology next week and hopefully they can address this.  In the meantime, I have discussed the case with the interventional radiology resident and they will try to get patient on the schedule for today.    Amount and/or Complexity of Data Reviewed  Labs: ordered.               ED Course as of 12/27/24 1710   Fri Dec 27, 2024   1634 Patient returned from IR.  They were able to drain 4-1/2 L. They also administered her albumin.  Her vital signs are stable and her labs are abnormal but at her baseline.  She and her relative are comfortable with the plan for discharge home.  She has close follow up with hepatology next week. [AS]      ED Course User Index  [AS] Sandee Troy MD                           Clinical Impression:  Final  diagnoses:  [K74.60, R18.8] Cirrhosis of liver with ascites, unspecified hepatic cirrhosis type (Primary)          ED Disposition Condition    Discharge Stable          ED Prescriptions    None       Follow-up Information       Follow up With Specialties Details Why Contact Info    [facility] Emergency Medicine  If symptoms worsen [address]  [facility] [address]  [phone]             Sandee Troy MD  12/27/24 [id number]

## 2024-12-27 NOTE — ED NOTES
79 year old female presents to the ED via POV for complaints of abdominal distension. States she receives paracenteses every 2-3 weeks. States her last para was 3 weeks ago. Endorses bilateral lower extremity swelling. Denies all other complaints.       LOC: The patient is awake, alert and aware of environment with an appropriate affect, the patient is oriented x 4 and speaking appropriately.   APPEARANCE: Patient appears comfortable and in no acute distress, patient is clean and well groomed.  SKIN: The skin is warm and dry, color consistent with ethnicity.   MUSCULOSKELETAL: Patient moving all extremities spontaneously +swelling to bilateral lower extremities  RESPIRATORY: Airway is open and patent, respirations are spontaneous, patient has a normal effort and rate, no accessory muscle use noted.  CARDIAC: Patient has a normal rate and regular rhythm, no edema noted, capillary refill < 3 seconds.   GASTRO: Soft and non tender to palpation +abdominal distention noted  : Pt denies any pain or frequency with urination.  NEURO: Pt opens eyes spontaneously, behavior appropriate to situation, follows commands.

## 2024-12-27 NOTE — SEDATION DOCUMENTATION
Pt arrived to U [facility] for paracentesis.  Name verified using two identifiers.  Allergies verified. .  Will continue to monitor.

## 2024-12-27 NOTE — TELEPHONE ENCOUNTER
"----- Message from Paulino sent at 12/27/2024  8:13 AM CST -----  Regarding: call back  Consult/Advisory:        Name Of Caller: daughter Bri     Contact Preference?:[phone]     What is the nature of the call?: Calling to speak w/  someone in regards to her mother needing a  PARACENTESIS today requesting call back         Additional Notes:  "Thank you for all that you do for our patients"  "

## 2024-12-31 ENCOUNTER — OFFICE VISIT (OUTPATIENT)
Dept: HEPATOLOGY | Facility: CLINIC | Age: 79
End: 2024-12-31
Payer: MEDICARE

## 2024-12-31 ENCOUNTER — TELEPHONE (OUTPATIENT)
Dept: HEPATOLOGY | Facility: CLINIC | Age: 79
End: 2024-12-31

## 2024-12-31 ENCOUNTER — TELEPHONE (OUTPATIENT)
Dept: INTERVENTIONAL RADIOLOGY/VASCULAR | Facility: CLINIC | Age: 79
End: 2024-12-31
Payer: MEDICARE

## 2024-12-31 VITALS
BODY MASS INDEX: 18.97 KG/M2 | OXYGEN SATURATION: 98 % | HEIGHT: 64 IN | HEART RATE: 75 BPM | TEMPERATURE: 99 F | RESPIRATION RATE: 18 BRPM | DIASTOLIC BLOOD PRESSURE: 78 MMHG | WEIGHT: 111.13 LBS | SYSTOLIC BLOOD PRESSURE: 99 MMHG

## 2024-12-31 DIAGNOSIS — K74.60 CHRONIC HEPATITIS C WITH CIRRHOSIS: Chronic | ICD-10-CM

## 2024-12-31 DIAGNOSIS — R18.8 OTHER ASCITES: Primary | ICD-10-CM

## 2024-12-31 DIAGNOSIS — C22.0 HCC (HEPATOCELLULAR CARCINOMA): Chronic | ICD-10-CM

## 2024-12-31 DIAGNOSIS — B18.2 CHRONIC HEPATITIS C WITH CIRRHOSIS: Chronic | ICD-10-CM

## 2024-12-31 PROCEDURE — 99999 PR PBB SHADOW E&M-EST. PATIENT-LVL III: CPT | Mod: PBBFAC,HCNC,, | Performed by: INTERNAL MEDICINE

## 2024-12-31 PROCEDURE — 3288F FALL RISK ASSESSMENT DOCD: CPT | Mod: HCNC,CPTII,S$GLB, | Performed by: INTERNAL MEDICINE

## 2024-12-31 PROCEDURE — 3078F DIAST BP <80 MM HG: CPT | Mod: HCNC,CPTII,S$GLB, | Performed by: INTERNAL MEDICINE

## 2024-12-31 PROCEDURE — 99215 OFFICE O/P EST HI 40 MIN: CPT | Mod: HCNC,S$GLB,, | Performed by: INTERNAL MEDICINE

## 2024-12-31 PROCEDURE — G2211 COMPLEX E/M VISIT ADD ON: HCPCS | Mod: HCNC,S$GLB,, | Performed by: INTERNAL MEDICINE

## 2024-12-31 PROCEDURE — 1101F PT FALLS ASSESS-DOCD LE1/YR: CPT | Mod: HCNC,CPTII,S$GLB, | Performed by: INTERNAL MEDICINE

## 2024-12-31 PROCEDURE — 1126F AMNT PAIN NOTED NONE PRSNT: CPT | Mod: HCNC,CPTII,S$GLB, | Performed by: INTERNAL MEDICINE

## 2024-12-31 PROCEDURE — 1159F MED LIST DOCD IN RCRD: CPT | Mod: HCNC,CPTII,S$GLB, | Performed by: INTERNAL MEDICINE

## 2024-12-31 PROCEDURE — 3074F SYST BP LT 130 MM HG: CPT | Mod: HCNC,CPTII,S$GLB, | Performed by: INTERNAL MEDICINE

## 2024-12-31 NOTE — TELEPHONE ENCOUNTER
Patient seen in clinic today with daughter Milla and other family member.    New Orders to follow.  The Patient will need Weekly Paracentesis at the [place] location.    Will send message to IR Schedulers to reach out to daughterMilla to schedule 1st.

## 2024-12-31 NOTE — PROGRESS NOTES
Subjective:       Patient ID: Seema Gann is a 79 y.o. female.    Chief Complaint: No chief complaint on file.      HPI  I saw this 79 y.o. lady in the liver clinic where she came with her daughter Bri.    She has been treated for her HCC with Y90 x2 and her HCC is under control. Unfortunately she has developed worsening ascites which now requires weekly large volume paracentesis.    Because of scheduling issues, she has intermittently attended the ED for paracenteses.    HCV RNA neg in Jan 2016    - G1 HCV cirrhosis with SVR  - admitted to [facility] on 11/28/22 with chest pain and was found to have a large mass on US.    CT abdo: 3/2/2023  1. Cirrhotic hepatic morphology with 7.0 x 8.5 x 8.2-cm (previously 7.0 x 7.9 x 7.3-cm) homogeneous partially exophytic right hepatic lobe mass. Further evaluation is limited given the lack of IV contrast.    Liver biopsy on 5/30/2023  Hepatocellular carcinoma, moderately differentiated     AFP on 4/26/23= 460  AFP on 1/23/24= 3.6  AFP on 5/6/24= 2.1  AFP<2 on 8/30/24    Y90 on 6/27/2023  Y90 on 10/12/2023    MRI abdo: 5/6/24  Post treatment changes of the right hepatic lobe with no convincing evidence of local recurrent disease.  Probable infarction of the posterior right hepatic lobe.  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  Continued follow-up is recommended.  Large volume of ascites, increased compared to prior exam    MELD 3.0: 20 at 12/27/2024 12:25 PM  MELD-Na: 18 at 12/27/2024 12:25 PM  Calculated from:  Serum Creatinine: 2.8 mg/dL at 12/27/2024 12:25 PM  Serum Sodium: 142 mmol/L (Using max of 137 mmol/L) at 12/27/2024 12:25 PM  Total Bilirubin: 0.8 mg/dL (Using min of 1 mg/dL) at 12/27/2024 12:25 PM  Serum Albumin: 2 g/dL at 12/27/2024 12:25 PM  INR(ratio): 1.2 at 12/27/2024 12:25 PM  Age at listing  (hypothetical): 79 years  Sex: Female at 12/27/2024 12:25 PM        PMH:  Hypertension  HCV cirrhosis    Review of Systems   Constitutional:  Negative for activity change, appetite change, chills, fatigue, fever and unexpected weight change.   HENT:  Negative for ear pain, hearing loss, nosebleeds, sore throat and trouble swallowing.    Eyes:  Negative for redness and visual disturbance.   Respiratory:  Negative for cough, chest tightness, shortness of breath and wheezing.    Cardiovascular:  Negative for chest pain and palpitations.   Gastrointestinal:  Negative for abdominal distention, abdominal pain, blood in stool, constipation, diarrhea, nausea and vomiting.   Genitourinary:  Negative for difficulty urinating, dysuria, frequency, hematuria and urgency.   Musculoskeletal:  Negative for arthralgias, back pain, gait problem, joint swelling and myalgias.   Skin:  Negative for rash.   Neurological:  Negative for tremors, seizures, speech difficulty, weakness and headaches.   Hematological:  Negative for adenopathy.   Psychiatric/Behavioral:  Negative for confusion, decreased concentration and sleep disturbance. The patient is not nervous/anxious.            Lab Results   Component Value Date    ALT 11 12/27/2024    AST 24 12/27/2024    GGT 45 01/16/2015    ALKPHOS 67 12/27/2024    BILITOT 0.8 12/27/2024     Past Medical History:   Diagnosis Date    Cirrhosis     HCC (hepatocellular carcinoma)     Hepatitis     Hypertension      Past Surgical History:   Procedure Laterality Date    APPENDECTOMY      HYSTERECTOMY       Current Outpatient Medications   Medication Sig    ergocalciferol (ERGOCALCIFEROL) 50,000 unit Cap Take 1 capsule (50,000 Units total) by mouth every 7 days.    mirtazapine (REMERON) 7.5 MG Tab Take 1 tablet (7.5 mg total) by mouth every evening.    midodrine (PROAMATINE) 5 MG Tab Take 2 tablets (10 mg total) by mouth 3 (three) times daily.     No current facility-administered medications for this  visit.       Objective:      Physical Exam  Constitutional:       General: She is not in acute distress.  HENT:      Head: Normocephalic.   Eyes:      Pupils: Pupils are equal, round, and reactive to light.   Neck:      Thyroid: No thyromegaly.      Vascular: No JVD.      Trachea: No tracheal deviation.   Cardiovascular:      Rate and Rhythm: Normal rate and regular rhythm.      Heart sounds: Normal heart sounds. No murmur heard.  Pulmonary:      Effort: Pulmonary effort is normal.      Breath sounds: Normal breath sounds. No stridor.   Abdominal:      General: There is distension.      Palpations: Abdomen is soft.   Musculoskeletal:      Right lower leg: Edema present.      Left lower leg: Edema present.   Lymphadenopathy:      Head:      Right side of head: No submental, submandibular, tonsillar, preauricular, posterior auricular or occipital adenopathy.      Left side of head: No submental, submandibular, tonsillar, preauricular, posterior auricular or occipital adenopathy.      Cervical: No cervical adenopathy.   Neurological:      Mental Status: She is alert. She is not disoriented.      Cranial Nerves: No cranial nerve deficit.      Sensory: No sensory deficit.         Assessment:       1. Other ascites    2. HCC (hepatocellular carcinoma)    3. Chronic hepatitis C with cirrhosis            Plan:   She has an excellent functional status and has tolerated Y90 well. She feels relatively well but her ascites has not been helped by diuretics which are now stopped- she requires weekly paracentesis.    - we discussed the lack of definitive treatment measures for her liver disease and she agrees to see Palliative Care - referral already placed by her PCP.    - also discussed the option of a permanent peritoneal drainage catheter. She is agreeable to this but in view of the fact that she is still relatively well, we elected to contiue with regular outpatient paracentesis.    Her leg edema is mild.  1) Weekly ny  2)  clinic in April 2025.

## 2025-01-01 ENCOUNTER — RESULTS FOLLOW-UP (OUTPATIENT)
Dept: FAMILY MEDICINE | Facility: CLINIC | Age: 80
End: 2025-01-01

## 2025-01-01 ENCOUNTER — HOSPITAL ENCOUNTER (INPATIENT)
Facility: HOSPITAL | Age: 80
LOS: 11 days | Discharge: HOSPICE | DRG: 432 | End: 2025-04-16
Attending: EMERGENCY MEDICINE | Admitting: EMERGENCY MEDICINE
Payer: MEDICARE

## 2025-01-01 VITALS
BODY MASS INDEX: 20.32 KG/M2 | TEMPERATURE: 97 F | WEIGHT: 119.06 LBS | HEART RATE: 70 BPM | SYSTOLIC BLOOD PRESSURE: 115 MMHG | DIASTOLIC BLOOD PRESSURE: 67 MMHG | HEIGHT: 64 IN | OXYGEN SATURATION: 99 % | RESPIRATION RATE: 17 BRPM

## 2025-01-01 DIAGNOSIS — R19.7 DIARRHEA, UNSPECIFIED TYPE: ICD-10-CM

## 2025-01-01 DIAGNOSIS — R19.7 NAUSEA VOMITING AND DIARRHEA: ICD-10-CM

## 2025-01-01 DIAGNOSIS — R11.10 VOMITING, UNSPECIFIED VOMITING TYPE, UNSPECIFIED WHETHER NAUSEA PRESENT: ICD-10-CM

## 2025-01-01 DIAGNOSIS — K72.90 DECOMPENSATED HEPATIC CIRRHOSIS: ICD-10-CM

## 2025-01-01 DIAGNOSIS — R53.81 DEBILITY: ICD-10-CM

## 2025-01-01 DIAGNOSIS — K74.60 DECOMPENSATED HEPATIC CIRRHOSIS: ICD-10-CM

## 2025-01-01 DIAGNOSIS — K74.69 DECOMPENSATED CIRRHOSIS RELATED TO HEPATITIS C VIRUS (HCV): ICD-10-CM

## 2025-01-01 DIAGNOSIS — N17.9 AKI (ACUTE KIDNEY INJURY): Primary | ICD-10-CM

## 2025-01-01 DIAGNOSIS — R11.2 NAUSEA VOMITING AND DIARRHEA: ICD-10-CM

## 2025-01-01 DIAGNOSIS — Z13.6 SCREENING FOR CARDIOVASCULAR CONDITION: ICD-10-CM

## 2025-01-01 DIAGNOSIS — B19.20 DECOMPENSATED CIRRHOSIS RELATED TO HEPATITIS C VIRUS (HCV): ICD-10-CM

## 2025-01-01 DIAGNOSIS — R07.9 CHEST PAIN: ICD-10-CM

## 2025-01-01 LAB
ABO + RH BLD: NORMAL
ABORH RETYPE: NORMAL
ABSOLUTE EOSINOPHIL (OHS): 0.03 K/UL
ABSOLUTE EOSINOPHIL (OHS): 0.03 K/UL
ABSOLUTE EOSINOPHIL (OHS): 0.09 K/UL
ABSOLUTE EOSINOPHIL (OHS): 0.09 K/UL
ABSOLUTE EOSINOPHIL (OHS): 0.1 K/UL
ABSOLUTE EOSINOPHIL (OHS): 0.18 K/UL
ABSOLUTE EOSINOPHIL (OHS): 0.22 K/UL
ABSOLUTE EOSINOPHIL (OHS): 0.22 K/UL
ABSOLUTE EOSINOPHIL (OHS): 0.23 K/UL
ABSOLUTE EOSINOPHIL (OHS): 0.24 K/UL
ABSOLUTE EOSINOPHIL (OHS): 0.26 K/UL
ABSOLUTE EOSINOPHIL (OHS): 0.29 K/UL
ABSOLUTE MONOCYTE (OHS): 0.32 K/UL (ref 0.3–1)
ABSOLUTE MONOCYTE (OHS): 0.32 K/UL (ref 0.3–1)
ABSOLUTE MONOCYTE (OHS): 0.34 K/UL (ref 0.3–1)
ABSOLUTE MONOCYTE (OHS): 0.36 K/UL (ref 0.3–1)
ABSOLUTE MONOCYTE (OHS): 0.4 K/UL (ref 0.3–1)
ABSOLUTE MONOCYTE (OHS): 0.47 K/UL (ref 0.3–1)
ABSOLUTE MONOCYTE (OHS): 0.5 K/UL (ref 0.3–1)
ABSOLUTE MONOCYTE (OHS): 0.51 K/UL (ref 0.3–1)
ABSOLUTE MONOCYTE (OHS): 0.55 K/UL (ref 0.3–1)
ABSOLUTE MONOCYTE (OHS): 0.59 K/UL (ref 0.3–1)
ABSOLUTE MONOCYTE (OHS): 0.61 K/UL (ref 0.3–1)
ABSOLUTE MONOCYTE (OHS): 0.71 K/UL (ref 0.3–1)
ABSOLUTE NEUTROPHIL COUNT (OHS): 2.79 K/UL (ref 1.8–7.7)
ABSOLUTE NEUTROPHIL COUNT (OHS): 3.03 K/UL (ref 1.8–7.7)
ABSOLUTE NEUTROPHIL COUNT (OHS): 3.25 K/UL (ref 1.8–7.7)
ABSOLUTE NEUTROPHIL COUNT (OHS): 3.51 K/UL (ref 1.8–7.7)
ABSOLUTE NEUTROPHIL COUNT (OHS): 3.72 K/UL (ref 1.8–7.7)
ABSOLUTE NEUTROPHIL COUNT (OHS): 4.28 K/UL (ref 1.8–7.7)
ABSOLUTE NEUTROPHIL COUNT (OHS): 4.7 K/UL (ref 1.8–7.7)
ABSOLUTE NEUTROPHIL COUNT (OHS): 4.72 K/UL (ref 1.8–7.7)
ABSOLUTE NEUTROPHIL COUNT (OHS): 5.08 K/UL (ref 1.8–7.7)
ABSOLUTE NEUTROPHIL COUNT (OHS): 5.28 K/UL (ref 1.8–7.7)
ABSOLUTE NEUTROPHIL COUNT (OHS): 6.15 K/UL (ref 1.8–7.7)
ABSOLUTE NEUTROPHIL COUNT (OHS): 6.71 K/UL (ref 1.8–7.7)
AFP SERPL-MCNC: <2 NG/ML
ALBUMIN FLD-MCNC: 0.5 G/DL
ALBUMIN SERPL BCP-MCNC: 1.7 G/DL (ref 3.5–5.2)
ALBUMIN SERPL BCP-MCNC: 1.7 G/DL (ref 3.5–5.2)
ALBUMIN SERPL BCP-MCNC: 1.8 G/DL (ref 3.5–5.2)
ALBUMIN SERPL BCP-MCNC: 1.9 G/DL (ref 3.5–5.2)
ALBUMIN SERPL BCP-MCNC: 1.9 G/DL (ref 3.5–5.2)
ALBUMIN SERPL BCP-MCNC: 2 G/DL (ref 3.5–5.2)
ALBUMIN SERPL BCP-MCNC: 2 G/DL (ref 3.5–5.2)
ALBUMIN SERPL BCP-MCNC: 2.4 G/DL (ref 3.5–5.2)
ALBUMIN SERPL BCP-MCNC: 2.5 G/DL (ref 3.5–5.2)
ALP SERPL-CCNC: 29 UNIT/L (ref 40–150)
ALP SERPL-CCNC: 30 UNIT/L (ref 40–150)
ALP SERPL-CCNC: 31 UNIT/L (ref 40–150)
ALP SERPL-CCNC: 32 UNIT/L (ref 40–150)
ALP SERPL-CCNC: 35 UNIT/L (ref 40–150)
ALP SERPL-CCNC: 39 UNIT/L (ref 40–150)
ALP SERPL-CCNC: 45 UNIT/L (ref 40–150)
ALP SERPL-CCNC: 56 UNIT/L (ref 40–150)
ALP SERPL-CCNC: 63 UNIT/L (ref 40–150)
ALT SERPL W/O P-5'-P-CCNC: 10 UNIT/L (ref 10–44)
ALT SERPL W/O P-5'-P-CCNC: 11 UNIT/L (ref 10–44)
ALT SERPL W/O P-5'-P-CCNC: 13 UNIT/L (ref 10–44)
ALT SERPL W/O P-5'-P-CCNC: 13 UNIT/L (ref 10–44)
ALT SERPL W/O P-5'-P-CCNC: 6 UNIT/L (ref 10–44)
ALT SERPL W/O P-5'-P-CCNC: 7 UNIT/L (ref 10–44)
ALT SERPL W/O P-5'-P-CCNC: 7 UNIT/L (ref 10–44)
AMMONIA PLAS-SCNC: 49 UMOL/L (ref 10–50)
ANION GAP (OHS): 10 MMOL/L (ref 8–16)
ANION GAP (OHS): 11 MMOL/L (ref 8–16)
ANION GAP (OHS): 4 MMOL/L (ref 8–16)
ANION GAP (OHS): 5 MMOL/L (ref 8–16)
ANION GAP (OHS): 6 MMOL/L (ref 8–16)
ANION GAP (OHS): 6 MMOL/L (ref 8–16)
ANION GAP (OHS): 7 MMOL/L (ref 8–16)
ANION GAP (OHS): 8 MMOL/L (ref 8–16)
ANION GAP (OHS): 9 MMOL/L (ref 8–16)
APPEARANCE FLD: NORMAL
APTT PPP: 23.9 SECONDS (ref 21–32)
ASCENDING AORTA: 2.55 CM
AST SERPL-CCNC: 14 UNIT/L (ref 11–45)
AST SERPL-CCNC: 15 UNIT/L (ref 11–45)
AST SERPL-CCNC: 17 UNIT/L (ref 11–45)
AST SERPL-CCNC: 19 UNIT/L (ref 11–45)
AST SERPL-CCNC: 21 UNIT/L (ref 11–45)
AST SERPL-CCNC: 23 UNIT/L (ref 11–45)
AST SERPL-CCNC: 25 UNIT/L (ref 11–45)
AST SERPL-CCNC: 26 UNIT/L (ref 11–45)
AST SERPL-CCNC: 33 UNIT/L (ref 11–45)
AST SERPL-CCNC: 33 UNIT/L (ref 11–45)
AST SERPL-CCNC: 34 UNIT/L (ref 11–45)
AV AREA BY CONTINUOUS VTI: 2.1 CM2
AV INDEX (PROSTH): 0.82
AV LVOT MEAN GRADIENT: 4 MMHG
AV LVOT PEAK GRADIENT: 7 MMHG
AV MEAN GRADIENT: 5 MMHG
AV PEAK GRADIENT: 10 MMHG
AV VALVE AREA BY VELOCITY RATIO: 2.2 CM²
AV VALVE AREA: 2.1 CM2
AV VELOCITY RATIO: 0.88
BACTERIA BLD CULT: NORMAL
BACTERIA BLD CULT: NORMAL
BACTERIA SPEC AEROBE CULT: NO GROWTH
BACTERIA SPEC ANAEROBE CULT: NO GROWTH
BASOPHILS # BLD AUTO: 0.01 K/UL
BASOPHILS # BLD AUTO: 0.02 K/UL
BASOPHILS # BLD AUTO: 0.03 K/UL
BASOPHILS # BLD AUTO: 0.04 K/UL
BASOPHILS # BLD AUTO: 0.04 K/UL
BASOPHILS NFR BLD AUTO: 0.3 %
BASOPHILS NFR BLD AUTO: 0.4 %
BASOPHILS NFR BLD AUTO: 0.5 %
BASOPHILS NFR BLD AUTO: 0.6 %
BASOPHILS NFR BLD AUTO: 0.6 %
BILIRUB DIRECT SERPL-MCNC: 0.3 MG/DL (ref 0.1–0.3)
BILIRUB SERPL-MCNC: 0.7 MG/DL (ref 0.1–1)
BILIRUB SERPL-MCNC: 0.8 MG/DL (ref 0.1–1)
BILIRUB SERPL-MCNC: 0.9 MG/DL (ref 0.1–1)
BILIRUB SERPL-MCNC: 0.9 MG/DL (ref 0.1–1)
BILIRUB SERPL-MCNC: 1 MG/DL (ref 0.1–1)
BILIRUB SERPL-MCNC: 1.1 MG/DL (ref 0.1–1)
BILIRUB SERPL-MCNC: 1.2 MG/DL (ref 0.1–1)
BILIRUB SERPL-MCNC: 1.4 MG/DL (ref 0.1–1)
BILIRUB SERPL-MCNC: 1.4 MG/DL (ref 0.1–1)
BILIRUB UR QL STRIP.AUTO: NEGATIVE
BIPAP: 0
BIPAP: 0
BLD PROD TYP BPU: NORMAL
BLOOD UNIT EXPIRATION DATE: NORMAL
BLOOD UNIT TYPE CODE: 9500
BNP SERPL-MCNC: 251 PG/ML (ref 0–99)
BSA FOR ECHO PROCEDURE: 1.55 M2
BUN SERPL-MCNC: 38 MG/DL (ref 8–23)
BUN SERPL-MCNC: 40 MG/DL (ref 8–23)
BUN SERPL-MCNC: 41 MG/DL (ref 8–23)
BUN SERPL-MCNC: 42 MG/DL (ref 8–23)
BUN SERPL-MCNC: 43 MG/DL (ref 8–23)
BUN SERPL-MCNC: 44 MG/DL (ref 8–23)
BUN SERPL-MCNC: 47 MG/DL (ref 8–23)
BUN SERPL-MCNC: 49 MG/DL (ref 8–23)
BUN SERPL-MCNC: 50 MG/DL (ref 8–23)
BUN SERPL-MCNC: 50 MG/DL (ref 8–23)
BUN SERPL-MCNC: 51 MG/DL (ref 8–23)
C DIFF GDH STL QL: NEGATIVE
C DIFF TOX A+B STL QL IA: NEGATIVE
CALCIUM SERPL-MCNC: 7.3 MG/DL (ref 8.7–10.5)
CALCIUM SERPL-MCNC: 7.5 MG/DL (ref 8.7–10.5)
CALCIUM SERPL-MCNC: 7.5 MG/DL (ref 8.7–10.5)
CALCIUM SERPL-MCNC: 7.6 MG/DL (ref 8.7–10.5)
CALCIUM SERPL-MCNC: 7.7 MG/DL (ref 8.7–10.5)
CALCIUM SERPL-MCNC: 7.8 MG/DL (ref 8.7–10.5)
CALCIUM SERPL-MCNC: 7.9 MG/DL (ref 8.7–10.5)
CALCIUM SERPL-MCNC: 8.2 MG/DL (ref 8.7–10.5)
CALCIUM SERPL-MCNC: 8.5 MG/DL (ref 8.7–10.5)
CHLORIDE SERPL-SCNC: 109 MMOL/L (ref 95–110)
CHLORIDE SERPL-SCNC: 111 MMOL/L (ref 95–110)
CHLORIDE SERPL-SCNC: 111 MMOL/L (ref 95–110)
CHLORIDE SERPL-SCNC: 113 MMOL/L (ref 95–110)
CHLORIDE SERPL-SCNC: 113 MMOL/L (ref 95–110)
CHLORIDE SERPL-SCNC: 115 MMOL/L (ref 95–110)
CHLORIDE SERPL-SCNC: 116 MMOL/L (ref 95–110)
CHLORIDE SERPL-SCNC: 118 MMOL/L (ref 95–110)
CHLORIDE SERPL-SCNC: 118 MMOL/L (ref 95–110)
CHLORIDE SERPL-SCNC: 119 MMOL/L (ref 95–110)
CHLORIDE SERPL-SCNC: 120 MMOL/L (ref 95–110)
CHLORIDE UR-SCNC: <20 MMOL/L (ref 25–200)
CLARITY UR: CLEAR
CO2 SERPL-SCNC: 14 MMOL/L (ref 23–29)
CO2 SERPL-SCNC: 15 MMOL/L (ref 23–29)
CO2 SERPL-SCNC: 16 MMOL/L (ref 23–29)
CO2 SERPL-SCNC: 17 MMOL/L (ref 23–29)
CO2 SERPL-SCNC: 17 MMOL/L (ref 23–29)
COLOR FLD: YELLOW
COLOR UR AUTO: YELLOW
CORRECTED TEMPERATURE (PCO2): 34.1 MMHG
CORRECTED TEMPERATURE (PH): 7.28
CORRECTED TEMPERATURE (PO2): 25.1 MMHG
CREAT SERPL-MCNC: 3.2 MG/DL (ref 0.5–1.4)
CREAT SERPL-MCNC: 3.3 MG/DL (ref 0.5–1.4)
CREAT SERPL-MCNC: 3.6 MG/DL (ref 0.5–1.4)
CREAT SERPL-MCNC: 3.9 MG/DL (ref 0.5–1.4)
CREAT SERPL-MCNC: 3.9 MG/DL (ref 0.5–1.4)
CREAT SERPL-MCNC: 4 MG/DL (ref 0.5–1.4)
CREAT SERPL-MCNC: 4.4 MG/DL (ref 0.5–1.4)
CREAT SERPL-MCNC: 4.4 MG/DL (ref 0.5–1.4)
CREAT SERPL-MCNC: 4.6 MG/DL (ref 0.5–1.4)
CREAT SERPL-MCNC: 4.7 MG/DL (ref 0.5–1.4)
CREAT SERPL-MCNC: 4.7 MG/DL (ref 0.5–1.4)
CREAT UR-MCNC: 392 MG/DL (ref 15–325)
CREAT UR-MCNC: 395 MG/DL (ref 15–325)
CROSSMATCH INTERPRETATION: NORMAL
CV ECHO LV RWT: 0.47 CM
DISPENSE STATUS: NORMAL
DOP CALC AO PEAK VEL: 1.6 M/S
DOP CALC AO VTI: 40.8 CM
DOP CALC LVOT AREA: 2.5 CM2
DOP CALC LVOT DIAMETER: 1.8 CM
DOP CALC LVOT PEAK VEL: 1.4 M/S
DOP CALC LVOT STROKE VOLUME: 84.9 CM3
DOP CALCLVOT PEAK VEL VTI: 33.4 CM
E WAVE DECELERATION TIME: 243 MS
E/A RATIO: 1.62
E/E' RATIO: 13 M/S
ECHO EF ESTIMATED: 79 %
ECHO LV POSTERIOR WALL: 0.9 CM (ref 0.6–1.1)
ERYTHROCYTE [DISTWIDTH] IN BLOOD BY AUTOMATED COUNT: 15.2 % (ref 11.5–14.5)
ERYTHROCYTE [DISTWIDTH] IN BLOOD BY AUTOMATED COUNT: 15.2 % (ref 11.5–14.5)
ERYTHROCYTE [DISTWIDTH] IN BLOOD BY AUTOMATED COUNT: 15.3 % (ref 11.5–14.5)
ERYTHROCYTE [DISTWIDTH] IN BLOOD BY AUTOMATED COUNT: 15.4 % (ref 11.5–14.5)
ERYTHROCYTE [DISTWIDTH] IN BLOOD BY AUTOMATED COUNT: 15.4 % (ref 11.5–14.5)
ERYTHROCYTE [DISTWIDTH] IN BLOOD BY AUTOMATED COUNT: 15.6 % (ref 11.5–14.5)
ERYTHROCYTE [DISTWIDTH] IN BLOOD BY AUTOMATED COUNT: 15.7 % (ref 11.5–14.5)
ERYTHROCYTE [DISTWIDTH] IN BLOOD BY AUTOMATED COUNT: 16.6 % (ref 11.5–14.5)
ERYTHROCYTE [DISTWIDTH] IN BLOOD BY AUTOMATED COUNT: 17.4 % (ref 11.5–14.5)
ERYTHROCYTE [DISTWIDTH] IN BLOOD BY AUTOMATED COUNT: 17.8 % (ref 11.5–14.5)
ESTROGEN SERPL-MCNC: NORMAL PG/ML
FIO2: 21 %
FIO2: 21 %
FRACTIONAL SHORTENING: 47.4 % (ref 28–44)
GFR SERPLBLD CREATININE-BSD FMLA CKD-EPI: 10 ML/MIN/1.73/M2
GFR SERPLBLD CREATININE-BSD FMLA CKD-EPI: 10 ML/MIN/1.73/M2
GFR SERPLBLD CREATININE-BSD FMLA CKD-EPI: 11 ML/MIN/1.73/M2
GFR SERPLBLD CREATININE-BSD FMLA CKD-EPI: 12 ML/MIN/1.73/M2
GFR SERPLBLD CREATININE-BSD FMLA CKD-EPI: 14 ML/MIN/1.73/M2
GFR SERPLBLD CREATININE-BSD FMLA CKD-EPI: 14 ML/MIN/1.73/M2
GFR SERPLBLD CREATININE-BSD FMLA CKD-EPI: 9 ML/MIN/1.73/M2
GLUCOSE SERPL-MCNC: 112 MG/DL (ref 70–110)
GLUCOSE SERPL-MCNC: 116 MG/DL (ref 70–110)
GLUCOSE SERPL-MCNC: 117 MG/DL (ref 70–110)
GLUCOSE SERPL-MCNC: 127 MG/DL (ref 70–110)
GLUCOSE SERPL-MCNC: 129 MG/DL (ref 70–110)
GLUCOSE SERPL-MCNC: 131 MG/DL (ref 70–110)
GLUCOSE SERPL-MCNC: 135 MG/DL (ref 70–110)
GLUCOSE SERPL-MCNC: 143 MG/DL (ref 70–110)
GLUCOSE SERPL-MCNC: 143 MG/DL (ref 70–110)
GLUCOSE SERPL-MCNC: 144 MG/DL (ref 70–110)
GLUCOSE SERPL-MCNC: 161 MG/DL (ref 70–110)
GLUCOSE UR QL STRIP: NEGATIVE
GRAM STN SPEC: NORMAL
GRAM STN SPEC: NORMAL
HCT VFR BLD AUTO: 19.7 % (ref 37–48.5)
HCT VFR BLD AUTO: 21.3 % (ref 37–48.5)
HCT VFR BLD AUTO: 21.7 % (ref 37–48.5)
HCT VFR BLD AUTO: 22.5 % (ref 37–48.5)
HCT VFR BLD AUTO: 23.2 % (ref 37–48.5)
HCT VFR BLD AUTO: 23.5 % (ref 37–48.5)
HCT VFR BLD AUTO: 23.8 % (ref 37–48.5)
HCT VFR BLD AUTO: 24.7 % (ref 37–48.5)
HCT VFR BLD AUTO: 24.9 % (ref 37–48.5)
HCT VFR BLD AUTO: 25.1 % (ref 37–48.5)
HCT VFR BLD AUTO: 27.1 % (ref 37–48.5)
HCT VFR BLD AUTO: 28.2 % (ref 37–48.5)
HCT VFR BLD CALC: 24.7 %
HGB BLD-MCNC: 6.2 GM/DL (ref 12–16)
HGB BLD-MCNC: 6.7 GM/DL (ref 12–16)
HGB BLD-MCNC: 6.8 GM/DL (ref 12–16)
HGB BLD-MCNC: 7.2 GM/DL (ref 12–16)
HGB BLD-MCNC: 7.2 GM/DL (ref 12–16)
HGB BLD-MCNC: 7.3 GM/DL (ref 12–16)
HGB BLD-MCNC: 7.4 GM/DL (ref 12–16)
HGB BLD-MCNC: 7.5 GM/DL (ref 12–16)
HGB BLD-MCNC: 7.8 GM/DL (ref 12–16)
HGB BLD-MCNC: 8 GM/DL (ref 12–16)
HGB BLD-MCNC: 8.1 GM/DL (ref 12–16)
HGB BLD-MCNC: 8.7 GM/DL (ref 12–16)
HGB UR QL STRIP: NEGATIVE
IMM GRANULOCYTES # BLD AUTO: 0.01 K/UL (ref 0–0.04)
IMM GRANULOCYTES # BLD AUTO: 0.01 K/UL (ref 0–0.04)
IMM GRANULOCYTES # BLD AUTO: 0.02 K/UL (ref 0–0.04)
IMM GRANULOCYTES # BLD AUTO: 0.03 K/UL (ref 0–0.04)
IMM GRANULOCYTES # BLD AUTO: 0.04 K/UL (ref 0–0.04)
IMM GRANULOCYTES # BLD AUTO: 0.04 K/UL (ref 0–0.04)
IMM GRANULOCYTES # BLD AUTO: 0.07 K/UL (ref 0–0.04)
IMM GRANULOCYTES NFR BLD AUTO: 0.2 % (ref 0–0.5)
IMM GRANULOCYTES NFR BLD AUTO: 0.2 % (ref 0–0.5)
IMM GRANULOCYTES NFR BLD AUTO: 0.4 % (ref 0–0.5)
IMM GRANULOCYTES NFR BLD AUTO: 0.5 % (ref 0–0.5)
IMM GRANULOCYTES NFR BLD AUTO: 0.5 % (ref 0–0.5)
IMM GRANULOCYTES NFR BLD AUTO: 0.6 % (ref 0–0.5)
IMM GRANULOCYTES NFR BLD AUTO: 1 % (ref 0–0.5)
INDIRECT COOMBS: NORMAL
INDIRECT COOMBS: NORMAL
INR PPP: 1.2 (ref 0.8–1.2)
INR PPP: 1.2 (ref 0.8–1.2)
INR PPP: 1.3 (ref 0.8–1.2)
INR PPP: 1.4 (ref 0.8–1.2)
INR PPP: 1.4 (ref 0.8–1.2)
INSULIN SERPL-ACNC: NORMAL U[IU]/ML
INTERVENTRICULAR SEPTUM: 0.9 CM (ref 0.6–1.1)
IVRT: 118 MS
KETONES UR QL STRIP: NEGATIVE
LA MAJOR: 6 CM
LA MINOR: 6 CM
LA WIDTH: 3.5 CM
LAB AP GROSS DESCRIPTION: NORMAL
LAB AP NON-GYN INTERPRETATION SPECIMEN 1: NORMAL
LAB AP PERFORMING LOCATION(S): NORMAL
LACTATE SERPL-SCNC: 2.5 MMOL/L (ref 0.5–2.2)
LACTATE SERPL-SCNC: 2.8 MMOL/L (ref 0.5–2.2)
LACTATE SERPL-SCNC: 3.2 MMOL/L (ref 0.5–2.2)
LACTATE SERPL-SCNC: 3.6 MMOL/L (ref 0.5–2.2)
LACTATE SERPL-SCNC: 4.5 MMOL/L (ref 0.5–2.2)
LACTATE SERPL-SCNC: 4.7 MMOL/L (ref 0.5–2.2)
LACTATE SERPL-SCNC: 7 MMOL/L (ref 0.5–2.2)
LDH FLD L TO P-CCNC: 45 U/L
LDH SERPL L TO P-CCNC: 3 MMOL/L
LDH SERPL L TO P-CCNC: 4.3 MMOL/L (ref 0.5–2.2)
LEFT ATRIUM SIZE: 3.6 CM
LEFT ATRIUM VOLUME INDEX MOD: 37 ML/M2
LEFT ATRIUM VOLUME INDEX: 41 ML/M2
LEFT ATRIUM VOLUME MOD: 58 ML
LEFT ATRIUM VOLUME: 64 CM3
LEFT INTERNAL DIMENSION IN SYSTOLE: 2 CM (ref 2.1–4)
LEFT VENTRICLE DIASTOLIC VOLUME INDEX: 39.1 ML/M2
LEFT VENTRICLE DIASTOLIC VOLUME: 61 ML
LEFT VENTRICLE MASS INDEX: 64.8 G/M2
LEFT VENTRICLE SYSTOLIC VOLUME INDEX: 8.3 ML/M2
LEFT VENTRICLE SYSTOLIC VOLUME: 13 ML
LEFT VENTRICULAR INTERNAL DIMENSION IN DIASTOLE: 3.8 CM (ref 3.5–6)
LEFT VENTRICULAR MASS: 101.1 G
LEUKOCYTE ESTERASE UR QL STRIP: NEGATIVE
LIPASE SERPL-CCNC: 79 U/L (ref 4–60)
LV LATERAL E/E' RATIO: 11.5
LV SEPTAL E/E' RATIO: 15.8
LYMPHOCYTES # BLD AUTO: 0.51 K/UL (ref 1–4.8)
LYMPHOCYTES # BLD AUTO: 0.59 K/UL (ref 1–4.8)
LYMPHOCYTES # BLD AUTO: 0.64 K/UL (ref 1–4.8)
LYMPHOCYTES # BLD AUTO: 0.68 K/UL (ref 1–4.8)
LYMPHOCYTES # BLD AUTO: 0.7 K/UL (ref 1–4.8)
LYMPHOCYTES # BLD AUTO: 0.73 K/UL (ref 1–4.8)
LYMPHOCYTES # BLD AUTO: 0.78 K/UL (ref 1–4.8)
LYMPHOCYTES # BLD AUTO: 0.8 K/UL (ref 1–4.8)
LYMPHOCYTES # BLD AUTO: 0.86 K/UL (ref 1–4.8)
LYMPHOCYTES # BLD AUTO: 0.87 K/UL (ref 1–4.8)
LYMPHOCYTES # BLD AUTO: 1.12 K/UL (ref 1–4.8)
LYMPHOCYTES # BLD AUTO: 1.18 K/UL (ref 1–4.8)
LYMPHOCYTES NFR FLD MANUAL: 75 %
MAGNESIUM SERPL-MCNC: 1.6 MG/DL (ref 1.6–2.6)
MAGNESIUM SERPL-MCNC: 1.7 MG/DL (ref 1.6–2.6)
MAGNESIUM SERPL-MCNC: 1.7 MG/DL (ref 1.6–2.6)
MAGNESIUM SERPL-MCNC: 1.8 MG/DL (ref 1.6–2.6)
MAGNESIUM SERPL-MCNC: 2 MG/DL (ref 1.6–2.6)
MCH RBC QN AUTO: 29.8 PG (ref 27–31)
MCH RBC QN AUTO: 30 PG (ref 27–31)
MCH RBC QN AUTO: 30.1 PG (ref 27–31)
MCH RBC QN AUTO: 30.2 PG (ref 27–31)
MCH RBC QN AUTO: 30.4 PG (ref 27–31)
MCH RBC QN AUTO: 30.4 PG (ref 27–31)
MCH RBC QN AUTO: 30.5 PG (ref 27–31)
MCH RBC QN AUTO: 30.7 PG (ref 27–31)
MCH RBC QN AUTO: 30.7 PG (ref 27–31)
MCH RBC QN AUTO: 30.8 PG (ref 27–31)
MCH RBC QN AUTO: 30.8 PG (ref 27–31)
MCH RBC QN AUTO: 31.2 PG (ref 27–31)
MCHC RBC AUTO-ENTMCNC: 29.5 G/DL (ref 32–36)
MCHC RBC AUTO-ENTMCNC: 29.9 G/DL (ref 32–36)
MCHC RBC AUTO-ENTMCNC: 30.7 G/DL (ref 32–36)
MCHC RBC AUTO-ENTMCNC: 30.9 G/DL (ref 32–36)
MCHC RBC AUTO-ENTMCNC: 31 G/DL (ref 32–36)
MCHC RBC AUTO-ENTMCNC: 31.3 G/DL (ref 32–36)
MCHC RBC AUTO-ENTMCNC: 31.5 G/DL (ref 32–36)
MCHC RBC AUTO-ENTMCNC: 31.5 G/DL (ref 32–36)
MCHC RBC AUTO-ENTMCNC: 31.6 G/DL (ref 32–36)
MCHC RBC AUTO-ENTMCNC: 31.9 G/DL (ref 32–36)
MCHC RBC AUTO-ENTMCNC: 32 G/DL (ref 32–36)
MCHC RBC AUTO-ENTMCNC: 32.1 G/DL (ref 32–36)
MCV RBC AUTO: 100 FL (ref 82–98)
MCV RBC AUTO: 105 FL (ref 82–98)
MCV RBC AUTO: 95 FL (ref 82–98)
MCV RBC AUTO: 95 FL (ref 82–98)
MCV RBC AUTO: 96 FL (ref 82–98)
MCV RBC AUTO: 98 FL (ref 82–98)
MCV RBC AUTO: 99 FL (ref 82–98)
MONOS+MACROS NFR FLD MANUAL: 18 %
MV PEAK A VEL: 0.78 M/S
MV PEAK E VEL: 1.26 M/S
NEUTROPHILS NFR FLD MANUAL: 7 %
NITRITE UR QL STRIP: NEGATIVE
NUCLEATED RBC (/100WBC) (OHS): 0 /100 WBC
OHS CV RV/LV RATIO: 0.87 CM
OHS QRS DURATION: 100 MS
OHS QRS DURATION: 102 MS
OHS QRS DURATION: 104 MS
OHS QRS DURATION: 106 MS
OHS QRS DURATION: 108 MS
OHS QRS DURATION: 108 MS
OHS QTC CALCULATION: 458 MS
OHS QTC CALCULATION: 460 MS
OHS QTC CALCULATION: 479 MS
OHS QTC CALCULATION: 479 MS
OHS QTC CALCULATION: 482 MS
OHS QTC CALCULATION: 487 MS
OSMOLALITY SERPL: 314 MOSM/KG (ref 275–295)
OSMOLALITY UR: 331 MOSM/KG (ref 50–1200)
PCO2 BLDA: 34.1 MMHG
PH SMN: 7.28 [PH]
PH UR STRIP: 5 [PH]
PHOSPHATE SERPL-MCNC: 3.5 MG/DL (ref 2.7–4.5)
PHOSPHATE SERPL-MCNC: 3.5 MG/DL (ref 2.7–4.5)
PHOSPHATE SERPL-MCNC: 3.6 MG/DL (ref 2.7–4.5)
PHOSPHATE SERPL-MCNC: 4.1 MG/DL (ref 2.7–4.5)
PHOSPHATE SERPL-MCNC: 4.2 MG/DL (ref 2.7–4.5)
PHOSPHATE SERPL-MCNC: 4.8 MG/DL (ref 2.7–4.5)
PHOSPHATE SERPL-MCNC: 4.9 MG/DL (ref 2.7–4.5)
PHOSPHATE SERPL-MCNC: 5.6 MG/DL (ref 2.7–4.5)
PISA TR MAX VEL: 3.2 M/S
PLATELET # BLD AUTO: 100 K/UL (ref 150–450)
PLATELET # BLD AUTO: 102 K/UL (ref 150–450)
PLATELET # BLD AUTO: 111 K/UL (ref 150–450)
PLATELET # BLD AUTO: 113 K/UL (ref 150–450)
PLATELET # BLD AUTO: 148 K/UL (ref 150–450)
PLATELET # BLD AUTO: 85 K/UL (ref 150–450)
PLATELET # BLD AUTO: 86 K/UL (ref 150–450)
PLATELET # BLD AUTO: 89 K/UL (ref 150–450)
PLATELET # BLD AUTO: 90 K/UL (ref 150–450)
PLATELET # BLD AUTO: 92 K/UL (ref 150–450)
PLATELET # BLD AUTO: 92 K/UL (ref 150–450)
PLATELET # BLD AUTO: 99 K/UL (ref 150–450)
PLATELET BLD QL SMEAR: ABNORMAL
PMV BLD AUTO: 11.1 FL (ref 9.2–12.9)
PMV BLD AUTO: 11.6 FL (ref 9.2–12.9)
PMV BLD AUTO: 12.1 FL (ref 9.2–12.9)
PMV BLD AUTO: 12.1 FL (ref 9.2–12.9)
PMV BLD AUTO: 12.2 FL (ref 9.2–12.9)
PMV BLD AUTO: 12.3 FL (ref 9.2–12.9)
PMV BLD AUTO: 13 FL (ref 9.2–12.9)
PMV BLD AUTO: 13.1 FL (ref 9.2–12.9)
PO2 BLDA: 25.1 MMHG
POC BASE DEFICIT: -9.7 MMOL/L
POC HCO3: 15.9 MMOL/L
POC IONIZED CALCIUM: 1.06 MMOL/L (ref 1.06–1.42)
POC PERFORMED BY: ABNORMAL
POC PERFORMED BY: NORMAL
POC TEMPERATURE: 37 C
POC TEMPERATURE: 37 C
POTASSIUM BLD-SCNC: 3.6 MMOL/L (ref 3.5–5.1)
POTASSIUM SERPL-SCNC: 3.2 MMOL/L (ref 3.5–5.1)
POTASSIUM SERPL-SCNC: 3.2 MMOL/L (ref 3.5–5.1)
POTASSIUM SERPL-SCNC: 3.3 MMOL/L (ref 3.5–5.1)
POTASSIUM SERPL-SCNC: 3.5 MMOL/L (ref 3.5–5.1)
POTASSIUM SERPL-SCNC: 3.6 MMOL/L (ref 3.5–5.1)
POTASSIUM SERPL-SCNC: 3.8 MMOL/L (ref 3.5–5.1)
POTASSIUM SERPL-SCNC: 3.9 MMOL/L (ref 3.5–5.1)
POTASSIUM SERPL-SCNC: 4 MMOL/L (ref 3.5–5.1)
POTASSIUM SERPL-SCNC: 4.1 MMOL/L (ref 3.5–5.1)
POTASSIUM SERPL-SCNC: 4.3 MMOL/L (ref 3.5–5.1)
POTASSIUM SERPL-SCNC: 4.5 MMOL/L (ref 3.5–5.1)
PROT FLD-MCNC: 1.2 G/DL
PROT SERPL-MCNC: 4.9 GM/DL (ref 6–8.4)
PROT SERPL-MCNC: 5 GM/DL (ref 6–8.4)
PROT SERPL-MCNC: 5.2 GM/DL (ref 6–8.4)
PROT SERPL-MCNC: 5.3 GM/DL (ref 6–8.4)
PROT SERPL-MCNC: 5.3 GM/DL (ref 6–8.4)
PROT SERPL-MCNC: 5.9 GM/DL (ref 6–8.4)
PROT SERPL-MCNC: 5.9 GM/DL (ref 6–8.4)
PROT SERPL-MCNC: 6.7 GM/DL (ref 6–8.4)
PROT SERPL-MCNC: 7.3 GM/DL (ref 6–8.4)
PROT UR QL STRIP: NEGATIVE
PROT UR-MCNC: 36 MG/DL
PROT/CREAT UR: 0.09 MG/G{CREAT}
PROTHROMBIN TIME: 12.6 SECONDS (ref 9–12.5)
PROTHROMBIN TIME: 13.4 SECONDS (ref 9–12.5)
PROTHROMBIN TIME: 13.5 SECONDS (ref 9–12.5)
PROTHROMBIN TIME: 13.7 SECONDS (ref 9–12.5)
PROTHROMBIN TIME: 14.3 SECONDS (ref 9–12.5)
PROTHROMBIN TIME: 14.4 SECONDS (ref 9–12.5)
PROTHROMBIN TIME: 14.6 SECONDS (ref 9–12.5)
PROTHROMBIN TIME: 14.8 SECONDS (ref 9–12.5)
RA MAJOR: 4.23 CM
RA PRESSURE ESTIMATED: 0 MMHG
RA WIDTH: 2.65 CM
RBC # BLD AUTO: 2.01 M/UL (ref 4–5.4)
RBC # BLD AUTO: 2.15 M/UL (ref 4–5.4)
RBC # BLD AUTO: 2.25 M/UL (ref 4–5.4)
RBC # BLD AUTO: 2.36 M/UL (ref 4–5.4)
RBC # BLD AUTO: 2.37 M/UL (ref 4–5.4)
RBC # BLD AUTO: 2.4 M/UL (ref 4–5.4)
RBC # BLD AUTO: 2.43 M/UL (ref 4–5.4)
RBC # BLD AUTO: 2.44 M/UL (ref 4–5.4)
RBC # BLD AUTO: 2.57 M/UL (ref 4–5.4)
RBC # BLD AUTO: 2.61 M/UL (ref 4–5.4)
RBC # BLD AUTO: 2.72 M/UL (ref 4–5.4)
RBC # BLD AUTO: 2.89 M/UL (ref 4–5.4)
RBCS: NORMAL
RELATIVE EOSINOPHIL (OHS): 0.4 %
RELATIVE EOSINOPHIL (OHS): 0.5 %
RELATIVE EOSINOPHIL (OHS): 1.1 %
RELATIVE EOSINOPHIL (OHS): 1.2 %
RELATIVE EOSINOPHIL (OHS): 1.6 %
RELATIVE EOSINOPHIL (OHS): 3.1 %
RELATIVE EOSINOPHIL (OHS): 3.9 %
RELATIVE EOSINOPHIL (OHS): 4.2 %
RELATIVE EOSINOPHIL (OHS): 4.6 %
RELATIVE EOSINOPHIL (OHS): 5.6 %
RELATIVE EOSINOPHIL (OHS): 5.7 %
RELATIVE EOSINOPHIL (OHS): 5.8 %
RELATIVE LYMPHOCYTE (OHS): 10.4 % (ref 18–48)
RELATIVE LYMPHOCYTE (OHS): 11.8 % (ref 18–48)
RELATIVE LYMPHOCYTE (OHS): 12.2 % (ref 18–48)
RELATIVE LYMPHOCYTE (OHS): 13.3 % (ref 18–48)
RELATIVE LYMPHOCYTE (OHS): 13.7 % (ref 18–48)
RELATIVE LYMPHOCYTE (OHS): 14.4 % (ref 18–48)
RELATIVE LYMPHOCYTE (OHS): 15 % (ref 18–48)
RELATIVE LYMPHOCYTE (OHS): 15.1 % (ref 18–48)
RELATIVE LYMPHOCYTE (OHS): 15.2 % (ref 18–48)
RELATIVE LYMPHOCYTE (OHS): 15.6 % (ref 18–48)
RELATIVE LYMPHOCYTE (OHS): 16.3 % (ref 18–48)
RELATIVE LYMPHOCYTE (OHS): 8.9 % (ref 18–48)
RELATIVE MONOCYTE (OHS): 10 % (ref 4–15)
RELATIVE MONOCYTE (OHS): 4.7 % (ref 4–15)
RELATIVE MONOCYTE (OHS): 5.5 % (ref 4–15)
RELATIVE MONOCYTE (OHS): 7.3 % (ref 4–15)
RELATIVE MONOCYTE (OHS): 8.1 % (ref 4–15)
RELATIVE MONOCYTE (OHS): 8.2 % (ref 4–15)
RELATIVE MONOCYTE (OHS): 8.5 % (ref 4–15)
RELATIVE MONOCYTE (OHS): 8.6 % (ref 4–15)
RELATIVE MONOCYTE (OHS): 8.6 % (ref 4–15)
RELATIVE MONOCYTE (OHS): 8.8 % (ref 4–15)
RELATIVE MONOCYTE (OHS): 8.9 % (ref 4–15)
RELATIVE MONOCYTE (OHS): 9.9 % (ref 4–15)
RELATIVE NEUTROPHIL (OHS): 69.1 % (ref 38–73)
RELATIVE NEUTROPHIL (OHS): 69.8 % (ref 38–73)
RELATIVE NEUTROPHIL (OHS): 70.4 % (ref 38–73)
RELATIVE NEUTROPHIL (OHS): 70.7 % (ref 38–73)
RELATIVE NEUTROPHIL (OHS): 71.3 % (ref 38–73)
RELATIVE NEUTROPHIL (OHS): 72.7 % (ref 38–73)
RELATIVE NEUTROPHIL (OHS): 73 % (ref 38–73)
RELATIVE NEUTROPHIL (OHS): 73.3 % (ref 38–73)
RELATIVE NEUTROPHIL (OHS): 75 % (ref 38–73)
RELATIVE NEUTROPHIL (OHS): 80.3 % (ref 38–73)
RELATIVE NEUTROPHIL (OHS): 81.7 % (ref 38–73)
RELATIVE NEUTROPHIL (OHS): 85.1 % (ref 38–73)
RH BLD: NORMAL
RH BLD: NORMAL
RIGHT VENTRICLE DIASTOLIC BASEL DIMENSION: 3.3 CM
RV TB RVSP: 3 MMHG
RV TISSUE DOPPLER FREE WALL SYSTOLIC VELOCITY 1 (APICAL 4 CHAMBER VIEW): 19.26 CM/S
SINUS: 2.75 CM
SODIUM BLD-SCNC: 140 MMOL/L (ref 136–145)
SODIUM SERPL-SCNC: 135 MMOL/L (ref 136–145)
SODIUM SERPL-SCNC: 137 MMOL/L (ref 136–145)
SODIUM SERPL-SCNC: 138 MMOL/L (ref 136–145)
SODIUM SERPL-SCNC: 139 MMOL/L (ref 136–145)
SODIUM SERPL-SCNC: 139 MMOL/L (ref 136–145)
SODIUM SERPL-SCNC: 141 MMOL/L (ref 136–145)
SODIUM SERPL-SCNC: 141 MMOL/L (ref 136–145)
SODIUM UR-SCNC: <10 MMOL/L (ref 20–250)
SP GR UR STRIP: 1.02
SPECIMEN OUTDATE: NORMAL
SPECIMEN OUTDATE: NORMAL
SPECIMEN SOURCE: ABNORMAL
SPECIMEN SOURCE: NORMAL
STJ: 2.47 CM
TDI LATERAL: 0.11 M/S
TDI SEPTAL: 0.08 M/S
TDI: 0.1 M/S
TRICUSPID ANNULAR PLANE SYSTOLIC EXCURSION: 2.42 CM
TROPONIN I SERPL HS-MCNC: 217 NG/L
TROPONIN I SERPL HS-MCNC: 228 NG/L
TROPONIN I SERPL HS-MCNC: 238 NG/L
TV PEAK GRADIENT: 41 MMHG
TV REST PULMONARY ARTERY PRESSURE: 41 MMHG
UNIT NUMBER: NORMAL
UROBILINOGEN UR STRIP-ACNC: NEGATIVE EU/DL
VANCOMYCIN SERPL-MCNC: 14.3 UG/ML (ref ?–80)
WBC # BLD AUTO: 3.91 K/UL (ref 3.9–12.7)
WBC # BLD AUTO: 4.17 K/UL (ref 3.9–12.7)
WBC # BLD AUTO: 4.66 K/UL (ref 3.9–12.7)
WBC # BLD AUTO: 5.08 K/UL (ref 3.9–12.7)
WBC # BLD AUTO: 5.26 K/UL (ref 3.9–12.7)
WBC # BLD AUTO: 5.78 K/UL (ref 3.9–12.7)
WBC # BLD AUTO: 5.85 K/UL (ref 3.9–12.7)
WBC # BLD AUTO: 6.27 K/UL (ref 3.9–12.7)
WBC # BLD AUTO: 7.2 K/UL (ref 3.9–12.7)
WBC # BLD AUTO: 7.22 K/UL (ref 3.9–12.7)
WBC # BLD AUTO: 7.24 K/UL (ref 3.9–12.7)
WBC # BLD AUTO: 8.35 K/UL (ref 3.9–12.7)
WBC # FLD: 13 /CU MM
Z-SCORE OF LEFT VENTRICULAR DIMENSION IN END DIASTOLE: -1.69
Z-SCORE OF LEFT VENTRICULAR DIMENSION IN END SYSTOLE: -2.64

## 2025-01-01 PROCEDURE — 83935 ASSAY OF URINE OSMOLALITY: CPT | Mod: HCNC

## 2025-01-01 PROCEDURE — 63600175 PHARM REV CODE 636 W HCPCS

## 2025-01-01 PROCEDURE — 88112 CYTOPATH CELL ENHANCE TECH: CPT | Mod: 26,,, | Performed by: PATHOLOGY

## 2025-01-01 PROCEDURE — 25000003 PHARM REV CODE 250: Performed by: INTERNAL MEDICINE

## 2025-01-01 PROCEDURE — 20000000 HC ICU ROOM

## 2025-01-01 PROCEDURE — 0W9G3ZZ DRAINAGE OF PERITONEAL CAVITY, PERCUTANEOUS APPROACH: ICD-10-PCS | Performed by: INTERNAL MEDICINE

## 2025-01-01 PROCEDURE — 87075 CULTR BACTERIA EXCEPT BLOOD: CPT | Mod: HCNC

## 2025-01-01 PROCEDURE — 99291 CRITICAL CARE FIRST HOUR: CPT | Mod: GC,,, | Performed by: INTERNAL MEDICINE

## 2025-01-01 PROCEDURE — 84484 ASSAY OF TROPONIN QUANT: CPT | Mod: HCNC

## 2025-01-01 PROCEDURE — 83735 ASSAY OF MAGNESIUM: CPT

## 2025-01-01 PROCEDURE — 85025 COMPLETE CBC W/AUTO DIFF WBC: CPT

## 2025-01-01 PROCEDURE — P9047 ALBUMIN (HUMAN), 25%, 50ML: HCPCS | Mod: HCNC

## 2025-01-01 PROCEDURE — 83880 ASSAY OF NATRIURETIC PEPTIDE: CPT | Mod: HCNC

## 2025-01-01 PROCEDURE — 63600175 PHARM REV CODE 636 W HCPCS: Mod: HCNC

## 2025-01-01 PROCEDURE — 93005 ELECTROCARDIOGRAM TRACING: CPT | Mod: HCNC

## 2025-01-01 PROCEDURE — 25000003 PHARM REV CODE 250

## 2025-01-01 PROCEDURE — 25000003 PHARM REV CODE 250: Mod: HCNC | Performed by: INTERNAL MEDICINE

## 2025-01-01 PROCEDURE — 83605 ASSAY OF LACTIC ACID: CPT | Mod: HCNC

## 2025-01-01 PROCEDURE — 82105 ALPHA-FETOPROTEIN SERUM: CPT | Mod: HCNC | Performed by: STUDENT IN AN ORGANIZED HEALTH CARE EDUCATION/TRAINING PROGRAM

## 2025-01-01 PROCEDURE — 97530 THERAPEUTIC ACTIVITIES: CPT

## 2025-01-01 PROCEDURE — P9016 RBC LEUKOCYTES REDUCED: HCPCS

## 2025-01-01 PROCEDURE — 25000003 PHARM REV CODE 250: Mod: HCNC

## 2025-01-01 PROCEDURE — 83930 ASSAY OF BLOOD OSMOLALITY: CPT | Mod: HCNC

## 2025-01-01 PROCEDURE — 85610 PROTHROMBIN TIME: CPT | Mod: HCNC

## 2025-01-01 PROCEDURE — 88305 TISSUE EXAM BY PATHOLOGIST: CPT | Mod: 26,,, | Performed by: PATHOLOGY

## 2025-01-01 PROCEDURE — 99900035 HC TECH TIME PER 15 MIN (STAT): Mod: HCNC

## 2025-01-01 PROCEDURE — 80053 COMPREHEN METABOLIC PANEL: CPT

## 2025-01-01 PROCEDURE — 84157 ASSAY OF PROTEIN OTHER: CPT | Mod: HCNC

## 2025-01-01 PROCEDURE — 20000000 HC ICU ROOM: Mod: HCNC

## 2025-01-01 PROCEDURE — 76937 US GUIDE VASCULAR ACCESS: CPT

## 2025-01-01 PROCEDURE — 99291 CRITICAL CARE FIRST HOUR: CPT | Mod: HCNC,GC,, | Performed by: STUDENT IN AN ORGANIZED HEALTH CARE EDUCATION/TRAINING PROGRAM

## 2025-01-01 PROCEDURE — 83605 ASSAY OF LACTIC ACID: CPT | Mod: HCNC | Performed by: EMERGENCY MEDICINE

## 2025-01-01 PROCEDURE — 63600175 PHARM REV CODE 636 W HCPCS: Mod: HCNC | Performed by: EMERGENCY MEDICINE

## 2025-01-01 PROCEDURE — 85730 THROMBOPLASTIN TIME PARTIAL: CPT | Mod: HCNC

## 2025-01-01 PROCEDURE — 80053 COMPREHEN METABOLIC PANEL: CPT | Mod: HCNC

## 2025-01-01 PROCEDURE — 84100 ASSAY OF PHOSPHORUS: CPT

## 2025-01-01 PROCEDURE — 87205 SMEAR GRAM STAIN: CPT

## 2025-01-01 PROCEDURE — 94761 N-INVAS EAR/PLS OXIMETRY MLT: CPT

## 2025-01-01 PROCEDURE — 82436 ASSAY OF URINE CHLORIDE: CPT | Mod: HCNC

## 2025-01-01 PROCEDURE — 86850 RBC ANTIBODY SCREEN: CPT

## 2025-01-01 PROCEDURE — 94761 N-INVAS EAR/PLS OXIMETRY MLT: CPT | Mod: HCNC

## 2025-01-01 PROCEDURE — 96374 THER/PROPH/DIAG INJ IV PUSH: CPT | Mod: HCNC

## 2025-01-01 PROCEDURE — 97110 THERAPEUTIC EXERCISES: CPT

## 2025-01-01 PROCEDURE — 93005 ELECTROCARDIOGRAM TRACING: CPT

## 2025-01-01 PROCEDURE — 63600175 PHARM REV CODE 636 W HCPCS: Mod: HCNC | Performed by: INTERNAL MEDICINE

## 2025-01-01 PROCEDURE — 99233 SBSQ HOSP IP/OBS HIGH 50: CPT | Mod: GC,,, | Performed by: INTERNAL MEDICINE

## 2025-01-01 PROCEDURE — 93010 ELECTROCARDIOGRAM REPORT: CPT | Mod: HCNC,,, | Performed by: INTERNAL MEDICINE

## 2025-01-01 PROCEDURE — 97165 OT EVAL LOW COMPLEX 30 MIN: CPT

## 2025-01-01 PROCEDURE — 96361 HYDRATE IV INFUSION ADD-ON: CPT | Mod: HCNC

## 2025-01-01 PROCEDURE — 36573 INSJ PICC RS&I 5 YR+: CPT

## 2025-01-01 PROCEDURE — 63600175 PHARM REV CODE 636 W HCPCS: Performed by: INTERNAL MEDICINE

## 2025-01-01 PROCEDURE — 87040 BLOOD CULTURE FOR BACTERIA: CPT | Mod: HCNC

## 2025-01-01 PROCEDURE — 83690 ASSAY OF LIPASE: CPT | Mod: HCNC | Performed by: EMERGENCY MEDICINE

## 2025-01-01 PROCEDURE — 83615 LACTATE (LD) (LDH) ENZYME: CPT | Mod: HCNC

## 2025-01-01 PROCEDURE — 85025 COMPLETE CBC W/AUTO DIFF WBC: CPT | Mod: HCNC

## 2025-01-01 PROCEDURE — 84300 ASSAY OF URINE SODIUM: CPT | Mod: HCNC

## 2025-01-01 PROCEDURE — 83735 ASSAY OF MAGNESIUM: CPT | Mod: HCNC

## 2025-01-01 PROCEDURE — 85610 PROTHROMBIN TIME: CPT

## 2025-01-01 PROCEDURE — 81003 URINALYSIS AUTO W/O SCOPE: CPT | Mod: HCNC

## 2025-01-01 PROCEDURE — 82042 OTHER SOURCE ALBUMIN QUAN EA: CPT | Mod: HCNC

## 2025-01-01 PROCEDURE — 82570 ASSAY OF URINE CREATININE: CPT | Mod: HCNC

## 2025-01-01 PROCEDURE — 84156 ASSAY OF PROTEIN URINE: CPT | Mod: HCNC

## 2025-01-01 PROCEDURE — 86901 BLOOD TYPING SEROLOGIC RH(D): CPT

## 2025-01-01 PROCEDURE — 36430 TRANSFUSION BLD/BLD COMPNT: CPT | Mod: HCNC

## 2025-01-01 PROCEDURE — 89051 BODY FLUID CELL COUNT: CPT | Mod: HCNC

## 2025-01-01 PROCEDURE — 36415 COLL VENOUS BLD VENIPUNCTURE: CPT | Mod: HCNC

## 2025-01-01 PROCEDURE — 85025 COMPLETE CBC W/AUTO DIFF WBC: CPT | Mod: HCNC | Performed by: EMERGENCY MEDICINE

## 2025-01-01 PROCEDURE — 25000003 PHARM REV CODE 250: Mod: HCNC | Performed by: EMERGENCY MEDICINE

## 2025-01-01 PROCEDURE — 87449 NOS EACH ORGANISM AG IA: CPT | Mod: HCNC | Performed by: EMERGENCY MEDICINE

## 2025-01-01 PROCEDURE — 99291 CRITICAL CARE FIRST HOUR: CPT | Mod: HCNC,GC,, | Performed by: INTERNAL MEDICINE

## 2025-01-01 PROCEDURE — 82040 ASSAY OF SERUM ALBUMIN: CPT

## 2025-01-01 PROCEDURE — 84100 ASSAY OF PHOSPHORUS: CPT | Mod: HCNC

## 2025-01-01 PROCEDURE — 96376 TX/PRO/DX INJ SAME DRUG ADON: CPT | Mod: HCNC

## 2025-01-01 PROCEDURE — 94760 N-INVAS EAR/PLS OXIMETRY 1: CPT

## 2025-01-01 PROCEDURE — 82248 BILIRUBIN DIRECT: CPT | Mod: HCNC

## 2025-01-01 PROCEDURE — 63600175 PHARM REV CODE 636 W HCPCS: Mod: JB,HCNC

## 2025-01-01 PROCEDURE — 93010 ELECTROCARDIOGRAM REPORT: CPT | Mod: ,,, | Performed by: INTERNAL MEDICINE

## 2025-01-01 PROCEDURE — 84484 ASSAY OF TROPONIN QUANT: CPT

## 2025-01-01 PROCEDURE — C1751 CATH, INF, PER/CENT/MIDLINE: HCPCS

## 2025-01-01 PROCEDURE — 97161 PT EVAL LOW COMPLEX 20 MIN: CPT

## 2025-01-01 PROCEDURE — 99285 EMERGENCY DEPT VISIT HI MDM: CPT | Mod: 25,HCNC

## 2025-01-01 PROCEDURE — 82140 ASSAY OF AMMONIA: CPT | Mod: HCNC

## 2025-01-01 PROCEDURE — 30233N1 TRANSFUSION OF NONAUTOLOGOUS RED BLOOD CELLS INTO PERIPHERAL VEIN, PERCUTANEOUS APPROACH: ICD-10-PCS | Performed by: INTERNAL MEDICINE

## 2025-01-01 PROCEDURE — 49082 ABD PARACENTESIS: CPT | Mod: HCNC

## 2025-01-01 PROCEDURE — 25000003 PHARM REV CODE 250: Performed by: NURSE PRACTITIONER

## 2025-01-01 PROCEDURE — 80202 ASSAY OF VANCOMYCIN: CPT | Mod: HCNC | Performed by: INTERNAL MEDICINE

## 2025-01-01 PROCEDURE — 86920 COMPATIBILITY TEST SPIN: CPT

## 2025-01-01 PROCEDURE — 99223 1ST HOSP IP/OBS HIGH 75: CPT | Mod: HCNC,GC,, | Performed by: INTERNAL MEDICINE

## 2025-01-01 PROCEDURE — 99232 SBSQ HOSP IP/OBS MODERATE 35: CPT | Mod: HCNC,GC,, | Performed by: INTERNAL MEDICINE

## 2025-01-01 PROCEDURE — 99233 SBSQ HOSP IP/OBS HIGH 50: CPT | Mod: ,,, | Performed by: INTERNAL MEDICINE

## 2025-01-01 PROCEDURE — 02HV33Z INSERTION OF INFUSION DEVICE INTO SUPERIOR VENA CAVA, PERCUTANEOUS APPROACH: ICD-10-PCS | Performed by: INTERNAL MEDICINE

## 2025-01-01 PROCEDURE — 82803 BLOOD GASES ANY COMBINATION: CPT | Mod: HCNC

## 2025-01-01 PROCEDURE — 88112 CYTOPATH CELL ENHANCE TECH: CPT | Mod: TC

## 2025-01-01 PROCEDURE — 80053 COMPREHEN METABOLIC PANEL: CPT | Mod: HCNC | Performed by: EMERGENCY MEDICINE

## 2025-01-01 PROCEDURE — 87070 CULTURE OTHR SPECIMN AEROBIC: CPT | Mod: HCNC

## 2025-01-01 PROCEDURE — 96375 TX/PRO/DX INJ NEW DRUG ADDON: CPT | Mod: HCNC

## 2025-01-01 RX ORDER — ONDANSETRON 4 MG/1
4 TABLET, FILM COATED ORAL EVERY 8 HOURS PRN
Qty: 90 TABLET | Refills: 0 | Status: SHIPPED | OUTPATIENT
Start: 2025-01-01

## 2025-01-01 RX ORDER — CEFTRIAXONE 2 G/1
2 INJECTION, POWDER, FOR SOLUTION INTRAMUSCULAR; INTRAVENOUS
Status: DISCONTINUED | OUTPATIENT
Start: 2025-01-01 | End: 2025-01-01

## 2025-01-01 RX ORDER — ALBUMIN HUMAN 250 G/1000ML
25 SOLUTION INTRAVENOUS DAILY
Status: CANCELLED | OUTPATIENT
Start: 2025-01-01 | End: 2025-04-20

## 2025-01-01 RX ORDER — ONDANSETRON HYDROCHLORIDE 2 MG/ML
4 INJECTION, SOLUTION INTRAVENOUS
Status: COMPLETED | OUTPATIENT
Start: 2025-01-01 | End: 2025-01-01

## 2025-01-01 RX ORDER — ONDANSETRON 8 MG/1
8 TABLET, ORALLY DISINTEGRATING ORAL EVERY 8 HOURS PRN
Status: DISCONTINUED | OUTPATIENT
Start: 2025-01-01 | End: 2025-01-01

## 2025-01-01 RX ORDER — METRONIDAZOLE 500 MG/100ML
500 INJECTION, SOLUTION INTRAVENOUS EVERY 12 HOURS
Status: DISCONTINUED | OUTPATIENT
Start: 2025-01-01 | End: 2025-01-01

## 2025-01-01 RX ORDER — NOREPINEPHRINE BITARTRATE 0.02 MG/ML
0-.2 INJECTION, SOLUTION INTRAVENOUS CONTINUOUS
Status: DISCONTINUED | OUTPATIENT
Start: 2025-01-01 | End: 2025-01-01

## 2025-01-01 RX ORDER — HYDROCODONE BITARTRATE AND ACETAMINOPHEN 500; 5 MG/1; MG/1
TABLET ORAL
Status: DISCONTINUED | OUTPATIENT
Start: 2025-01-01 | End: 2025-01-01 | Stop reason: HOSPADM

## 2025-01-01 RX ORDER — SODIUM CHLORIDE 0.9 % (FLUSH) 0.9 %
10 SYRINGE (ML) INJECTION
Status: CANCELLED | OUTPATIENT
Start: 2025-01-01

## 2025-01-01 RX ORDER — PROCHLORPERAZINE MALEATE 5 MG
10 TABLET ORAL EVERY 6 HOURS PRN
Status: DISCONTINUED | OUTPATIENT
Start: 2025-01-01 | End: 2025-01-01

## 2025-01-01 RX ORDER — HEPARIN SODIUM 5000 [USP'U]/ML
5000 INJECTION, SOLUTION INTRAVENOUS; SUBCUTANEOUS EVERY 8 HOURS
Status: DISCONTINUED | OUTPATIENT
Start: 2025-01-01 | End: 2025-01-01

## 2025-01-01 RX ORDER — MIDODRINE HYDROCHLORIDE 5 MG/1
5 TABLET ORAL EVERY 6 HOURS
Qty: 120 TABLET | Refills: 11 | Status: SHIPPED | OUTPATIENT
Start: 2025-01-01 | End: 2026-04-16

## 2025-01-01 RX ORDER — ACETAMINOPHEN 500 MG
500 TABLET ORAL DAILY PRN
COMMUNITY

## 2025-01-01 RX ORDER — NOREPINEPHRINE BITARTRATE 0.02 MG/ML
0-.2 INJECTION, SOLUTION INTRAVENOUS CONTINUOUS
Status: DISPENSED | OUTPATIENT
Start: 2025-01-01 | End: 2025-01-01

## 2025-01-01 RX ORDER — MIDODRINE HYDROCHLORIDE 5 MG/1
5 TABLET ORAL EVERY 6 HOURS
Status: DISCONTINUED | OUTPATIENT
Start: 2025-01-01 | End: 2025-01-01 | Stop reason: HOSPADM

## 2025-01-01 RX ORDER — NOREPINEPHRINE BITARTRATE 0.02 MG/ML
0-3 INJECTION, SOLUTION INTRAVENOUS CONTINUOUS
Status: DISCONTINUED | OUTPATIENT
Start: 2025-01-01 | End: 2025-01-01

## 2025-01-01 RX ORDER — LACTULOSE 10 G/15ML
20 SOLUTION ORAL; RECTAL 2 TIMES DAILY
Qty: 1800 ML | Refills: 0 | Status: SHIPPED | OUTPATIENT
Start: 2025-01-01

## 2025-01-01 RX ORDER — ALBUMIN HUMAN 250 G/1000ML
25 SOLUTION INTRAVENOUS EVERY 8 HOURS
Status: DISCONTINUED | OUTPATIENT
Start: 2025-01-01 | End: 2025-01-01

## 2025-01-01 RX ORDER — POTASSIUM CHLORIDE 29.8 MG/ML
40 INJECTION INTRAVENOUS EVERY 4 HOURS
Status: COMPLETED | OUTPATIENT
Start: 2025-01-01 | End: 2025-01-01

## 2025-01-01 RX ORDER — CEFTRIAXONE 1 G/1
1 INJECTION, POWDER, FOR SOLUTION INTRAMUSCULAR; INTRAVENOUS
Status: DISCONTINUED | OUTPATIENT
Start: 2025-01-01 | End: 2025-01-01

## 2025-01-01 RX ORDER — PROCHLORPERAZINE MALEATE 10 MG
10 TABLET ORAL EVERY 6 HOURS PRN
Qty: 90 TABLET | Refills: 0 | Status: SHIPPED | OUTPATIENT
Start: 2025-01-01

## 2025-01-01 RX ORDER — HYDROCODONE BITARTRATE AND ACETAMINOPHEN 500; 5 MG/1; MG/1
TABLET ORAL
Status: DISCONTINUED | OUTPATIENT
Start: 2025-01-01 | End: 2025-01-01

## 2025-01-01 RX ORDER — CEFTRIAXONE 1 G/1
1 INJECTION, POWDER, FOR SOLUTION INTRAMUSCULAR; INTRAVENOUS
Status: COMPLETED | OUTPATIENT
Start: 2025-01-01 | End: 2025-01-01

## 2025-01-01 RX ORDER — SODIUM CHLORIDE, SODIUM LACTATE, POTASSIUM CHLORIDE, CALCIUM CHLORIDE 600; 310; 30; 20 MG/100ML; MG/100ML; MG/100ML; MG/100ML
INJECTION, SOLUTION INTRAVENOUS CONTINUOUS
Status: DISCONTINUED | OUTPATIENT
Start: 2025-01-01 | End: 2025-01-01

## 2025-01-01 RX ORDER — MIDODRINE HYDROCHLORIDE 5 MG/1
5 TABLET ORAL
Status: COMPLETED | OUTPATIENT
Start: 2025-01-01 | End: 2025-01-01

## 2025-01-01 RX ORDER — MORPHINE SULFATE 2 MG/ML
2 INJECTION, SOLUTION INTRAMUSCULAR; INTRAVENOUS
Status: DISCONTINUED | OUTPATIENT
Start: 2025-01-01 | End: 2025-01-01 | Stop reason: HOSPADM

## 2025-01-01 RX ORDER — ONDANSETRON HYDROCHLORIDE 2 MG/ML
8 INJECTION, SOLUTION INTRAVENOUS EVERY 8 HOURS PRN
Status: DISCONTINUED | OUTPATIENT
Start: 2025-01-01 | End: 2025-01-01 | Stop reason: HOSPADM

## 2025-01-01 RX ORDER — MIDODRINE HYDROCHLORIDE 5 MG/1
15 TABLET ORAL 3 TIMES DAILY
Status: DISCONTINUED | OUTPATIENT
Start: 2025-01-01 | End: 2025-01-01

## 2025-01-01 RX ORDER — IBUPROFEN 200 MG
16 TABLET ORAL
Status: DISCONTINUED | OUTPATIENT
Start: 2025-01-01 | End: 2025-01-01 | Stop reason: HOSPADM

## 2025-01-01 RX ORDER — DEXTROSE MONOHYDRATE AND SODIUM CHLORIDE 5; .45 G/100ML; G/100ML
INJECTION, SOLUTION INTRAVENOUS CONTINUOUS PRN
Status: CANCELLED | OUTPATIENT
Start: 2025-01-01

## 2025-01-01 RX ORDER — GLUCAGON 1 MG
1 KIT INJECTION
Status: DISCONTINUED | OUTPATIENT
Start: 2025-01-01 | End: 2025-01-01 | Stop reason: HOSPADM

## 2025-01-01 RX ORDER — CALCIUM CARBONATE 200(500)MG
500 TABLET,CHEWABLE ORAL 2 TIMES DAILY PRN
Status: DISCONTINUED | OUTPATIENT
Start: 2025-01-01 | End: 2025-01-01 | Stop reason: HOSPADM

## 2025-01-01 RX ORDER — PROCHLORPERAZINE EDISYLATE 5 MG/ML
5 INJECTION INTRAMUSCULAR; INTRAVENOUS EVERY 6 HOURS PRN
Qty: 90 EACH | Refills: 0 | Status: SHIPPED | OUTPATIENT
Start: 2025-01-01 | End: 2025-01-01 | Stop reason: HOSPADM

## 2025-01-01 RX ORDER — ALBUMIN HUMAN 250 G/1000ML
25 SOLUTION INTRAVENOUS EVERY 8 HOURS
Status: COMPLETED | OUTPATIENT
Start: 2025-01-01 | End: 2025-01-01

## 2025-01-01 RX ORDER — ALBUMIN HUMAN 250 G/1000ML
25 SOLUTION INTRAVENOUS ONCE
Status: DISCONTINUED | OUTPATIENT
Start: 2025-01-01 | End: 2025-01-01

## 2025-01-01 RX ORDER — SODIUM CHLORIDE 0.9 % (FLUSH) 0.9 %
10 SYRINGE (ML) INJECTION EVERY 12 HOURS PRN
Status: DISCONTINUED | OUTPATIENT
Start: 2025-01-01 | End: 2025-01-01 | Stop reason: HOSPADM

## 2025-01-01 RX ORDER — MAGNESIUM SULFATE HEPTAHYDRATE 40 MG/ML
2 INJECTION, SOLUTION INTRAVENOUS ONCE
Status: COMPLETED | OUTPATIENT
Start: 2025-01-01 | End: 2025-01-01

## 2025-01-01 RX ORDER — MIDODRINE HYDROCHLORIDE 5 MG/1
10 TABLET ORAL 3 TIMES DAILY
Status: DISCONTINUED | OUTPATIENT
Start: 2025-01-01 | End: 2025-01-01

## 2025-01-01 RX ORDER — SEVELAMER CARBONATE 0.8 G/1
0.8 POWDER, FOR SUSPENSION ORAL
Status: DISCONTINUED | OUTPATIENT
Start: 2025-01-01 | End: 2025-01-01

## 2025-01-01 RX ORDER — PANTOPRAZOLE SODIUM 40 MG/10ML
40 INJECTION, POWDER, LYOPHILIZED, FOR SOLUTION INTRAVENOUS ONCE
Status: COMPLETED | OUTPATIENT
Start: 2025-01-01 | End: 2025-01-01

## 2025-01-01 RX ORDER — NALOXONE HCL 0.4 MG/ML
0.02 VIAL (ML) INJECTION
Status: DISCONTINUED | OUTPATIENT
Start: 2025-01-01 | End: 2025-01-01 | Stop reason: HOSPADM

## 2025-01-01 RX ORDER — PROCHLORPERAZINE EDISYLATE 5 MG/ML
5 INJECTION INTRAMUSCULAR; INTRAVENOUS EVERY 6 HOURS PRN
Status: DISCONTINUED | OUTPATIENT
Start: 2025-01-01 | End: 2025-01-01 | Stop reason: HOSPADM

## 2025-01-01 RX ORDER — TALC
6 POWDER (GRAM) TOPICAL NIGHTLY PRN
Qty: 90 TABLET | Refills: 0 | Status: SHIPPED | OUTPATIENT
Start: 2025-01-01

## 2025-01-01 RX ORDER — ACETAMINOPHEN 325 MG/1
650 TABLET ORAL EVERY 6 HOURS PRN
Status: DISCONTINUED | OUTPATIENT
Start: 2025-01-01 | End: 2025-01-01 | Stop reason: HOSPADM

## 2025-01-01 RX ORDER — IBUPROFEN 200 MG
24 TABLET ORAL
Status: DISCONTINUED | OUTPATIENT
Start: 2025-01-01 | End: 2025-01-01 | Stop reason: HOSPADM

## 2025-01-01 RX ORDER — POTASSIUM CHLORIDE 29.8 MG/ML
40 INJECTION INTRAVENOUS EVERY 4 HOURS
Status: DISCONTINUED | OUTPATIENT
Start: 2025-01-01 | End: 2025-01-01

## 2025-01-01 RX ORDER — TALC
6 POWDER (GRAM) TOPICAL NIGHTLY PRN
Status: DISCONTINUED | OUTPATIENT
Start: 2025-01-01 | End: 2025-01-01 | Stop reason: HOSPADM

## 2025-01-01 RX ORDER — MAGNESIUM SULFATE 1 G/100ML
1 INJECTION INTRAVENOUS ONCE
Status: COMPLETED | OUTPATIENT
Start: 2025-01-01 | End: 2025-01-01

## 2025-01-01 RX ORDER — ONDANSETRON HYDROCHLORIDE 2 MG/ML
8 INJECTION, SOLUTION INTRAVENOUS EVERY 8 HOURS PRN
Qty: 90 EACH | Refills: 0 | Status: SHIPPED | OUTPATIENT
Start: 2025-01-01 | End: 2025-01-01 | Stop reason: HOSPADM

## 2025-01-01 RX ORDER — SODIUM CHLORIDE 9 MG/ML
1000 INJECTION, SOLUTION INTRAVENOUS CONTINUOUS
Status: CANCELLED | OUTPATIENT
Start: 2025-01-01

## 2025-01-01 RX ORDER — OCTREOTIDE ACETATE 100 UG/ML
100 INJECTION, SOLUTION INTRAVENOUS; SUBCUTANEOUS EVERY 8 HOURS
Status: DISCONTINUED | OUTPATIENT
Start: 2025-01-01 | End: 2025-01-01

## 2025-01-01 RX ORDER — MIRTAZAPINE 7.5 MG/1
7.5 TABLET, FILM COATED ORAL NIGHTLY
Status: DISCONTINUED | OUTPATIENT
Start: 2025-01-01 | End: 2025-01-01 | Stop reason: HOSPADM

## 2025-01-01 RX ORDER — MIDODRINE HYDROCHLORIDE 5 MG/1
5 TABLET ORAL EVERY 6 HOURS
Status: DISCONTINUED | OUTPATIENT
Start: 2025-01-01 | End: 2025-01-01

## 2025-01-01 RX ORDER — NOREPINEPHRINE BITARTRATE/D5W 4MG/250ML
0-.2 PLASTIC BAG, INJECTION (ML) INTRAVENOUS CONTINUOUS
Status: DISCONTINUED | OUTPATIENT
Start: 2025-01-01 | End: 2025-01-01

## 2025-01-01 RX ORDER — MUPIROCIN 20 MG/G
OINTMENT TOPICAL 2 TIMES DAILY
Status: COMPLETED | OUTPATIENT
Start: 2025-01-01 | End: 2025-01-01

## 2025-01-01 RX ADMIN — NOREPINEPHRINE BITARTRATE 0.16 MCG/KG/MIN: 0.02 INJECTION, SOLUTION INTRAVENOUS at 05:04

## 2025-01-01 RX ADMIN — NOREPINEPHRINE BITARTRATE 0.14 MCG/KG/MIN: 0.02 INJECTION, SOLUTION INTRAVENOUS at 08:04

## 2025-01-01 RX ADMIN — ONDANSETRON 8 MG: 8 TABLET, ORALLY DISINTEGRATING ORAL at 11:04

## 2025-01-01 RX ADMIN — HEPARIN SODIUM 5000 UNITS: 5000 INJECTION INTRAVENOUS; SUBCUTANEOUS at 09:04

## 2025-01-01 RX ADMIN — ONDANSETRON 8 MG: 2 INJECTION INTRAMUSCULAR; INTRAVENOUS at 12:04

## 2025-01-01 RX ADMIN — MIDODRINE HYDROCHLORIDE 15 MG: 5 TABLET ORAL at 08:04

## 2025-01-01 RX ADMIN — CALCIUM CARBONATE (ANTACID) CHEW TAB 500 MG 500 MG: 500 CHEW TAB at 07:04

## 2025-01-01 RX ADMIN — MIDODRINE HYDROCHLORIDE 5 MG: 5 TABLET ORAL at 12:04

## 2025-01-01 RX ADMIN — ALBUMIN (HUMAN) 25 G: 12.5 SOLUTION INTRAVENOUS at 10:04

## 2025-01-01 RX ADMIN — MIRTAZAPINE 7.5 MG: 7.5 TABLET, FILM COATED ORAL at 08:04

## 2025-01-01 RX ADMIN — MIDODRINE HYDROCHLORIDE 15 MG: 5 TABLET ORAL at 02:04

## 2025-01-01 RX ADMIN — MORPHINE SULFATE 2 MG: 2 INJECTION, SOLUTION INTRAMUSCULAR; INTRAVENOUS at 01:04

## 2025-01-01 RX ADMIN — HEPARIN SODIUM 5000 UNITS: 5000 INJECTION INTRAVENOUS; SUBCUTANEOUS at 10:04

## 2025-01-01 RX ADMIN — PANTOPRAZOLE SODIUM 40 MG: 40 INJECTION, POWDER, FOR SOLUTION INTRAVENOUS at 05:04

## 2025-01-01 RX ADMIN — SEVELAMER CARBONATE 0.8 G: 800 POWDER, FOR SUSPENSION ORAL at 01:04

## 2025-01-01 RX ADMIN — SEVELAMER CARBONATE 0.8 G: 800 POWDER, FOR SUSPENSION ORAL at 05:04

## 2025-01-01 RX ADMIN — PIPERACILLIN SODIUM AND TAZOBACTAM SODIUM 4.5 G: 4; .5 INJECTION, POWDER, FOR SOLUTION INTRAVENOUS at 12:04

## 2025-01-01 RX ADMIN — PIPERACILLIN SODIUM AND TAZOBACTAM SODIUM 4.5 G: 4; .5 INJECTION, POWDER, FOR SOLUTION INTRAVENOUS at 10:04

## 2025-01-01 RX ADMIN — NOREPINEPHRINE BITARTRATE 0.17 MCG/KG/MIN: 0.02 INJECTION, SOLUTION INTRAVENOUS at 12:04

## 2025-01-01 RX ADMIN — MIDODRINE HYDROCHLORIDE 15 MG: 5 TABLET ORAL at 01:04

## 2025-01-01 RX ADMIN — SEVELAMER CARBONATE 0.8 G: 800 POWDER, FOR SUSPENSION ORAL at 11:04

## 2025-01-01 RX ADMIN — MIDODRINE HYDROCHLORIDE 5 MG: 5 TABLET ORAL at 01:04

## 2025-01-01 RX ADMIN — HEPARIN SODIUM 5000 UNITS: 5000 INJECTION INTRAVENOUS; SUBCUTANEOUS at 02:04

## 2025-01-01 RX ADMIN — MUPIROCIN: 20 OINTMENT TOPICAL at 09:04

## 2025-01-01 RX ADMIN — SEVELAMER CARBONATE 0.8 G: 800 POWDER, FOR SUSPENSION ORAL at 12:04

## 2025-01-01 RX ADMIN — ACETAMINOPHEN 650 MG: 325 TABLET ORAL at 08:04

## 2025-01-01 RX ADMIN — PIPERACILLIN SODIUM AND TAZOBACTAM SODIUM 4.5 G: 4; .5 INJECTION, POWDER, FOR SOLUTION INTRAVENOUS at 11:04

## 2025-01-01 RX ADMIN — MUPIROCIN: 20 OINTMENT TOPICAL at 08:04

## 2025-01-01 RX ADMIN — NOREPINEPHRINE BITARTRATE 0.14 MCG/KG/MIN: 0.02 INJECTION, SOLUTION INTRAVENOUS at 11:04

## 2025-01-01 RX ADMIN — ONDANSETRON 4 MG: 2 INJECTION INTRAMUSCULAR; INTRAVENOUS at 02:04

## 2025-01-01 RX ADMIN — MIRTAZAPINE 7.5 MG: 7.5 TABLET, FILM COATED ORAL at 09:04

## 2025-01-01 RX ADMIN — NOREPINEPHRINE BITARTRATE 0.18 MCG/KG/MIN: 0.02 INJECTION, SOLUTION INTRAVENOUS at 07:04

## 2025-01-01 RX ADMIN — PROCHLORPERAZINE EDISYLATE 5 MG: 5 INJECTION INTRAMUSCULAR; INTRAVENOUS at 06:04

## 2025-01-01 RX ADMIN — LACTULOSE 20 G: 20 SOLUTION ORAL at 10:04

## 2025-01-01 RX ADMIN — HEPARIN SODIUM 5000 UNITS: 5000 INJECTION INTRAVENOUS; SUBCUTANEOUS at 01:04

## 2025-01-01 RX ADMIN — MUPIROCIN: 20 OINTMENT TOPICAL at 02:04

## 2025-01-01 RX ADMIN — NOREPINEPHRINE BITARTRATE 0.18 MCG/KG/MIN: 0.02 INJECTION, SOLUTION INTRAVENOUS at 08:04

## 2025-01-01 RX ADMIN — SEVELAMER CARBONATE 0.8 G: 800 POWDER, FOR SUSPENSION ORAL at 04:04

## 2025-01-01 RX ADMIN — ONDANSETRON 4 MG: 2 INJECTION INTRAMUSCULAR; INTRAVENOUS at 07:04

## 2025-01-01 RX ADMIN — OCTREOTIDE ACETATE 100 MCG: 100 INJECTION, SOLUTION INTRAVENOUS; SUBCUTANEOUS at 02:04

## 2025-01-01 RX ADMIN — MAGNESIUM SULFATE HEPTAHYDRATE 1 G: 500 INJECTION, SOLUTION INTRAMUSCULAR; INTRAVENOUS at 08:04

## 2025-01-01 RX ADMIN — HEPARIN SODIUM 5000 UNITS: 5000 INJECTION INTRAVENOUS; SUBCUTANEOUS at 05:04

## 2025-01-01 RX ADMIN — SEVELAMER CARBONATE 0.8 G: 800 POWDER, FOR SUSPENSION ORAL at 07:04

## 2025-01-01 RX ADMIN — MIDODRINE HYDROCHLORIDE 5 MG: 5 TABLET ORAL at 09:04

## 2025-01-01 RX ADMIN — LACTULOSE 20 G: 20 SOLUTION ORAL at 08:04

## 2025-01-01 RX ADMIN — VANCOMYCIN HYDROCHLORIDE 1000 MG: 1 INJECTION, POWDER, LYOPHILIZED, FOR SOLUTION INTRAVENOUS at 08:04

## 2025-01-01 RX ADMIN — SEVELAMER CARBONATE 0.8 G: 800 POWDER, FOR SUSPENSION ORAL at 08:04

## 2025-01-01 RX ADMIN — LACTULOSE 20 G: 20 SOLUTION ORAL at 09:04

## 2025-01-01 RX ADMIN — OCTREOTIDE ACETATE 100 MCG: 100 INJECTION, SOLUTION INTRAVENOUS; SUBCUTANEOUS at 06:04

## 2025-01-01 RX ADMIN — ACETAMINOPHEN 650 MG: 325 TABLET ORAL at 05:04

## 2025-01-01 RX ADMIN — HEPARIN SODIUM 5000 UNITS: 5000 INJECTION INTRAVENOUS; SUBCUTANEOUS at 03:04

## 2025-01-01 RX ADMIN — HEPARIN SODIUM 5000 UNITS: 5000 INJECTION INTRAVENOUS; SUBCUTANEOUS at 08:04

## 2025-01-01 RX ADMIN — NOREPINEPHRINE BITARTRATE 0.18 MCG/KG/MIN: 0.02 INJECTION, SOLUTION INTRAVENOUS at 03:04

## 2025-01-01 RX ADMIN — HEPARIN SODIUM 5000 UNITS: 5000 INJECTION INTRAVENOUS; SUBCUTANEOUS at 06:04

## 2025-01-01 RX ADMIN — LACTULOSE 10 G: 20 SOLUTION ORAL at 08:04

## 2025-01-01 RX ADMIN — POTASSIUM BICARBONATE 20 MEQ: 391 TABLET, EFFERVESCENT ORAL at 10:04

## 2025-01-01 RX ADMIN — MUPIROCIN: 20 OINTMENT TOPICAL at 10:04

## 2025-01-01 RX ADMIN — NOREPINEPHRINE BITARTRATE 0.16 MCG/KG/MIN: 0.02 INJECTION, SOLUTION INTRAVENOUS at 08:04

## 2025-01-01 RX ADMIN — NOREPINEPHRINE BITARTRATE 0.14 MCG/KG/MIN: 0.02 INJECTION, SOLUTION INTRAVENOUS at 10:04

## 2025-01-01 RX ADMIN — OCTREOTIDE ACETATE 100 MCG: 100 INJECTION, SOLUTION INTRAVENOUS; SUBCUTANEOUS at 05:04

## 2025-01-01 RX ADMIN — NOREPINEPHRINE BITARTRATE 0.16 MCG/KG/MIN: 0.02 INJECTION, SOLUTION INTRAVENOUS at 01:04

## 2025-01-01 RX ADMIN — MIDODRINE HYDROCHLORIDE 5 MG: 5 TABLET ORAL at 11:04

## 2025-01-01 RX ADMIN — PIPERACILLIN SODIUM AND TAZOBACTAM SODIUM 4.5 G: 4; .5 INJECTION, POWDER, FOR SOLUTION INTRAVENOUS at 02:04

## 2025-01-01 RX ADMIN — VANCOMYCIN HYDROCHLORIDE 1000 MG: 1 INJECTION, POWDER, LYOPHILIZED, FOR SOLUTION INTRAVENOUS at 09:04

## 2025-01-01 RX ADMIN — METRONIDAZOLE 500 MG: 5 INJECTION, SOLUTION INTRAVENOUS at 11:04

## 2025-01-01 RX ADMIN — ALBUMIN (HUMAN) 25 G: 12.5 SOLUTION INTRAVENOUS at 01:04

## 2025-01-01 RX ADMIN — OCTREOTIDE ACETATE 100 MCG: 100 INJECTION, SOLUTION INTRAVENOUS; SUBCUTANEOUS at 10:04

## 2025-01-01 RX ADMIN — NOREPINEPHRINE BITARTRATE 0.18 MCG/KG/MIN: 0.02 INJECTION, SOLUTION INTRAVENOUS at 06:04

## 2025-01-01 RX ADMIN — NOREPINEPHRINE BITARTRATE 0.2 MCG/KG/MIN: 0.02 INJECTION, SOLUTION INTRAVENOUS at 10:04

## 2025-01-01 RX ADMIN — NOREPINEPHRINE BITARTRATE 0.12 MCG/KG/MIN: 0.02 INJECTION, SOLUTION INTRAVENOUS at 05:04

## 2025-01-01 RX ADMIN — LACTULOSE 10 G: 20 SOLUTION ORAL at 11:04

## 2025-01-01 RX ADMIN — SODIUM CHLORIDE, POTASSIUM CHLORIDE, SODIUM LACTATE AND CALCIUM CHLORIDE: 600; 310; 30; 20 INJECTION, SOLUTION INTRAVENOUS at 11:04

## 2025-01-01 RX ADMIN — NOREPINEPHRINE BITARTRATE 0.2 MCG/KG/MIN: 0.02 INJECTION, SOLUTION INTRAVENOUS at 07:04

## 2025-01-01 RX ADMIN — NOREPINEPHRINE BITARTRATE 0.14 MCG/KG/MIN: 0.02 INJECTION, SOLUTION INTRAVENOUS at 02:04

## 2025-01-01 RX ADMIN — NOREPINEPHRINE BITARTRATE 0.04 MCG/KG/MIN: 0.02 INJECTION, SOLUTION INTRAVENOUS at 07:04

## 2025-01-01 RX ADMIN — OCTREOTIDE ACETATE 100 MCG: 100 INJECTION, SOLUTION INTRAVENOUS; SUBCUTANEOUS at 09:04

## 2025-01-01 RX ADMIN — PIPERACILLIN SODIUM AND TAZOBACTAM SODIUM 4.5 G: 4; .5 INJECTION, POWDER, FOR SOLUTION INTRAVENOUS at 09:04

## 2025-01-01 RX ADMIN — MIRTAZAPINE 7.5 MG: 7.5 TABLET, FILM COATED ORAL at 10:04

## 2025-01-01 RX ADMIN — CALCIUM CARBONATE (ANTACID) CHEW TAB 500 MG 500 MG: 500 CHEW TAB at 03:04

## 2025-01-01 RX ADMIN — PROCHLORPERAZINE MALEATE 10 MG: 5 TABLET ORAL at 05:04

## 2025-01-01 RX ADMIN — NOREPINEPHRINE BITARTRATE 0.18 MCG/KG/MIN: 0.02 INJECTION, SOLUTION INTRAVENOUS at 05:04

## 2025-01-01 RX ADMIN — MAGNESIUM SULFATE HEPTAHYDRATE 2 G: 40 INJECTION, SOLUTION INTRAVENOUS at 06:04

## 2025-01-01 RX ADMIN — NOREPINEPHRINE BITARTRATE 0.18 MCG/KG/MIN: 0.02 INJECTION, SOLUTION INTRAVENOUS at 09:04

## 2025-01-01 RX ADMIN — SEVELAMER CARBONATE 0.8 G: 800 POWDER, FOR SUSPENSION ORAL at 02:04

## 2025-01-01 RX ADMIN — CEFTRIAXONE 1 G: 1 INJECTION, POWDER, FOR SOLUTION INTRAMUSCULAR; INTRAVENOUS at 12:04

## 2025-01-01 RX ADMIN — MIDODRINE HYDROCHLORIDE 5 MG: 5 TABLET ORAL at 05:04

## 2025-01-01 RX ADMIN — POTASSIUM BICARBONATE 20 MEQ: 391 TABLET, EFFERVESCENT ORAL at 08:04

## 2025-01-01 RX ADMIN — POTASSIUM BICARBONATE 20 MEQ: 391 TABLET, EFFERVESCENT ORAL at 03:04

## 2025-01-01 RX ADMIN — MIDODRINE HYDROCHLORIDE 15 MG: 5 TABLET ORAL at 09:04

## 2025-01-01 RX ADMIN — ACETAMINOPHEN 650 MG: 325 TABLET ORAL at 03:04

## 2025-01-01 RX ADMIN — MIDODRINE HYDROCHLORIDE 5 MG: 5 TABLET ORAL at 06:04

## 2025-01-01 RX ADMIN — ALBUMIN (HUMAN) 25 G: 12.5 SOLUTION INTRAVENOUS at 05:04

## 2025-01-01 RX ADMIN — NOREPINEPHRINE BITARTRATE 0.18 MCG/KG/MIN: 0.02 INJECTION, SOLUTION INTRAVENOUS at 04:04

## 2025-01-01 RX ADMIN — NOREPINEPHRINE BITARTRATE 0.02 MCG/KG/MIN: 0.02 INJECTION, SOLUTION INTRAVENOUS at 12:04

## 2025-01-01 RX ADMIN — POTASSIUM CHLORIDE 40 MEQ: 29.8 INJECTION, SOLUTION INTRAVENOUS at 09:04

## 2025-01-01 RX ADMIN — SODIUM CHLORIDE 1000 ML: 9 INJECTION, SOLUTION INTRAVENOUS at 07:04

## 2025-01-08 ENCOUNTER — HOSPITAL ENCOUNTER (OUTPATIENT)
Dept: INTERVENTIONAL RADIOLOGY/VASCULAR | Facility: HOSPITAL | Age: 80
Discharge: HOME | End: 2025-01-08
Attending: INTERNAL MEDICINE
Payer: MEDICARE

## 2025-01-08 VITALS
SYSTOLIC BLOOD PRESSURE: 100 MMHG | HEIGHT: 64 IN | DIASTOLIC BLOOD PRESSURE: 57 MMHG | HEART RATE: 72 BPM | OXYGEN SATURATION: 100 % | RESPIRATION RATE: 16 BRPM | BODY MASS INDEX: 18.95 KG/M2 | WEIGHT: 111 LBS | TEMPERATURE: 98 F

## 2025-01-08 DIAGNOSIS — R18.8 OTHER ASCITES: ICD-10-CM

## 2025-01-08 DIAGNOSIS — R18.8 ASCITES: ICD-10-CM

## 2025-01-08 PROCEDURE — 49083 ABD PARACENTESIS W/IMAGING: CPT | Performed by: RADIOLOGY

## 2025-01-08 PROCEDURE — 63600175 PHARM REV CODE 636 W HCPCS: Performed by: PHYSICIAN ASSISTANT

## 2025-01-08 RX ORDER — LIDOCAINE HYDROCHLORIDE 10 MG/ML
INJECTION, SOLUTION INFILTRATION; PERINEURAL
Status: COMPLETED | OUTPATIENT
Start: 2025-01-08 | End: 2025-01-08

## 2025-01-08 RX ADMIN — LIDOCAINE HYDROCHLORIDE 5 ML: 10 INJECTION, SOLUTION INFILTRATION; PERINEURAL at 08:01

## 2025-01-08 NOTE — PROCEDURES
Radiology Post-Procedure Note    Pre Op Diagnosis: Ascites  Post Op Diagnosis: Same    Procedure: Ultrasound Guided Paracentesis    Procedure performed by: Patt Elliott PA-C    Written Informed Consent Obtained: Yes  Specimen Removed: None sent  Estimated Blood Loss: Minimal    Findings:   Successful paracentesis.  3600 ml clear yellow fluid RLQ.  Albumin was not administered PRN per protocol.    Patient tolerated procedure well.    Patt Elliott PA-C

## 2025-01-08 NOTE — H&P
Radiology History & Physical      SUBJECTIVE:     Chief Complaint: abdominal distention    History of Present Illness:  Seema Gann is a 79 y.o. female who presents for ultrasound guided paracentesis  Past Medical History:   Diagnosis Date    Cirrhosis     HCC (hepatocellular carcinoma)     Hepatitis     Hypertension      Past Surgical History:   Procedure Laterality Date    APPENDECTOMY      HYSTERECTOMY         Home Meds:   Prior to Admission medications    Medication Sig Start Date End Date Taking? Authorizing Provider   ergocalciferol (ERGOCALCIFEROL) 50,000 unit Cap Take 1 capsule (50,000 Units total) by mouth every 7 days. 9/28/24  Yes Blaine Anderson MD   mirtazapine (REMERON) 7.5 MG Tab Take 1 tablet (7.5 mg total) by mouth every evening. 9/6/24 9/6/25 Yes Bethany Mesa MD   midodrine (PROAMATINE) 5 MG Tab Take 2 tablets (10 mg total) by mouth 3 (three) times daily. 9/16/24 12/15/24  Blaine Anderson MD     Anticoagulants/Antiplatelets: no anticoagulation    Allergies: Review of patient's allergies indicates:  No Known Allergies  Sedation History:  no adverse reactions    Review of Systems:   Hematological: no known coagulopathies  Respiratory: no shortness of breath  Cardiovascular: no chest pain  Gastrointestinal: no abdominal pain  Genito-Urinary: no dysuria  Musculoskeletal: negative  Neurological: no TIA or stroke symptoms         OBJECTIVE:     Vital Signs (Most Recent)  Temp: 98.3 °F (36.8 °C) (01/08/25 0829)  Pulse: 77 (01/08/25 0829)  Resp: 16 (01/08/25 0829)  BP: 116/68 (01/08/25 0832)  SpO2: 100 % (01/08/25 0829)    Physical Exam:  ASA: 2  Mallampati: n/a    General: no acute distress  Mental Status: alert and oriented to person, place and time  HEENT: normocephalic, atraumatic  Chest: unlabored breathing  Heart: regular heart rate  Abdomen: distended  Extremity: moves all extremities    ASSESSMENT/PLAN:     Sedation Plan: local  Patient will undergo ultrasound guided  paracentesis.    Patt Elliott PA-C

## 2025-01-08 NOTE — DISCHARGE SUMMARY
Interventional Radiology Short Stay Discharge Summary      Admit Date: 1/8/2025  Discharge Date: 01/08/2025     Hospital Course: Uneventful    Discharge Diagnosis: ascites    Discharge Condition: Stable    Discharge Disposition: Home    Diet: Resume prior diet    Activity: activity as tolerated    Follow-up: With referring provider    Cindy Chava, PA-C [facility]

## 2025-01-14 DIAGNOSIS — Z00.00 ENCOUNTER FOR MEDICARE ANNUAL WELLNESS EXAM: ICD-10-CM

## 2025-01-15 ENCOUNTER — TELEPHONE (OUTPATIENT)
Dept: INTERVENTIONAL RADIOLOGY/VASCULAR | Facility: HOSPITAL | Age: 80
End: 2025-01-15
Payer: MEDICARE

## 2025-01-16 ENCOUNTER — HOSPITAL ENCOUNTER (OUTPATIENT)
Dept: INTERVENTIONAL RADIOLOGY/VASCULAR | Facility: HOSPITAL | Age: 80
Discharge: HOME | End: 2025-01-16
Attending: INTERNAL MEDICINE
Payer: MEDICARE

## 2025-01-16 VITALS
HEIGHT: 64 IN | OXYGEN SATURATION: 100 % | SYSTOLIC BLOOD PRESSURE: 95 MMHG | HEART RATE: 63 BPM | RESPIRATION RATE: 17 BRPM | BODY MASS INDEX: 18.95 KG/M2 | WEIGHT: 111 LBS | DIASTOLIC BLOOD PRESSURE: 56 MMHG

## 2025-01-16 DIAGNOSIS — R18.8 OTHER ASCITES: ICD-10-CM

## 2025-01-16 LAB
APPEARANCE FLD: NORMAL
BODY FLD TYPE: NORMAL
COLOR FLD: YELLOW
GRAM STN SPEC: NORMAL
GRAM STN SPEC: NORMAL
LYMPHOCYTES NFR FLD MANUAL: 75 %
MESOTHL CELL NFR FLD MANUAL: 1 %
MONOS+MACROS NFR FLD MANUAL: 13 %
NEUTROPHILS NFR FLD MANUAL: 11 %
WBC # FLD: 55 /CU MM

## 2025-01-16 PROCEDURE — 63600175 PHARM REV CODE 636 W HCPCS: Mod: HCNC | Performed by: PHYSICIAN ASSISTANT

## 2025-01-16 PROCEDURE — 87205 SMEAR GRAM STAIN: CPT | Mod: HCNC | Performed by: INTERNAL MEDICINE

## 2025-01-16 PROCEDURE — 49083 ABD PARACENTESIS W/IMAGING: CPT | Mod: HCNC | Performed by: INTERNAL MEDICINE

## 2025-01-16 PROCEDURE — 89051 BODY FLUID CELL COUNT: CPT | Mod: HCNC | Performed by: INTERNAL MEDICINE

## 2025-01-16 RX ORDER — LIDOCAINE HYDROCHLORIDE 10 MG/ML
INJECTION, SOLUTION INFILTRATION; PERINEURAL
Status: COMPLETED | OUTPATIENT
Start: 2025-01-16 | End: 2025-01-16

## 2025-01-16 RX ADMIN — LIDOCAINE HYDROCHLORIDE 3 ML: 10 INJECTION, SOLUTION INFILTRATION; PERINEURAL at 01:01

## 2025-01-16 NOTE — SEDATION DOCUMENTATION
Procedure complete, pt tolerated well. 3500mL hazy/yellow fluid removed from abd, specimens collected to be sent to lab. Patient alert and oriented x4 unlabored on Room Air. Patient denies pain and is resting comfortably. Surgical site dressed with bandaid. Dressing is clean, dry, and intact. AVS and discharge summary provided, pt to be dc'ed at this time.

## 2025-01-16 NOTE — PROCEDURES
Radiology Post-Procedure Note    Pre Op Diagnosis: Ascites  Post Op Diagnosis: Same    Procedure: Ultrasound Guided Paracentesis    Procedure performed by: Patt Elliott PA-C    Written Informed Consent Obtained: Yes  Specimen Removed: YES   Estimated Blood Loss: Minimal    Findings:   Successful paracentesis.  3500 ml yellow fluid RLQ.  Albumin was not administered PRN per protocol.    Patient tolerated procedure well.    Patt Elliott PA-C

## 2025-01-16 NOTE — DISCHARGE SUMMARY
Interventional Radiology Short Stay Discharge Summary      Admit Date: 1/16/2025  Discharge Date: 01/16/2025     Hospital Course: Uneventful    Discharge Diagnosis: ascites    Discharge Condition: Stable    Discharge Disposition: Home    Diet: Resume prior diet    Activity: activity as tolerated    Follow-up: With referring provider    Cindy Chava, PA-C [facility]

## 2025-01-16 NOTE — H&P
Radiology History & Physical      SUBJECTIVE:     Chief Complaint: abdominal distention    History of Present Illness:  Seema Gann is a 79 y.o. female who presents for ultrasound guided paracentesis  Past Medical History:   Diagnosis Date    Cirrhosis     HCC (hepatocellular carcinoma)     Hepatitis     Hypertension      Past Surgical History:   Procedure Laterality Date    APPENDECTOMY      HYSTERECTOMY         Home Meds:   Prior to Admission medications    Medication Sig Start Date End Date Taking? Authorizing Provider   ergocalciferol (ERGOCALCIFEROL) 50,000 unit Cap Take 1 capsule (50,000 Units total) by mouth every 7 days. 9/28/24  Yes Blaine Anderson MD   mirtazapine (REMERON) 7.5 MG Tab Take 1 tablet (7.5 mg total) by mouth every evening. 9/6/24 9/6/25 Yes Bethany Mesa MD   midodrine (PROAMATINE) 5 MG Tab Take 2 tablets (10 mg total) by mouth 3 (three) times daily. 9/16/24 12/15/24  Blaine Anderson MD     Anticoagulants/Antiplatelets: no anticoagulation    Allergies: Review of patient's allergies indicates:  No Known Allergies  Sedation History:  no adverse reactions    Review of Systems:   Hematological: no known coagulopathies  Respiratory: no shortness of breath  Cardiovascular: no chest pain  Gastrointestinal: no abdominal pain  Genito-Urinary: no dysuria  Musculoskeletal: negative  Neurological: no TIA or stroke symptoms         OBJECTIVE:     Vital Signs (Most Recent)  Pulse: 70 (01/16/25 1305)  Resp: 16 (01/16/25 1305)  BP: (!) 98/59 (01/16/25 1307)  SpO2: 100 % (01/16/25 1305)    Physical Exam:  ASA: 2  Mallampati: n/a    General: no acute distress  Mental Status: alert and oriented to person, place and time  HEENT: normocephalic, atraumatic  Chest: unlabored breathing  Heart: regular heart rate  Abdomen: distended  Extremity: moves all extremities    ASSESSMENT/PLAN:     Sedation Plan: local  Patient will undergo ultrasound guided paracentesis.    Patt Elliott PA-C

## 2025-01-25 ENCOUNTER — TELEPHONE (OUTPATIENT)
Dept: INTERVENTIONAL RADIOLOGY/VASCULAR | Facility: HOSPITAL | Age: 80
End: 2025-01-25
Payer: MEDICARE

## 2025-01-27 ENCOUNTER — HOSPITAL ENCOUNTER (OUTPATIENT)
Dept: INTERVENTIONAL RADIOLOGY/VASCULAR | Facility: HOSPITAL | Age: 80
Discharge: HOME | End: 2025-01-27
Attending: INTERNAL MEDICINE
Payer: MEDICARE

## 2025-01-27 VITALS
RESPIRATION RATE: 18 BRPM | TEMPERATURE: 98 F | HEART RATE: 66 BPM | SYSTOLIC BLOOD PRESSURE: 90 MMHG | OXYGEN SATURATION: 98 % | DIASTOLIC BLOOD PRESSURE: 52 MMHG

## 2025-01-27 DIAGNOSIS — R18.8 OTHER ASCITES: ICD-10-CM

## 2025-01-27 PROCEDURE — 63600175 PHARM REV CODE 636 W HCPCS: Mod: HCNC | Performed by: PHYSICIAN ASSISTANT

## 2025-01-27 PROCEDURE — 49083 ABD PARACENTESIS W/IMAGING: CPT | Mod: HCNC | Performed by: STUDENT IN AN ORGANIZED HEALTH CARE EDUCATION/TRAINING PROGRAM

## 2025-01-27 RX ORDER — LIDOCAINE HYDROCHLORIDE 10 MG/ML
INJECTION, SOLUTION INFILTRATION; PERINEURAL
Status: COMPLETED | OUTPATIENT
Start: 2025-01-27 | End: 2025-01-27

## 2025-01-27 RX ADMIN — LIDOCAINE HYDROCHLORIDE 5 ML: 10 INJECTION, SOLUTION INFILTRATION; PERINEURAL at 11:01

## 2025-01-27 NOTE — PROCEDURES
Radiology Post-Procedure Note    Pre Op Diagnosis: Ascites  Post Op Diagnosis: Same    Procedure: Ultrasound Guided Paracentesis    Procedure performed by: Patt Elliott PA-C    Written Informed Consent Obtained: Yes  Specimen Removed: None sent  Estimated Blood Loss: Minimal    Findings:   Successful paracentesis.  2800 ml cloudy yellow fluid removed from RLQ.  Albumin was not administered PRN per protocol.    Patient tolerated procedure well.    Patt Elliott PA-C

## 2025-01-27 NOTE — NURSING
Patient is discharged from IR. AVS is printed and reviewed. Upcoming appointments and the [facility] number is highlighted for convenience. The opportunity to ask questions is provided. Patient is ambulatory from the unit and directed to the lobby to exit.

## 2025-01-27 NOTE — DISCHARGE SUMMARY
Interventional Radiology Short Stay Discharge Summary      Admit Date: 1/27/2025  Discharge Date: 01/27/2025     Hospital Course: Uneventful    Discharge Diagnosis: ascites    Discharge Condition: Stable    Discharge Disposition: Home    Diet: Resume prior diet    Activity: activity as tolerated    Follow-up: With referring provider    Cindy Chava, PA-C [facility]

## 2025-01-27 NOTE — SEDATION DOCUMENTATION
IR Procedure - Paracentesis - is completed. Patient tolerated well. She is awake, alert, oriented. Vital signs are stable. 2800 mL cloudy yellow ascites is drained. Patient will return to IR pre/post to complete discharge.

## 2025-01-28 ENCOUNTER — TELEPHONE (OUTPATIENT)
Dept: INTERVENTIONAL RADIOLOGY/VASCULAR | Facility: HOSPITAL | Age: 80
End: 2025-01-28
Payer: MEDICARE

## 2025-01-31 ENCOUNTER — TELEPHONE (OUTPATIENT)
Dept: INTERVENTIONAL RADIOLOGY/VASCULAR | Facility: HOSPITAL | Age: 80
End: 2025-01-31
Payer: MEDICARE

## 2025-02-03 ENCOUNTER — HOSPITAL ENCOUNTER (OUTPATIENT)
Dept: INTERVENTIONAL RADIOLOGY/VASCULAR | Facility: HOSPITAL | Age: 80
Discharge: HOME | End: 2025-02-03
Attending: INTERNAL MEDICINE
Payer: MEDICARE

## 2025-02-03 VITALS
RESPIRATION RATE: 15 BRPM | DIASTOLIC BLOOD PRESSURE: 57 MMHG | SYSTOLIC BLOOD PRESSURE: 94 MMHG | OXYGEN SATURATION: 99 % | BODY MASS INDEX: 18.78 KG/M2 | HEART RATE: 68 BPM | WEIGHT: 110 LBS | TEMPERATURE: 98 F | HEIGHT: 64 IN

## 2025-02-03 DIAGNOSIS — R18.8 ASCITES: ICD-10-CM

## 2025-02-03 DIAGNOSIS — R18.8 OTHER ASCITES: ICD-10-CM

## 2025-02-03 PROCEDURE — 49083 ABD PARACENTESIS W/IMAGING: CPT | Mod: HCNC | Performed by: RADIOLOGY

## 2025-02-03 PROCEDURE — 63600175 PHARM REV CODE 636 W HCPCS: Mod: HCNC | Performed by: PHYSICIAN ASSISTANT

## 2025-02-03 RX ORDER — LIDOCAINE HYDROCHLORIDE 10 MG/ML
INJECTION, SOLUTION INFILTRATION; PERINEURAL
Status: COMPLETED | OUTPATIENT
Start: 2025-02-03 | End: 2025-02-03

## 2025-02-03 RX ADMIN — LIDOCAINE HYDROCHLORIDE 5 ML: 10 INJECTION, SOLUTION INFILTRATION; PERINEURAL at 08:02

## 2025-02-03 NOTE — PROCEDURES
Radiology Post-Procedure Note    Pre Op Diagnosis: Ascites  Post Op Diagnosis: Same    Procedure: Ultrasound Guided Paracentesis    Procedure performed by: Patt Elliott PA-C    Written Informed Consent Obtained: Yes  Specimen Removed: None sent  Estimated Blood Loss: Minimal    Findings:   Successful paracentesis.  3150 ml yellow fluid RLQ.  Albumin was not administered PRN per protocol.    Patient tolerated procedure well.    Patt Elliott PA-C    [Behavioral health counseling provided] : behavioral health  [None] : None [Good understanding] : Patient has a good understanding of lifestyle changes and the steps needed to achieve self management goals

## 2025-02-03 NOTE — PLAN OF CARE
Pt VSS and in NAD. CRM equipment removed. Discharge instructions and AVS provided. Pt verbalized understanding, questions and concerns addressed. No further needs at this time. Pt discharged from recovery area at 0905.

## 2025-02-03 NOTE — DISCHARGE SUMMARY
Interventional Radiology Short Stay Discharge Summary      Admit Date: 2/3/2025  Discharge Date: 02/03/2025     Hospital Course: Uneventful    Discharge Diagnosis: ascites    Discharge Condition: Stable    Discharge Disposition: Home    Diet: Resume prior diet    Activity: activity as tolerated    Follow-up: With referring provider    Cindy Chava, PA-C [facility]

## 2025-02-03 NOTE — H&P
Radiology History & Physical      SUBJECTIVE:     Chief Complaint: abdominal distention    History of Present Illness:  Seema Gann is a 79 y.o. female who presents for ultrasound guided paracentesis  Past Medical History:   Diagnosis Date    Cirrhosis     HCC (hepatocellular carcinoma)     Hepatitis     Hypertension      Past Surgical History:   Procedure Laterality Date    APPENDECTOMY      HYSTERECTOMY         Home Meds:   Prior to Admission medications    Medication Sig Start Date End Date Taking? Authorizing Provider   ergocalciferol (ERGOCALCIFEROL) 50,000 unit Cap Take 1 capsule (50,000 Units total) by mouth every 7 days. 9/28/24  Yes Blaine Anderson MD   mirtazapine (REMERON) 7.5 MG Tab Take 1 tablet (7.5 mg total) by mouth every evening. 9/6/24 9/6/25 Yes Bethany Mesa MD   midodrine (PROAMATINE) 5 MG Tab Take 2 tablets (10 mg total) by mouth 3 (three) times daily. 9/16/24 12/15/24  Blaine Anderson MD     Anticoagulants/Antiplatelets: no anticoagulation    Allergies: Review of patient's allergies indicates:  No Known Allergies  Sedation History:  no adverse reactions    Review of Systems:   Hematological: no known coagulopathies  Respiratory: no shortness of breath  Cardiovascular: no chest pain  Gastrointestinal: no abdominal pain  Genito-Urinary: no dysuria  Musculoskeletal: negative  Neurological: no TIA or stroke symptoms         OBJECTIVE:     Vital Signs (Most Recent)  Temp: 97.8 °F (36.6 °C) (02/03/25 0811)  Pulse: 68 (02/03/25 0850)  Resp: 15 (02/03/25 0850)  BP: (!) 94/57 (02/03/25 0850)  SpO2: 99 % (02/03/25 0850)    Physical Exam:  ASA: 2  Mallampati: n/a    General: no acute distress  Mental Status: alert and oriented to person, place and time  HEENT: normocephalic, atraumatic  Chest: unlabored breathing  Heart: regular heart rate  Abdomen: distended  Extremity: moves all extremities    ASSESSMENT/PLAN:     Sedation Plan: local  Patient will undergo ultrasound guided  paracentesis.    Patt Elliott PA-C

## 2025-02-17 ENCOUNTER — TELEPHONE (OUTPATIENT)
Dept: INTERVENTIONAL RADIOLOGY/VASCULAR | Facility: HOSPITAL | Age: 80
End: 2025-02-17
Payer: MEDICARE

## 2025-02-18 ENCOUNTER — HOSPITAL ENCOUNTER (OUTPATIENT)
Dept: INTERVENTIONAL RADIOLOGY/VASCULAR | Facility: HOSPITAL | Age: 80
Discharge: HOME | End: 2025-02-18
Attending: INTERNAL MEDICINE
Payer: MEDICARE

## 2025-02-18 VITALS
OXYGEN SATURATION: 100 % | RESPIRATION RATE: 16 BRPM | HEIGHT: 62 IN | HEART RATE: 67 BPM | WEIGHT: 110 LBS | TEMPERATURE: 98 F | DIASTOLIC BLOOD PRESSURE: 61 MMHG | SYSTOLIC BLOOD PRESSURE: 100 MMHG | BODY MASS INDEX: 20.24 KG/M2

## 2025-02-18 DIAGNOSIS — R18.8 OTHER ASCITES: ICD-10-CM

## 2025-02-18 DIAGNOSIS — R18.8 ASCITES: ICD-10-CM

## 2025-02-18 PROCEDURE — 49083 ABD PARACENTESIS W/IMAGING: CPT | Mod: HCNC | Performed by: STUDENT IN AN ORGANIZED HEALTH CARE EDUCATION/TRAINING PROGRAM

## 2025-02-18 PROCEDURE — 63600175 PHARM REV CODE 636 W HCPCS: Mod: HCNC | Performed by: PHYSICIAN ASSISTANT

## 2025-02-18 RX ORDER — LIDOCAINE HYDROCHLORIDE 10 MG/ML
INJECTION, SOLUTION INFILTRATION; PERINEURAL
Status: COMPLETED | OUTPATIENT
Start: 2025-02-18 | End: 2025-02-18

## 2025-02-18 RX ADMIN — LIDOCAINE HYDROCHLORIDE 5 ML: 10 INJECTION, SOLUTION INFILTRATION; PERINEURAL at 09:02

## 2025-02-18 NOTE — Clinical Note
A pre-sedation assessment was completed by the PA-C immediately prior to sedation start.     Local Only

## 2025-02-18 NOTE — DISCHARGE SUMMARY
Interventional Radiology Short Stay Discharge Summary      Admit Date: 2/18/2025  Discharge Date: 02/18/2025     Hospital Course: Uneventful    Discharge Diagnosis: ascites    Discharge Condition: Stable    Discharge Disposition: Home    Diet: Resume prior diet    Activity: activity as tolerated    Follow-up: With referring provider    Cindy Chava, PA-C [facility]

## 2025-02-18 NOTE — SEDATION DOCUMENTATION
IR Procedure - Paracentesis - is completed. Patient tolerated well. She is awake, alert, oriented. Vital signs are stable. 2800 mL hazy, yellow ascites is drained. Patient will return to IR pre/post to complete discharge.

## 2025-02-18 NOTE — PROCEDURES
Radiology Post-Procedure Note    Pre Op Diagnosis: Ascites  Post Op Diagnosis: Same    Procedure: Ultrasound Guided Paracentesis    Procedure performed by: Patt Elliott PA-C    Written Informed Consent Obtained: Yes  Specimen Removed: YES   Estimated Blood Loss: Minimal    Findings:   Successful paracentesis.  2800 ml RLQ.  Albumin was not administered PRN per protocol.    Patient tolerated procedure well.    Patt Elliott PA-C

## 2025-02-28 ENCOUNTER — OFFICE VISIT (OUTPATIENT)
Dept: CARDIOLOGY | Facility: CLINIC | Age: 80
End: 2025-02-28
Payer: MEDICARE

## 2025-02-28 VITALS
HEART RATE: 76 BPM | HEIGHT: 62 IN | BODY MASS INDEX: 20.48 KG/M2 | OXYGEN SATURATION: 100 % | WEIGHT: 111.31 LBS | DIASTOLIC BLOOD PRESSURE: 64 MMHG | SYSTOLIC BLOOD PRESSURE: 92 MMHG

## 2025-02-28 DIAGNOSIS — I73.9 PERIPHERAL VASCULAR DISEASE, UNSPECIFIED: ICD-10-CM

## 2025-02-28 DIAGNOSIS — K74.60 CHRONIC HEPATITIS C WITH CIRRHOSIS: Chronic | ICD-10-CM

## 2025-02-28 DIAGNOSIS — B18.2 CHRONIC HEPATITIS C WITH CIRRHOSIS: Chronic | ICD-10-CM

## 2025-02-28 DIAGNOSIS — R60.0 BILATERAL LOWER EXTREMITY EDEMA: ICD-10-CM

## 2025-02-28 DIAGNOSIS — I10 PRIMARY HYPERTENSION: Primary | Chronic | ICD-10-CM

## 2025-02-28 DIAGNOSIS — N18.4 CHRONIC KIDNEY DISEASE (CKD), STAGE IV (SEVERE): ICD-10-CM

## 2025-02-28 DIAGNOSIS — C22.0 HCC (HEPATOCELLULAR CARCINOMA): Chronic | ICD-10-CM

## 2025-02-28 PROCEDURE — 99999 PR PBB SHADOW E&M-EST. PATIENT-LVL III: CPT | Mod: PBBFAC,HCNC,,

## 2025-02-28 RX ORDER — AMLODIPINE BESYLATE 10 MG/1
TABLET ORAL
COMMUNITY

## 2025-02-28 NOTE — PROGRESS NOTES
Subjective:    Patient ID:  Seema Gann is a 79 y.o. female who presents for follow-up of Follow-up      PCP: Bethany Mesa MD     Referring Provider: Roshan Jesus MD     HPI: Pt is a 78 yo F w/ PMH of HTN, Biliary Cirrhosis w/ enlarging hepatic mass since 2015, HCV, HCC s/p radio embolization, cirrhosis with recurrent ascites, portal hypertension, portal vein occlusion, hepatocellular carcinoma treated with Y90, pancytopenia, pleural effusion, chronic kidney disease stage 3, who presents today for f/u appt.She was last seen on 11/26/2024 and was continued on medical therapy. She is followed by Hepatology, Dr. Hernandez and was last seen on 12/31/2024 and is now receiving  therapeutic paracentesis with Interventional Radiology weekly. She is also followed by Nephrology and was last seen on 12/10/2024  She reports doing well from cardiac standpoint and states since she is having the fluid removed she is more active. She denies cp, sob, edema, orthopnea, PND, presyncope, LOC, palpitations, and claudication. She continue to report increased abdominal fluid. Patient reports BLE edema. She notes compliance w/ meds and denies side effects. She does not monitor her BP at home.She does not exercise however states she is very active w/ gardening and denies cp or sob.      Past Medical History:   Diagnosis Date    Cirrhosis     HCC (hepatocellular carcinoma)     Hepatitis     Hypertension      Past Surgical History:   Procedure Laterality Date    APPENDECTOMY      HYSTERECTOMY       Social History     Socioeconomic History    Marital status:    Tobacco Use    Smoking status: Never     Passive exposure: Never    Smokeless tobacco: Never   Substance and Sexual Activity    Alcohol use: No    Drug use: No    Sexual activity: Yes   Social History Narrative    ** Merged History Encounter **          Social Drivers of Health     Financial Resource Strain: Medium Risk (2/24/2025)    Overall Financial  Resource Strain (CARDIA)     Difficulty of Paying Living Expenses: Somewhat hard   Food Insecurity: Food Insecurity Present (2/24/2025)    Hunger Vital Sign     Worried About Running Out of Food in the Last Year: Sometimes true     Ran Out of Food in the Last Year: Sometimes true   Transportation Needs: No Transportation Needs (2/24/2025)    PRAPARE - Transportation     Lack of Transportation (Medical): No     Lack of Transportation (Non-Medical): No   Physical Activity: Inactive (2/24/2025)    Exercise Vital Sign     Days of Exercise per Week: 0 days     Minutes of Exercise per Session: 0 min   Stress: No Stress Concern Present (2/24/2025)    [employer] [place] [employer] - Occupational Stress Questionnaire     Feeling of Stress : Only a little   Housing Stability: Unknown (2/24/2025)    Housing Stability Vital Sign     Unable to Pay for Housing in the Last Year: Patient declined     Number of Times Moved in the Last Year: 0     Homeless in the Last Year: No     No family history on file.    Review of patient's allergies indicates:  No Known Allergies    Medication List with Changes/Refills   Current Medications    AMLODIPINE (NORVASC) 10 MG TABLET        ERGOCALCIFEROL (ERGOCALCIFEROL) 50,000 UNIT CAP    Take 1 capsule (50,000 Units total) by mouth every 7 days.    MIDODRINE (PROAMATINE) 5 MG TAB    Take 2 tablets (10 mg total) by mouth 3 (three) times daily.    MIRTAZAPINE (REMERON) 7.5 MG TAB    Take 1 tablet (7.5 mg total) by mouth every evening.       Review of Systems   Constitutional: Negative for diaphoresis and fever.   HENT:  Negative for congestion and hearing loss.    Eyes:  Negative for blurred vision and pain.   Cardiovascular:  Positive for leg swelling. Negative for chest pain, claudication, dyspnea on exertion, near-syncope, palpitations and syncope.   Respiratory:  Negative for shortness of breath and sleep disturbances due to breathing.    Hematologic/Lymphatic: Negative for bleeding  "problem. Does not bruise/bleed easily.   Skin:  Negative for color change and poor wound healing.   Gastrointestinal:  Negative for abdominal pain and nausea.   Genitourinary:  Negative for bladder incontinence and flank pain.   Neurological:  Negative for focal weakness and light-headedness.        Objective:   BP 92/64 (BP Location: Left arm, Patient Position: Sitting)   Pulse 76   Ht 5' 2" (1.575 m)   Wt 50.5 kg (111 lb 5.3 oz)   SpO2 100%   BMI 20.36 kg/m²    Physical Exam  Constitutional:       Appearance: She is well-developed. She is not diaphoretic.   HENT:      Head: Normocephalic and atraumatic.   Eyes:      General: No scleral icterus.     Pupils: Pupils are equal, round, and reactive to light.   Neck:      Vascular: No JVD.   Cardiovascular:      Rate and Rhythm: Normal rate and regular rhythm.      Pulses: Intact distal pulses.      Heart sounds: S1 normal and S2 normal. Murmur heard.      High-pitched blowing holosystolic murmur is present with a grade of 2/6 at the apex.      No friction rub. No gallop.   Pulmonary:      Effort: Pulmonary effort is normal. No respiratory distress.      Breath sounds: Normal breath sounds. No wheezing or rales.   Chest:      Chest wall: No tenderness.   Abdominal:      General: Abdomen is protuberant. Bowel sounds are normal. There is distension.      Palpations: Abdomen is soft. There is no mass.      Tenderness: There is no abdominal tenderness. There is no rebound.   Musculoskeletal:         General: No tenderness. Normal range of motion.      Cervical back: Normal range of motion and neck supple.      Right lower le+ Edema present.      Left lower le+ Edema present.   Skin:     General: Skin is warm and dry.      Coloration: Skin is not pale.   Neurological:      Mental Status: She is alert and oriented to person, place, and time.      Coordination: Coordination normal.      Deep Tendon Reflexes: Reflexes normal.   Psychiatric:         Behavior: " Behavior normal.         Judgment: Judgment normal.           NM stress test 5/16/24  Interpretation Summary    The ECG portion of the study is abnormal but not diagnostic due to resting ST-T abnormalities.    The patient reported no chest pain during the stress test.    There were no arrhythmias during stress.    The nuclear portion of this study will be reported separately.    FINDINGS:  There is no fixed or reversible perfusion defect.  The stress and rest end-diastolic volumes each measure 47 mL.  The stress and rest end systolic volumes each measure 9 mL respectively.  The stress and rest ejection fraction measure 81%.     Impression:     No acute findings.    CV US BLE Veins 4/23/2024  Interpretation Summary    There is no evidence of a right lower extremity DVT.    There is no evidence of a left lower extremity DVT.    The right greater saphenous vein has reflux.  Mostly around calf    The left greater saphenous vein has reflux.    Lt prox calf accessory vein reflux time =7753mx    The left superficial femoral middle vein has reflux.     Cardiac echo 4/22/24  Summary    Left Ventricle: The left ventricle is normal in size. Normal wall thickness. Regional wall motion abnormalities present very mild hypokinesia apical lateral wall. . There is normal systolic function with a visually estimated ejection fraction of 65 - 70%. Biplane (2D) method of discs ejection fraction is 73%. There is normal diastolic function.    Right Ventricle: Normal right ventricular cavity size. Wall thickness is normal. Right ventricle wall motion  is normal. Systolic function is normal.    Left Atrium: Left atrium is mildly dilated.    Aortic Valve: There is mild aortic valve sclerosis. There is mild stenosis. Aortic valve area by VTI is 1.92 cm². Aortic valve peak velocity is 1.40 m/s. Mean gradient is 4 mmHg. The dimensionless index is 0.84. There is mild aortic regurgitation.    Mitral Valve: There is mild regurgitation.     Pulmonary Artery: The estimated pulmonary artery systolic pressure is 35 mmHg.    IVC/SVC: Normal venous pressure at 3 mmHg.    EKG 23- reviewed - SR w SA, RBBB  TTE 23  Summary    Left Ventricle: The left ventricle is normal in size. Normal wall thickness. There is concentric remodeling. Normal wall motion. There is normal systolic function. Biplane (2D) method of discs ejection fraction is 60%. There is normal diastolic function.    Right Ventricle: Normal right ventricular cavity size. Wall thickness is normal. Right ventricle wall motion  is normal. Systolic function is normal.    Aortic Valve: The aortic valve is a trileaflet valve. There is moderate aortic valve sclerosis.    Tricuspid Valve: There is mild to moderate regurgitation.    Pulmonary Artery: The estimated pulmonary artery systolic pressure is 25 mmHg.    IVC/SVC: Normal venous pressure at 3 mmHg.    EC2022- reviewed.  Sinus katherine w/ PACs, RBBB, LAFB.       Echo: 2022- reviewed.    Summary  The left ventricle is normal in size with concentric remodeling and normal systolic function.  The estimated ejection fraction is 70%.  Normal left ventricular diastolic function.  Normal right ventricular size with normal right ventricular systolic function.  Mild aortic regurgitation.  Mild tricuspid regurgitation.  Mild pulmonic regurgitation.  Mild mitral regurgitation.  Normal central venous pressure (3 mmHg).  The estimated PA systolic pressure is 32 mmHg.     ETT: 2022- reviewed.    Conclusion    The patient exercised for 7 minutes 30 seconds on a Abel protocol, corresponding to a functional capacity of 9 METS, achieving a peak heart rate of 157 bpm, which is 113 % of the age predicted maximum heart rate. The patient experienced no angina during the test. Their exercise capacity was above average. The Duke Treadmill Score was 8.    The ECG portion of the study is negative for ischemia.    The patient reported no chest pain  during the stress test.    The blood pressure response to stress was normal.    The Duke Treadmill Score was 8.    During stress, occasional PACs are noted. , During stress, occasional PVCs are noted.    The exercise capacity was above average.    Assessment:       1. Primary hypertension    2. Peripheral vascular disease, unspecified    3. Chronic kidney disease (CKD), stage IV (severe)    4. HCC (hepatocellular carcinoma)    5. Chronic hepatitis C with cirrhosis    6. Bilateral lower extremity edema           Plan:         Primary hypertension  Goal BP < 140/90.  Compliant w/ meds.    -controlled   -in office 92/64  - not currently on any antihypertensive medications   -continue Midodrine 10 mg TID ( per Nephrology)   - enrolling in digital medicine pr  - risk factor and lifestyle modifications     Peripheral vascular disease, unspecified  CV US BLE Veins 4/23/2024  Interpretation Summary    There is no evidence of a right lower extremity DVT.    There is no evidence of a left lower extremity DVT.    The right greater saphenous vein has reflux.  Mostly around calf    The left greater saphenous vein has reflux.    Lt prox calf accessory vein reflux time =7753mx    The left superficial femoral middle vein has reflux.\    -followed by Vascular medicine  - continue medical therapy         Chronic kidney disease (CKD), stage IV (severe)  Followed by Nephrology - last visit 12/10/24 and was continued on medical therapy   -midodrine 10 mg TID ( per Dr. Anderson)   -GFR 16.8      HCC (hepatocellular carcinoma)  S/p Y90 x 2, leading to cirrhosis. No residual disease per hepatology.     Chronic hepatitis C with cirrhosis  -Followed by hepatology.  -Therapeutic paracentesis scheduled with Interventional Radiology every two weeks.    Bilateral lower extremity edema  -Bilateral LE US 4/23/24-no DVT, Lt prox calf accessory vein reflux time =[id number], the left superficial femoral middle vein has reflux.  -followed by Vascular  medicine  -continue medical therapy   -compression stockings   -Elevate legs when sitting       Total duration of face to face visit time 30 minutes.  Total time spent counseling greater than fifty percent of total visit time.  Counseling included discussion regarding imaging findings, diagnosis, possibilities, treatment options, risks and benefits.  The patient had many questions regarding the options and long-term effects      Guilherme Gregorio, MOE  Cardiology

## 2025-02-28 NOTE — ASSESSMENT & PLAN NOTE
-Bilateral LE US 4/23/24-no DVT, Lt prox calf accessory vein reflux time =[id number], the left superficial femoral middle vein has reflux.  -followed by Vascular medicine  -continue medical therapy   -compression stockings   -Elevate legs when sitting

## 2025-02-28 NOTE — ASSESSMENT & PLAN NOTE
Goal BP < 140/90.  Compliant w/ meds.    -controlled   -in office 92/64  - not currently on any antihypertensive medications   -continue Midodrine 10 mg TID ( per Nephrology)   - enrolling in digital medicine prgm  - risk factor and lifestyle modifications

## 2025-02-28 NOTE — ASSESSMENT & PLAN NOTE
-Followed by hepatology.  -Therapeutic paracentesis scheduled with Interventional Radiology every two weeks.

## 2025-02-28 NOTE — ASSESSMENT & PLAN NOTE
Followed by Nephrology - last visit 12/10/24 and was continued on medical therapy   -midodrine 10 mg TID ( per Dr. Anderson)   -GFR 16.8

## 2025-02-28 NOTE — ASSESSMENT & PLAN NOTE
CV US BLE Veins 4/23/2024  Interpretation Summary    There is no evidence of a right lower extremity DVT.    There is no evidence of a left lower extremity DVT.    The right greater saphenous vein has reflux.  Mostly around calf    The left greater saphenous vein has reflux.    Lt prox calf accessory vein reflux time =7753mx    The left superficial femoral middle vein has reflux.\    -followed by Vascular medicine  - continue medical therapy

## 2025-03-11 ENCOUNTER — TELEPHONE (OUTPATIENT)
Dept: INTERVENTIONAL RADIOLOGY/VASCULAR | Facility: HOSPITAL | Age: 80
End: 2025-03-11
Payer: MEDICARE

## 2025-03-12 ENCOUNTER — HOSPITAL ENCOUNTER (OUTPATIENT)
Dept: INTERVENTIONAL RADIOLOGY/VASCULAR | Facility: HOSPITAL | Age: 80
Discharge: HOME | End: 2025-03-12
Attending: INTERNAL MEDICINE
Payer: MEDICARE

## 2025-03-12 VITALS
TEMPERATURE: 98 F | WEIGHT: 112 LBS | DIASTOLIC BLOOD PRESSURE: 62 MMHG | HEIGHT: 64 IN | HEART RATE: 67 BPM | BODY MASS INDEX: 19.12 KG/M2 | RESPIRATION RATE: 18 BRPM | OXYGEN SATURATION: 100 % | SYSTOLIC BLOOD PRESSURE: 104 MMHG

## 2025-03-12 DIAGNOSIS — R18.8 OTHER ASCITES: ICD-10-CM

## 2025-03-12 DIAGNOSIS — R18.8 ASCITES: ICD-10-CM

## 2025-03-12 PROCEDURE — 63600175 PHARM REV CODE 636 W HCPCS: Mod: HCNC | Performed by: PHYSICIAN ASSISTANT

## 2025-03-12 PROCEDURE — 49083 ABD PARACENTESIS W/IMAGING: CPT | Mod: HCNC,,, | Performed by: PHYSICIAN ASSISTANT

## 2025-03-12 PROCEDURE — 49083 ABD PARACENTESIS W/IMAGING: CPT | Mod: HCNC | Performed by: STUDENT IN AN ORGANIZED HEALTH CARE EDUCATION/TRAINING PROGRAM

## 2025-03-12 RX ORDER — LIDOCAINE HYDROCHLORIDE 10 MG/ML
INJECTION, SOLUTION INFILTRATION; PERINEURAL
Status: COMPLETED | OUTPATIENT
Start: 2025-03-12 | End: 2025-03-12

## 2025-03-12 RX ADMIN — LIDOCAINE HYDROCHLORIDE 5 ML: 10 INJECTION, SOLUTION INFILTRATION; PERINEURAL at 12:03

## 2025-03-12 NOTE — H&P
Radiology History & Physical      SUBJECTIVE:     Chief Complaint: abdominal distention    History of Present Illness:  Seema Gann is a 79 y.o. female who presents for ultrasound guided paracentesis  Past Medical History:   Diagnosis Date    Cirrhosis     HCC (hepatocellular carcinoma)     Hepatitis     Hypertension      Past Surgical History:   Procedure Laterality Date    APPENDECTOMY      HYSTERECTOMY         Home Meds:   Prior to Admission medications    Medication Sig Start Date End Date Taking? Authorizing Provider   amLODIPine (NORVASC) 10 MG tablet     Provider, Historical   ergocalciferol (ERGOCALCIFEROL) 50,000 unit Cap Take 1 capsule (50,000 Units total) by mouth every 7 days. 9/28/24   Blaine Anderson MD   midodrine (PROAMATINE) 5 MG Tab Take 2 tablets (10 mg total) by mouth 3 (three) times daily. 9/16/24 2/18/25  Blaine Anderson MD   mirtazapine (REMERON) 7.5 MG Tab Take 1 tablet (7.5 mg total) by mouth every evening. 9/6/24 9/6/25  Bethany Mesa MD     Anticoagulants/Antiplatelets: no anticoagulation    Allergies: Review of patient's allergies indicates:  No Known Allergies  Sedation History:  no adverse reactions    Review of Systems:   Hematological: no known coagulopathies  Respiratory: no shortness of breath  Cardiovascular: no chest pain  Gastrointestinal: no abdominal pain  Genito-Urinary: no dysuria  Musculoskeletal: negative  Neurological: no TIA or stroke symptoms         OBJECTIVE:     Vital Signs (Most Recent)       Physical Exam:  ASA: 2  Mallampati: n/a    General: no acute distress  Mental Status: alert and oriented to person, place and time  HEENT: normocephalic, atraumatic  Chest: unlabored breathing  Heart: regular heart rate  Abdomen: distended  Extremity: moves all extremities    ASSESSMENT/PLAN:     Sedation Plan: local  Patient will undergo ultrasound guided paracentesis.    Patt Elliott PA-C

## 2025-03-12 NOTE — DISCHARGE SUMMARY
Interventional Radiology Short Stay Discharge Summary      Admit Date: 3/12/2025  Discharge Date: 03/12/2025     Hospital Course: Uneventful    Discharge Diagnosis: ascites    Discharge Condition: Stable    Discharge Disposition: Home    Diet: Resume prior diet    Activity: activity as tolerated    Follow-up: With referring provider    Cindy Chava, PA-C [facility]

## 2025-03-12 NOTE — SEDATION DOCUMENTATION
IR Procedure - Paracentesis - is completed. Patient tolerated well. She is awake, alert, oriented. Vital signs are stable. 3400 mL cloudy yellow ascites is drained. Patient will return to IR pre/post to complete discharge.

## 2025-03-12 NOTE — PROCEDURES
Radiology Post-Procedure Note    Pre Op Diagnosis: Ascites  Post Op Diagnosis: Same    Procedure: Ultrasound Guided Paracentesis    Procedure performed by: Patt Elliott PA-C    Written Informed Consent Obtained: Yes  Specimen Removed:none sent  Estimated Blood Loss: Minimal    Findings:   Successful paracentesis.  3400 ml rLQ.  Albumin was not administered PRN per protocol.    Patient tolerated procedure well.    Patt Elliott PA-C

## 2025-03-18 ENCOUNTER — TELEPHONE (OUTPATIENT)
Dept: FAMILY MEDICINE | Facility: CLINIC | Age: 80
End: 2025-03-18
Payer: MEDICARE

## 2025-03-18 ENCOUNTER — OFFICE VISIT (OUTPATIENT)
Dept: FAMILY MEDICINE | Facility: CLINIC | Age: 80
End: 2025-03-18
Payer: MEDICARE

## 2025-03-18 VITALS
WEIGHT: 114 LBS | BODY MASS INDEX: 19.46 KG/M2 | DIASTOLIC BLOOD PRESSURE: 64 MMHG | HEIGHT: 64 IN | RESPIRATION RATE: 16 BRPM | TEMPERATURE: 99 F | SYSTOLIC BLOOD PRESSURE: 106 MMHG | OXYGEN SATURATION: 97 % | HEART RATE: 101 BPM

## 2025-03-18 DIAGNOSIS — R79.9 ABNORMAL BLOOD CHEMISTRY: ICD-10-CM

## 2025-03-18 DIAGNOSIS — D61.818 PANCYTOPENIA: ICD-10-CM

## 2025-03-18 DIAGNOSIS — R63.0 ANOREXIA: ICD-10-CM

## 2025-03-18 DIAGNOSIS — C22.0 HCC (HEPATOCELLULAR CARCINOMA): ICD-10-CM

## 2025-03-18 DIAGNOSIS — C22.0 HEPATOCELLULAR CARCINOMA: ICD-10-CM

## 2025-03-18 DIAGNOSIS — K76.7 HEPATORENAL SYNDROME: ICD-10-CM

## 2025-03-18 DIAGNOSIS — R79.9 ABNORMAL FINDING OF BLOOD CHEMISTRY, UNSPECIFIED: ICD-10-CM

## 2025-03-18 DIAGNOSIS — R63.4 ABNORMAL WEIGHT LOSS: ICD-10-CM

## 2025-03-18 DIAGNOSIS — N18.4 CHRONIC KIDNEY DISEASE (CKD), STAGE IV (SEVERE): Primary | ICD-10-CM

## 2025-03-18 DIAGNOSIS — D64.9 NORMOCYTIC ANEMIA: ICD-10-CM

## 2025-03-18 PROCEDURE — 3078F DIAST BP <80 MM HG: CPT | Mod: HCNC,CPTII,S$GLB, | Performed by: STUDENT IN AN ORGANIZED HEALTH CARE EDUCATION/TRAINING PROGRAM

## 2025-03-18 PROCEDURE — 3288F FALL RISK ASSESSMENT DOCD: CPT | Mod: HCNC,CPTII,S$GLB, | Performed by: STUDENT IN AN ORGANIZED HEALTH CARE EDUCATION/TRAINING PROGRAM

## 2025-03-18 PROCEDURE — 99999 PR PBB SHADOW E&M-EST. PATIENT-LVL III: CPT | Mod: PBBFAC,HCNC,, | Performed by: STUDENT IN AN ORGANIZED HEALTH CARE EDUCATION/TRAINING PROGRAM

## 2025-03-18 PROCEDURE — 1126F AMNT PAIN NOTED NONE PRSNT: CPT | Mod: HCNC,CPTII,S$GLB, | Performed by: STUDENT IN AN ORGANIZED HEALTH CARE EDUCATION/TRAINING PROGRAM

## 2025-03-18 PROCEDURE — 3074F SYST BP LT 130 MM HG: CPT | Mod: HCNC,CPTII,S$GLB, | Performed by: STUDENT IN AN ORGANIZED HEALTH CARE EDUCATION/TRAINING PROGRAM

## 2025-03-18 PROCEDURE — 1160F RVW MEDS BY RX/DR IN RCRD: CPT | Mod: HCNC,CPTII,S$GLB, | Performed by: STUDENT IN AN ORGANIZED HEALTH CARE EDUCATION/TRAINING PROGRAM

## 2025-03-18 PROCEDURE — 1101F PT FALLS ASSESS-DOCD LE1/YR: CPT | Mod: HCNC,CPTII,S$GLB, | Performed by: STUDENT IN AN ORGANIZED HEALTH CARE EDUCATION/TRAINING PROGRAM

## 2025-03-18 PROCEDURE — 1159F MED LIST DOCD IN RCRD: CPT | Mod: HCNC,CPTII,S$GLB, | Performed by: STUDENT IN AN ORGANIZED HEALTH CARE EDUCATION/TRAINING PROGRAM

## 2025-03-18 PROCEDURE — 99215 OFFICE O/P EST HI 40 MIN: CPT | Mod: HCNC,S$GLB,, | Performed by: STUDENT IN AN ORGANIZED HEALTH CARE EDUCATION/TRAINING PROGRAM

## 2025-03-18 RX ORDER — MIRTAZAPINE 7.5 MG/1
7.5 TABLET, FILM COATED ORAL NIGHTLY
Qty: 30 TABLET | Refills: 11 | Status: SHIPPED | OUTPATIENT
Start: 2025-03-18 | End: 2026-03-18

## 2025-03-18 NOTE — PROGRESS NOTES
[facility] Note    Chief Complaint      F/u on chronic conditions    History of Present Illness     Seema Gann is a 79 y.o. female with sHTN, Biliary cirrhosis with hepatocellular carcinoma since 2015 due to HCV s/p chemotherapy now complicated by hepatorenal syndrome and pancytopenia, who presents for f/u visit. She is s/p multiple ER visit for abdominal paracentesis for recurrent massive ascites with ongoing discussion as to whether pleural catheter an option but patient does not want any aggressive measure according to discussion had with her today and does not want involvement of her daughter ( Rochelley in making decision on her behalf since she is still mentally capacitated to make her own decision . She has also tried explaining ongoing situation to her but states daughter is in total denial . Last paracentesis was on 03/12/2025 about 4-5liters again drained from her abdomen. She was started on midodrine due to symptomatic hypotension and lasix/aldactone has since been discontinued .  She states no worsening baseline leg swelling, SOB , or abdominal swelling  today but c/o painful hemorrhoids today , no bloody stool.     Problem List Addressed This Visit:    1. Chronic kidney disease (CKD), stage IV (severe)  -     Comprehensive Metabolic Panel; Future; Expected date: 03/18/2025  -     Hemoglobin A1C; Future; Expected date: 03/18/2025  -     TSH; Future; Expected date: 03/18/2025  -     Microalbumin/Creatinine Ratio, Urine; Future; Expected date: 03/18/2025    2. Hepatocellular carcinoma    3. Normocytic anemia  -     CBC Auto Differential; Future; Expected date: 03/18/2025    4. Abnormal blood chemistry  -     Lipid Panel; Future; Expected date: 03/18/2025    5. Abnormal finding of blood chemistry, unspecified  -     Hemoglobin A1C; Future; Expected date: 03/18/2025    6. Abnormal weight loss  -     TSH; Future; Expected date: 03/18/2025    7. Anorexia  -     mirtazapine (REMERON)  7.5 MG Tab; Take 1 tablet (7.5 mg total) by mouth every evening.  Dispense: 30 tablet; Refill: 11    8. Hepatorenal syndrome    9. Pancytopenia         Health Maintenance   Topic Date Due    RSV Vaccine (Age 60+ and Pregnant patients) (1 - 1-dose 75+ series) Never done    TETANUS VACCINE  12/13/2025 (Originally 9/23/1963)    Shingles Vaccine (1 of 2) 12/13/2025 (Originally 9/23/1964)    COVID-19 Vaccine (4 - 2024-25 season) 12/13/2025 (Originally 9/1/2024)    DEXA Scan  02/09/2026    Lipid Panel  11/22/2028    Hepatitis C Screening  Completed    Pneumococcal Vaccines (Age 50+)  Completed    Influenza Vaccine  Addressed       Past Medical History:   Diagnosis Date    Cirrhosis     HCC (hepatocellular carcinoma)     Hepatitis     Hypertension        Past Surgical History:   Procedure Laterality Date    APPENDECTOMY      HYSTERECTOMY         family history is not on file.    Social History     Tobacco Use    Smoking status: Never     Passive exposure: Never    Smokeless tobacco: Never   Substance Use Topics    Alcohol use: No    Drug use: No       Review of Systems   Constitutional:  Negative for fatigue and fever.   Respiratory:  Negative for cough and chest tightness.    Cardiovascular:  Positive for leg swelling. Negative for chest pain and palpitations.   Gastrointestinal:  Negative for abdominal pain, diarrhea and vomiting.   Endocrine: Negative for polydipsia and polyphagia.   Genitourinary:  Negative for difficulty urinating, dysuria and frequency.   Musculoskeletal:  Negative for arthralgias, back pain, gait problem and joint swelling.   Skin:  Negative for rash.   Neurological:  Negative for seizures, weakness, numbness and headaches.   Psychiatric/Behavioral:  Negative for sleep disturbance.        Outpatient Encounter Medications as of 3/18/2025   Medication Sig Dispense Refill    ergocalciferol (ERGOCALCIFEROL) 50,000 unit Cap Take 1 capsule (50,000 Units total) by mouth every 7 days. 28 capsule 0     "[DISCONTINUED] amLODIPine (NORVASC) 10 MG tablet       [DISCONTINUED] mirtazapine (REMERON) 7.5 MG Tab Take 1 tablet (7.5 mg total) by mouth every evening. 30 tablet 11    midodrine (PROAMATINE) 5 MG Tab Take 2 tablets (10 mg total) by mouth 3 (three) times daily.      mirtazapine (REMERON) 7.5 MG Tab Take 1 tablet (7.5 mg total) by mouth every evening. 30 tablet 11     No facility-administered encounter medications on file as of 3/18/2025.        Review of patient's allergies indicates:  No Known Allergies    Physical Exam      Vital Signs  Temp: 98.6 °F (37 °C)  Temp Source: Temporal  Pulse: 101  Resp: 16  SpO2: 97 %  BP: 106/64  BP Location: Right arm  Patient Position: Sitting  Pain Score: 0-No pain  Height and Weight  Height: 5' 4" (162.6 cm)  Weight: 51.7 kg (113 lb 15.7 oz)  BSA (Calculated - sq m): 1.53 sq meters  BMI (Calculated): 19.6  Weight in (lb) to have BMI = 25: 145.3]    Physical Exam  Vitals reviewed.   Constitutional:       Appearance: Normal appearance. She is normal weight.   HENT:      Head: Normocephalic and atraumatic.      Mouth/Throat:      Mouth: Mucous membranes are moist.      Pharynx: Oropharynx is clear.   Eyes:      Extraocular Movements: Extraocular movements intact.      Conjunctiva/sclera: Conjunctivae normal.      Pupils: Pupils are equal, round, and reactive to light.   Cardiovascular:      Rate and Rhythm: Normal rate and regular rhythm.      Pulses: Normal pulses.      Heart sounds: Normal heart sounds.   Pulmonary:      Effort: Pulmonary effort is normal.      Breath sounds: Normal breath sounds. Rales: few rales on lower posterior lung fields bilaterally.   Abdominal:      General: Bowel sounds are normal. There is distension (moderately distended., palpable fluid thrill).      Palpations: Abdomen is soft.      Tenderness: There is no abdominal tenderness. There is no guarding.   Musculoskeletal:      Cervical back: Normal range of motion.      Right lower leg: Edema ((2+ " "bilaterally).) present.      Left lower leg: Edema present.   Skin:     General: Skin is warm and dry.   Neurological:      General: No focal deficit present.      Mental Status: She is alert and oriented to person, place, and time.      Sensory: No sensory deficit.   Psychiatric:         Mood and Affect: Mood normal.         Behavior: Behavior normal.       Laboratory:  CBC:  Recent Labs   Lab Result Units 12/27/24  1225   WBC K/uL 3.66*   RBC M/uL 2.84*   Hemoglobin g/dL 8.8*   Hematocrit % 26.3*   Platelets K/uL 81*   MCV fL 93   MCH pg 31.0   MCHC g/dL 33.5     CMP:  Recent Labs   Lab Result Units 12/27/24  1225   Glucose mg/dL 144*   Calcium mg/dL 8.0*   Albumin g/dL 2.0*   Total Protein g/dL 6.8   Sodium mmol/L 142   Potassium mmol/L 3.2*   CO2 mmol/L 18*   Chloride mmol/L 119*   BUN mg/dL 45*   Alkaline Phosphatase U/L 67   ALT U/L 11   AST U/L 24   Total Bilirubin mg/dL 0.8     URINALYSIS:  No results for input(s): "COLORU", "CLARITYU", "SPECGRAV", "PHUR", "PROTEINUA", "GLUCOSEU", "BILIRUBINCON", "BLOODU", "WBCU", "RBCU", "BACTERIA", "MUCUS", "NITRITE", "LEUKOCYTESUR", "UROBILINOGEN", "HYALINECASTS" in the last 2160 hours.     LIPIDS:  No results for input(s): "TSH", "HDL", "CHOL", "TRIG", "LDLCALC", "CHOLHDL", "NONHDLCHOL", "TOTALCHOLEST" in the last 2160 hours.  TSH:  No results for input(s): "TSH" in the last 2160 hours.  A1C:  No results for input(s): "HGBA1C" in the last 2160 hours.    Radiology:      Assessment/Plan     Seema Gann is a 79 y.o.female with:    1. Hepatorenal syndrome  -due to decompensated cirrhotic liver disease from HCC  -s/p multiple  therapeutic paracentesis  -on scheduled therapeutic paracentesis with IR for now, last 03/12/2025  -patient advised not to wait till next week for next paracentesis due to findings on PE today  -BP still soft so will hold off lasix/aldactone for now  - on midodrine 5mg TID tolerating well  -s/p hepatology evaluation, recs appreciated  -patient " does not want aggressive measures  -s/p palliative eval     2. Hepatocellular carcinoma  -due  to HCV  -mildly hypervolemic today  -HCV RNA undetectable  -completed chemotherapy 2023  -followed by hepatology, oncology  -c/w zofran PRN for nausea    3. Anorexia  -likely cancer related anorexia  -patient advised to supplement nutrition with ensure as well, eat 2-3hrly  -     mirtazapine (REMERON) 7.5 MG Tab; Take 1 tablet (7.5 mg total) by mouth every evening.  Dispense: 30 tablet; Refill: 11    4. Hemorrhoid  -likely due to portal hypertension  -hydrocort-lidocaine ordered for symptomatic relief     5. Chronic kidney disease (CKD), stage IV (severe)  -refer to renal, at scheduled for 12/28/2024    6. Pancytopenia  -due to HCC  -will monitor for now  -declined all due vccines except PCV 20    -Continue current medications and maintain follow up with specialists.      Patient verbalizes understanding and agrees with current treatment plan.    42 minutes of total time spent on the encounter, which includes face to face time and non-face to face time preparing to see the patient (eg, review of tests), Obtaining and/or reviewing separately obtained history, Documenting clinical information in the electronic or other health record, Independently interpreting results (not separately reported) and communicating results to the patient/family/caregiver, or Care coordination (not separately reported).     Bethany Mesa MD  Internal Medicine   [facility]

## 2025-03-18 NOTE — TELEPHONE ENCOUNTER
Called pt in attempt to get more information in regards to chest pain noted in her call, lvm. X2.

## 2025-03-18 NOTE — TELEPHONE ENCOUNTER
----- Message from Marielle sent at 3/18/2025  9:19 AM CDT -----  Type:  Same Day Appointment RequestCaller is requesting a same day appointment.  Caller declined first available appointment listed below.  Name of Caller: Pt When is the first available appointment? 07/01, and 03/20 w/ NP Symptoms: 3 month F/U, medication refill, and mild chest painBest Call Back Number: [phone] Information: Please be advised, pt states that she could come in anytime for today or tomorrow

## 2025-03-25 ENCOUNTER — TELEPHONE (OUTPATIENT)
Dept: INTERVENTIONAL RADIOLOGY/VASCULAR | Facility: HOSPITAL | Age: 80
End: 2025-03-25
Payer: MEDICARE

## 2025-03-26 ENCOUNTER — HOSPITAL ENCOUNTER (OUTPATIENT)
Dept: INTERVENTIONAL RADIOLOGY/VASCULAR | Facility: HOSPITAL | Age: 80
Discharge: HOME | End: 2025-03-26
Attending: INTERNAL MEDICINE
Payer: MEDICARE

## 2025-03-26 VITALS
HEART RATE: 71 BPM | TEMPERATURE: 98 F | SYSTOLIC BLOOD PRESSURE: 102 MMHG | BODY MASS INDEX: 19.29 KG/M2 | RESPIRATION RATE: 16 BRPM | DIASTOLIC BLOOD PRESSURE: 60 MMHG | HEIGHT: 64 IN | OXYGEN SATURATION: 99 % | WEIGHT: 113 LBS

## 2025-03-26 DIAGNOSIS — R18.8 ASCITES: ICD-10-CM

## 2025-03-26 DIAGNOSIS — R18.8 OTHER ASCITES: ICD-10-CM

## 2025-03-26 PROCEDURE — 63600175 PHARM REV CODE 636 W HCPCS: Mod: HCNC | Performed by: PHYSICIAN ASSISTANT

## 2025-03-26 PROCEDURE — 49083 ABD PARACENTESIS W/IMAGING: CPT | Mod: HCNC | Performed by: RADIOLOGY

## 2025-03-26 RX ORDER — LIDOCAINE HYDROCHLORIDE 10 MG/ML
INJECTION, SOLUTION INFILTRATION; PERINEURAL
Status: COMPLETED | OUTPATIENT
Start: 2025-03-26 | End: 2025-03-26

## 2025-03-26 RX ADMIN — LIDOCAINE HYDROCHLORIDE 5 ML: 10 INJECTION, SOLUTION INFILTRATION; PERINEURAL at 11:03

## 2025-03-26 NOTE — DISCHARGE INSTRUCTIONS
Interventional Radiology Clinic    [phone]: choose prompt 3 Monday - Friday, 8:00 am - 4:00 pm    [phone]: after hours and on holidays. Ask to speak with the interventional radiologist on call.

## 2025-03-26 NOTE — PROCEDURES
Radiology Post-Procedure Note    Pre Op Diagnosis: Ascites  Post Op Diagnosis: Same    Procedure: Ultrasound Guided Paracentesis    Procedure performed by: Patt Elliott PA-C    Written Informed Consent Obtained: Yes  Specimen Removed: none sent  Estimated Blood Loss: Minimal    Findings:   Successful paracentesis.  4650 ml RLQ.  Albumin was not administered PRN per protocol.    Patient tolerated procedure well.    Patt Elliott PA-C

## 2025-03-26 NOTE — SEDATION DOCUMENTATION
IR Procedure - Paracentesis - is completed. Patient tolerated well. She is awake, alert, oriented. Vital signs are stable. 4650 mL cloudy yellow ascites is drained. Patient will return to IR pre/post to complete discharge.

## 2025-03-26 NOTE — DISCHARGE SUMMARY
Interventional Radiology Short Stay Discharge Summary      Admit Date: 3/26/2025  Discharge Date: 03/26/2025     Hospital Course: Uneventful    Discharge Diagnosis: ascites    Discharge Condition: Stable    Discharge Disposition: Home    Diet: Resume prior diet    Activity: activity as tolerated    Follow-up: With referring provider    Cindy Chava, PA-C [facility]

## 2025-03-26 NOTE — NURSING
Patient is discharged from IR. AVS is printed and reviewed. Upcoming appointments and the [facility] number is highlighted for convenience. The opportunity to ask questions is provided. Patient is ambulatory from the unit and directed to lobby to exit.

## 2025-03-26 NOTE — H&P
Radiology History & Physical      SUBJECTIVE:     Chief Complaint: abdominal distention    History of Present Illness:  Seema Gann is a 79 y.o. female who presents for ultrasound guided paracentesis  Past Medical History:   Diagnosis Date    Cirrhosis     HCC (hepatocellular carcinoma)     Hepatitis     Hypertension      Past Surgical History:   Procedure Laterality Date    APPENDECTOMY      HYSTERECTOMY         Home Meds:   Prior to Admission medications    Medication Sig Start Date End Date Taking? Authorizing Provider   ergocalciferol (ERGOCALCIFEROL) 50,000 unit Cap Take 1 capsule (50,000 Units total) by mouth every 7 days. 9/28/24  Yes Blaine Anderson MD   mirtazapine (REMERON) 7.5 MG Tab Take 1 tablet (7.5 mg total) by mouth every evening. 3/18/25 3/18/26 Yes Bethany Mesa MD   midodrine (PROAMATINE) 5 MG Tab Take 2 tablets (10 mg total) by mouth 3 (three) times daily. 9/16/24 2/18/25  Blaine Anderson MD     Anticoagulants/Antiplatelets: no anticoagulation    Allergies: Review of patient's allergies indicates:  No Known Allergies  Sedation History:  no adverse reactions    Review of Systems:   Hematological: no known coagulopathies  Respiratory: no shortness of breath  Cardiovascular: no chest pain  Gastrointestinal: no abdominal pain  Genito-Urinary: no dysuria  Musculoskeletal: negative  Neurological: no TIA or stroke symptoms         OBJECTIVE:     Vital Signs (Most Recent)  Temp: 98 °F (36.7 °C) (03/26/25 1016)  Pulse: 75 (03/26/25 1016)  Resp: 18 (03/26/25 1016)  BP: 116/71 (03/26/25 1018)  SpO2: 99 % (03/26/25 1016)    Physical Exam:  ASA: 2  Mallampati: n/a    General: no acute distress  Mental Status: alert and oriented to person, place and time  HEENT: normocephalic, atraumatic  Chest: unlabored breathing  Heart: regular heart rate  Abdomen: distended  Extremity: moves all extremities    ASSESSMENT/PLAN:     Sedation Plan: local  Patient will undergo ultrasound guided  paracentesis.    Patt Elliott PA-C

## 2025-04-02 ENCOUNTER — OFFICE VISIT (OUTPATIENT)
Dept: HEPATOLOGY | Facility: CLINIC | Age: 80
End: 2025-04-02
Payer: MEDICARE

## 2025-04-02 VITALS
WEIGHT: 118.81 LBS | BODY MASS INDEX: 20.28 KG/M2 | HEIGHT: 64 IN | HEART RATE: 88 BPM | RESPIRATION RATE: 18 BRPM | SYSTOLIC BLOOD PRESSURE: 116 MMHG | TEMPERATURE: 98 F | DIASTOLIC BLOOD PRESSURE: 73 MMHG | OXYGEN SATURATION: 99 %

## 2025-04-02 DIAGNOSIS — Z86.19 HISTORY OF HEPATITIS C: ICD-10-CM

## 2025-04-02 DIAGNOSIS — B18.2 CHRONIC HEPATITIS C WITH CIRRHOSIS: Chronic | ICD-10-CM

## 2025-04-02 DIAGNOSIS — K74.60 CHRONIC HEPATITIS C WITH CIRRHOSIS: Chronic | ICD-10-CM

## 2025-04-02 DIAGNOSIS — C22.0 HCC (HEPATOCELLULAR CARCINOMA): Primary | ICD-10-CM

## 2025-04-02 PROCEDURE — 3078F DIAST BP <80 MM HG: CPT | Mod: HCNC,CPTII,S$GLB, | Performed by: INTERNAL MEDICINE

## 2025-04-02 PROCEDURE — 99999 PR PBB SHADOW E&M-EST. PATIENT-LVL IV: CPT | Mod: PBBFAC,HCNC,, | Performed by: INTERNAL MEDICINE

## 2025-04-02 PROCEDURE — 1159F MED LIST DOCD IN RCRD: CPT | Mod: HCNC,CPTII,S$GLB, | Performed by: INTERNAL MEDICINE

## 2025-04-02 PROCEDURE — 1126F AMNT PAIN NOTED NONE PRSNT: CPT | Mod: HCNC,CPTII,S$GLB, | Performed by: INTERNAL MEDICINE

## 2025-04-02 PROCEDURE — 3288F FALL RISK ASSESSMENT DOCD: CPT | Mod: HCNC,CPTII,S$GLB, | Performed by: INTERNAL MEDICINE

## 2025-04-02 PROCEDURE — G2211 COMPLEX E/M VISIT ADD ON: HCPCS | Mod: HCNC,S$GLB,, | Performed by: INTERNAL MEDICINE

## 2025-04-02 PROCEDURE — 3074F SYST BP LT 130 MM HG: CPT | Mod: HCNC,CPTII,S$GLB, | Performed by: INTERNAL MEDICINE

## 2025-04-02 PROCEDURE — 1101F PT FALLS ASSESS-DOCD LE1/YR: CPT | Mod: HCNC,CPTII,S$GLB, | Performed by: INTERNAL MEDICINE

## 2025-04-02 PROCEDURE — 99215 OFFICE O/P EST HI 40 MIN: CPT | Mod: HCNC,S$GLB,, | Performed by: INTERNAL MEDICINE

## 2025-04-02 NOTE — PROGRESS NOTES
Subjective:       Patient ID: Seema Gann is a 79 y.o. female.    Chief Complaint: No chief complaint on file.      HPI  I saw this 79 y.o. lady in the liver clinic.    She has decomnpensated cirrhosis with ascites and HCC- treated with Y90 x2.  Her HCC is under control. Unfortunately she has developed worsening ascites which now requires large volume paracentesis every 2 weeks. I note her worsening renal impairment.    HCV RNA neg in Jan 2016    - G1 HCV cirrhosis with SVR  - admitted to [facility] on 11/28/22 with chest pain and was found to have a large mass on US.    CT abdo: 3/2/2023  1. Cirrhotic hepatic morphology with 7.0 x 8.5 x 8.2-cm (previously 7.0 x 7.9 x 7.3-cm) homogeneous partially exophytic right hepatic lobe mass. Further evaluation is limited given the lack of IV contrast.    Liver biopsy on 5/30/2023  Hepatocellular carcinoma, moderately differentiated     AFP on 4/26/23= 460  AFP on 1/23/24= 3.6  AFP on 5/6/24= 2.1  AFP<2 on 8/30/24    Y90 on 6/27/2023  Y90 on 10/12/2023    MRI abdo: 10/7/24  Cirrhosis status post Y 90 treatment, the treated area, right hepatic lobe is unchanged.  Small area of postcontrast enhancement within the right hepatic lobe, adjacent to the treated area, possibly adjacent residual liver parenchyma or less likely residual disease.  No new lesion seen.  Several small pancreatic cysts, unchanged.  Cholelithiasis or gallbladder sludge with gallbladder wall edema.  This can be associated with ascites and chronic liver disease, acute or chronic cholecystitis not excluded.  Large amount of ascites.  Sizable left pleural effusion.    MELD 3.0: 20 at 12/27/2024 12:25 PM  MELD-Na: 18 at 12/27/2024 12:25 PM  Calculated from:  Serum Creatinine: 2.8 mg/dL at 12/27/2024 12:25 PM  Serum Sodium: 142 mmol/L (Using max of 137 mmol/L) at 12/27/2024 12:25 PM  Total Bilirubin: 0.8 mg/dL (Using min of 1 mg/dL) at 12/27/2024 12:25 PM  Serum Albumin: 2 g/dL at 12/27/2024  12:25 PM  INR(ratio): 1.2 at 12/27/2024 12:25 PM  Age at listing (hypothetical): 79 years  Sex: Female at 12/27/2024 12:25 PM        PMH:  Hypertension  HCV cirrhosis    Review of Systems   Constitutional:  Negative for activity change, appetite change, chills, fatigue, fever and unexpected weight change.   HENT:  Negative for ear pain, hearing loss, nosebleeds, sore throat and trouble swallowing.    Eyes:  Negative for redness and visual disturbance.   Respiratory:  Negative for cough, chest tightness, shortness of breath and wheezing.    Cardiovascular:  Negative for chest pain and palpitations.   Gastrointestinal:  Negative for abdominal distention, abdominal pain, blood in stool, constipation, diarrhea, nausea and vomiting.   Genitourinary:  Negative for difficulty urinating, dysuria, frequency, hematuria and urgency.   Musculoskeletal:  Negative for arthralgias, back pain, gait problem, joint swelling and myalgias.   Skin:  Negative for rash.   Neurological:  Negative for tremors, seizures, speech difficulty, weakness and headaches.   Hematological:  Negative for adenopathy.   Psychiatric/Behavioral:  Negative for confusion, decreased concentration and sleep disturbance. The patient is not nervous/anxious.            Lab Results   Component Value Date    ALT 12 03/19/2025    AST 24 03/19/2025    GGT 45 01/16/2015    ALKPHOS 60 03/19/2025    BILITOT 0.6 03/19/2025     Past Medical History:   Diagnosis Date    Cirrhosis     HCC (hepatocellular carcinoma)     Hepatitis     Hypertension      Past Surgical History:   Procedure Laterality Date    APPENDECTOMY      HYSTERECTOMY       Current Outpatient Medications   Medication Sig    ergocalciferol (ERGOCALCIFEROL) 50,000 unit Cap Take 1 capsule (50,000 Units total) by mouth every 7 days.    midodrine (PROAMATINE) 5 MG Tab Take 2 tablets (10 mg total) by mouth 3 (three) times daily.    mirtazapine (REMERON) 7.5 MG Tab Take 1 tablet (7.5 mg total) by mouth every  evening.     No current facility-administered medications for this visit.       Objective:      Physical Exam  Constitutional:       General: She is not in acute distress.  HENT:      Head: Normocephalic.   Eyes:      Pupils: Pupils are equal, round, and reactive to light.   Neck:      Thyroid: No thyromegaly.      Vascular: No JVD.      Trachea: No tracheal deviation.   Cardiovascular:      Rate and Rhythm: Normal rate and regular rhythm.      Heart sounds: Normal heart sounds. No murmur heard.  Pulmonary:      Effort: Pulmonary effort is normal.      Breath sounds: Normal breath sounds. No stridor.   Abdominal:      General: There is distension.      Palpations: Abdomen is soft.   Musculoskeletal:      Right lower leg: Edema present.      Left lower leg: Edema present.   Lymphadenopathy:      Head:      Right side of head: No submental, submandibular, tonsillar, preauricular, posterior auricular or occipital adenopathy.      Left side of head: No submental, submandibular, tonsillar, preauricular, posterior auricular or occipital adenopathy.      Cervical: No cervical adenopathy.   Neurological:      Mental Status: She is alert. She is not disoriented.      Cranial Nerves: No cranial nerve deficit.      Sensory: No sensory deficit.         Assessment:       1. HCC (hepatocellular carcinoma)    2. History of hepatitis C    3. Chronic hepatitis C with cirrhosis          Plan:   She has an excellent functional status and has tolerated Y90 well. She feels relatively well but her ascites has not been helped by diuretics which are now stopped- she requires paracentesis every 2 weeks.    - we discussed the lack of definitive treatment measures for her liver disease and she agrees to see Palliative Care - referral already placed by her PCP but this has yet to be scheduled.    - also discussed the option of a permanent peritoneal drainage catheter. She is agreeable to this but in view of the fact that she is still  relatively well, we elected to contiue with regular outpatient paracentesis.    Her leg edema is mild.  1) Conitue regular paracenteses  2) MRI abdo to check on status of HCC  3) Labs next week with para  4) Clinic in 3 months.      I spent a total of 40 minutes on the day of the visit.  This includes face to face time and non-face to face time preparing to see the patient (eg, review of tests), obtaining and/or reviewing separately obtained history, documenting clinical information in the electronic or other health record, independently interpreting results and communicating results to the patient/family/caregiver, or care coordinator.

## 2025-04-05 PROBLEM — R11.2 NAUSEA AND VOMITING: Status: ACTIVE | Noted: 2025-04-05

## 2025-04-05 PROBLEM — D63.1 ANEMIA IN STAGE 4 CHRONIC KIDNEY DISEASE: Status: ACTIVE | Noted: 2023-11-21

## 2025-04-05 PROBLEM — R18.8 ASCITES OF LIVER: Status: ACTIVE | Noted: 2025-04-05

## 2025-04-05 PROBLEM — D69.59 SECONDARY THROMBOCYTOPENIA: Status: ACTIVE | Noted: 2023-11-21

## 2025-04-05 PROBLEM — I95.9 HYPOTENSION: Status: ACTIVE | Noted: 2025-04-05

## 2025-04-05 PROBLEM — E87.20 LACTIC ACIDOSIS: Status: ACTIVE | Noted: 2025-04-05

## 2025-04-05 PROBLEM — R18.8 ASCITES OF LIVER: Status: RESOLVED | Noted: 2025-04-05 | Resolved: 2025-04-05

## 2025-04-05 PROBLEM — N18.4 ANEMIA IN STAGE 4 CHRONIC KIDNEY DISEASE: Status: ACTIVE | Noted: 2023-11-21

## 2025-04-05 PROBLEM — R19.7 NAUSEA VOMITING AND DIARRHEA: Status: ACTIVE | Noted: 2025-04-05

## 2025-04-05 NOTE — HOSPITAL COURSE
79 F HTN, decompensated HCV cirrhosis with recurrent lg volume paracentesis q2week, thrombocytopenia, HCC s/p Y90 x2, CKDIV (baseline Cr 2.5-2.8) who pw N/V and diarrhea. Cr 3.9 and hypotensive. Admitted d/t concerns for sepsis vs HRS. IR paracentesis ordered, CTX 2g started for SBP ppx, along with albumin, octreotide, and increasing midodrine. Will also give maintenance IVF to help support fluid loss from diarrhea and vomiting. Tolerating diet.

## 2025-04-05 NOTE — PHARMACY MED REC
"Admission Medication History     The home medication history was taken by Lauren Pardo.    You may go to "Admission" then "Reconcile Home Medications" tabs to review and/or act upon these items.     The home medication list has been updated by the Pharmacy department.   Please read ALL comments highlighted in yellow.   Please address this information as you see fit.    Feel free to contact us if you have any questions or require assistance.      The medications listed below were removed from the home medication list. Please reorder if appropriate:  Patient reports no longer taking the following medication(s):  ERGOCALCIFEROL 50,000 UNIT     Medications listed below were obtained from: Patient/family and Analytic software- [employer]  Current Outpatient Medications on File Prior to Encounter   Medication Sig    acetaminophen (TYLENOL) 500 MG tablet   Take 500 mg by mouth daily as needed for Pain.    aspirin/sod bicarb/citric acid (MAN-SELTZER ORAL) Take 2 tablets by mouth daily as needed (Heartburn/indigestion).      CALCIUM ORAL Take 1 tablet by mouth once daily.      midodrine (PROAMATINE) 5 MG Tab Take 10 mg by mouth 3 (three) times daily as needed (Low blood pressure). Last filled 9/2/24, #270 for 30 day supply.      mirtazapine (REMERON) 7.5 MG Tab   Take 1 tablet (7.5 mg total) by mouth every evening.               Lauren Pardo  EXT [id number]                  .          "

## 2025-04-05 NOTE — H&P
Michael maia - Emergency Dept  [facility] Medicine  History & Physical    Patient Name: Seema Gann  MRN: [id number]  Patient Class: IP- Inpatient  Admission Date: 4/4/2025  Attending Physician: Burke Plummer MD   Primary Care Provider: Bethany Mesa MD       Patient information was obtained from patient, past medical records, and ER records.     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 Symptom onset noted to be day prior to presentation with over 6 episodes of vomiting and 3 episodes of diarrhea throughout that time. Denies fever/chills, chest pain, shortness of breath, hematemesis, coffee-ground emesis, bloody stools, abdominal pain/tenderness, dysuria. Last IR paracentesis was on 3/26 with 4.6L removed. Prior peritoneal fluid studies did not show evidence for SBP.     While in the ED, patient was noted to be hypotensive with BP 86/50, HR 78, afebrile and satting well on RA. Labs were notable for Na 135, bicarb 15, elevated BUN 41/creatinine 4.0, stable CBC without leukocytosis. Initial lactate of 4.5. She was given total 1L normal saline bolus and given midodrine 5 mg with noted improvement of blood pressures. 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 Admitted to hospital medicine for further management of decompensated cirrhosis and SHARA on CKD stage 4 with concerns for possible hepatorenal syndrome.         Past Medical History:   Diagnosis Date    Cirrhosis     HCC (hepatocellular carcinoma)     Hepatitis     Hypertension        Past Surgical History:   Procedure Laterality Date    APPENDECTOMY      HYSTERECTOMY         Review of patient's allergies indicates:  No Known Allergies    No current facility-administered medications on file prior to encounter.     Current Outpatient Medications on File Prior to Encounter   Medication Sig    ergocalciferol (ERGOCALCIFEROL) 50,000 unit Cap Take 1 capsule (50,000 Units total) by mouth every 7 days.    midodrine (PROAMATINE) 5 MG Tab Take 2 tablets (10 mg total) by mouth 3 (three) times daily.    mirtazapine (REMERON) 7.5 MG Tab Take 1 tablet (7.5 mg total) by mouth every evening.     Family History    None       Tobacco Use    Smoking status: Never     Passive exposure: Never    Smokeless tobacco: Never   Substance and Sexual Activity    Alcohol use: No    Drug use: No    Sexual activity: Yes     Review of Systems   Constitutional:  Negative for chills, fatigue and fever.   Respiratory:  Negative for cough, shortness of breath and wheezing.    Cardiovascular:  Positive for leg swelling. Negative for chest pain.   Gastrointestinal:  Positive for abdominal distention, abdominal pain, diarrhea, nausea and vomiting. Negative for blood in stool and constipation.   Genitourinary:  Negative for decreased urine volume, difficulty urinating, dysuria, frequency and urgency.   Neurological:  Negative for dizziness, weakness and headaches.   Psychiatric/Behavioral:  Negative for confusion.      Objective:     Vital Signs (Most Recent):  Temp: 97.6 °F (36.4 °C) (04/04/25 2047)  Pulse: 73 (04/05/25 0302)  Resp: 20  (04/05/25 0302)  BP: 96/60 (04/05/25 0301)  SpO2: 99 % (04/05/25 0302) Vital Signs (24h Range):  Temp:  [97.6 °F (36.4 °C)-97.8 °F (36.6 °C)] 97.6 °F (36.4 °C)  Pulse:  [66-78] 73  Resp:  [13-23] 20  SpO2:  [92 %-100 %] 99 %  BP: (80-97)/(50-61) 96/60     Weight: 53.5 kg (117 lb 15.1 oz)  Body mass index is 20.25 kg/m².     Physical Exam  Vitals and nursing note reviewed.   Constitutional:       General: She is awake. She is in acute distress.      Appearance: She is not ill-appearing, toxic-appearing or diaphoretic.   HENT:      Head: Normocephalic and atraumatic.   Eyes:      General: No scleral icterus.     Extraocular Movements: Extraocular movements intact.      Conjunctiva/sclera: Conjunctivae normal.   Cardiovascular:      Rate and Rhythm: Normal rate and regular rhythm.      Pulses: Normal pulses.      Heart sounds: Normal heart sounds. No murmur heard.  Pulmonary:      Effort: Pulmonary effort is normal. No respiratory distress.      Breath sounds: No wheezing, rhonchi or rales.      Comments: Decreased breath sounds over left lower lung field   Abdominal:      General: Abdomen is protuberant. There is distension.      Palpations: There is fluid wave.      Tenderness: There is abdominal tenderness (epigastric). There is no guarding or rebound.   Musculoskeletal:         General: No tenderness.      Right lower leg: Edema present.      Left lower leg: Edema present.      Comments: bilateral lower extremities with +2 to +3 pitting edema, up to knees   Skin:     General: Skin is warm and dry.      Coloration: Skin is not jaundiced.   Neurological:      Mental Status: She is alert and oriented to person, place, and time.      Cranial Nerves: No dysarthria or facial asymmetry.      Motor: No weakness.   Psychiatric:         Behavior: Behavior is cooperative.           Significant Labs: All pertinent labs within the past 24 hours have been reviewed.  CBC:   Recent Labs   Lab 04/04/25  1930   WBC 7.22   HGB 8.7*    HCT 28.2*   *     CMP:   Recent Labs   Lab 04/04/25  1930   *   K 4.1      CO2 15*   BUN 41*   CREATININE 4.0*   CALCIUM 8.5*   ALBUMIN 2.0*   BILITOT 1.4*   ALKPHOS 63   AST 33   ALT 11   ANIONGAP 11     Lactic Acid:   Recent Labs   Lab 04/04/25  1930 04/04/25  2130   LACTATE 4.5* 3.2*         Significant Imaging: I have reviewed all pertinent imaging results/findings within the past 24 hours.    Results for orders placed or performed during the hospital encounter of 04/04/25 (from the past 2160 hours)   CT Abdomen Pelvis  Without Contrast    Narrative    EXAMINATION:  CT ABDOMEN PELVIS WITHOUT CONTRAST    CLINICAL HISTORY:  Nausea/vomiting;persistent nausea vomiting/diarrhea. No abdominal pain. Distension. History of ascites/liver cancer;    TECHNIQUE:  Low dose axial images, sagittal and coronal reformations were obtained from the lung bases to the pubic symphysis.  Contrast was not administered.    COMPARISON:  MRI abdomen 05/06/2024, CT abdomen pelvis 11/26/2023    FINDINGS:  Evaluation of the imaged organ systems is limited by the absence of intravenous contrast.    Lungs: Moderate left-sided pleural effusion with compressive atelectasis on the adjacent lower lobe.  There is dependent and subsegmental atelectasis.  No confluent airspace opacification or pneumothorax.    Heart: Normal size. Multivessel coronary artery atherosclerosis.  Small volume pericardial fluid.    Liver: Cirrhotic liver morphology with post treatment related changes, most notable in the posterior right hepatic lobe, incompletely evaluated.    Gallbladder: Gallbladder is not well visualized.    Bile ducts: No definite intrahepatic or extrahepatic biliary ductal dilatation.    Spleen: Normal size.    Pancreas: There are low-density lesions in the pancreatic body and tail, which are poorly evaluated without intravenous contrast.  Largest in the tail measures approximately 3.1 x 2.5 cm (2-67).    Adrenals: No adrenal  nodules.    Renal/Ureters: Kidneys are mildly atrophic.  4 mm nonobstructing right renal stone.  No hydronephrosis.  The ureters are difficult to trace along their entire course.  The urinary bladder is decompressed and poorly evaluated..    Reproductive: Uterus is surgically absent.  Possible vaginal prolapse, as below.    Stomach/Bowel: There is a small hiatal hernia.  There is predominantly liquid stool within the proximal colon.  The distal colon is decompressed.  Colonic diverticulosis.  Stranding cut in portions of the omental and mesenteric fat are nonspecific, but considerations may intraperitoneal metastases, peritonitis, and/or edema.    Peritoneum: Large volume abdominopelvic ascites.  There is apparent prolapse of the pelvic floor, and possibly of the vagina, which contains ascitic fluid, partially visualized.  No intraperitoneal free air.    Lymph Nodes: No pathologic lazaro enlargement in the abdomen or pelvis.    Vasculature: Abdominal aorta tapers normally.  Severe atherosclerosis of the abdominal aorta and its branches.    Bones: Diffuse osteopenia.  T11 vertebral body osseous hemangioma.  No acute fracture or aggressive osseous lesion.    Soft Tissues: Diffuse body wall edema.  Increased AP diameter of the abdomen, likely secondary to the ascites.  Depending on clinical findings, now include the this abdominal distension is developed, abdominal compartment syndrome might be a consideration..      Impression    Liver cirrhosis and sequela of portal hypertension including large volume abdominal ascites.    Moderate left pleural effusion; hepatic hydrothorax is in the differential.  Other etiologies not excluded.    There is partially visualized large pelvic floor prolapse, possibly involving the vagina, and containing ascitic fluid.    Low-density lesions in the pancreatic body and tail, poorly evaluated without intravenous contrast.    4 mm nonobstructing right renal stone.    Small hiatal  hernia.    Additional findings, as described in the body of the report.    Electronically signed by resident: Jet Cortez  Date:    04/04/2025  Time:    23:06    Electronically signed by: Harish Hu  Date:    04/05/2025  Time:    02:00       Assessment/Plan:     Assessment & Plan  Decompensated cirrhosis related to hepatitis C virus (HCV)  Patient with good mentation/no encephalopathy at this time. CBC with anemia, thrombocytopenia. INR 1.2. Abdominal exam with new epigastric tenderness since initial presentation. 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 Last diagnostic/therapeutic paracentesis on 3/26 with removal of 4.6L. Negative for SBP.     Patient with known Cirrhosis with Child's class B. Co-morbidities are present and inclusive of ascites, portal hypertension, portal venous thrombosis, malnutrition, and anemia/pancytopenia.  MELD-Na score calculated; MELD 3.0: 23 at 4/5/2025  5:16 AM  MELD-Na: 22 at 4/5/2025  5:16 AM  Calculated from:  Serum Creatinine: 3.9 mg/dL (Using max of 3 mg/dL) at 4/5/2025  5:16 AM  Serum Sodium: 135 mmol/L at 4/5/2025  5:16 AM  Total Bilirubin: 0.9 mg/dL (Using min of 1 mg/dL) at 4/5/2025  5:16 AM  Serum Albumin: 1.7 g/dL at 4/5/2025  5:16 AM  INR(ratio): 1.2 at 4/5/2025  5:16 AM  Age at listing (hypothetical): 79 years  Sex: Female at 4/5/2025  5:16 AM    -continue chronic meds. Etiology likely  related to chronic HCV hepatitis and HCC s/p Y90 .   -avoid any hepatotoxic meds   -monitor CBC/CMP/INR for synthetic function.   -f/u ammonia level  -consider liver U/S with doppler   - IR Therapeutic/Diagnostic paracentesis ordered  -NPO for now  -f/u ascitic studies (albumin, total protein, cell count, LDH, culture, stain) when procedure completed  -will consider diureses with IV lasix 40 mg  - Na restriction <2g, 1.5 fluid restriction  - started 1g CTX for SBP ppx out of abundance of caution ; if ascitic albumin >1.5, PMNs <250 and no  evidence of GIB, can discontinue. Patient with no history of SBP, no fevers since presentation, no leukocytosis but some abdominal tenderness on exam  - If PMNs >250 on ascitic studies will treat with CTX 2g q.d x 5 days  - monitor mental status; q4h Neuro checks.       Chronic kidney disease (CKD), stage IV (severe)  Hepatorenal syndrome  Creatinine stable for now. BMP reviewed- noted Estimated Creatinine Clearance: 10 mL/min (A) (based on SCr of 3.9 mg/dL (H)). according to latest data. Based on current GFR, CKD stage is stage 4 - GFR 15-29.  Monitor UOP and serial BMP and adjust therapy as needed. Renally dose meds. Avoid nephrotoxic medications and procedures.    Creatinine 4.0 on admit, baseline around 2.5-2.8.  Likely secondary to pre-renal etiology from dehydration and volume losses given vomiting/diarrhea and hypotension. There are concerns for hepatorenal syndrome.     - Strict I&Os and daily weights   - Gentle IVF  - Avoid nephrotoxic agents such as NSAIDs, gadolinium, and IV radiocontrast  - Renally dose meds to current GFR  - f/u urine lytes, urine osm, serum osm for initial w/u for HRS  - continue midodrine 10 mg PO TID  - Goal MAP > 85mmHg  - May need pressors if no improvement    Pleural effusion  Patient found to have moderate pleural effusion on imaging. I have personally reviewed and interpreted the following imaging: Xray and CT. A thoracentesis was deferred. Most likely etiology includes Cirrhosis. Management to include Diuresis and consider consult to pulmonology for potential thoracentesis if patient develops sx related to effusion    Elevated troponin  Recent Labs   Lab 04/05/25  0516   TROPONINIHS 238*     -trend troponin to peak  -monitor for signs/symptoms of ACS  -serial EKG  -f/u echo  Nausea vomiting and diarrhea  -antiemetics PRN  -linda IV fluid resuscitation given liver disease  -f/u c diff studies  -no systemic symptoms but significantly volume overloaded and distended on exam  -IR  paracentesis    VTE Risk Mitigation (From admission, onward)           Ordered     heparin (porcine) injection 5,000 Units  Every 8 hours         04/05/25 0333     IP VTE HIGH RISK PATIENT  Once         04/05/25 0333     Place sequential compression device  Until discontinued         04/05/25 0333                     Jaclyn Jonhs MD  Department of Hospital Medicine  [facility] - Emergency Dept

## 2025-04-05 NOTE — HPI
Seema Gann is a 79 year old female with hx of hypertension, decompensated HCV cirrhosis with recurrent ascites requiring frequent large volume paracentesis q2wk, thrombocytopenia, hepatocellular carcinoma s/p Y90 x2, CKD stage IV (baseline creatinine 2.5-2.8) who presented to [facility] [place] for concerns of vomiting and diarrhea. Symptom onset noted to be day prior to presentation with over 6 episodes of vomiting and 3 episodes of diarrhea throughout that time. Denies fever/chills, chest pain, shortness of breath, hematemesis, coffee-ground emesis, bloody stools, abdominal pain/tenderness, dysuria. Last IR paracentesis was on 3/26 with 4.6L removed. Prior peritoneal fluid studies did not show evidence for SBP.     While in the ED, patient was noted to be hypotensive with BP 86/50, HR 78, afebrile and satting well on RA. Labs were notable for Na 135, bicarb 15, elevated BUN 41/creatinine 4.0, stable CBC without leukocytosis. Initial lactate of 4.5. She was given total 1L normal saline bolus and given midodrine 5 mg with noted improvement of blood pressures. 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 Admitted to hospital medicine for further management of decompensated cirrhosis and SHARA on CKD stage 4 with concerns for possible hepatorenal syndrome.

## 2025-04-05 NOTE — SUBJECTIVE & OBJECTIVE
Past Medical History:   Diagnosis Date    Cirrhosis     HCC (hepatocellular carcinoma)     Hepatitis     Hypertension        Past Surgical History:   Procedure Laterality Date    APPENDECTOMY      HYSTERECTOMY         Review of patient's allergies indicates:  No Known Allergies    No current facility-administered medications on file prior to encounter.     Current Outpatient Medications on File Prior to Encounter   Medication Sig    ergocalciferol (ERGOCALCIFEROL) 50,000 unit Cap Take 1 capsule (50,000 Units total) by mouth every 7 days.    midodrine (PROAMATINE) 5 MG Tab Take 2 tablets (10 mg total) by mouth 3 (three) times daily.    mirtazapine (REMERON) 7.5 MG Tab Take 1 tablet (7.5 mg total) by mouth every evening.     Family History    None       Tobacco Use    Smoking status: Never     Passive exposure: Never    Smokeless tobacco: Never   Substance and Sexual Activity    Alcohol use: No    Drug use: No    Sexual activity: Yes     Review of Systems   Constitutional:  Negative for chills, fatigue and fever.   Respiratory:  Negative for cough, shortness of breath and wheezing.    Cardiovascular:  Positive for leg swelling. Negative for chest pain.   Gastrointestinal:  Positive for abdominal distention, abdominal pain, diarrhea, nausea and vomiting. Negative for blood in stool and constipation.   Genitourinary:  Negative for decreased urine volume, difficulty urinating, dysuria, frequency and urgency.   Neurological:  Negative for dizziness, weakness and headaches.   Psychiatric/Behavioral:  Negative for confusion.      Objective:     Vital Signs (Most Recent):  Temp: 97.6 °F (36.4 °C) (04/04/25 2047)  Pulse: 73 (04/05/25 0302)  Resp: 20 (04/05/25 0302)  BP: 96/60 (04/05/25 0301)  SpO2: 99 % (04/05/25 0302) Vital Signs (24h Range):  Temp:  [97.6 °F (36.4 °C)-97.8 °F (36.6 °C)] 97.6 °F (36.4 °C)  Pulse:  [66-78] 73  Resp:  [13-23] 20  SpO2:  [92 %-100 %] 99 %  BP: (80-97)/(50-61) 96/60     Weight: 53.5 kg (117 lb  15.1 oz)  Body mass index is 20.25 kg/m².     Physical Exam  Vitals and nursing note reviewed.   Constitutional:       General: She is awake. She is in acute distress.      Appearance: She is not ill-appearing, toxic-appearing or diaphoretic.   HENT:      Head: Normocephalic and atraumatic.   Eyes:      General: No scleral icterus.     Extraocular Movements: Extraocular movements intact.      Conjunctiva/sclera: Conjunctivae normal.   Cardiovascular:      Rate and Rhythm: Normal rate and regular rhythm.      Pulses: Normal pulses.      Heart sounds: Normal heart sounds. No murmur heard.  Pulmonary:      Effort: Pulmonary effort is normal. No respiratory distress.      Breath sounds: No wheezing, rhonchi or rales.      Comments: Decreased breath sounds over left lower lung field   Abdominal:      General: Abdomen is protuberant. There is distension.      Palpations: There is fluid wave.      Tenderness: There is abdominal tenderness (epigastric). There is no guarding or rebound.   Musculoskeletal:         General: No tenderness.      Right lower leg: Edema present.      Left lower leg: Edema present.      Comments: bilateral lower extremities with +2 to +3 pitting edema, up to knees   Skin:     General: Skin is warm and dry.      Coloration: Skin is not jaundiced.   Neurological:      Mental Status: She is alert and oriented to person, place, and time.      Cranial Nerves: No dysarthria or facial asymmetry.      Motor: No weakness.   Psychiatric:         Behavior: Behavior is cooperative.           Significant Labs: All pertinent labs within the past 24 hours have been reviewed.  CBC:   Recent Labs   Lab 04/04/25 1930   WBC 7.22   HGB 8.7*   HCT 28.2*   *     CMP:   Recent Labs   Lab 04/04/25 1930   *   K 4.1      CO2 15*   BUN 41*   CREATININE 4.0*   CALCIUM 8.5*   ALBUMIN 2.0*   BILITOT 1.4*   ALKPHOS 63   AST 33   ALT 11   ANIONGAP 11     Lactic Acid:   Recent Labs   Lab 04/04/25 1930  04/04/25  2130   LACTATE 4.5* 3.2*         Significant Imaging: I have reviewed all pertinent imaging results/findings within the past 24 hours.    Results for orders placed or performed during the hospital encounter of 04/04/25 (from the past 2160 hours)   CT Abdomen Pelvis  Without Contrast    Narrative    EXAMINATION:  CT ABDOMEN PELVIS WITHOUT CONTRAST    CLINICAL HISTORY:  Nausea/vomiting;persistent nausea vomiting/diarrhea. No abdominal pain. Distension. History of ascites/liver cancer;    TECHNIQUE:  Low dose axial images, sagittal and coronal reformations were obtained from the lung bases to the pubic symphysis.  Contrast was not administered.    COMPARISON:  MRI abdomen 05/06/2024, CT abdomen pelvis 11/26/2023    FINDINGS:  Evaluation of the imaged organ systems is limited by the absence of intravenous contrast.    Lungs: Moderate left-sided pleural effusion with compressive atelectasis on the adjacent lower lobe.  There is dependent and subsegmental atelectasis.  No confluent airspace opacification or pneumothorax.    Heart: Normal size. Multivessel coronary artery atherosclerosis.  Small volume pericardial fluid.    Liver: Cirrhotic liver morphology with post treatment related changes, most notable in the posterior right hepatic lobe, incompletely evaluated.    Gallbladder: Gallbladder is not well visualized.    Bile ducts: No definite intrahepatic or extrahepatic biliary ductal dilatation.    Spleen: Normal size.    Pancreas: There are low-density lesions in the pancreatic body and tail, which are poorly evaluated without intravenous contrast.  Largest in the tail measures approximately 3.1 x 2.5 cm (2-67).    Adrenals: No adrenal nodules.    Renal/Ureters: Kidneys are mildly atrophic.  4 mm nonobstructing right renal stone.  No hydronephrosis.  The ureters are difficult to trace along their entire course.  The urinary bladder is decompressed and poorly evaluated..    Reproductive: Uterus is surgically  absent.  Possible vaginal prolapse, as below.    Stomach/Bowel: There is a small hiatal hernia.  There is predominantly liquid stool within the proximal colon.  The distal colon is decompressed.  Colonic diverticulosis.  Stranding cut in portions of the omental and mesenteric fat are nonspecific, but considerations may intraperitoneal metastases, peritonitis, and/or edema.    Peritoneum: Large volume abdominopelvic ascites.  There is apparent prolapse of the pelvic floor, and possibly of the vagina, which contains ascitic fluid, partially visualized.  No intraperitoneal free air.    Lymph Nodes: No pathologic lazaro enlargement in the abdomen or pelvis.    Vasculature: Abdominal aorta tapers normally.  Severe atherosclerosis of the abdominal aorta and its branches.    Bones: Diffuse osteopenia.  T11 vertebral body osseous hemangioma.  No acute fracture or aggressive osseous lesion.    Soft Tissues: Diffuse body wall edema.  Increased AP diameter of the abdomen, likely secondary to the ascites.  Depending on clinical findings, now include the this abdominal distension is developed, abdominal compartment syndrome might be a consideration..      Impression    Liver cirrhosis and sequela of portal hypertension including large volume abdominal ascites.    Moderate left pleural effusion; hepatic hydrothorax is in the differential.  Other etiologies not excluded.    There is partially visualized large pelvic floor prolapse, possibly involving the vagina, and containing ascitic fluid.    Low-density lesions in the pancreatic body and tail, poorly evaluated without intravenous contrast.    4 mm nonobstructing right renal stone.    Small hiatal hernia.    Additional findings, as described in the body of the report.    Electronically signed by resident: Jet Cortez  Date:    04/04/2025  Time:    23:06    Electronically signed by: Harish Hu  Date:    04/05/2025  Time:    02:00

## 2025-04-05 NOTE — ASSESSMENT & PLAN NOTE
Patient with good mentation/no encephalopathy at this time. CBC with anemia, thrombocytopenia. INR 1.2. Abdominal exam with new epigastric tenderness since initial presentation. 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 Last diagnostic/therapeutic paracentesis on 3/26 with removal of 4.6L. Negative for SBP.     Patient with known Cirrhosis with Child's class B. Co-morbidities are present and inclusive of ascites, portal hypertension, portal venous thrombosis, malnutrition, and anemia/pancytopenia.  MELD-Na score calculated; MELD 3.0: 23 at 4/5/2025  5:16 AM  MELD-Na: 22 at 4/5/2025  5:16 AM  Calculated from:  Serum Creatinine: 3.9 mg/dL (Using max of 3 mg/dL) at 4/5/2025  5:16 AM  Serum Sodium: 135 mmol/L at 4/5/2025  5:16 AM  Total Bilirubin: 0.9 mg/dL (Using min of 1 mg/dL) at 4/5/2025  5:16 AM  Serum Albumin: 1.7 g/dL at 4/5/2025  5:16 AM  INR(ratio): 1.2 at 4/5/2025  5:16 AM  Age at listing (hypothetical): 79 years  Sex: Female at 4/5/2025  5:16 AM    -continue chronic meds. Etiology likely related to chronic HCV hepatitis and HCC s/p Y90.   -avoid any hepatotoxic meds   -monitor CBC/CMP/INR for synthetic function.   -f/u ammonia level  -consider liver U/S with doppler   - IR Therapeutic/Diagnostic paracentesis ordered  -NPO for now  -f/u ascitic studies (albumin, total protein, cell count, LDH, culture, stain) when procedure completed  -will consider diureses with IV lasix 40 mg  - Na restriction <2g, 1.5 fluid restriction  - started 1g CTX for SBP ppx out of abundance of caution ; if ascitic albumin >1.5, PMNs <250 and no evidence of GIB, can discontinue. Patient with no history of SBP, no fevers since presentation, no leukocytosis but some abdominal tenderness on exam  - If PMNs >250 on ascitic studies will treat with CTX 2g q.d x 5 days  - monitor mental status; q4h Neuro checks.

## 2025-04-05 NOTE — ASSESSMENT & PLAN NOTE
Recent Labs   Lab 04/05/25  0516   TROPONINIHS 238*     -trend troponin to peak  -monitor for signs/symptoms of ACS  -serial EKG  -f/u echo

## 2025-04-05 NOTE — ED TRIAGE NOTES
Seema Gann, a 79 y.o. female presents to the ED w/ complaint of vomiting and diarrhea since yesterday. Pt states she has vomited x 6 occurences with 3 episodes of diarrhea. Denies sick contacts. Denies recent antibiotic use    Triage note:  Chief Complaint   Patient presents with    Vomiting     Vomiting since 1am, endorses diarrhea as well, some abdominal pain, denies dysuria, endorses lower back pain     Review of patient's allergies indicates:  No Known Allergies  Past Medical History:   Diagnosis Date    Cirrhosis     HCC (hepatocellular carcinoma)     Hepatitis     Hypertension

## 2025-04-05 NOTE — FIRST PROVIDER EVALUATION
Medical screening examination initiated.  I have conducted a focused provider triage encounter, findings are as follows:    Brief history of present illness:  Pt with n/v/d.  No fever.  Some belly discomfort    There were no vitals filed for this visit.    Pertinent physical exam:  benign exam, ascites, hypotensive    Brief workup plan:  abd labs, fluids, and zofran    Preliminary workup initiated; this workup will be continued and followed by the physician or advanced practice provider that is assigned to the patient when roomed.

## 2025-04-05 NOTE — PROVIDER PROGRESS NOTES - EMERGENCY DEPT.
ED Resident HAND-OFF NOTE:  Seema Gann is a 79 y.o. female who presented to the ED on 4/5/2025, patient C/O vomiting. I assumed care of patient from off-going ED physician team patient pending CT abdomen, lactic.    Briefly, this is a 71-year-old female with history of hepatocellular carcinoma no longer on chemo presenting for nausea/vomiting/diarrhea.  Workup so far concerning for SHARA.    On my evaluation, Seema Gann appears well, hemodynamically stable and in NAD. Thus far, Seema Gann has received:  Medications   sodium chloride 0.9% flush 10 mL (has no administration in time range)   naloxone 0.4 mg/mL injection 0.02 mg (has no administration in time range)   glucose chewable tablet 16 g (has no administration in time range)   glucose chewable tablet 24 g (has no administration in time range)   dextrose 50% injection 12.5 g (has no administration in time range)   dextrose 50% injection 25 g (has no administration in time range)   glucagon (human recombinant) injection 1 mg (has no administration in time range)   heparin (porcine) injection 5,000 Units (has no administration in time range)   acetaminophen tablet 650 mg (has no administration in time range)   ondansetron disintegrating tablet 8 mg (has no administration in time range)   prochlorperazine tablet 10 mg (has no administration in time range)   melatonin tablet 6 mg (has no administration in time range)   midodrine tablet 10 mg (has no administration in time range)   mirtazapine tablet 7.5 mg (has no administration in time range)   cefTRIAXone injection 1 g (has no administration in time range)   sodium chloride 0.9% bolus 1,000 mL 1,000 mL (0 mLs Intravenous Stopped 4/4/25 2030)   ondansetron injection 4 mg (4 mg Intravenous Given 4/4/25 1929)   cefTRIAXone injection 1 g (1 g Intravenous Given 4/5/25 0004)   midodrine tablet 5 mg (5 mg Oral Given 4/5/25 0149)   ondansetron injection 4 mg (4 mg Intravenous Given 4/5/25 0245)  "      BP 96/60   Pulse 73   Temp 97.6 °F (36.4 °C) (Oral)   Resp 20   Ht 5' 4" (1.626 m)   Wt 53.5 kg (117 lb 15.1 oz)   SpO2 99%   BMI 20.25 kg/m²         Disposition: I anticipate patient will be admission  ______________________  Roma Petit MD   Emergency Medicine Resident      UPDATE:     Upon my reassessment, patient denies any fevers, abdomen is soft/nontender, low suspicion for SBP.  Patient with borderline hypotension though she chronically has borderline low blood pressures.  After fluids, blood pressure improved, also gave midodrine.    Discussed patient's case with Hospital Medicine who agreed to admit patient to their service for SHARA.    :  Screening for cardiovascular condition  Nausea vomiting and diarrhea  SHARA (acute kidney injury) (Primary)  Vomiting, unspecified vomiting type, unspecified whether nausea present  Diarrhea, unspecified type      79-year-old female past medical history cirrhosis, hepatocellular carcinoma that is presenting for nausea/vomiting.  On re-evaluation patient is well-appearing, benign abdominal exam, no tenderness to palpation.  Workup noted, patient will be admitted for SHARA.    Attestation of Dr. Greco (Attending Physician):      I have reviewed the record and the patient care provided by the Resident provider and agree with the documented HPI, ROS, PE.  I also agree with the treatment plan and work up and I have performed an independent history / physical exam and MDM.    "

## 2025-04-05 NOTE — ASSESSMENT & PLAN NOTE
Patient found to have moderate pleural effusion on imaging. I have personally reviewed and interpreted the following imaging: Xray and CT. A thoracentesis was deferred. Most likely etiology includes Cirrhosis. Management to include Diuresis and consider consult to pulmonology for potential thoracentesis if patient develops sx related to effusion

## 2025-04-05 NOTE — ED PROVIDER NOTES
Encounter Date: 4/4/2025       History     Chief Complaint   Patient presents with    Vomiting     Vomiting since 1am, endorses diarrhea as well, some abdominal pain, denies dysuria, endorses lower back pain     Patient is a 79-year-old with history of cirrhosis and hepatocellular carcinoma no longer on active chemo presenting due to nausea/vomiting/diarrhea for 24 hours.  Patient states she had a normal day yesterday.  Denies any abdominal pain, chest pain, shortness of breath, headaches, changes in vision, cough, congestion, changes in urination, blood in her stool, blood in her in vomit, fevers.  States that last time she ate was last night.  Reports that she gets ascites drained every other week.        Review of patient's allergies indicates:  No Known Allergies  Past Medical History:   Diagnosis Date    Cirrhosis     HCC (hepatocellular carcinoma)     Hepatitis     Hypertension      Past Surgical History:   Procedure Laterality Date    APPENDECTOMY      HYSTERECTOMY       No family history on file.  Social History[1]  Review of Systems  Per HPI  Physical Exam     Initial Vitals [04/04/25 1908]   BP Pulse Resp Temp SpO2   (!) 86/50 78 19 97.8 °F (36.6 °C) 97 %      MAP       --         Physical Exam    Constitutional: She is not diaphoretic. No distress.   Eyes: Conjunctivae are normal. Right eye exhibits no discharge. Left eye exhibits no discharge.   Cardiovascular:  Normal rate, regular rhythm and normal heart sounds.           No murmur heard.  Pulmonary/Chest: Breath sounds normal. No stridor. No respiratory distress. She has no wheezes. She has no rhonchi. She has no rales.   Abdominal: Abdomen is soft. She exhibits distension. There is no abdominal tenderness.   Distention of abdomen without tenderness to palpation There is no rebound and no guarding.   Musculoskeletal:         General: No tenderness or edema.     Neurological: She is alert. GCS score is 15. GCS eye subscore is 4. GCS verbal subscore is  5. GCS motor subscore is 6.   Skin: Skin is warm and dry.   Psychiatric: She has a normal mood and affect.         ED Course   Procedures  Labs Reviewed   COMPREHENSIVE METABOLIC PANEL - Abnormal       Result Value    Sodium 135 (*)     Potassium 4.1      Chloride 109      CO2 15 (*)     Glucose 117 (*)     BUN 41 (*)     Creatinine 4.0 (*)     Calcium 8.5 (*)     Protein Total 7.3      Albumin 2.0 (*)     Bilirubin Total 1.4 (*)     ALP 63      AST 33      ALT 11      Anion Gap 11      eGFR 11 (*)    LIPASE - Abnormal    Lipase Level 79 (*)    LACTIC ACID, PLASMA - Abnormal    Lactic Acid Level 4.5 (*)     Narrative:     Falsely low lactic acid results can be found in samples containing >=13.0 mg/dL total bilirubin and/or >=3.5 mg/dL direct bilirubin.    CBC WITH DIFFERENTIAL - Abnormal    WBC 7.22      RBC 2.89 (*)     HGB 8.7 (*)     HCT 28.2 (*)     MCV 98      MCH 30.1      MCHC 30.9 (*)     RDW 15.6 (*)     Platelet Count 102 (*)     MPV 13.1 (*)     Nucleated RBC 0      Neut % 85.1 (*)     Lymph % 8.9 (*)     Mono % 4.7      Eos % 0.4      Basophil % 0.3      Imm Grans % 0.6 (*)     Neut # 6.15      Lymph # 0.64 (*)     Mono # 0.34      Eos # 0.03      Baso # 0.02      Imm Grans # 0.04     CLOSTRIDIUM DIFFICILE   CULTURE, BLOOD   CULTURE, BLOOD   CBC W/ AUTO DIFFERENTIAL    Narrative:     The following orders were created for panel order CBC auto differential.  Procedure                               Abnormality         Status                     ---------                               -----------         ------                     CBC with Differential[[id number]]       Abnormal            Final result                 Please view results for these tests on the individual orders.   URINALYSIS, REFLEX TO URINE CULTURE   LACTIC ACID, PLASMA     EKG Readings: (Independently Interpreted)   No significant ST elevations or ST depressions.  FL interval WNL.  Normal sinus rhythm.       Imaging Results              CT  Abdomen Pelvis  Without Contrast (In process)                      X-Ray Chest AP Portable (In process)                      Medications   sodium chloride 0.9% bolus 1,000 mL 1,000 mL (0 mLs Intravenous Stopped 4/4/25 2030)   ondansetron injection 4 mg (4 mg Intravenous Given 4/4/25 1929)   cefTRIAXone injection 1 g (1 g Intravenous Given 4/5/25 0004)     Medical Decision Making  Patient is a 79-year-old afebrile, hypotensive, in no acute distress female presenting due to N/V and diarrhea.    DDX:  Gastritis, SBP, UTI, pyelonephritis    No significant abdominal pain on history or physical exam.  Distention is stable per the patient we will follow up CT imaging findings.  Afebrile without leukocytosis or tachycardia.  However low suspicion for SBP.  Lactate elevated.  We will give IV fluids.  We will trend.  CT abdomen and pelvis pending.  UA pending.  Repeat lactate pending.  Patient handed off the oncoming ED team pending labs and imaging.  Suspect likely admission to hospital medicine given patient's SHARA in the setting of likely hypovolemic shock.    Amount and/or Complexity of Data Reviewed  Labs: ordered.  Radiology: ordered.    Risk  Prescription drug management.                                      Clinical Impression:  Final diagnoses:  [Z13.6] Screening for cardiovascular condition  [R11.2, R19.7] Nausea vomiting and diarrhea                   Edilberto Warenr MD  Resident  04/05/25 0037         [1]   Social History  Tobacco Use    Smoking status: Never     Passive exposure: Never    Smokeless tobacco: Never   Substance Use Topics    Alcohol use: No    Drug use: No        Edilberto Warner MD  Resident  04/05/25 0039

## 2025-04-05 NOTE — PROGRESS NOTES
"Pharmacokinetic Initial Assessment: IV Vancomycin    Assessment/Plan:    Initiate intravenous vancomycin with loading dose of 1000 mg once with subsequent doses when random concentrations are less than 20 mcg/mL  Desired empiric serum trough concentration is 15 to 20 mcg/mL  Draw vancomycin random level on 4/6/25 at 1900.  Pharmacy will continue to follow and monitor vancomycin.      Please contact pharmacy at extension [phone] with any questions regarding this assessment.     Thank you for the consult,   Justo Hermosillo       Patient brief summary:  Seema Gann is a 79 y.o. female initiated on antimicrobial therapy with IV Vancomycin for treatment of suspected sepsis    Drug Allergies:   Review of patient's allergies indicates:  No Known Allergies    Actual Body Weight:   53.5 kg    Renal Function:   Estimated Creatinine Clearance: 10 mL/min (A) (based on SCr of 3.9 mg/dL (H)).,     Dialysis Method (if applicable):  N/A    CBC (last 72 hours):  Recent Labs   Lab Result Units 04/04/25 1930 04/05/25  0516   WBC K/uL 7.22 5.78   HGB gm/dL 8.7* 7.3*   HCT % 28.2* 23.8*   Platelet Count K/uL 102* 92*   Lymph % % 8.9* 11.8*   Mono % % 4.7 5.5   Eos % % 0.4 0.5   Basophil % % 0.3 0.3       Metabolic Panel (last 72 hours):  Recent Labs   Lab Result Units 04/04/25 1930 04/05/25  0516 04/05/25  0638   Sodium mmol/L 135* 135*  --    Urine Sodium mmol/L  --   --  <10*   Potassium mmol/L 4.1 4.5  --    Chloride mmol/L 109 111*  --    Urine Chloride mmol/L  --   --  <20*   CO2 mmol/L 15* 15*  --    Glucose mg/dL 117* 129*  --    BUN mg/dL 41* 43*  --    Creatinine mg/dL 4.0* 3.9*  --    Urine Creatinine mg/dL  --   --  395.0*  392.0*   Albumin g/dL 2.0* 1.7*  --    Bilirubin Total mg/dL 1.4* 0.9  --    ALP unit/L 63 56  --    AST unit/L 33 33  --    ALT unit/L 11 10  --    Magnesium  mg/dL  --  2.0  --    Phosphorus Level mg/dL  --  4.9*  --        Drug levels (last 3 results):  No results for input(s): "VANCOMYCINRA", " ""VANCORANDOM", "VANCOMYCINPE", "VANCOPEAK", "VANCOMYCINTR", "VANCOTROUGH" in the last 72 hours.    Microbiologic Results:  Microbiology Results (last 7 days)       Procedure Component Value Units Date/Time    Clostridium difficile EIA [[id number]]  (Normal) Collected: 04/04/25 2326    Order Status: Completed Specimen: Stool Updated: 04/05/25 0859     C. DIFFICILE GDH AG Negative     Clostridium Difficile Toxin A/B Negative    Blood culture x two cultures. Draw prior to antibiotics. [[id number]]  (Normal) Collected: 04/04/25 2010    Order Status: Completed Specimen: Blood from Peripheral, Antecubital, Right Updated: 04/05/25 0501     Blood Culture No Growth After 6 Hours    Blood culture x two cultures. Draw prior to antibiotics. [[id number]]  (Normal) Collected: 04/04/25 2011    Order Status: Completed Specimen: Blood from Peripheral, Forearm, Left Updated: 04/05/25 0501     Blood Culture No Growth After 6 Hours            "

## 2025-04-05 NOTE — ASSESSMENT & PLAN NOTE
-[employer]iemetics PRN  -linda IV fluid resuscitation given liver disease  -f/u c diff studies  -no systemic symptoms but significantly volume overloaded and distended on exam  -IR paracentesis

## 2025-04-05 NOTE — ASSESSMENT & PLAN NOTE
Creatinine stable for now. BMP reviewed- noted Estimated Creatinine Clearance: 10 mL/min (A) (based on SCr of 3.9 mg/dL (H)). according to latest data. Based on current GFR, CKD stage is stage 4 - GFR 15-29.  Monitor UOP and serial BMP and adjust therapy as needed. Renally dose meds. Avoid nephrotoxic medications and procedures.    Creatinine 4.0 on admit, baseline around 2.5-2.8.  Likely secondary to pre-renal etiology from dehydration and volume losses given vomiting/diarrhea and hypotension. There are concerns for hepatorenal syndrome.     - Strict I&Os and daily weights   - Gentle IVF  - Avoid nephrotoxic agents such as NSAIDs, gadolinium, and IV radiocontrast  - Renally dose meds to current GFR  - f/u urine lytes, urine osm, serum osm for initial w/u for HRS  - continue midodrine 10 mg PO TID  - Goal MAP > 85mmHg  - May need pressors if no improvement

## 2025-04-06 PROBLEM — N81.4 UTERINE PROLAPSE: Status: ACTIVE | Noted: 2025-01-01

## 2025-04-06 PROBLEM — R00.1 BRADYCARDIA: Chronic | Status: ACTIVE | Noted: 2025-04-06

## 2025-04-06 NOTE — CONSULTS
"[facility] - Medical / Clinical  Nephrology  CONSULT Note      Patient Name: Seema Gann   MRN: [id number]   Current Provider: Gerald Campo*  Primary Care Provider: Bethany Mesa MD   Admission Date: 4/4/2025   Hospital Day: 1  Bed: 1546/9480 A  Principal Problem: Decompensated cirrhosis related to hepatitis C virus (HCV)       SUBJECTIVE    Seema Gann is a 79 y.o. female ,is admitted on 4/4/2025  with past medical history of  hypertension, decompensated HCV cirrhosis with recurrent ascites requiring frequent large volume paracentesis q2wk, thrombocytopenia, hepatocellular carcinoma s/p Y90 x2, CKD stage IV (baseline creatinine 2.5-2.8)        Admitted with vomiting and diarrhea. She had 6 episodes of vomiting and 3 episodes of diarrhea 1 day. She was hypotensive in ED wth SAAVGE of 86/50. Lactate was high and Cr was 4. Got 1L of NS bolus in ED. Her Cr mildly improved but then again got worse today. She is started on midodrine but the BP did not improved. Stepped up to the ICU for pressors requiremenets.     Nephrology consulted for the concerns of HRS.      Review of Systems: Negative except for as stated in history of present illness    OBJECTIVE     Vitals:  /69   Pulse (!) 59   Temp 97.5 °F (36.4 °C) (Oral)   Resp 20   Ht 5' 4" (1.626 m)   Wt 58.5 kg (128 lb 15.5 oz)   SpO2 95%   BMI 22.14 kg/m²    I/O last 3 completed shifts:  In: 1027.7 [I.V.:789; IV Piggyback:238.7]  Out: 20 [Urine:20]   Net IO Since Admission: 1,245.78 mL [04/06/25 1131]    Physical Examination:  Constitutional: Chronically ill. thin, frail  HEENT: Neck is supple. No JVD  Chest:  Chest is clear BL.   Cardiovascular:  S1+S2+0  Abdominal: SNT, ND  Extremities: No lower extremity edema       MEDICATIONS & LABS       NORepinephrine bitartrate-NaCl  0-0.2 mcg/kg/min Intravenous Continuous 24.1 mL/hr at 04/06/25 1044 0.12 mcg/kg/min at 04/06/25 1044      heparin (porcine)  5,000 Units Subcutaneous Q8H    " "lactulose  10 g Oral BID    midodrine  15 mg Oral TID    mirtazapine  7.5 mg Oral QHS    octreotide  100 mcg Subcutaneous Q8H    piperacillin-tazobactam (Zosyn) IV (PEDS and ADULTS) (extended infusion is not appropriate)  4.5 g Intravenous Q12H    sevelamer carbonate  0.8 g Oral TID WM     Current Outpatient Medications   Medication Instructions    acetaminophen (TYLENOL) 500 mg, Daily PRN    aspirin/sod bicarb/citric acid (MAN-SELTZER ORAL) 2 tablets, Daily PRN    CALCIUM ORAL 1 tablet, Daily    mirtazapine (REMERON) 7.5 mg, Oral, Nightly       Relevant Data:  Trend: No results found for: "CYSTATINCSER"  Trend:   Lab Results   Component Value Date    BUN 50 (H) 04/06/2025    BUN 43 (H) 04/05/2025    BUN 41 (H) 04/04/2025     Trend:     POC HCO3   Date Value Ref Range Status   04/05/2025 15.9 mmol/l Final     Trend:   Vitals:    04/06/25 0800 04/06/25 0900 04/06/25 0901 04/06/25 1000   BP: (!) 163/84 115/66  122/69   BP Location:       Patient Position:       Pulse: 65 64 60 (!) 59   Resp: 20 19 19 20   Temp:       TempSrc:       SpO2: (!) 94% 96% 96% 95%   Weight:       Height:           I/O last 3 completed shifts:  In: 1027.7 [I.V.:789; IV Piggyback:238.7]  Out: 20 [Urine:20]   Net IO Since Admission: 1,245.78 mL [04/06/25 1131]  Trend: No results found for: "PHOSPHORUS"  Trend:   Lab Results   Component Value Date    HGB 8.0 (L) 04/06/2025    HGB 7.3 (L) 04/05/2025    HGB 8.7 (L) 04/04/2025         Trend:   Calcium   Date Value Ref Range Status   04/06/2025 8.2 (L) 8.7 - 10.5 mg/dL Final   04/05/2025 7.9 (L) 8.7 - 10.5 mg/dL Final   04/04/2025 8.5 (L) 8.7 - 10.5 mg/dL Final     Albumin   Date Value Ref Range Status   04/06/2025 2.5 (L) 3.5 - 5.2 g/dL Final   04/05/2025 1.7 (L) 3.5 - 5.2 g/dL Final       MEDICAL HISTORY    Past Medical History:  Past Medical History:   Diagnosis Date    Cirrhosis     HCC (hepatocellular carcinoma)     Hepatitis     Hypertension        Past Surgical History:  Past Surgical " History:   Procedure Laterality Date    APPENDECTOMY      HYSTERECTOMY         Family History:   No family history on file.   Review of patient's allergies indicates:  No Known Allergies  Social History[1]         ASSESSMENT AND PLAN:    Seema Gann is a 79 y.o. female ,is admitted on 4/4/2025  with past medical history of   decompensated HCV cirrhosis with recurrent ascites requiring frequent large volume paracentesis q2wk.  \Admitted with vomiting and diarrhea  Nephrology  is consulted for concerns of HRS      Impression:  SHARA KDIGO stage 3 on CKD 4    Baseline Creatinine: 2.5-2.8  Creatinine at time of consult: 4.4  Tests done:UPCR 0.09  Etiology of SHARA: Likely HRS. Other causes could be hypotension and increased abdominal pressures      Acid Base Disorder: metabolic acidosis with Na bicarbonate of 17    Recommendations :   - Consider albumin and IV fluids   - She refused dialysis in cases required stating that she has lived her life -  and daughter at bedside - Family understands and honors her decision    Monitor serum chemistries daily, strict intake and output Qshift , daily weights if able.  Renal protective measures: Please adjust medications for reduced clearance  Avoid nephrotoxic medications (NSAID, IV contrast)  Avoid ACEi and ARBs in the setting of SHARA  Maintain MAP > 65  Transfuse for Hb <7    Thank you for allowing us to participate in the care of this patient.  Plan discussed with attending. Please call with any questions or concerns.           James Bowen MD.  Clinical Nephrology Fellow, PGY-4  [facility] [address]          [1]   Social History  Socioeconomic History    Marital status:    Tobacco Use    Smoking status: Never     Passive exposure: Never    Smokeless tobacco: Never   Substance and Sexual Activity    Alcohol use: No    Drug use: No    Sexual activity: Yes   Social History Narrative    ** Merged History Encounter **          Social Drivers  of Health     Financial Resource Strain: Low Risk  (4/5/2025)    Overall Financial Resource Strain (CARDIA)     Difficulty of Paying Living Expenses: Not very hard   Recent Concern: Financial Resource Strain - Medium Risk (2/24/2025)    Overall Financial Resource Strain (CARDIA)     Difficulty of Paying Living Expenses: Somewhat hard   Food Insecurity: No Food Insecurity (4/5/2025)    Hunger Vital Sign     Worried About Running Out of Food in the Last Year: Never true     Ran Out of Food in the Last Year: Never true   Recent Concern: Food Insecurity - Food Insecurity Present (2/24/2025)    Hunger Vital Sign     Worried About Running Out of Food in the Last Year: Sometimes true     Ran Out of Food in the Last Year: Sometimes true   Transportation Needs: No Transportation Needs (4/5/2025)    PRAPARE - Transportation     Lack of Transportation (Medical): No     Lack of Transportation (Non-Medical): No   Physical Activity: Inactive (2/24/2025)    Exercise Vital Sign     Days of Exercise per Week: 0 days     Minutes of Exercise per Session: 0 min   Stress: Stress Concern Present (4/5/2025)    [employer] - Occupational Stress Questionnaire     Feeling of Stress : To some extent   Housing Stability: Low Risk  (4/5/2025)    Housing Stability Vital Sign     Unable to Pay for Housing in the Last Year: No     Number of Times Moved in the Last Year: 0     Homeless in the Last Year: No

## 2025-04-06 NOTE — ASSESSMENT & PLAN NOTE
"Chief Complaint   Patient presents with   • Follow-up   • Breathing Problem       Subjective   Eulalio Frazier is a 82 y.o. male.     History of Present Illness   Patient was evaluated today to discuss the results of CT scan.    The patient denies any night sweats, weight loss or hemoptysis.     Patient also complains of runny nose and dribbling in the back of the throat for the past few weeks. This has been sometimes associated with seasonal variation.      The following portions of the patient's history were reviewed and updated as appropriate: allergies, current medications, past family history, past medical history, past social history and past surgical history.    Review of Systems   Constitutional: Negative for chills and fever.   HENT: Positive for rhinorrhea, sinus pressure and sneezing. Negative for sore throat.    Respiratory: Negative for cough, chest tightness, shortness of breath and wheezing.    Psychiatric/Behavioral: Positive for sleep disturbance.       Objective   Visit Vitals  /60   Pulse 60   Temp 98.6 °F (37 °C)   Resp 18   Ht 167.6 cm (66\")   Wt 67.1 kg (148 lb)   SpO2 96%   BMI 23.89 kg/m²       Physical Exam   Constitutional: He is oriented to person, place, and time. He appears well-developed and well-nourished.   HENT:   Head: Normocephalic and atraumatic.   Nostril showed moderate erythema.  Oropharynx was cobblestoned.   Eyes: EOM are normal.   Neck: Neck supple. No JVD present.   Cardiovascular: Normal rate and regular rhythm.   Pulmonary/Chest: Effort normal. No respiratory distress. He has no wheezes.   Musculoskeletal:   Gait was normal.   Neurological: He is alert and oriented to person, place, and time.   Skin: Skin is warm and dry.   Vitals reviewed.      Assessment/Plan   Eulalio was seen today for follow-up and breathing problem.    Diagnoses and all orders for this visit:    Lung nodule, multiple  -     CT Chest Without Contrast; Future    Abnormal CT of the chest  -     CT " Creatinine stable for now. BMP reviewed- noted Estimated Creatinine Clearance: 10 mL/min (A) (based on SCr of 3.9 mg/dL (H)). according to latest data. Based on current GFR, CKD stage is stage 4 - GFR 15-29.  Monitor UOP and serial BMP and adjust therapy as needed. Renally dose meds. Avoid nephrotoxic medications and procedures.     Creatinine 4.0 on admit, baseline around 2.5-2.8.  Likely secondary to pre-renal etiology from dehydration and volume losses given vomiting/diarrhea and hypotension. There are concerns for hepatorenal syndrome.      - Strict I&Os and daily weights   - Gentle IVF  - Avoid nephrotoxic agents such as NSAIDs, gadolinium, and IV radiocontrast  - Renally dose meds to current GFR  - Urine Na <10, Urine Osm > serum osm, and FeNa suggestive of pre-renal etiology. Has not received any diuretics, likely consistent with HRS  - continue midodrine 15 mg PO TID  - Goal MAP > 85mmHg  - Levo for MAP goals PRN   Chest Without Contrast; Future    Other allergic rhinitis    Other orders  -     flunisolide (NASALIDE) 25 MCG/ACT (0.025%) solution nasal spray; Inhale 1 spray Daily.         Return in about 10 months (around 5/10/2021) for Recheck, Imaging, In [place].    DISCUSSION (if any):  Last CT scan was reviewed in great detail with the patient. Images reviewed personally.   The CT scan shows that the lung nodules have remained stable. There was no acute change/process identified    Repeat CT will be scheduled for March 2021.     Patient will be started on nasal spray for symptoms which are definitely consistent with allergic rhinitis.     If his symptoms do not improve, then we will consider ordering IgE/RAST panel.    Dictated utilizing Dragon dictation.    This document was electronically signed by Piedad Garrido MD on 07/10/20 at 12:08

## 2025-04-06 NOTE — ASSESSMENT & PLAN NOTE
Chronic. Intermittent asx bradycardia to 30s-40s. H/o LAFB, RBBB.  - continue to monitor on telemetry.   - avoid lazaro blockade

## 2025-04-06 NOTE — ACP (ADVANCE CARE PLANNING)
Advance Care Planning     Date: 04/05/2025    Code Status  In light of the patients advanced and life limiting illness,I engaged the the patient and family in a voluntary conversation about the patient's preferences for care  at the very end of life. The patient wishes to have a natural, peaceful death.  Along those lines, the patient does not wish to have CPR or other invasive treatments performed when her heart and/or breathing stops. I communicated to the patient and family that a DNR order would be placed in her medical record to reflect this preference.    A total of 30 min was spent on advance care planning, goals of care discussion, emotional support, formulating and communicating prognosis and exploring burden/benefit of various approaches of treatment. This discussion occurred on a fully voluntary basis with the verbal consent of the patient and/or family.           Patient expressed that she would not want CPR, intubation, or other invasive treatments.

## 2025-04-06 NOTE — PLAN OF CARE
MICU DAILY GOALS     Family/Goals of care/Code Status   Code Status: DNR    24H Vital Sign Range  Temp:  [97.5 °F (36.4 °C)-98.2 °F (36.8 °C)]   Pulse:  [50-77]   Resp:  [10-22]   BP: ()/(51-72)   SpO2:  [92 %-100 %]      Shift Events (include procedures and significant events)   Levophed initiated    AWAKE RASS: Goal -    Actual -      Restraint necessity: Not necessary   BREATHE SBT: Not intubated    Coordinate A & B, analgesics/sedatives Pain: managed   SAT: Not intubated   Delirium CAM-ICU:     Early(intubated/ Progressive (non-intubated) Mobility MOVE Screen (INTUBATED ONLY): Not intubated    Activity: Activity Management: Rolling - L1   Feeding/Nutrition Diet order:  ,     Thrombus DVT prophylaxis:     HOB Elevation Head of Bed (HOB) Positioning: HOB at 20-30 degrees   Ulcer Prophylaxis GI: yes   Glucose control managed     Skin Skin assessment:     Sacrum intact/not altered? No  Heels intact/not altered? Yes  Surgical wound? No    CHECK ONE!   (no altered skin or altered skin) and sub boxes:  [] No Altered Skin Integrity Present    [x]Prevention Measures Documented    [] Altered Skin Integrity Present or Discovered   [x] LDA already present in EPIC, daily doc completed              [x] LDA added if not already in EPIC (describe/stage wound).               [] Wound Image Taken (required on admit,                   transfer/discharge and every Tuesday)    Wound Care Consulted? No   Bowel Function diarrhea    Indwelling Catheter Necessity         na   De-escalation Antibiotics Yes        VS and assessment per flow sheet, patient progressing towards goals as tolerated, plan of care reviewed with [unfilled], all concerns addressed, will continue to monitor.

## 2025-04-06 NOTE — EICU
Intervention Initiated From:  Bedside    Nolan intervened regarding:  Documentation    Comments: Called to bedside for paracentesis. Consent confirmed with bedside team. Physician verification completed. Time out done using proper pt identifiers. Paracentesis to (R) abd by MD Cristobal under direct supervision of MD Lacy. Return of cl yellow fluid noted.  Approx 1.6L removed. Pt tolerated procedure well.

## 2025-04-06 NOTE — ASSESSMENT & PLAN NOTE
Large pelvic floor prolapse noted on CT.   - chronic, occurs when ascites builds, improves with paracentesis

## 2025-04-06 NOTE — ASSESSMENT & PLAN NOTE
Last diagnostic/therapeutic paracentesis on 3/26 with removal of 4.6L. Negative for SBP.      MELD 3.0: 24 at 4/6/2025  5:22 AM  MELD-Na: 25 at 4/6/2025  5:22 AM  Calculated from:  Serum Creatinine: 4.4 mg/dL (Using max of 3 mg/dL) at 4/6/2025  5:22 AM  Serum Sodium: 138 mmol/L (Using max of 137 mmol/L) at 4/6/2025  5:22 AM  Total Bilirubin: 1.4 mg/dL at 4/6/2025  5:22 AM  Serum Albumin: 2.5 g/dL at 4/6/2025  5:22 AM  INR(ratio): 1.4 at 4/6/2025  5:22 AM  Age at listing (hypothetical): 79 years  Sex: Female at 4/6/2025  5:22 AM    - Continue home meds. Etiology likely related to chronic HCV hepatitis and HCC s/p Y90 .   - Avoid any hepatotoxic meds   - Daily CBC, CMP, INR  - Ammonia 49, pt is mentating appropriately. Restarting lactulose  - will perform paracentesis at bedside  - f/u ascitic studies when collected (albumin, total protein, cell count, LDH, culture, stain)   - Na restriction <2g  - Has received 1g rocephin for SBP ppx. As now with increasing lactic and persistent hypotension, started on broad spectrum abx, de-escalate as indicated  - monitor mental status; q4h Neuro checks.   - Hepatology consult

## 2025-04-06 NOTE — CONSULTS
[facility] Consult Note  Patient Name: Seema Gann  MRN: [id number]  [facility]  Attending: Gerald Campo*   Admit Date: 4/4/2025  Today's Date: 04/06/2025      SUBJECTIVE:     HPI:  Seema Gann is a 79 year old female for whom Hepatology is consulted for management of decompensated cirrhosis. 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 White (last seen in Hepatology clinic 12/2024), CKDIV (baseline Cr 2.5 to 2.8).    During last clinic visit, discussed lack of definitive treatment measures, palliative care referral and PleurX drain for recurrent ascites. She initially presented to [facility] on 4/04 with n/v/d. She was hypotensive (86/50) and found to have a lactic acidosis of 7. She was treated with IVFs, albumin, CTX and Flagyl. Due to lactic acidosis and hypotension, she was stepped up to the MICU, started on levophed and abx switched to Zosyn. CTAP w/o showed moderate L pleural effusion and large volume ascites. Primary team concerned about HRS so nephrology consulted. She is now DNR after goals of care conversations.       Review of patient's allergies indicates:  No Known Allergies    Past Medical History:   Diagnosis Date    Cirrhosis     HCC (hepatocellular carcinoma)     Hepatitis     Hypertension      Past Surgical History:   Procedure Laterality Date    APPENDECTOMY      HYSTERECTOMY       No family history on file.  Social History[1]    All home medications reviewed.    Review of Systems   Constitutional:  Positive for malaise/fatigue.   Gastrointestinal:  Negative for abdominal pain and vomiting.     OBJECTIVE:     Vital Signs Trends/History Reviewed  Vitals:    04/06/25 0407 04/06/25 0452 04/06/25 0547 04/06/25 0632   BP: 122/71 113/70 118/67 133/63   BP Location: Left arm   Left arm    Patient Position: Lying   Lying   Pulse: (!) 59 60 (!) 56 (!) 57   Resp: 18 13 18 18   Temp:       TempSrc:       SpO2: 99% 99% 95% 97%   Weight:       Height:             Physical Exam  Vitals reviewed.   Constitutional:       General: She is not in acute distress.     Appearance: She is ill-appearing.   Eyes:      General: No scleral icterus.  Pulmonary:      Effort: Pulmonary effort is normal.   Abdominal:      General: There is distension.      Palpations: Abdomen is soft.      Tenderness: There is no abdominal tenderness. There is no guarding or rebound.   Neurological:      Mental Status: She is alert. Mental status is at baseline.           Laboratory: All labs within last 24 hours reviewed.     MELD:   MELD 3.0: 24 at 4/6/2025  5:22 AM  MELD-Na: 25 at 4/6/2025  5:22 AM  Calculated from:  Serum Creatinine: 4.4 mg/dL (Using max of 3 mg/dL) at 4/6/2025  5:22 AM  Serum Sodium: 138 mmol/L (Using max of 137 mmol/L) at 4/6/2025  5:22 AM  Total Bilirubin: 1.4 mg/dL at 4/6/2025  5:22 AM  Serum Albumin: 2.5 g/dL at 4/6/2025  5:22 AM  INR(ratio): 1.4 at 4/6/2025  5:22 AM  Age at listing (hypothetical): 79 years  Sex: Female at 4/6/2025  5:22 AM        Imaging: All imaging within last 24 hours reviewed.       Infusions:     NORepinephrine bitartrate-NaCl  0-0.2 mcg/kg/min Intravenous Continuous 32.1 mL/hr at 04/06/25 0640 0.16 mcg/kg/min at 04/06/25 0640       Scheduled Medications:    heparin (porcine)  5,000 Units Subcutaneous Q8H    midodrine  15 mg Oral TID    mirtazapine  7.5 mg Oral QHS    octreotide  100 mcg Subcutaneous Q8H    piperacillin-tazobactam (Zosyn) IV (PEDS and ADULTS) (extended infusion is not appropriate)  4.5 g Intravenous Q12H       PRN Medications:     Current Facility-Administered Medications:     acetaminophen, 650 mg, Oral, Q6H PRN    dextrose 50%, 12.5 g, Intravenous, PRN    dextrose 50%, 25 g, Intravenous, PRN    glucagon (human recombinant), 1 mg, Intramuscular, PRN    glucose, 16 g, Oral,  PRN    glucose, 24 g, Oral, PRN    melatonin, 6 mg, Oral, Nightly PRN    naloxone, 0.02 mg, Intravenous, PRN    ondansetron, 8 mg, Oral, Q8H PRN    prochlorperazine, 10 mg, Oral, Q6H PRN    sodium chloride 0.9%, 10 mL, Intravenous, Q12H PRN    Pharmacy to dose Vancomycin consult, , , Once **AND** vancomycin - pharmacy to dose, , Intravenous, pharmacy to manage frequency    ASSESSMENT:    79 year old female for whom Hepatology is consulted for management of decompensated cirrhosis. 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 White (last seen in Hepatology clinic 12/2024), CKDIV (baseline Cr 2.5 to 2.8) admitted to [facility] on 4/05 with hypotensive, SHARA on CKD and LA 7, now on levophed and broad spectrum abx. Hepatology consulted for management of decompensated cirrhosis. CT imaging showed moderate L pleural effusion and large volume ascites. She is DNR but would like to avoid invasive measures including dialysis.     Problem List:  Shock  SHARA on CKD  Decompensated HCV cirrhosis and HCC   Ascites   L pleural effusion  Normocytic anemia  Coagulopathy  Lactic acidosis    RECOMMENDATIONS:    -Patient is not a liver transplant candidate.   -Broad spectrum abx and infectious workup.  -Obtain diagnostic/therapeutic paracentesis.   -Consider thoracentesis for L pleural effusion.   -SHARA per Nephrology. They suspect HRS. Of note, she does not want dialysis should she ever require it.   -Palliative care consult to assist with goals of care conversations should her clinical condition continue to worsen. If she were to proceed with hospice, would discuss placing a PleurX catheter.   -CMP, INR daily.      Thank you for allowing us to participate in the care of this patient. Please call with questions.      Laurie Linda MD  Gastroenterology Fellow,  PGY-V  [facility]          [1]   Social History  Tobacco Use    Smoking status: Never     Passive exposure: Never    Smokeless tobacco: Never   Substance Use Topics    Alcohol use: No    Drug use: No

## 2025-04-06 NOTE — CONSULTS
Patient seen and evaluated by critical care medicine. To be admitted to ICU for further management. Full H&P to follow.     NELI Carballo, [facility]  Pulmonary Critical Care Medicine   04/05/2025

## 2025-04-06 NOTE — SUBJECTIVE & OBJECTIVE
Review of Systems   Constitutional:  Negative for chills and fever.   Respiratory:  Negative for cough and shortness of breath.    Cardiovascular:  Positive for chest pain.   Gastrointestinal:  Positive for abdominal distention, diarrhea and vomiting. Negative for abdominal pain, blood in stool and nausea.   Genitourinary:  Negative for difficulty urinating.   Musculoskeletal:  Negative for myalgias.   Skin:  Negative for wound.   Neurological:  Negative for dizziness and light-headedness.     Objective:     Vital Signs (Most Recent):  Temp: 97.8 °F (36.6 °C) (04/05/25 1520)  Pulse: 71 (04/05/25 1520)  Resp: 20 (04/05/25 1520)  BP: (!) 94/53 (04/05/25 1520)  SpO2: 98 % (04/05/25 1520) Vital Signs (24h Range):  Temp:  [97.6 °F (36.4 °C)-98.2 °F (36.8 °C)] 97.8 °F (36.6 °C)  Pulse:  [66-77] 71  Resp:  [13-23] 20  SpO2:  [92 %-100 %] 98 %  BP: ()/(50-63) 94/53   Weight: 53.5 kg (117 lb 15.1 oz)  Body mass index is 20.25 kg/m².      Intake/Output Summary (Last 24 hours) at 4/5/2025 1944  Last data filed at 4/5/2025 0732  Gross per 24 hour   Intake --   Output 20 ml   Net -20 ml          Physical Exam  Vitals and nursing note reviewed.   Constitutional:       General: She is not in acute distress.     Appearance: She is not toxic-appearing or diaphoretic.      Comments: Chronically-ill appearing   HENT:      Head: Normocephalic and atraumatic.      Nose: Nose normal.      Mouth/Throat:      Mouth: Mucous membranes are moist.   Eyes:      Conjunctiva/sclera: Conjunctivae normal.   Cardiovascular:      Rate and Rhythm: Regular rhythm. Bradycardia present.      Heart sounds: Normal heart sounds. No murmur heard.  Pulmonary:      Effort: Pulmonary effort is normal.      Breath sounds: No wheezing, rhonchi or rales.      Comments: Decreased breath sounds on the left.   Abdominal:      General: There is distension.      Palpations: Abdomen is soft.      Tenderness: There is no abdominal tenderness. There is no guarding  or rebound.      Comments: Easily reducible umbilical hernia   Musculoskeletal:      Right lower leg: Edema present.      Left lower leg: Edema present.   Skin:     General: Skin is warm and dry.      Capillary Refill: Capillary refill takes less than 2 seconds.   Neurological:      General: No focal deficit present.      Mental Status: She is alert and oriented to person, place, and time.            Vents:     Lines/Drains/Airways       Drain  Duration             Female External Urinary Catheter w/ Suction 04/04/25 1941 1 day              Peripheral Intravenous Line  Duration                  Peripheral IV - Single Lumen 04/04/25 1926 20 G No Right Antecubital 1 day         Peripheral IV - Single Lumen 04/04/25 2024 20 G Left Forearm <1 day                  Significant Labs:    CBC/Anemia Profile:  Recent Labs   Lab 04/04/25 1930 04/05/25  0516 04/05/25  0657   WBC 7.22 5.78  --    HGB 8.7* 7.3*  --    HCT 28.2* 23.8* 24.7   * 92*  --    MCV 98 98  --    RDW 15.6* 15.4*  --         Chemistries:  Recent Labs   Lab 04/04/25 1930 04/05/25  0516   * 135*   K 4.1 4.5    111*   CO2 15* 15*   BUN 41* 43*   CREATININE 4.0* 3.9*   CALCIUM 8.5* 7.9*   ALBUMIN 2.0* 1.7*   BILITOT 1.4* 0.9   ALKPHOS 63 56   ALT 11 10   AST 33 33   GLUCOSE 117* 129*   MG  --  2.0   PHOS  --  4.9*       All pertinent labs within the past 24 hours have been reviewed.    Significant Imaging:  I have reviewed all pertinent imaging results/findings within the past 24 hours.

## 2025-04-06 NOTE — SIGNIFICANT EVENT
Stepped up to MICU on 4/5 for worsening lactic acidosis and concern for HRS requiring MAP goals.    Afternoon lactic acid resulted 7.0. Given worsening lactic acidosis and concern for HRS, MICU was consulted.    On evaluation patient Aox4, abdominal distension and tenderness, decreased breath sounds on the left lower lung zone, bilateral lower extremity edema.  
02-Jul-2020 03:13

## 2025-04-06 NOTE — HOSPITAL COURSE
Stepped up to [facility] on 4/5 for worsening lactic acidosis and concern for HRS requiring MAP goals. Started on levo for MAP goal >85. Nephrology and hepatology consulted. Nephrology offered dialysis, which she refused. Cr continuing to worsen. Restarted home lactulose. Repeat CXR worsened, likely worsened volume overload following albumin administration. However, gradually with some improvement in urine output while being on levophed, however renal recovery remains very slow and modest. On 4/11, patient and her family expressed desire to transition goals of care to being comfort focused. Along those lines, they expressed desire to pursue home with hospice. [facility] hospice agency arranged home equipment. Plan for discharge to home hospice today on 4/16.

## 2025-04-06 NOTE — PLAN OF CARE
[facility] - Medical [facility]  Initial Discharge Assessment       Primary Care Provider: Bethany Mesa MD    Admission Diagnosis: Screening for cardiovascular condition [Z13.6]  SHARA (acute kidney injury) [N17.9]  Chest pain [R07.9]  Nausea vomiting and diarrhea [R11.2, R19.7]  Decompensated hepatic cirrhosis [K72.90, K74.60]  Diarrhea, unspecified type [R19.7]  Vomiting, unspecified vomiting type, unspecified whether nausea present [R11.10]    Admission Date: 4/4/2025  Expected Discharge Date: 4/7/2025    Transition of Care Barriers: None    Payor: HUMANA MANAGED MEDICARE / Plan: HU[employer] SNP HMO PPO SPECIAL NEEDS / Product Type: Medicare Advantage /     Extended Emergency Contact Information  Primary Emergency Contact: Bri Hermosillo  Address: [address] [place] [address] [place] [address] [place]  Mobile Phone: [phone]  Relation: Daughter   needed? No  Secondary Emergency Contact: Jerome Teixeira  Address: [address] [place] [address] [facility]  Home Phone: [phone]  Mobile Phone: [phone]  Relation: Spouse   needed? No    Discharge Plan A: Home  Discharge Plan B: Home with family      [facility] [place] - [address]  [address]  [facility] [address]  Phone: [phone] Fax: [phone]    [facility] - [facility] [address]  [address]  [facility] [address]  Phone: [phone] Fax: [phone]     met with patient and patient's family at bedside to complete discharge planning assessment.  Patient alert and oriented xs 4.  Patient verified all demographic information on facesheet is correct.  Patient verified PCP is Dr. Mesa.  Patient verified primary health insurance is [employer] and secondary is [place] Medicaid.  Patient with NO home health or DME.  Patient with NO POA or Living Will.  Patient not on dialysis or medication coumadin.  Patient  with no 30 day admission.  Patient with no financial issues at this time.  Patient family will provide transportation upon discharge from facility.  Patient independent with ADLs, live with spouse and adult son, spouse drives.      Initial Assessment (most recent)       Adult Discharge Assessment - 04/06/25 9304          Discharge Assessment    Assessment Type Discharge Planning Assessment     Confirmed/corrected address, phone number and insurance Yes     Confirmed Demographics Correct on Facesheet     Source of Information patient;family     Communicated LETA with patient/caregiver Date not available/Unable to determine     People in Home child(chi), adult;spouse     Facility Arrived From: home     Do you expect to return to your current living situation? Yes     Do you have help at home or someone to help you manage your care at home? Yes     Who are your caregiver(s) and their phone number(s)? family     Prior to hospitilization cognitive status: Alert/Oriented     Current cognitive status: Alert/Oriented     Walking or Climbing Stairs Difficulty no     Dressing/Bathing Difficulty no     Equipment Currently Used at Home none     Readmission within 30 days? No     Patient currently being followed by outpatient case management? No     Do you currently have service(s) that help you manage your care at home? No     Do you take prescription medications? Yes     Do you have prescription coverage? Yes     Do you have any problems affording any of your prescribed medications? No     Is the patient taking medications as prescribed? yes     Who is going to help you get home at discharge? family     How do you get to doctors appointments? family or friend will provide     Are you on dialysis? No     Do you take coumadin? No     Discharge Plan A Home     Discharge Plan B Home with family     DME Needed Upon Discharge  none     Discharge Plan discussed with: Patient     Transition of Care Barriers None

## 2025-04-06 NOTE — ASSESSMENT & PLAN NOTE
Lactic acid 4.5 on admission, initially decreased to 3.6 though increased to 7 again  Blood cx NGTD  Continue BS abx  Q6h lactic acid

## 2025-04-06 NOTE — ASSESSMENT & PLAN NOTE
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   Currently asymptomatic and satting well on RA. Consider thoracentesis and diuresis if symptomatic

## 2025-04-06 NOTE — ASSESSMENT & PLAN NOTE
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   Currently asymptomatic and satting well on RA. Will perform thoracentesis today

## 2025-04-06 NOTE — PROGRESS NOTES
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 Gann is a 79 y.o. F with a PMHx of HTN, decompensated HCV cirrhosis with recurrent ascites requiring large volume paracentesis approximately every 2 weeks, thrombocytopenia, hepatocellular carcinoma s/p Y90 x2, CKD IV (baseline Cr 2.5-2.8) who initially presented to [facility] ED on 4/4 for n/v/d x1 day. She had over 6 episodes of NBNB emesis and 3 episodes of non-bloody diarrhea. Last IR paracentesis was on 3/26 and 4.6L were removed, no evidence of SBP on prior fluid studies.     In the ED, pt was hypotensive (BP 86/50) otherwise vitals WNL. Lab work remarkable for Na 135, Bicarb 15, BUN 41/Cr 4. Initial lactic acid 4.5. She received 1L NS and 5mg midodrine with improvement in BP. CTAP with liver cirrhosis, portal hypertension, large volume of ascites, and moderate L pleural effusions. She was admitted to  for further management of decompensated cirrhosis and concern for hepatorenal syndrome.     [facility] consulted on 4/5 for increasing lactic acid (7.0), hypotension requiring levophed, and concern for sepsis. Currently receiving Albumin 25% 25g q8h, midodrine 15mg TID, octreotide 100mcg q8h, Zosyn, and Vancomycin.      Pt admitted to [facility].     Hospital/ICU Course:  Stepped up to [facility] on 4/5 for worsening lactic acidosis and concern for HRS requiring MAP goals. Started on levo for MAP goal >85. Nephrology and hepatology consulted. Nephrology offered dialysis, which she refused. Cr continuing to worsen. Restarted home lactulose. Repeat CXR worsened, likely worsened volume overload following albumin administration. Will perform  thoracentesis and paracentesis today. Will send fluid to evaluate for infection given condition may still be due to sepsis rather than HRS. On Vanc/zosyn.     Interval History/Significant Events: On levo and midodrine 15 TID. Restarted lactulose, no BM since Friday. Repeat CXR with worsened pleural effusion. Plan for paracentesis and thoracentesis today.     Review of Systems   Constitutional:  Negative for chills and fever.   Respiratory:  Negative for cough and shortness of breath.    Cardiovascular:  Negative for chest pain.   Gastrointestinal:  Positive for abdominal distention and constipation. Negative for abdominal pain, blood in stool, nausea and vomiting.   Genitourinary:  Negative for difficulty urinating.   Musculoskeletal:  Positive for back pain. Negative for myalgias.   Skin:  Negative for wound.   Neurological:  Negative for dizziness and light-headedness.     Objective:     Vital Signs (Most Recent):  Temp: 97.9 °F (36.6 °C) (04/06/25 1100)  Pulse: (!) 54 (04/06/25 1300)  Resp: 16 (04/06/25 1300)  BP: 131/63 (04/06/25 1300)  SpO2: (!) 94 % (04/06/25 1300) Vital Signs (24h Range):  Temp:  [97.5 °F (36.4 °C)-98 °F (36.7 °C)] 97.9 °F (36.6 °C)  Pulse:  [50-76] 54  Resp:  [10-22] 16  SpO2:  [92 %-100 %] 94 %  BP: ()/(53-84) 131/63   Weight: 58.5 kg (128 lb 15.5 oz)  Body mass index is 22.14 kg/m².      Intake/Output Summary (Last 24 hours) at 4/6/2025 1410  Last data filed at 4/6/2025 1210  Gross per 24 hour   Intake 1455.52 ml   Output 50 ml   Net 1405.52 ml          Physical Exam  Vitals and nursing note reviewed.   Constitutional:       General: She is not in acute distress.     Appearance: She is not toxic-appearing or diaphoretic.      Comments: Chronically-ill appearing   HENT:      Head: Normocephalic and atraumatic.      Nose: Nose normal.      Mouth/Throat:      Mouth: Mucous membranes are moist.   Eyes:      Conjunctiva/sclera: Conjunctivae normal.   Cardiovascular:      Rate and Rhythm:  Normal rate and regular rhythm.      Pulses: Normal pulses.      Heart sounds: Normal heart sounds. No murmur heard.  Pulmonary:      Effort: Pulmonary effort is normal.      Breath sounds: Normal breath sounds.      Comments: Decreased breath sounds on the left.   Abdominal:      General: There is distension.      Palpations: Abdomen is soft.      Tenderness: There is no abdominal tenderness. There is no guarding or rebound.      Comments: Easily reducible umbilical hernia   Musculoskeletal:      Right lower leg: Edema present.      Left lower leg: Edema present.   Skin:     General: Skin is warm and dry.      Capillary Refill: Capillary refill takes less than 2 seconds.   Neurological:      General: No focal deficit present.      Mental Status: She is alert and oriented to person, place, and time.   Psychiatric:         Mood and Affect: Mood normal.         Thought Content: Thought content normal.            Vents:     Lines/Drains/Airways       Drain  Duration             Female External Urinary Catheter w/ Suction 04/04/25 1941 1 day              Peripheral Intravenous Line  Duration                  Peripheral IV - Single Lumen 04/04/25 1926 20 G No Right Antecubital 1 day         Peripheral IV - Single Lumen 04/04/25 2024 20 G Left Forearm 1 day                  Significant Labs:    CBC/Anemia Profile:  Recent Labs   Lab 04/04/25 1930 04/05/25  0516 04/05/25  0657 04/06/25  0522   WBC 7.22 5.78  --  8.35   HGB 8.7* 7.3*  --  8.0*   HCT 28.2* 23.8* 24.7 24.9*   * 92*  --  111*   MCV 98 98  --  95   RDW 15.6* 15.4*  --  15.6*        Chemistries:  Recent Labs   Lab 04/04/25 1930 04/05/25  0516 04/06/25  0522   * 135* 138   K 4.1 4.5 4.3    111* 113*   CO2 15* 15* 17*   BUN 41* 43* 50*   CREATININE 4.0* 3.9* 4.4*   CALCIUM 8.5* 7.9* 8.2*   ALBUMIN 2.0* 1.7* 2.5*   BILITOT 1.4* 0.9 1.4*   ALKPHOS 63 56 45   ALT 11 10 6*   AST 33 33 17   GLUCOSE 117* 129* 143*   MG  --  2.0 2.0   PHOS  --  4.9*  5.6*       All pertinent labs within the past 24 hours have been reviewed.    Significant Imaging:  I have reviewed all pertinent imaging results/findings within the past 24 hours.    CXR: When compared to previous performed 04/04/2025. There has been development of a moderate amount of right perihilar opacity. Large left-sided pleural fluid collection remains. There is no pneumothorax. The remainder of the examination appears unchanged.     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   Currently asymptomatic and satting well on RA. Will perform thoracentesis today    Cardiac/Vascular  Hypotension  Continue Midodrine  Has been running low at home and aldactone/Lasix were recently discontinued outpatient   Hepatology consult  Consider Nephrology consult in AM  MAP goals >85 for HRS    Elevated troponin  - 238 -> 228, currently with no signs or symptoms of ACS, likely demand ischemia   - F/u TTE    Renal/  Lactic acidosis  Lactic acid 4.5 on admission, initially decreased to 3.6 though increased to 7 again  Blood cx NGTD  Continue BS abx  Q6h lactic acid    Hepatorenal syndrome  Creatinine stable for now. BMP reviewed- noted Estimated Creatinine Clearance: 10 mL/min (A) (based on SCr of 3.9 mg/dL (H)). according to latest data. Based on current GFR, CKD stage is stage 4 - GFR 15-29.  Monitor UOP and serial BMP and adjust therapy as needed. Renally dose meds. Avoid nephrotoxic medications and procedures.     Creatinine 4.0 on admit, baseline around 2.5-2.8.  Likely secondary to pre-renal etiology from dehydration and volume losses given vomiting/diarrhea and hypotension. There are concerns for hepatorenal syndrome.      - Strict I&Os and daily weights   - Gentle IVF  - Avoid nephrotoxic agents such as NSAIDs, gadolinium, and IV  radiocontrast  - Renally dose meds to current GFR  - Urine Na <10, Urine Osm > serum osm, and FeNa suggestive of pre-renal etiology. 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 Negative for SBP.      MELD 3.0: 24 at 4/6/2025  5:22 AM  MELD-Na: 25 at 4/6/2025  5:22 AM  Calculated from:  Serum Creatinine: 4.4 mg/dL (Using max of 3 mg/dL) at 4/6/2025  5:22 AM  Serum Sodium: 138 mmol/L (Using max of 137 mmol/L) at 4/6/2025  5:22 AM  Total Bilirubin: 1.4 mg/dL at 4/6/2025  5:22 AM  Serum Albumin: 2.5 g/dL at 4/6/2025  5:22 AM  INR(ratio): 1.4 at 4/6/2025  5:22 AM  Age at listing (hypothetical): 79 years  Sex: Female at 4/6/2025  5:22 AM    - Continue home meds. Etiology likely related to chronic HCV hepatitis and HCC s/p Y90 .   - Avoid any hepatotoxic meds   - Daily CBC, CMP, INR  - Ammonia 49, pt is mentating appropriately. Restarting lactulose  - will perform paracentesis at bedside  - f/u ascitic studies when collected (albumin, total protein, cell count, LDH, culture, stain)   - Na restriction <2g  - Has received 1g rocephin for SBP ppx. As now with increasing lactic and persistent hypotension, started on broad spectrum abx, de-escalate as indicated  - monitor mental status; q4h Neuro checks.   - Hepatology consult       Nausea vomiting and diarrhea  - antiemetics PRN  - C diff negative       Chronic hepatitis C with cirrhosis  See decompensated cirrhosis        Critical Care Daily Checklist:    A: Awake: RASS Goal/Actual  Goal:    Actual:     B: Spontaneous Breathing Trial Performed?     C: SAT & SBT Coordinated?  na                      D: Delirium: CAM-ICU     E: Early Mobility Performed? Yes   F: Feeding Goal:    Status:     Current Diet Order   Procedures    Diet Low Sodium, 2gm      AS: Analgesia/Sedation na   T: Thromboembolic Prophylaxis heparin   H: HOB > 300 Yes   U: Stress Ulcer Prophylaxis (if needed) na   G: Glucose Control wnl   B: Bowel Function Stool Occurrence: 1   I: Indwelling Catheter (Lines & Stephens) Necessity PIV x2   D: De-escalation of Antimicrobials/Pharmacotherapies na    Plan for the day/ETD Thoracentesis & paracentesis    Code Status:  Family/Goals of Care: DNR         Critical secondary to Patient has a condition that poses threat to life and bodily function: Acute Renal Failure      Critical care was time spent personally by me on the following activities: development of treatment plan with patient or surrogate and bedside caregivers, discussions with consultants, evaluation of patient's response to treatment, examination of patient, ordering and performing treatments and interventions, ordering and review of laboratory studies, ordering and review of radiographic studies, pulse oximetry, re-evaluation of patient's condition. This critical care time did not overlap with that of any other provider or involve time for any procedures.     Amarilys Emery MD  Critical Care Medicine  [facility] - Medical ICU

## 2025-04-06 NOTE — ASSESSMENT & PLAN NOTE
Continue Midodrine  Has been running low at home and aldactone/Lasix were recently discontinued outpatient   Hepatology consult  Consider Nephrology consult in AM  MAP goals >85 for HRS

## 2025-04-06 NOTE — H&P
[facility]  Critical Care Medicine  History and Physical     Patient Name: Seema Gann  MRN: [id number]  Admission Date: 4/4/2025  Hospital Length of Stay: 0 days  Code Status: DNR  Attending Provider: Gerald Campo*  Primary Care Provider: Bethany Mesa MD   Principal Problem: Decompensated cirrhosis related to hepatitis C virus (HCV)    Subjective:     HPI:  Ms. Gann is a 79 y.o. F with a PMHx of HTN, decompensated HCV cirrhosis with recurrent ascites requiring large volume paracentesis approximately every 2 weeks, thrombocytopenia, hepatocellular carcinoma s/p Y90 x2, CKD IV (baseline Cr 2.5-2.8) who initially presented to [facility] ED on 4/4 for n/v/d x1 day. She had over 6 episodes of NBNB emesis and 3 episodes of non-bloody diarrhea. Last IR paracentesis was on 3/26 and 4.6L were removed, no evidence of SBP on prior fluid studies.     In the ED, pt was hypotensive (BP 86/50) otherwise vitals WNL. Lab work remarkable for Na 135, Bicarb 15, BUN 41/Cr 4. Initial lactic acid 4.5. She received 1L NS and 5mg midodrine with improvement in BP. CTAP with liver cirrhosis, portal hypertension, large volume of ascites, and moderate L pleural effusions. She was admitted to  for further management of decompensated cirrhosis and concern for hepatorenal syndrome.     [facility] consulted on 4/5 for increasing lactic acid (7.0), hypotension requiring levophed, and concern for sepsis. Currently receiving Albumin 25% 25g q8h, midodrine 15mg TID, octreotide 100mcg q8h, Zosyn, and Vancomycin.      Pt admitted to [facility].     Hospital/ICU Course:  Stepped up to [facility] on 4/5 for worsening lactic acidosis and concern for HRS requiring MAP goals.       Review of Systems   Constitutional:  Negative for chills and fever.   Respiratory:  Negative for cough and shortness of breath.    Cardiovascular:  Positive for chest pain.   Gastrointestinal:  Positive for abdominal distention, diarrhea and vomiting. Negative for  abdominal pain, blood in stool and nausea.   Genitourinary:  Negative for difficulty urinating.   Musculoskeletal:  Negative for myalgias.   Skin:  Negative for wound.   Neurological:  Negative for dizziness and light-headedness.     Objective:     Vital Signs (Most Recent):  Temp: 97.8 °F (36.6 °C) (04/05/25 1520)  Pulse: 71 (04/05/25 1520)  Resp: 20 (04/05/25 1520)  BP: (!) 94/53 (04/05/25 1520)  SpO2: 98 % (04/05/25 1520) Vital Signs (24h Range):  Temp:  [97.6 °F (36.4 °C)-98.2 °F (36.8 °C)] 97.8 °F (36.6 °C)  Pulse:  [66-77] 71  Resp:  [13-23] 20  SpO2:  [92 %-100 %] 98 %  BP: ()/(50-63) 94/53   Weight: 53.5 kg (117 lb 15.1 oz)  Body mass index is 20.25 kg/m².      Intake/Output Summary (Last 24 hours) at 4/5/2025 1944  Last data filed at 4/5/2025 0732  Gross per 24 hour   Intake --   Output 20 ml   Net -20 ml          Physical Exam  Vitals and nursing note reviewed.   Constitutional:       General: She is not in acute distress.     Appearance: She is not toxic-appearing or diaphoretic.      Comments: Chronically-ill appearing   HENT:      Head: Normocephalic and atraumatic.      Nose: Nose normal.      Mouth/Throat:      Mouth: Mucous membranes are moist.   Eyes:      Conjunctiva/sclera: Conjunctivae normal.   Cardiovascular:      Rate and Rhythm: Regular rhythm. Bradycardia present.      Heart sounds: Normal heart sounds. No murmur heard.  Pulmonary:      Effort: Pulmonary effort is normal.      Breath sounds: No wheezing, rhonchi or rales.      Comments: Decreased breath sounds on the left.   Abdominal:      General: There is distension.      Palpations: Abdomen is soft.      Tenderness: There is no abdominal tenderness. There is no guarding or rebound.      Comments: Easily reducible umbilical hernia   Musculoskeletal:      Right lower leg: Edema present.      Left lower leg: Edema present.   Skin:     General: Skin is warm and dry.      Capillary Refill: Capillary refill takes less than 2 seconds.    Neurological:      General: No focal deficit present.      Mental Status: She is alert and oriented to person, place, and time.            Vents:     Lines/Drains/Airways       Drain  Duration             Female External Urinary Catheter w/ Suction 04/04/25 1941 1 day              Peripheral Intravenous Line  Duration                  Peripheral IV - Single Lumen 04/04/25 1926 20 G No Right Antecubital 1 day         Peripheral IV - Single Lumen 04/04/25 2024 20 G Left Forearm <1 day                  Significant Labs:    CBC/Anemia Profile:  Recent Labs   Lab 04/04/25 1930 04/05/25  0516 04/05/25  0657   WBC 7.22 5.78  --    HGB 8.7* 7.3*  --    HCT 28.2* 23.8* 24.7   * 92*  --    MCV 98 98  --    RDW 15.6* 15.4*  --         Chemistries:  Recent Labs   Lab 04/04/25 1930 04/05/25  0516   * 135*   K 4.1 4.5    111*   CO2 15* 15*   BUN 41* 43*   CREATININE 4.0* 3.9*   CALCIUM 8.5* 7.9*   ALBUMIN 2.0* 1.7*   BILITOT 1.4* 0.9   ALKPHOS 63 56   ALT 11 10   AST 33 33   GLUCOSE 117* 129*   MG  --  2.0   PHOS  --  4.9*       All pertinent labs within the past 24 hours have been reviewed.    Significant Imaging:  I have reviewed all pertinent imaging results/findings within the past 24 hours.    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   Currently asymptomatic and satting well on RA. Consider thoracentesis and diuresis if symptomatic     Cardiac/Vascular  Hypotension  Continue Midodrine  Has been running low at home and aldactone/Lasix were recently discontinued outpatient   Hepatology consult  Consider Nephrology consult in AM  MAP goals >85 for HRS    Elevated troponin  - 238 -> 228, currently with no signs or symptoms of ACS, likely demand ischemia   - F/u TTE    Renal/  Lactic acidosis  Lactic acid 4.5 on  admission, initially decreased to 3.6 though increased to 7 again  Blood cx NGTD  Continue BS abx  Q6h lactic acid    Hepatorenal syndrome  Creatinine stable for now. BMP reviewed- noted Estimated Creatinine Clearance: 10 mL/min (A) (based on SCr of 3.9 mg/dL (H)). according to latest data. Based on current GFR, CKD stage is stage 4 - GFR 15-29.  Monitor UOP and serial BMP and adjust therapy as needed. Renally dose meds. Avoid nephrotoxic medications and procedures.     Creatinine 4.0 on admit, baseline around 2.5-2.8.  Likely secondary to pre-renal etiology from dehydration and volume losses given vomiting/diarrhea and hypotension. There are concerns for hepatorenal syndrome.      - Strict I&Os and daily weights   - Gentle IVF  - Avoid nephrotoxic agents such as NSAIDs, gadolinium, and IV radiocontrast  - Renally dose meds to current GFR  - Urine Na <10, Urine Osm > serum osm, and FeNa suggestive of pre-renal etiology. Has not received any diuretics, likely consistent with HRS  - continue midodrine 15 mg PO TID  - Goal MAP > 85mmHg  - Levo for MAP goals PRN    Chronic kidney disease (CKD), stage IV (severe)  See HRS    Hematology  Secondary thrombocytopenia  Daily CBC    Oncology  Anemia in stage 4 chronic kidney disease  Daily CBC    HCC (hepatocellular carcinoma)  See decompensated cirrhosis     GI  * Decompensated cirrhosis related to hepatitis C virus (HCV)  Last diagnostic/therapeutic paracentesis on 3/26 with removal of 4.6L. Negative for SBP.      MELD 3.0: 23 at 4/5/2025  5:16 AM  MELD-Na: 22 at 4/5/2025  5:16 AM  Calculated from:  Serum Creatinine: 3.9 mg/dL (Using max of 3 mg/dL) at 4/5/2025  5:16 AM  Serum Sodium: 135 mmol/L at 4/5/2025  5:16 AM  Total Bilirubin: 0.9 mg/dL (Using min of 1 mg/dL) at 4/5/2025  5:16 AM  Serum Albumin: 1.7 g/dL at 4/5/2025  5:16 AM  INR(ratio): 1.2 at 4/5/2025  5:16 AM  Age at listing (hypothetical): 79 years  Sex: Female at 4/5/2025  5:16 AM    - Continue home meds.  Etiology likely related to chronic HCV hepatitis and HCC s/p Y90 .   - Avoid any hepatotoxic meds   - Daily CBC, CMP, INR  - Ammonia 49, pt is mentating appropriately   - Consider liver U/S with doppler   - IR Therapeutic/Diagnostic paracentesis ordered by , has not received yet  - NPO  - f/u ascitic studies when collected (albumin, total protein, cell count, LDH, culture, stain)   - Na restriction <2g  - Has received 1g rocephin for SBP ppx. As now with increasing lactic and persistent hypotension, started on broad spectrum abx, de-escalate as indicated  - monitor mental status; q4h Neuro checks.   - Hepatology consult       Nausea vomiting and diarrhea  - antiemetics PRN  - C diff negative       Chronic hepatitis C with cirrhosis  See decompensated cirrhosis        Critical Care Daily Checklist:    A: Awake: RASS Goal/Actual Goal:    Actual:     B: Spontaneous Breathing Trial Performed?     C: SAT & SBT Coordinated?  N/a                      D: Delirium: CAM-ICU     E: Early Mobility Performed? Yes   F: Feeding Goal:    Status:     Current Diet Order   Procedures    Diet Low Sodium, 2gm      AS: Analgesia/Sedation N/a   T: Thromboembolic Prophylaxis SQ heparin    H: HOB > 300 No   U: Stress Ulcer Prophylaxis (if needed) N/a   G: Glucose Control Goal 140-180, add on SSI as indicated    B: Bowel Function Stool Occurrence: 1   I: Indwelling Catheter (Lines & Stephens) Necessity PIV   D: De-escalation of Antimicrobials/Pharmacotherapies Vanc, Zosyn    Plan for the day/ETD Admit to MICU, MAPs >85    Code Status:  Family/Goals of Care: DNR  N/a       Critical secondary to Patient has a condition that poses threat to life and bodily function: decompensated cirrhosis, hepatorenal syndrome       Critical care was time spent personally by me on the following activities: development of treatment plan with patient or surrogate and bedside caregivers, discussions with consultants, evaluation of patient's response to treatment,  examination of patient, ordering and performing treatments and interventions, ordering and review of laboratory studies, ordering and review of radiographic studies, pulse oximetry, re-evaluation of patient's condition. This critical care time did not overlap with that of any other provider or involve time for any procedures.    Suad Rojas MD, PGY-2  [facility]  [facility] - Medical ICU

## 2025-04-06 NOTE — SUBJECTIVE & OBJECTIVE
Interval History/Significant Events: On levo and midodrine 15 TID. Restarted lactulose, no BM since Friday. Repeat CXR with worsened pleural effusion. Plan for paracentesis and thoracentesis today.     Review of Systems   Constitutional:  Negative for chills and fever.   Respiratory:  Negative for cough and shortness of breath.    Cardiovascular:  Negative for chest pain.   Gastrointestinal:  Positive for abdominal distention and constipation. Negative for abdominal pain, blood in stool, nausea and vomiting.   Genitourinary:  Negative for difficulty urinating.   Musculoskeletal:  Positive for back pain. Negative for myalgias.   Skin:  Negative for wound.   Neurological:  Negative for dizziness and light-headedness.     Objective:     Vital Signs (Most Recent):  Temp: 97.9 °F (36.6 °C) (04/06/25 1100)  Pulse: (!) 54 (04/06/25 1300)  Resp: 16 (04/06/25 1300)  BP: 131/63 (04/06/25 1300)  SpO2: (!) 94 % (04/06/25 1300) Vital Signs (24h Range):  Temp:  [97.5 °F (36.4 °C)-98 °F (36.7 °C)] 97.9 °F (36.6 °C)  Pulse:  [50-76] 54  Resp:  [10-22] 16  SpO2:  [92 %-100 %] 94 %  BP: ()/(53-84) 131/63   Weight: 58.5 kg (128 lb 15.5 oz)  Body mass index is 22.14 kg/m².      Intake/Output Summary (Last 24 hours) at 4/6/2025 1410  Last data filed at 4/6/2025 1210  Gross per 24 hour   Intake 1455.52 ml   Output 50 ml   Net 1405.52 ml          Physical Exam  Vitals and nursing note reviewed.   Constitutional:       General: She is not in acute distress.     Appearance: She is not toxic-appearing or diaphoretic.      Comments: Chronically-ill appearing   HENT:      Head: Normocephalic and atraumatic.      Nose: Nose normal.      Mouth/Throat:      Mouth: Mucous membranes are moist.   Eyes:      Conjunctiva/sclera: Conjunctivae normal.   Cardiovascular:      Rate and Rhythm: Normal rate and regular rhythm.      Pulses: Normal pulses.      Heart sounds: Normal heart sounds. No murmur heard.  Pulmonary:      Effort: Pulmonary effort  is normal.      Breath sounds: Normal breath sounds.      Comments: Decreased breath sounds on the left.   Abdominal:      General: There is distension.      Palpations: Abdomen is soft.      Tenderness: There is no abdominal tenderness. There is no guarding or rebound.      Comments: Easily reducible umbilical hernia   Musculoskeletal:      Right lower leg: Edema present.      Left lower leg: Edema present.   Skin:     General: Skin is warm and dry.      Capillary Refill: Capillary refill takes less than 2 seconds.   Neurological:      General: No focal deficit present.      Mental Status: She is alert and oriented to person, place, and time.   Psychiatric:         Mood and Affect: Mood normal.         Thought Content: Thought content normal.            Vents:     Lines/Drains/Airways       Drain  Duration             Female External Urinary Catheter w/ Suction 04/04/25 1941 1 day              Peripheral Intravenous Line  Duration                  Peripheral IV - Single Lumen 04/04/25 1926 20 G No Right Antecubital 1 day         Peripheral IV - Single Lumen 04/04/25 2024 20 G Left Forearm 1 day                  Significant Labs:    CBC/Anemia Profile:  Recent Labs   Lab 04/04/25 1930 04/05/25 0516 04/05/25  0657 04/06/25  0522   WBC 7.22 5.78  --  8.35   HGB 8.7* 7.3*  --  8.0*   HCT 28.2* 23.8* 24.7 24.9*   * 92*  --  111*   MCV 98 98  --  95   RDW 15.6* 15.4*  --  15.6*        Chemistries:  Recent Labs   Lab 04/04/25 1930 04/05/25  0516 04/06/25  0522   * 135* 138   K 4.1 4.5 4.3    111* 113*   CO2 15* 15* 17*   BUN 41* 43* 50*   CREATININE 4.0* 3.9* 4.4*   CALCIUM 8.5* 7.9* 8.2*   ALBUMIN 2.0* 1.7* 2.5*   BILITOT 1.4* 0.9 1.4*   ALKPHOS 63 56 45   ALT 11 10 6*   AST 33 33 17   GLUCOSE 117* 129* 143*   MG  --  2.0 2.0   PHOS  --  4.9* 5.6*       All pertinent labs within the past 24 hours have been reviewed.    Significant Imaging:  I have reviewed all pertinent imaging results/findings  within the past 24 hours.    CXR: When compared to previous performed 04/04/2025. There has been development of a moderate amount of right perihilar opacity. Large left-sided pleural fluid collection remains. There is no pneumothorax. The remainder of the examination appears unchanged.

## 2025-04-06 NOTE — HPI
Ms. Gann is a 79 y.o. F with a PMHx of HTN, decompensated HCV cirrhosis with recurrent ascites requiring large volume paracentesis approximately every 2 weeks, thrombocytopenia, hepatocellular carcinoma s/p Y90 x2, CKD IV (baseline Cr 2.5-2.8) who initially presented to [facility] ED on 4/4 for n/v/d x1 day. She had over 6 episodes of NBNB emesis and 3 episodes of non-bloody diarrhea. Last IR paracentesis was on 3/26 and 4.6L were removed, no evidence of SBP on prior fluid studies.     In the ED, pt was hypotensive (BP 86/50) otherwise vitals WNL. Lab work remarkable for Na 135, Bicarb 15, BUN 41/Cr 4. Initial lactic acid 4.5. She received 1L NS and 5mg midodrine with improvement in BP. CTAP with liver cirrhosis, portal hypertension, large volume of ascites, and moderate L pleural effusions. She was admitted to  for further management of decompensated cirrhosis and concern for hepatorenal syndrome.     [facility] consulted on 4/5 for increasing lactic acid (7.0), hypotension requiring levophed, and concern for sepsis. Currently receiving Albumin 25% 25g q8h, midodrine 15mg TID, octreotide 100mcg q8h, Zosyn, and Vancomycin.      Pt admitted to [facility].

## 2025-04-06 NOTE — ACP (ADVANCE CARE PLANNING)
Critical care medicine attending    Engaged patient, sister,  in conversation regarding her current condition  She is aware that she has cirrhosis and that her kidneys are not working well.  She would like conservative management with medication but does not want to pursue aggressive life support measures. DNR- No intubation. No CPR. She is OK with current strategy to attempt to reverse her renal failure. She also understands she has a limited time frame for this to occur prior as this may then become fatal. No HD.  She is ok with paracentesis and thoracentesis.    Gerald Campo MD  Critical Care Medicine  [facility]

## 2025-04-06 NOTE — ASSESSMENT & PLAN NOTE
Creatinine stable for now. BMP reviewed- noted Estimated Creatinine Clearance: 10 mL/min (A) (based on SCr of 3.9 mg/dL (H)). according to latest data. Based on current GFR, CKD stage is stage 4 - GFR 15-29.  Monitor UOP and serial BMP and adjust therapy as needed. Renally dose meds. Avoid nephrotoxic medications and procedures.     Creatinine 4.0 on admit, baseline around 2.5-2.8.  Likely secondary to pre-renal etiology from dehydration and volume losses given vomiting/diarrhea and hypotension. There are concerns for hepatorenal syndrome.      - Strict I&Os and daily weights   - Gentle IVF  - Avoid nephrotoxic agents such as NSAIDs, gadolinium, and IV radiocontrast  - Renally dose meds to current GFR  - Urine Na <10, Urine Osm > serum osm, and FeNa suggestive of pre-renal etiology. Has not received any diuretics, likely consistent with HRS  - continue midodrine 15 mg PO TID  - Goal MAP > 85mmHg  - Levo for MAP goals PRN  - nephrology consulted

## 2025-04-06 NOTE — ASSESSMENT & PLAN NOTE
Last diagnostic/therapeutic paracentesis on 3/26 with removal of 4.6L. Negative for SBP.      MELD 3.0: 23 at 4/5/2025  5:16 AM  MELD-Na: 22 at 4/5/2025  5:16 AM  Calculated from:  Serum Creatinine: 3.9 mg/dL (Using max of 3 mg/dL) at 4/5/2025  5:16 AM  Serum Sodium: 135 mmol/L at 4/5/2025  5:16 AM  Total Bilirubin: 0.9 mg/dL (Using min of 1 mg/dL) at 4/5/2025  5:16 AM  Serum Albumin: 1.7 g/dL at 4/5/2025  5:16 AM  INR(ratio): 1.2 at 4/5/2025  5:16 AM  Age at listing (hypothetical): 79 years  Sex: Female at 4/5/2025  5:16 AM    - Continue home meds. Etiology likely related to chronic HCV hepatitis and HCC s/p Y90 .   - Avoid any hepatotoxic meds   - Daily CBC, CMP, INR  - Ammonia 49, pt is mentating appropriately   - Consider liver U/S with doppler   - IR Therapeutic/Diagnostic paracentesis ordered by , has not received yet  - NPO  - f/u ascitic studies when collected (albumin, total protein, cell count, LDH, culture, stain)   - Na restriction <2g  - Has received 1g rocephin for SBP ppx. As now with increasing lactic and persistent hypotension, started on broad spectrum abx, de-escalate as indicated  - monitor mental status; q4h Neuro checks.   - Hepatology consult

## 2025-04-07 NOTE — PLAN OF CARE
Recommendations  Continue low sodium diet  RD added Boost Plus BID  Monitor labs, meds, weight, skin    Goals: Meet % een/epn by next RD f/u  Nutrition Goal Status: new  Communication of RD Recs: other (comment) (poc)

## 2025-04-07 NOTE — PROGRESS NOTES
Pharmacokinetic Assessment Follow Up: IV Vancomycin    Vancomycin serum concentration assessment(s):    The random level was drawn correctly and can be used to guide therapy at this time. The measurement is below the desired definitive target range of 15 to 20 mcg/mL.    Vancomycin Regimen Plan:  Give 1000 mg IV Vancomycin x 1 dose    Re-dose when the random level is less than 20 mcg/mL, next level to be drawn at 1900 on 4/7/25    Drug levels (last 3 results):  Recent Labs   Lab Result Units 04/06/25  1753   Vancomycin Random ug/ml 14.3       Pharmacy will continue to follow and monitor vancomycin.    Please contact pharmacy at extension [phone] for questions regarding this assessment.    Thank you for the consult,   Justo Hermosillo       Patient brief summary:  Seema Gann is a 79 y.o. female initiated on antimicrobial therapy with IV Vancomycin for treatment of sepsis        Drug Allergies:   Review of patient's allergies indicates:  No Known Allergies    Actual Body Weight:   58.5 kg    Renal Function:   Estimated Creatinine Clearance: 9.1 mL/min (A) (based on SCr of 4.4 mg/dL (H)).,     Dialysis Method (if applicable):  N/A    CBC (last 72 hours):  Recent Labs   Lab Result Units 04/04/25 1930 04/05/25 0516 04/06/25  0522   WBC K/uL 7.22 5.78 8.35   HGB gm/dL 8.7* 7.3* 8.0*   HCT % 28.2* 23.8* 24.9*   Platelet Count K/uL 102* 92* 111*   Lymph % % 8.9* 11.8* 10.4*   Mono % % 4.7 5.5 7.3   Eos % % 0.4 0.5 1.1   Basophil % % 0.3 0.3 0.5       Metabolic Panel (last 72 hours):  Recent Labs   Lab Result Units 04/04/25  1930 04/05/25  0516 04/05/25  0638 04/06/25  0522 04/06/25  1137   Sodium mmol/L 135* 135*  --  138  --    Urine Sodium mmol/L  --   --  <10*  --   --    Potassium mmol/L 4.1 4.5  --  4.3  --    Chloride mmol/L 109 111*  --  113*  --    Urine Chloride mmol/L  --   --  <20*  --   --    CO2 mmol/L 15* 15*  --  17*  --    Glucose mg/dL 117* 129*  --  143*  --    Glucose, UA   --   --   --   --   Negative   BUN mg/dL 41* 43*  --  50*  --    Creatinine mg/dL 4.0* 3.9*  --  4.4*  --    Urine Creatinine mg/dL  --   --  395.0*  392.0*  --   --    Albumin g/dL 2.0* 1.7*  --  2.5*  --    Bilirubin Total mg/dL 1.4* 0.9  --  1.4*  --    ALP unit/L 63 56  --  45  --    AST unit/L 33 33  --  17  --    ALT unit/L 11 10  --  6*  --    Magnesium  mg/dL  --  2.0  --  2.0  --    Phosphorus Level mg/dL  --  4.9*  --  5.6*  --        Vancomycin Administrations:  vancomycin given in the last 96 hours                     vancomycin (VANCOCIN) 1,000 mg in 0.9% NaCl 250 mL IVPB (admixture device) (mg) 1,000 mg New Bag 04/05/25 2106                    Microbiologic Results:  Microbiology Results (last 7 days)       Procedure Component Value Units Date/Time    (rule out SBP) Aerobic culture [[id number]] Collected: 04/06/25 1838    Order Status: Sent Specimen: Ascites from Abdomen     (rule out SBP) Culture, Anaerobic [[id number]] Collected: 04/06/25 1838    Order Status: Sent Specimen: Ascites from Abdomen     (rule out SBP) Gram stain [[id number]] Collected: 04/06/25 1838    Order Status: Sent Specimen: Ascites from Abdomen     (rule out SBP) Aerobic culture [[id number]]     Order Status: Canceled Specimen: Ascites     (rule out SBP) Culture, Anaerobic [[id number]]     Order Status: Canceled Specimen: Ascites     (rule out SBP) Gram stain [[id number]]     Order Status: Canceled Specimen: Ascites     Culture, body fluid - Bactec [[id number]]     Order Status: Canceled Specimen: Body Fluid from Thoracentesis Fluid     Gram stain [[id number]]     Order Status: Canceled Specimen: Pleural Fluid     AFB culture [[id number]]     Order Status: Canceled Specimen: Pleural Fluid     AFB stain [[id number]]     Order Status: Canceled Specimen: Pleural Fluid     KOH prep [[id number]]     Order Status: Canceled Specimen: Pleural Fluid     Fungus culture [[id number]]     Order Status: Canceled Specimen: Pleural Fluid     Blood culture x two  cultures. Draw prior to antibiotics. [[id number]]  (Normal) Collected: 04/04/25 2010    Order Status: Completed Specimen: Blood from Peripheral, Antecubital, Right Updated: 04/06/25 1101     Blood Culture No Growth After 36 Hours    Blood culture x two cultures. Draw prior to antibiotics. [[id number]]  (Normal) Collected: 04/04/25 2011    Order Status: Completed Specimen: Blood from Peripheral, Forearm, Left Updated: 04/06/25 1101     Blood Culture No Growth After 36 Hours    Clostridium difficile EIA [[id number]]  (Normal) Collected: 04/04/25 2326    Order Status: Completed Specimen: Stool Updated: 04/05/25 0859     C. DIFFICILE GDH AG Negative     Clostridium Difficile Toxin A/B Negative

## 2025-04-07 NOTE — PROGRESS NOTES
[facility]  Adult Nutrition  Progress Note    SUMMARY   Recommendations  Continue low sodium diet  RD added Boost Plus BID  Monitor labs, meds, weight, skin    Goals: Meet % een/epn by next RD f/u  Nutrition Goal Status: new  Communication of RD Recs: other (comment) (poc)    Nutrition Discharge Planning    Nutrition Discharge Planning: Therapeutic diet (comments)  Therapeutic diet (comments): low sodium    Assessment and Plan    Nutrition Problem  Inadequate energy intake    Related to (etiology):   Decreased appetite    Signs and Symptoms (as evidenced by):   25% intake of meals     Interventions/Recommendations (treatment strategy):  Collaboration with other providers  Commercial beverages     Nutrition Diagnosis Status:   New     Malnutrition Assessment  No current signs/symptoms.     Reason for Assessment    Reason For Assessment: identified at risk by screening criteria  Diagnosis: liver disease  General Information Comments: 79 y.o. female, PMHX hypertension, decompensated HCV cirrhosis with recurrent ascites requiring frequent large volume paracentesis q2wk, thrombocytopenia, hepatocellular carcinoma s/p Y90 x2, CKD stage IV (baseline creatinine 2.5-2.8). RD assessment for MST 3. Pt with 25% intake of meals. Skin tear to buttocks noted. Wt gain  noted this year, no recent wt loss. BMI 20.25. RD to follow up.    Nutrition Related Social Determinants of Health: SDOH: None Identified  Food Insecurity: No Food Insecurity (4/5/2025)    Hunger Vital Sign     Worried About Running Out of Food in the Last Year: Never true     Ran Out of Food in the Last Year: Never true   Recent Concern: Food Insecurity - Food Insecurity Present (2/24/2025)    Hunger Vital Sign     Worried About Running Out of Food in the Last Year: Sometimes true     Ran Out of Food in the Last Year: Sometimes true       Nutrition/Diet History    Spiritual, Cultural Beliefs, [employer]itan Practices, Values that Affect Care: no  Food  "Allergies: NKFA    Anthropometrics    Height: 5' 4" (162.6 cm)  Height (inches): 64 in  Height Method: Stated  Weight: 53.5 kg (118 lb)  Weight (lb): 118 lb  Weight Method: Bed Scale  Ideal Body Weight (IBW), Female: 120 lb  % Ideal Body Weight, Female (lb): 98.33 %  BMI (Calculated): 20.2  BMI Grade: 18.5-24.9 - normal       Lab/Procedures/Meds    Pertinent Labs Reviewed: reviewed  Pertinent Labs Comments: Na: 135, BUN: 51, creatinine: 4.7, GFR: 9, Glu: 143, phos: 4.8  Pertinent Medications Reviewed: reviewed  Pertinent Medications Comments: Lactulose, abx, sevelamer, norepinephrine    Estimated/Assessed Needs    Weight Used For Calorie Calculations: 53.5 kg (117 lb 15.1 oz)  Energy Calorie Requirements (kcal): 9297-2713 (25-30 kcal/kg - liver disease)  Energy Need Method: Kcal/kg  Protein Requirements: 53-80 (1-1.5 g/kg)  Weight Used For Protein Calculations: 53.5 kg (117 lb 15.1 oz)     Estimated Fluid Requirement Method: RDA Method  RDA Method (mL): 1337         Nutrition Prescription Ordered    Current Diet Order: Low sodium    Evaluation of Received Nutrient/Fluid Intake    I/O: -  Comments: LBM 4/4 (liquid)  % Intake of Estimated Energy Needs: 0 - 25 %  % Meal Intake: 0 - 25 %    Nutrition Risk    Level of Risk/Frequency of Follow-up: moderate (f/u 1-2x/week)     Monitor and Evaluation    Monitor and Evaluation: Diet order, Beliefs and attitudes, Electrolyte and renal panel, Gastrointestinal profile, Glucose/endocrine profile, Inflammatory profile, Lipid profile, Nutrition focused physical findings, Skin     Nutrition Follow-Up    RD Follow-up?: Yes      "

## 2025-04-07 NOTE — PROGRESS NOTES
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 Gann is a 79 y.o. F with a PMHx of HTN, decompensated HCV cirrhosis with recurrent ascites requiring large volume paracentesis approximately every 2 weeks, thrombocytopenia, hepatocellular carcinoma s/p Y90 x2, CKD IV (baseline Cr 2.5-2.8) who initially presented to [facility] [place] on 4/4 for n/v/d x1 day. She had over 6 episodes of NBNB emesis and 3 episodes of non-bloody diarrhea. Last IR paracentesis was on 3/26 and 4.6L were removed, no evidence of SBP on prior fluid studies.     In the ED, pt was hypotensive (BP 86/50) otherwise vitals WNL. Lab work remarkable for Na 135, Bicarb 15, BUN 41/Cr 4. Initial lactic acid 4.5. She received 1L NS and 5mg midodrine with improvement in BP. CTAP with liver cirrhosis, portal hypertension, large volume of ascites, and moderate L pleural effusions. She was admitted to  for further management of decompensated cirrhosis and concern for hepatorenal syndrome.     [facility] consulted on 4/5 for increasing lactic acid (7.0), hypotension requiring levophed, and concern for sepsis. Currently receiving Albumin 25% 25g q8h, midodrine 15mg TID, octreotide 100mcg q8h, Zosyn, and Vancomycin.      Pt admitted to [facility].     Hospital/ICU Course:  Stepped up to [facility] on 4/5 for worsening lactic acidosis and concern for HRS requiring MAP goals. Started on levo for MAP goal >85. Nephrology and hepatology consulted. Nephrology offered dialysis, which she refused. Cr continuing to worsen. Restarted home lactulose. Repeat CXR worsened, likely worsened volume overload following albumin administration. Will perform  thoracentesis and paracentesis today. Will send fluid to evaluate for infection given condition may still be due to sepsis rather than HRS. On Vanc/zosyn.     4/7 Plan for thoracentesis today.     Interval History/Significant Events: was concern for left forearm infiltration. Line was d/c'ed and new left AC PIV placed.     Review of Systems   All other systems reviewed and are negative.      Objective:     Vital Signs (Most Recent):  Temp: 98.3 °F (36.8 °C) (04/07/25 0305)  Pulse: 64 (04/07/25 0825)  Resp: 19 (04/07/25 0825)  BP: 127/66 (04/07/25 0615)  SpO2: 97 % (04/07/25 0825) Vital Signs (24h Range):  Temp:  [97.9 °F (36.6 °C)-98.6 °F (37 °C)] 98.3 °F (36.8 °C)  Pulse:  [47-77] 64  Resp:  [12-23] 19  SpO2:  [91 %-97 %] 97 %  BP: (109-143)/(59-77) 127/66   Weight: 53.5 kg (118 lb)  Body mass index is 20.25 kg/m².      Intake/Output Summary (Last 24 hours) at 4/7/2025 0902  Last data filed at 4/7/2025 0437  Gross per 24 hour   Intake 1125.06 ml   Output 1600 ml   Net -474.94 ml          Physical Exam     Physical Exam  Vitals and nursing note reviewed.   Constitutional:       General: She is not in acute distress.     Appearance: She is not toxic-appearing or diaphoretic.      Comments: Chronically-ill appearing   HENT:      Head: Normocephalic and atraumatic.      Nose: Nose normal.      Mouth/Throat:      Mouth: Mucous membranes are moist.   Eyes:      Conjunctiva/sclera: Conjunctivae normal.   Cardiovascular:      Rate and Rhythm: Normal rate and regular rhythm.      Pulses: Normal pulses.      Heart sounds: Normal heart sounds. No murmur heard.  Pulmonary:      Effort: Pulmonary effort is normal.      Breath sounds: Normal breath sounds.      Comments: Decreased breath sounds on the left.   Abdominal:      General: There is distension.      Palpations: Abdomen is soft.      Tenderness: There is no abdominal tenderness. There is no guarding or rebound.      Comments: Easily reducible umbilical hernia    Musculoskeletal:      Right lower leg: Edema present.      Left lower leg: Edema present.   Skin:     General: Skin is warm and dry.      Capillary Refill: Capillary refill takes less than 2 seconds.   Neurological:      General: No focal deficit present.      Mental Status: She is alert and oriented to person, place, and time.   Psychiatric:         Mood and Affect: Mood normal.         Thought Content: Thought content normal.     Vents:     Lines/Drains/Airways       Drain  Duration             Female External Urinary Catheter w/ Suction 04/04/25 1941 2 days              Peripheral Intravenous Line  Duration                  Peripheral IV - Single Lumen 04/04/25 1926 20 G No Right Antecubital 2 days         Peripheral IV - Single Lumen 04/07/25 0627 22 G 1 in No Left Antecubital <1 day                  Significant Labs:    CBC/Anemia Profile:  Recent Labs   Lab 04/06/25  0522 04/07/25  0545   WBC 8.35 7.20   HGB 8.0* 8.1*   HCT 24.9* 27.1*   * 148*   MCV 95 100*   RDW 15.6* 15.6*        Chemistries:  Recent Labs   Lab 04/06/25  0522 04/07/25  0545    135*   K 4.3 3.9   * 113*   CO2 17* 15*   BUN 50* 51*   CREATININE 4.4* 4.7*   CALCIUM 8.2* 7.8*   ALBUMIN 2.5* 2.4*   BILITOT 1.4* 1.2*   ALKPHOS 45 39*   ALT 6* 7*   AST 17 23   GLUCOSE 143* 143*   MG 2.0 2.0   PHOS 5.6* 4.8*       All pertinent labs within the past 24 hours have been reviewed.    Significant Imaging:  I have reviewed all pertinent imaging results/findings within the past 24 hours.    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   Currently asymptomatic and satting well on RA. Will perform thoracentesis 4/7    Cardiac/Vascular  Bradycardia  Chronic. Intermittent asx bradycardia to 30s-40s. H/o LAFB, RBBB.  - continue to monitor on telemetry.   -  avoid lazaro blockade    Hypotension  Continue Midodrine  Has been running low at home and aldactone/Lasix were recently discontinued outpatient   Hepatology consulted  Nephrology consulted  -4/7 recommendations - Consider albumin and IV fluids: pt refusing dialysis  Transfuse for Hb <7  MAP goals >85 for HRS    Elevated troponin  - 238 -> 228, currently with no signs or symptoms of ACS, likely demand ischemia   - TTE performed 4/6 without systolic or diastolic dysfunction.    Renal/  Uterine prolapse  Large pelvic floor prolapse noted on CT.   - chronic, occurs when ascites builds, improves with paracentesis    Lactic acidosis  Lactic acid 4.5 on admission, initially decreased to 3.6 though increased to 7 again  Blood cx NGTD  Continue BS abx  Q6h lactic acid    Hepatorenal syndrome  Creatinine stable for now. BMP reviewed- noted Estimated Creatinine Clearance: 10 mL/min (A) (based on SCr of 3.9 mg/dL (H)). according to latest data. Based on current GFR, CKD stage is stage 4 - GFR 15-29.  Monitor UOP and serial BMP and adjust therapy as needed. Renally dose meds. Avoid nephrotoxic medications and procedures.     Creatinine 4.0 on admit, baseline around 2.5-2.8.  Likely secondary to pre-renal etiology from dehydration and volume losses given vomiting/diarrhea and hypotension. There are concerns for hepatorenal syndrome.      - Strict I&Os and daily weights   - Gentle IVF  - Avoid nephrotoxic agents such as NSAIDs, gadolinium, and IV radiocontrast  - Renally dose meds to current GFR  - Urine Na <10, Urine Osm > serum osm, and FeNa suggestive of pre-renal etiology. 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 Negative for SBP.      MELD 3.0: 24 at 4/7/2025  5:45 AM  MELD-Na: 24 at 4/7/2025  5:45 AM  Calculated from:  Serum Creatinine: 4.7 mg/dL (Using max of 3 mg/dL) at 4/7/2025  5:45 AM  Serum Sodium: 135 mmol/L at 4/7/2025  5:45 AM  Total Bilirubin: 1.2 mg/dL at 4/7/2025  5:45 AM  Serum Albumin: 2.4 g/dL at 4/7/2025  5:45 AM  INR(ratio): 1.3 at 4/7/2025  5:45 AM  Age at listing (hypothetical): 79 years  Sex: Female at 4/7/2025  5:45 AM    - Continue home meds. Etiology likely related to chronic HCV hepatitis and HCC s/p Y90 .   - Avoid any hepatotoxic meds   - Daily CBC, CMP, INR  - Ammonia 49, pt is mentating appropriately. Restarting lactulose  - paracentesis performed 4/6  - f/u ascitic studies when collected (albumin, total protein, cell count, LDH, culture, stain)   - Na restriction <2g  - Has received 1g rocephin for SBP ppx. As now with increasing lactic and persistent hypotension, started on broad spectrum abx, de-escalate as indicated  - monitor mental status; q4h Neuro checks.   - Hepatology following    4/7  -ascitic fluid SAAG?1.1. portal hypertension likely cause; No organisms seen.        Nausea vomiting and diarrhea  - antiemetics PRN  - C diff negative       Chronic hepatitis C with cirrhosis  See decompensated cirrhosis        Critical Care Daily Checklist:    A: Awake: RASS Goal/Actual Goal:    Actual:     B: Spontaneous Breathing Trial Performed?     C: SAT & SBT Coordinated?  N/a                      D: Delirium: CAM-ICU     E: Early Mobility Performed? No   F: Feeding Goal:    Status:     Current Diet Order   Procedures    Diet Low Sodium, 2gm      AS: Analgesia/Sedation N/a   T: Thromboembolic Prophylaxis heparin   H: HOB > 300 Yes   U: Stress Ulcer Prophylaxis (if needed) N/a   G: Glucose Control wnl    B: Bowel Function Stool Occurrence: 1   I: Indwelling Catheter (Lines & Royal) Necessity PIV x2, royal   D: De-escalation of Antimicrobials/Pharmacotherapies Zosyn      Plan for the day/ETD thoracentesis    Code Status:  Family/Goals of Care: DNR         Critical secondary to Patient has a condition that poses threat to life and bodily function: Acute Renal Failure      Critical care was time spent personally by me on the following activities: development of treatment plan with patient or surrogate and bedside caregivers, discussions with consultants, evaluation of patient's response to treatment, examination of patient, ordering and performing treatments and interventions, ordering and review of laboratory studies, ordering and review of radiographic studies, pulse oximetry, re-evaluation of patient's condition. This critical care time did not overlap with that of any other provider or involve time for any procedures.     Caro Patterson MD  Critical Care Medicine  [facility] - Medical ICU

## 2025-04-07 NOTE — ASSESSMENT & PLAN NOTE
Last diagnostic/therapeutic paracentesis on 3/26 with removal of 4.6L. Negative for SBP.      MELD 3.0: 24 at 4/7/2025  5:45 AM  MELD-Na: 24 at 4/7/2025  5:45 AM  Calculated from:  Serum Creatinine: 4.7 mg/dL (Using max of 3 mg/dL) at 4/7/2025  5:45 AM  Serum Sodium: 135 mmol/L at 4/7/2025  5:45 AM  Total Bilirubin: 1.2 mg/dL at 4/7/2025  5:45 AM  Serum Albumin: 2.4 g/dL at 4/7/2025  5:45 AM  INR(ratio): 1.3 at 4/7/2025  5:45 AM  Age at listing (hypothetical): 79 years  Sex: Female at 4/7/2025  5:45 AM    - Continue home meds. Etiology likely related to chronic HCV hepatitis and HCC s/p Y90 .   - Avoid any hepatotoxic meds   - Daily CBC, CMP, INR  - Ammonia 49, pt is mentating appropriately. Restarting lactulose  - paracentesis performed 4/6  - f/u ascitic studies when collected (albumin, total protein, cell count, LDH, culture, stain)   - Na restriction <2g  - Has received 1g rocephin for SBP ppx. As now with increasing lactic and persistent hypotension, started on broad spectrum abx, de-escalate as indicated  - monitor mental status; q4h Neuro checks.   - Hepatology following    4/7  -ascitic fluid SAAG?1.1. portal hypertension likely cause; No organisms seen.

## 2025-04-07 NOTE — SUBJECTIVE & OBJECTIVE
Interval History:     NAEON, status post paracentesis, 1.5 L removed.  Alert and oriented, BUN 51, serum creatinine 4.7, up from 4.4.  Last urine sodium less than 10.  Stable blood pressure on midodrine    Review of patient's allergies indicates:  No Known Allergies  Current Facility-Administered Medications   Medication Frequency    acetaminophen tablet 650 mg Q6H PRN    dextrose 50% injection 12.5 g PRN    dextrose 50% injection 25 g PRN    glucagon (human recombinant) injection 1 mg PRN    glucose chewable tablet 16 g PRN    glucose chewable tablet 24 g PRN    heparin (porcine) injection 5,000 Units Q8H    lactulose 20 gram/30 mL solution Soln 20 g BID    melatonin tablet 6 mg Nightly PRN    mirtazapine tablet 7.5 mg QHS    mupirocin 2 % ointment BID    naloxone 0.4 mg/mL injection 0.02 mg PRN    NORepinephrine 4 mg in sodium chloride 0.9% 250 mL infusion (premix) Continuous    ondansetron disintegrating tablet 8 mg Q8H PRN    piperacillin-tazobactam (ZOSYN) 4.5 g in D5W 100 mL IVPB (MB+) Q12H    prochlorperazine tablet 10 mg Q6H PRN    sevelamer carbonate pwpk 0.8 g TID WM    sodium chloride 0.9% flush 10 mL Q12H PRN       Objective:     Vital Signs (Most Recent):  Temp: 97.4 °F (36.3 °C) (04/07/25 1100)  Pulse: (!) 46 (04/07/25 1500)  Resp: 12 (04/07/25 1500)  BP: 124/64 (04/07/25 1500)  SpO2: 97 % (04/07/25 1500) Vital Signs (24h Range):  Temp:  [97.4 °F (36.3 °C)-98.6 °F (37 °C)] 97.4 °F (36.3 °C)  Pulse:  [46-77] 46  Resp:  [11-23] 12  SpO2:  [91 %-99 %] 97 %  BP: ()/(55-77) 124/64     Weight: 53.5 kg (118 lb) (04/07/25 0615)  Body mass index is 20.25 kg/m².  Body surface area is 1.55 meters squared.    I/O last 3 completed shifts:  In: 2272.8 [P.O.:480; I.V.:1439.7; IV Piggyback:353]  Out: 1650 [Urine:50; Other:1600]     Physical Exam  Constitutional:       General: She is not in acute distress.     Appearance: She is not toxic-appearing.   HENT:      Head: Normocephalic and atraumatic.   Eyes:       Extraocular Movements: Extraocular movements intact.      Pupils: Pupils are equal, round, and reactive to light.   Cardiovascular:      Rate and Rhythm: Normal rate.   Pulmonary:      Effort: No respiratory distress.      Breath sounds: No wheezing or rales.   Abdominal:      General: There is distension.      Tenderness: There is no abdominal tenderness. There is no guarding.   Musculoskeletal:      Right lower leg: Edema present.      Left lower leg: Edema present.   Skin:     Coloration: Skin is pale.   Neurological:      Mental Status: She is oriented to person, place, and time.          Significant Labs:  ABGs:   Recent Labs   Lab 04/05/25  0657   PH 7.284   PCO2 34.1   HCO3 15.9     CBC:   Recent Labs   Lab 04/07/25  0545   WBC 7.20   RBC 2.72*   HGB 8.1*   HCT 27.1*   *   *   MCH 29.8   MCHC 29.9*     CMP:   Recent Labs   Lab 04/07/25  0545   CALCIUM 7.8*   ALBUMIN 2.4*   *   K 3.9   CO2 15*   *   BUN 51*   CREATININE 4.7*   ALKPHOS 39*   ALT 7*   AST 23   BILITOT 1.2*     LFTs:   Recent Labs   Lab 04/07/25  0545   ALT 7*   AST 23   ALKPHOS 39*   BILITOT 1.2*   ALBUMIN 2.4*       Recent Labs   Lab 04/06/25  1137   COLORU Yellow   SPECGRAV 1.020   PHUR 5.0   PROTEINUA Negative   NITRITE Negative   LEUKOCYTESUR Negative   UROBILINOGEN Negative

## 2025-04-07 NOTE — PROGRESS NOTES
Therapy with vancomycin complete and/or consult discontinued by provider.  Pharmacy will sign off, please re-consult as needed.    Thank you,  Miky Dai, ErinD

## 2025-04-07 NOTE — ASSESSMENT & PLAN NOTE
- 238 -> 228, currently with no signs or symptoms of ACS, likely demand ischemia   - TTE performed 4/6 without systolic or diastolic dysfunction.

## 2025-04-07 NOTE — ASSESSMENT & PLAN NOTE
ASSESSMENT AND PLAN:      79 y.o. female ,is admitted on 4/4/2025  with past medical history of decompensated HCV cirrhosis with recurrent ascites requiring frequent large volume paracentesis q2wk.  \Admitted with vomiting and diarrhea Nephrology  is consulted for concerns of HRS        Impression:    SHARA KDIGO stage 3 on CKD 4    Baseline Creatinine: 2.5-2.8  Creatinine at time of consult: 4.4  Tests done:UPCR 0.09  Etiology of SHARA: Likely HRS. Other causes could be hypotension and increased abdominal pressure compartment syndrome     Acid Base Disorder: metabolic acidosis with Na bicarbonate of 15       Recommendations :     - Frequent ascites fluid (paracentesis), last paracentesis yesterday 1.5 L removed  - Avoid hypotension, currently on midodrine, lactulose, last albumin dose yesterday, current albumin level 2.4,   - urine lytes, urine sodium less than 10, urine chloride less than 20, urine osmolality 331, urine protein creatinine ratio 0.09  - Patient refused hemodialysis, family honored her wishes  - Monitor serum chemistries daily, strict intake and output Qshift, daily weights if able.  - Renal protective measures: Please adjust medications for reduced clearance  - Avoid nephrotoxic medications (NSAID, IV contrast)  - Avoid ACEi and ARBs in the setting of SHARA  - Maintain MAP > 85  - Transfuse for Hb <7

## 2025-04-07 NOTE — ASSESSMENT & PLAN NOTE
Continue Midodrine  Has been running low at home and aldactone/Lasix were recently discontinued outpatient   Hepatology consulted  Nephrology consulted  -4/7 recommendations - Consider albumin and IV fluids: pt refusing dialysis  Transfuse for Hb <7  MAP goals >85 for HRS

## 2025-04-07 NOTE — SUBJECTIVE & OBJECTIVE
Interval History/Significant Events: was concern for left forearm infiltration. Line was d/c'ed and new left AC PIV placed.     Review of Systems   All other systems reviewed and are negative.      Objective:     Vital Signs (Most Recent):  Temp: 98.3 °F (36.8 °C) (04/07/25 0305)  Pulse: 64 (04/07/25 0825)  Resp: 19 (04/07/25 0825)  BP: 127/66 (04/07/25 0615)  SpO2: 97 % (04/07/25 0825) Vital Signs (24h Range):  Temp:  [97.9 °F (36.6 °C)-98.6 °F (37 °C)] 98.3 °F (36.8 °C)  Pulse:  [47-77] 64  Resp:  [12-23] 19  SpO2:  [91 %-97 %] 97 %  BP: (109-143)/(59-77) 127/66   Weight: 53.5 kg (118 lb)  Body mass index is 20.25 kg/m².      Intake/Output Summary (Last 24 hours) at 4/7/2025 0902  Last data filed at 4/7/2025 0437  Gross per 24 hour   Intake 1125.06 ml   Output 1600 ml   Net -474.94 ml          Physical Exam     Physical Exam  Vitals and nursing note reviewed.   Constitutional:       General: She is not in acute distress.     Appearance: She is not toxic-appearing or diaphoretic.      Comments: Chronically-ill appearing   HENT:      Head: Normocephalic and atraumatic.      Nose: Nose normal.      Mouth/Throat:      Mouth: Mucous membranes are moist.   Eyes:      Conjunctiva/sclera: Conjunctivae normal.   Cardiovascular:      Rate and Rhythm: Normal rate and regular rhythm.      Pulses: Normal pulses.      Heart sounds: Normal heart sounds. No murmur heard.  Pulmonary:      Effort: Pulmonary effort is normal.      Breath sounds: Normal breath sounds.      Comments: Decreased breath sounds on the left.   Abdominal:      General: There is distension.      Palpations: Abdomen is soft.      Tenderness: There is no abdominal tenderness. There is no guarding or rebound.      Comments: Easily reducible umbilical hernia   Musculoskeletal:      Right lower leg: Edema present.      Left lower leg: Edema present.   Skin:     General: Skin is warm and dry.      Capillary Refill: Capillary refill takes less than 2 seconds.    Neurological:      General: No focal deficit present.      Mental Status: She is alert and oriented to person, place, and time.   Psychiatric:         Mood and Affect: Mood normal.         Thought Content: Thought content normal.     Vents:     Lines/Drains/Airways       Drain  Duration             Female External Urinary Catheter w/ Suction 04/04/25 1941 2 days              Peripheral Intravenous Line  Duration                  Peripheral IV - Single Lumen 04/04/25 1926 20 G No Right Antecubital 2 days         Peripheral IV - Single Lumen 04/07/25 0627 22 G 1 in No Left Antecubital <1 day                  Significant Labs:    CBC/Anemia Profile:  Recent Labs   Lab 04/06/25  0522 04/07/25  0545   WBC 8.35 7.20   HGB 8.0* 8.1*   HCT 24.9* 27.1*   * 148*   MCV 95 100*   RDW 15.6* 15.6*        Chemistries:  Recent Labs   Lab 04/06/25  0522 04/07/25  0545    135*   K 4.3 3.9   * 113*   CO2 17* 15*   BUN 50* 51*   CREATININE 4.4* 4.7*   CALCIUM 8.2* 7.8*   ALBUMIN 2.5* 2.4*   BILITOT 1.4* 1.2*   ALKPHOS 45 39*   ALT 6* 7*   AST 17 23   GLUCOSE 143* 143*   MG 2.0 2.0   PHOS 5.6* 4.8*       All pertinent labs within the past 24 hours have been reviewed.    Significant Imaging:  I have reviewed all pertinent imaging results/findings within the past 24 hours.

## 2025-04-07 NOTE — PROGRESS NOTES
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  Alert and oriented, BUN 51, serum creatinine 4.7, up from 4.4.  Last urine sodium less than 10.  Stable blood pressure on midodrine    Review of patient's allergies indicates:  No Known Allergies  Current Facility-Administered Medications   Medication Frequency    acetaminophen tablet 650 mg Q6H PRN    dextrose 50% injection 12.5 g PRN    dextrose 50% injection 25 g PRN    glucagon (human recombinant) injection 1 mg PRN    glucose chewable tablet 16 g PRN    glucose chewable tablet 24 g PRN    heparin (porcine) injection 5,000 Units Q8H    lactulose 20 gram/30 mL solution Soln 20 g BID    melatonin tablet 6 mg Nightly PRN    mirtazapine tablet 7.5 mg QHS    mupirocin 2 % ointment BID    naloxone 0.4 mg/mL injection 0.02 mg PRN    NORepinephrine 4 mg in sodium chloride 0.9% 250 mL infusion (premix) Continuous    ondansetron disintegrating tablet 8 mg Q8H PRN    piperacillin-tazobactam (ZOSYN) 4.5 g in D5W 100 mL IVPB (MB+) Q12H    prochlorperazine tablet 10 mg Q6H PRN    sevelamer carbonate pwpk 0.8 g TID WM    sodium chloride 0.9% flush 10 mL Q12H PRN       Objective:     Vital Signs (Most Recent):  Temp: 97.4 °F (36.3 °C) (04/07/25 1100)  Pulse: (!) 46 (04/07/25 1500)  Resp: 12 (04/07/25 1500)  BP: 124/64 (04/07/25 1500)  SpO2: 97 % (04/07/25 1500) Vital Signs (24h Range):  Temp:  [97.4 °F (36.3 °C)-98.6 °F (37 °C)] 97.4 °F (36.3 °C)  Pulse:  [46-77] 46  Resp:  [11-23] 12  SpO2:  [91 %-99 %] 97 %  BP: ()/(55-77) 124/64     Weight: 53.5 kg (118 lb) (04/07/25  0615)  Body mass index is 20.25 kg/m².  Body surface area is 1.55 meters squared.    I/O last 3 completed shifts:  In: 2272.8 [P.O.:480; I.V.:1439.7; IV Piggyback:353]  Out: 1650 [Urine:50; Other:1600]     Physical Exam  Constitutional:       General: She is not in acute distress.     Appearance: She is not toxic-appearing.   HENT:      Head: Normocephalic and atraumatic.   Eyes:      Extraocular Movements: Extraocular movements intact.      Pupils: Pupils are equal, round, and reactive to light.   Cardiovascular:      Rate and Rhythm: Normal rate.   Pulmonary:      Effort: No respiratory distress.      Breath sounds: No wheezing or rales.   Abdominal:      General: There is distension.      Tenderness: There is no abdominal tenderness. There is no guarding.   Musculoskeletal:      Right lower leg: Edema present.      Left lower leg: Edema present.   Skin:     Coloration: Skin is pale.   Neurological:      Mental Status: She is oriented to person, place, and time.          Significant Labs:  ABGs:   Recent Labs   Lab 04/05/25  0657   PH 7.284   PCO2 34.1   HCO3 15.9     CBC:   Recent Labs   Lab 04/07/25  0545   WBC 7.20   RBC 2.72*   HGB 8.1*   HCT 27.1*   *   *   MCH 29.8   MCHC 29.9*     CMP:   Recent Labs   Lab 04/07/25  0545   CALCIUM 7.8*   ALBUMIN 2.4*   *   K 3.9   CO2 15*   *   BUN 51*   CREATININE 4.7*   ALKPHOS 39*   ALT 7*   AST 23   BILITOT 1.2*     LFTs:   Recent Labs   Lab 04/07/25  0545   ALT 7*   AST 23   ALKPHOS 39*   BILITOT 1.2*   ALBUMIN 2.4*       Recent Labs   Lab 04/06/25  1137   COLORU Yellow   SPECGRAV 1.020   PHUR 5.0   PROTEINUA Negative   NITRITE Negative   LEUKOCYTESUR Negative   UROBILINOGEN Negative       Assessment/Plan:     Renal/  SHARA (acute kidney injury)  ASSESSMENT AND PLAN:      79 y.o. female ,is admitted on 4/4/2025  with past medical history of decompensated HCV cirrhosis with recurrent ascites requiring frequent large volume paracentesis  q2wk.  \Admitted with vomiting and diarrhea Nephrology  is consulted for concerns of HRS        Impression:    SHARA KDIGO stage 3 on CKD 4    Baseline Creatinine: 2.5-2.8  Creatinine at time of consult: 4.4  Tests done:UPCR 0.09  Etiology of SHARA: Likely HRS. Other causes could be hypotension and increased abdominal pressure compartment syndrome     Acid Base Disorder: metabolic acidosis with Na bicarbonate of 15       Recommendations :     - Frequent ascites fluid (paracentesis), last paracentesis yesterday 1.5 L removed  - Avoid hypotension, currently on midodrine, lactulose, last albumin dose yesterday, current albumin level 2.4,   - urine lytes, urine sodium less than 10, urine chloride less than 20, urine osmolality 331, urine protein creatinine ratio 0.09  - Patient refused hemodialysis, family honored her wishes  - Monitor serum chemistries daily, strict intake and output Qshift, daily weights if able.  - Renal protective measures: Please adjust medications for reduced clearance  - Avoid nephrotoxic medications (NSAID, IV contrast)  - Avoid ACEi and ARBs in the setting of SHARA  - Maintain MAP > 85  - Transfuse for Hb <7        Thank you for your consult. I will follow-up with patient. Please contact us if you have any additional questions.    Nilton Coon MD  Nephrology  [facility] - [facility]

## 2025-04-07 NOTE — TREATMENT PLAN
Hepatology Treatment Plan    Seema Gann is a 79 y.o. female admitted to hospital 4/4/2025 (Hospital Day: 4) due to Decompensated cirrhosis related to hepatitis C virus (HCV).     Interval History  Still pending paracentesis, thoracentesis. Cr worsening to 4.7 today despite albumin administration. Pt feeling fatigue and malaise.    Objective  Temp:  [97.4 °F (36.3 °C)-98.6 °F (37 °C)] 97.4 °F (36.3 °C) (04/07 1100)  Pulse:  [44-77] 51 (04/07 1530)  BP: ()/(55-77) 116/70 (04/07 1530)  Resp:  [11-23] 23 (04/07 1530)  SpO2:  [91 %-99 %] 98 % (04/07 1530)    General: Alert, Oriented x3, no distress  Abdomen:  distended, Soft. Tender to palpation R mid abdomen . No peritoneal signs.    Laboratory    Recent Labs   Lab 04/05/25  0516 04/06/25  0522 04/07/25  0545   HGB 7.3* 8.0* 8.1*         Assessment and Plan  79F with decompensated HCV cirrhosis complicated by ascites requiring weekly paracentesis, HCC treated with Y90, HCV treated with cure, who was admitted on 4/4/25 for management of N/V, diarrhea, SHARA. Hepatology has been consulted for management of cirrhosis. Patient's renal function is worsening and she wants to avoid invasive procedures. Appears chronically ill appearing but not in acute distress.    Problem List:  Shock  SHARA on CKD  Decompensated HCV cirrhosis and HCC   Ascites   L pleural effusion  Normocytic anemia  Coagulopathy  Lactic acidosis    -Patient is not a liver transplant candidate given she  does not want invasive procedures and escalation of care  -Obtain diagnostic/therapeutic paracentesis.   -Consider thoracentesis for L pleural effusion.   -SHARA per Nephrology. They suspect HRS. Of note, she does not want dialysis should she ever require it.   -Palliative care consult to assist with goals of care conversations should her clinical condition continue to worsen. If she were to proceed with hospice, would discuss placing a PleurX catheter.   -CMP, INR daily.  - We will continue to  follow.    Thank you for involving us in the care of Seema Gann. Please call with any additional questions, concerns or changes in the patient's clinical status.    Discussed with Dr. Long.    Baldo Hartley MD  Gastroenterology/Hepatology Fellow, PGY IV

## 2025-04-07 NOTE — PROCEDURES
"Seema Gann is a 79 y.o. female patient.    Temp: 98.3 °F (36.8 °C) (25)  Pulse: 64 (25)  Resp: 19 (25)  BP: 127/66 (25)  SpO2: 97 % (25)  Weight: 53.5 kg (118 lb) (25)  Height: 5' 4" (162.6 cm) (25)       Paracentesis    Date/Time: 2025 9:08 AM  Location procedure was performed: [facility]    Performed by: Salvador Cristobal MD  Authorized by: Salvador Cristobal MD  Assisting provider: Roxie Lacy MD  Consent Done: Yes  Consent: Verbal consent obtained. Written consent obtained  Consent given by: patient  Patient understanding: patient states understanding of the procedure being performed  Patient consent: the patient's understanding of the procedure matches consent given  Relevant documents: relevant documents present and verified  Test results: test results available and properly labeled  Site marked: the operative site was marked  Patient identity confirmed: , MRN, name and verbally with patient  Time out: Immediately prior to procedure a "time out" was called to verify the correct patient, procedure, equipment, support staff and site/side marked as required.  Initial or subsequent exam: subsequent  Procedure purpose: diagnostic and therapeutic  Indications: abdominal discomfort secondary to ascites and secondary bacterial peritonitis  Anesthesia: local infiltration  Description of findings: yellow ascites   Ultrasound guidance: yes  Puncture site: right lower quadrant  Complications: No  Specimens: Yes  Implants: No  Patient tolerance: Patient tolerated the procedure well with no immediate complications          2025    "

## 2025-04-07 NOTE — ASSESSMENT & PLAN NOTE
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   Currently asymptomatic and satting well on RA. Will perform thoracentesis 4/7

## 2025-04-08 ENCOUNTER — TELEPHONE (OUTPATIENT)
Dept: INTERVENTIONAL RADIOLOGY/VASCULAR | Facility: HOSPITAL | Age: 80
End: 2025-04-08
Payer: MEDICARE

## 2025-04-08 NOTE — PLAN OF CARE
PT evaluation completed- see note for details. PT POC and goals established.    Problem: Physical Therapy  Goal: Physical Therapy Goal  Description: Goals to be met by: 25     Patient will increase functional independence with mobility by performin. Supine to sit with [place]  2. Sit to stand transfer with Stand-by Assistance  3. Bed to chair transfer with Stand-by Assistance using LRAD  4. Gait  x 100 feet with Stand-by Assistance using LRAD.   5. Ascend/descend 5 stair with bilateral Handrails Contact Guard Assistance using LRAD.   6. Stand for 10 minutes with Stand-by Assistance using LRAD    Outcome: Progressing

## 2025-04-08 NOTE — SUBJECTIVE & OBJECTIVE
Interval History:     NAEON, AFVSS, on Levophed, map 97, urinated and past motion, denied nausea vomiting or diarrhea, alert and oriented by 3, urine output 275 cc, remains on room air with oxygen saturation 97%, serum creatinine 4.4, potassium 3.3 replaced    Review of patient's allergies indicates:  No Known Allergies  Current Facility-Administered Medications   Medication Frequency    0.9%  NaCl infusion (for blood administration) Q24H PRN    acetaminophen tablet 650 mg Q6H PRN    dextrose 50% injection 12.5 g PRN    dextrose 50% injection 25 g PRN    glucagon (human recombinant) injection 1 mg PRN    glucose chewable tablet 16 g PRN    glucose chewable tablet 24 g PRN    heparin (porcine) injection 5,000 Units Q8H    lactulose 20 gram/30 mL solution Soln 20 g BID    melatonin tablet 6 mg Nightly PRN    mirtazapine tablet 7.5 mg QHS    mupirocin 2 % ointment BID    naloxone 0.4 mg/mL injection 0.02 mg PRN    NORepinephrine 4 mg in sodium chloride 0.9% 250 mL infusion (premix) Continuous    ondansetron disintegrating tablet 8 mg Q8H PRN    piperacillin-tazobactam (ZOSYN) 4.5 g in D5W 100 mL IVPB (MB+) Q12H    prochlorperazine tablet 10 mg Q6H PRN    sevelamer carbonate pwpk 0.8 g TID WM    sodium chloride 0.9% flush 10 mL Q12H PRN       Objective:     Vital Signs (Most Recent):  Temp: 98.1 °F (36.7 °C) (04/08/25 0701)  Pulse: 63 (04/08/25 1100)  Resp: (!) 21 (04/08/25 1100)  BP: 107/63 (04/08/25 1100)  SpO2: 97 % (04/08/25 1100) Vital Signs (24h Range):  Temp:  [98.1 °F (36.7 °C)-98.5 °F (36.9 °C)] 98.1 °F (36.7 °C)  Pulse:  [44-83] 63  Resp:  [11-32] 21  SpO2:  [87 %-99 %] 97 %  BP: ()/(50-70) 107/63     Weight: 51.6 kg (113 lb 12.8 oz) (04/08/25 0400)  Body mass index is 19.53 kg/m².  Body surface area is 1.53 meters squared.    I/O last 3 completed shifts:  In: 1933.5 [P.O.:600; I.V.:1048.9; IV Piggyback:284.6]  Out: 275 [Urine:275]     Physical Exam  Constitutional:       Appearance: She is  ill-appearing. She is not toxic-appearing.   HENT:      Head: Normocephalic and atraumatic.   Eyes:      Extraocular Movements: Extraocular movements intact.      Pupils: Pupils are equal, round, and reactive to light.   Pulmonary:      Effort: No respiratory distress.      Breath sounds: No wheezing or rales.   Abdominal:      General: There is distension.      Tenderness: There is no abdominal tenderness. There is no guarding.   Musculoskeletal:      Right lower leg: Edema present.      Left lower leg: Edema present.   Skin:     Coloration: Skin is pale.   Neurological:      Mental Status: She is oriented to person, place, and time.          Significant Labs:  ABGs:   Recent Labs   Lab 04/05/25  0657   PH 7.284   PCO2 34.1   HCO3 15.9     CBC:   Recent Labs   Lab 04/08/25  0822   WBC 5.26   RBC 2.57*   HGB 7.8*   HCT 24.7*   *   MCV 96   MCH 30.4   MCHC 31.6*     CMP:   Recent Labs   Lab 04/08/25  0822   CALCIUM 7.7*   ALBUMIN 2.0*      K 3.5   CO2 17*   *   BUN 50*   CREATININE 4.7*   ALKPHOS 32*   ALT 6*   AST 15   BILITOT 1.1*     LFTs:   Recent Labs   Lab 04/08/25  0822   ALT 6*   AST 15   ALKPHOS 32*   BILITOT 1.1*   ALBUMIN 2.0*       Recent Labs   Lab 04/06/25  1137   COLORU Yellow   SPECGRAV 1.020   PHUR 5.0   PROTEINUA Negative   NITRITE Negative   LEUKOCYTESUR Negative   UROBILINOGEN Negative

## 2025-04-08 NOTE — SUBJECTIVE & OBJECTIVE
Interval History/Significant Events: NAEON    Review of Systems  Objective:     Vital Signs (Most Recent):  Temp: 98.4 °F (36.9 °C) (04/08/25 0300)  Pulse: (!) 56 (04/08/25 0700)  Resp: 15 (04/08/25 0700)  BP: 121/65 (04/08/25 0700)  SpO2: 96 % (04/08/25 0700) Vital Signs (24h Range):  Temp:  [97.4 °F (36.3 °C)-98.5 °F (36.9 °C)] 98.4 °F (36.9 °C)  Pulse:  [44-65] 56  Resp:  [11-32] 15  SpO2:  [87 %-99 %] 96 %  BP: ()/(50-70) 121/65   Weight: 51.6 kg (113 lb 12.8 oz)  Body mass index is 19.53 kg/m².      Intake/Output Summary (Last 24 hours) at 4/8/2025 0732  Last data filed at 4/8/2025 0600  Gross per 24 hour   Intake 1396.24 ml   Output 275 ml   Net 1121.24 ml          Physical Exam     Physical Exam  Vitals and nursing note reviewed.   Constitutional:       General: She is not in acute distress.     Appearance: She is not toxic-appearing or diaphoretic.      Comments: Chronically-ill appearing   HENT:      Head: Normocephalic and atraumatic.      Nose: Nose normal.      Mouth/Throat:      Mouth: Mucous membranes are moist.   Eyes:      Conjunctiva/sclera: Conjunctivae normal.   Cardiovascular:      Rate and Rhythm: Normal rate and regular rhythm.      Pulses: Normal pulses.      Heart sounds: Normal heart sounds. No murmur heard.  Pulmonary:      Effort: Pulmonary effort is normal.      Breath sounds: Normal breath sounds.      Comments: Decreased breath sounds on the left.   Abdominal:      General: There is distension.      Palpations: Abdomen is soft.      Tenderness: There is no abdominal tenderness. There is no guarding or rebound.      Comments: Easily reducible umbilical hernia   Musculoskeletal:      Right lower leg: Edema present.      Left lower leg: Edema present.   Skin:     General: Skin is warm and dry.      Capillary Refill: Capillary refill takes less than 2 seconds.   Neurological:      General: No focal deficit present.      Mental Status: She is alert and oriented to person, place, and  time.   Psychiatric:         Mood and Affect: Mood normal.         Thought Content: Thought content normal.   Vents:     Lines/Drains/Airways       Drain  Duration             Female External Urinary Catheter w/ Suction 04/04/25 1941 3 days              Peripheral Intravenous Line  Duration                  Peripheral IV - Single Lumen 04/04/25 1926 20 G No Right Antecubital 3 days         Peripheral IV - Single Lumen 04/07/25 0627 22 G 1 in No Left Antecubital 1 day         Peripheral IV - Single Lumen 04/08/25 0545 20 G 1 3/4 in Anterior;Proximal;Right Upper Arm <1 day                  Significant Labs:    CBC/Anemia Profile:  Recent Labs   Lab 04/07/25  0545 04/08/25  0546   WBC 7.20 3.91   HGB 8.1* 6.8*   HCT 27.1* 21.7*   * 99*   * 96   RDW 15.6* 15.2*        Chemistries:  Recent Labs   Lab 04/07/25  0545 04/08/25  0546   * 138   K 3.9 3.3*   * 116*   CO2 15* 16*   BUN 51* 49*   CREATININE 4.7* 4.4*   CALCIUM 7.8* 7.3*   ALBUMIN 2.4* 1.9*   BILITOT 1.2* 1.0   ALKPHOS 39* 30*   ALT 7* 6*   AST 23 14   GLUCOSE 143* 112*   MG 2.0 1.7   PHOS 4.8* 4.2       All pertinent labs within the past 24 hours have been reviewed.    Significant Imaging:  I have reviewed all pertinent imaging results/findings within the past 24 hours.

## 2025-04-08 NOTE — NURSING
Pt awaiting CT with IV contrast imaging. Per PRABHU Thompson MD, CT will be postponed at this time due to pt's BUN/creatinine levels. Pt SpO2 sats 96% on room air, no SOB, clear breath sounds, no signs of distress. Imaging will be reconsidered when primary day team arrives.

## 2025-04-08 NOTE — TREATMENT PLAN
Hepatology Treatment Plan    Seema Gann is a 79 y.o. female admitted to hospital 4/4/2025 (Hospital Day: 5) due to Decompensated cirrhosis related to hepatitis C virus (HCV).     Interval History  S/p paracentesis with removal of 1.5L yesterday. Cr improving to 4.4 from 4.7. Pt still hypotensive with albumin and IVF and midodrine. Requiring levophed still. Pending CT chest prior to thoracentesis.    Objective  Temp:  [98.1 °F (36.7 °C)-98.5 °F (36.9 °C)] 98.1 °F (36.7 °C) (04/08 0701)  Pulse:  [44-83] 58 (04/08 1345)  BP: ()/(50-70) 112/66 (04/08 1345)  Resp:  [11-32] 16 (04/08 1345)  SpO2:  [87 %-99 %] 96 % (04/08 1345)      Laboratory    Recent Labs   Lab 04/07/25  0545 04/08/25  0546 04/08/25  0822   HGB 8.1* 6.8* 7.8*         Assessment and Plan  79F with decompensated HCV cirrhosis complicated by ascites requiring weekly paracentesis, HCC treated with Y90, HCV treated with cure, who was admitted on 4/4/25 for management of N/V, diarrhea, SHARA. Hepatology has been consulted for management of cirrhosis. Pt still requiring levophed for hypotension along with IVF, albumin, and midodrine. Patient's renal function is slightly improving. She wants to avoid invasive procedures such as dialysis.    Problem List:  Shock  SHARA on CKD  Decompensated HCV cirrhosis and HCC   Ascites   L pleural effusion  Normocytic anemia  Coagulopathy  Lactic acidosis    -Patient is not a liver transplant candidate given she does not want invasive procedures and escalation of care  -s/p paracentesis on 4/7. SAAG 2.0 (>1.1 so likely 2/2 portal hypertension).   -Primary team considering thoracentesis for L pleural effusion after CT scan.   -SHARA per Nephrology. They suspect HRS. Of note, she does not want dialysis should she ever require it.   -Palliative care consult to assist with goals of care conversations should her clinical condition continue to worsen. If she were to proceed with hospice, would discuss placing a PleurX  catheter.   -CMP, INR daily.  - We will sign off    Thank you for involving us in the care of Seema Gann. Please call with any additional questions, concerns or changes in the patient's clinical status.    Discussed with Dr. Long.    Baldo Hartley MD  Gastroenterology/Hepatology Fellow, PGY IV

## 2025-04-08 NOTE — PROGRESS NOTES
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)    Subjective:     HPI: No notes on file    Interval History:     NAEON, AFVSS, on Levophed, map 97, urinated and past motion, denied nausea vomiting or diarrhea, alert and oriented by 3, urine output 275 cc, remains on room air with oxygen saturation 97%, serum creatinine 4.4, potassium 3.3 replaced    Review of patient's allergies indicates:  No Known Allergies  Current Facility-Administered Medications   Medication Frequency    0.9%  NaCl infusion (for blood administration) Q24H PRN    acetaminophen tablet 650 mg Q6H PRN    dextrose 50% injection 12.5 g PRN    dextrose 50% injection 25 g PRN    glucagon (human recombinant) injection 1 mg PRN    glucose chewable tablet 16 g PRN    glucose chewable tablet 24 g PRN    heparin (porcine) injection 5,000 Units Q8H    lactulose 20 gram/30 mL solution Soln 20 g BID    melatonin tablet 6 mg Nightly PRN    mirtazapine tablet 7.5 mg QHS    mupirocin 2 % ointment BID    naloxone 0.4 mg/mL injection 0.02 mg PRN    NORepinephrine 4 mg in sodium chloride 0.9% 250 mL infusion (premix) Continuous    ondansetron disintegrating tablet 8 mg Q8H PRN    piperacillin-tazobactam (ZOSYN) 4.5 g in D5W 100 mL IVPB (MB+) Q12H    prochlorperazine tablet 10 mg Q6H PRN    sevelamer carbonate pwpk 0.8 g TID WM    sodium chloride 0.9% flush 10 mL Q12H PRN       Objective:     Vital Signs (Most Recent):  Temp: 98.1 °F (36.7 °C) (04/08/25 0701)  Pulse: 63 (04/08/25 1100)  Resp: (!) 21 (04/08/25 1100)  BP: 107/63 (04/08/25 1100)  SpO2: 97 % (04/08/25 1100) Vital Signs (24h Range):  Temp:  [98.1 °F (36.7 °C)-98.5 °F (36.9 °C)] 98.1 °F (36.7 °C)  Pulse:  [44-83]  63  Resp:  [11-32] 21  SpO2:  [87 %-99 %] 97 %  BP: ()/(50-70) 107/63     Weight: 51.6 kg (113 lb 12.8 oz) (04/08/25 0400)  Body mass index is 19.53 kg/m².  Body surface area is 1.53 meters squared.    I/O last 3 completed shifts:  In: 1933.5 [P.O.:600; I.V.:1048.9; IV Piggyback:284.6]  Out: 275 [Urine:275]     Physical Exam  Constitutional:       Appearance: She is ill-appearing. She is not toxic-appearing.   HENT:      Head: Normocephalic and atraumatic.   Eyes:      Extraocular Movements: Extraocular movements intact.      Pupils: Pupils are equal, round, and reactive to light.   Pulmonary:      Effort: No respiratory distress.      Breath sounds: No wheezing or rales.   Abdominal:      General: There is distension.      Tenderness: There is no abdominal tenderness. There is no guarding.   Musculoskeletal:      Right lower leg: Edema present.      Left lower leg: Edema present.   Skin:     Coloration: Skin is pale.   Neurological:      Mental Status: She is oriented to person, place, and time.          Significant Labs:  ABGs:   Recent Labs   Lab 04/05/25  0657   PH 7.284   PCO2 34.1   HCO3 15.9     CBC:   Recent Labs   Lab 04/08/25  0822   WBC 5.26   RBC 2.57*   HGB 7.8*   HCT 24.7*   *   MCV 96   MCH 30.4   MCHC 31.6*     CMP:   Recent Labs   Lab 04/08/25  0822   CALCIUM 7.7*   ALBUMIN 2.0*      K 3.5   CO2 17*   *   BUN 50*   CREATININE 4.7*   ALKPHOS 32*   ALT 6*   AST 15   BILITOT 1.1*     LFTs:   Recent Labs   Lab 04/08/25  0822   ALT 6*   AST 15   ALKPHOS 32*   BILITOT 1.1*   ALBUMIN 2.0*       Recent Labs   Lab 04/06/25  1137   COLORU Yellow   SPECGRAV 1.020   PHUR 5.0   PROTEINUA Negative   NITRITE Negative   LEUKOCYTESUR Negative   UROBILINOGEN Negative       Assessment/Plan:     Renal/  SHARA (acute kidney injury)  Renal/  SHARA (acute kidney injury)      Assessment and plan      79 y.o. female ,is admitted on 4/4/2025  with past medical history of decompensated HCV cirrhosis  with recurrent ascites requiring frequent large volume paracentesis q2wk.\Admitted with vomiting and diarrhea Nephrology  is consulted for concerns of HRS        Impression:     SHARA KDIGO stage 3 on CKD 4    Baseline Creatinine: 2.5-2.8  Creatinine at time of consult: 4.4  Tests done:UPCR 0.09  Etiology of SHARA: Likely HRS. Other causes could be hypotension and increased abdominal pressure compartment syndrome     Acid Base Disorder: metabolic acidosis with Na bicarbonate of 16        Recommendations :      - Frequent ascites fluid (paracentesis), last paracentesis 2 days ago 1.5 L removed  - Avoid hypotension, continue with lactulose,  - last albumin dose on April 6, 2025  - Urine output 275 cc, Urine lytes, urine sodium less than 10, urine chloride less than 20, urine osmolality 331, urine protein creatinine ratio 0.09  - Patient refused hemodialysis, family honored her wishes  - Monitor serum chemistries daily, strict intake and output Qshift, daily weights if able.  - Renal protective measures:  Renal dose all medication  - Avoid nephrotoxic medications (NSAID, IV contrast)  - Avoid ACEi and ARBs in the setting of SHARA  - Maintain MAP > 85  - Transfuse for Hb <7        Thank you for your consult. I will follow-up with patient. Please contact us if you have any additional questions.    Nilton Coon MD  Nephrology  [facility] - Medical ICU

## 2025-04-08 NOTE — PLAN OF CARE
Problem: Occupational Therapy  Goal: Occupational Therapy Goal  Description: Goals to be met by: 7 days 4/15/25     Patient will increase functional independence with ADLs by performing:      Pt to complete toilieting with SBA  Pt to complete standing g/h skills with SBA  Pt to complete t/f bed, chair and commode with SBA  Pt to complete UE/LE dressing with SBA     Outcome: Progressing

## 2025-04-08 NOTE — ASSESSMENT & PLAN NOTE
Renal/  SHARA (acute kidney injury)      Assessment and plan      79 y.o. female ,is admitted on 4/4/2025  with past medical history of decompensated HCV cirrhosis with recurrent ascites requiring frequent large volume paracentesis q2wk.\Admitted with vomiting and diarrhea Nephrology  is consulted for concerns of HRS        Impression:     SHARA KDIGO stage 3 on CKD 4    Baseline Creatinine: 2.5-2.8  Creatinine at time of consult: 4.4  Tests done:UPCR 0.09  Etiology of SHARA: Likely HRS. Other causes could be hypotension and increased abdominal pressure compartment syndrome     Acid Base Disorder: metabolic acidosis with Na bicarbonate of 16        Recommendations :      - Frequent ascites fluid (paracentesis), last paracentesis 2 days ago 1.5 L removed  - Avoid hypotension, continue with lactulose,  - last albumin dose on April 6, 2025  - Urine output 150 cc, Urine lytes, urine sodium less than 10, urine chloride less than 20, urine osmolality 331, urine protein creatinine ratio 0.09  - Patient refused hemodialysis, family honored her wishes  - Monitor serum chemistries daily, strict intake and output Qshift, daily weights if able.  - Renal protective measures:  Renal dose all medication  - Avoid nephrotoxic medications (NSAID, IV contrast)  - Avoid ACEi and ARBs in the setting of SHARA  - Maintain MAP > 85  - Transfuse for Hb <7

## 2025-04-08 NOTE — PROGRESS NOTES
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 Gann is a 79 y.o. F with a PMHx of HTN, decompensated HCV cirrhosis with recurrent ascites requiring large volume paracentesis approximately every 2 weeks, thrombocytopenia, hepatocellular carcinoma s/p Y90 x2, CKD IV (baseline Cr 2.5-2.8) who initially presented to [facility] ED on 4/4 for n/v/d x1 day. She had over 6 episodes of NBNB emesis and 3 episodes of non-bloody diarrhea. Last IR paracentesis was on 3/26 and 4.6L were removed, no evidence of SBP on prior fluid studies.     In the ED, pt was hypotensive (BP 86/50) otherwise vitals WNL. Lab work remarkable for Na 135, Bicarb 15, BUN 41/Cr 4. Initial lactic acid 4.5. She received 1L NS and 5mg midodrine with improvement in BP. CTAP with liver cirrhosis, portal hypertension, large volume of ascites, and moderate L pleural effusions. She was admitted to  for further management of decompensated cirrhosis and concern for hepatorenal syndrome.     [facility] consulted on 4/5 for increasing lactic acid (7.0), hypotension requiring levophed, and concern for sepsis. Currently receiving Albumin 25% 25g q8h, midodrine 15mg TID, octreotide 100mcg q8h, Zosyn, and Vancomycin.      Pt admitted to [facility].     Hospital/ICU Course:  Stepped up to [facility] on 4/5 for worsening lactic acidosis and concern for HRS requiring MAP goals. Started on levo for MAP goal >85. Nephrology and hepatology consulted. Nephrology offered dialysis, which she refused. Cr continuing to worsen. Restarted home lactulose. Repeat CXR worsened, likely worsened volume overload following albumin administration. Will perform  thoracentesis and paracentesis today. Will send fluid to evaluate for infection given condition may still be due to sepsis rather than HRS. On Vanc/zosyn.     4/8 Plan for CT chest non for better characterization of left pleural effusion.     Interval History/Significant Events: NAEON    Review of Systems  Objective:     Vital Signs (Most Recent):  Temp: 98.4 °F (36.9 °C) (04/08/25 0300)  Pulse: (!) 56 (04/08/25 0700)  Resp: 15 (04/08/25 0700)  BP: 121/65 (04/08/25 0700)  SpO2: 96 % (04/08/25 0700) Vital Signs (24h Range):  Temp:  [97.4 °F (36.3 °C)-98.5 °F (36.9 °C)] 98.4 °F (36.9 °C)  Pulse:  [44-65] 56  Resp:  [11-32] 15  SpO2:  [87 %-99 %] 96 %  BP: ()/(50-70) 121/65   Weight: 51.6 kg (113 lb 12.8 oz)  Body mass index is 19.53 kg/m².      Intake/Output Summary (Last 24 hours) at 4/8/2025 0732  Last data filed at 4/8/2025 0600  Gross per 24 hour   Intake 1396.24 ml   Output 275 ml   Net 1121.24 ml          Physical Exam     Physical Exam  Vitals and nursing note reviewed.   Constitutional:       General: She is not in acute distress.     Appearance: She is not toxic-appearing or diaphoretic.      Comments: Chronically-ill appearing   HENT:      Head: Normocephalic and atraumatic.      Nose: Nose normal.      Mouth/Throat:      Mouth: Mucous membranes are moist.   Eyes:      Conjunctiva/sclera: Conjunctivae normal.   Cardiovascular:      Rate and Rhythm: Normal rate and regular rhythm.      Pulses: Normal pulses.      Heart sounds: Normal heart sounds. No murmur heard.  Pulmonary:      Effort: Pulmonary effort is normal.      Breath sounds: Normal breath sounds.      Comments: Decreased breath sounds on the left.   Abdominal:      General: There is distension.      Palpations: Abdomen is soft.      Tenderness: There is no abdominal tenderness. There is no guarding or rebound.      Comments: Easily reducible umbilical hernia   Musculoskeletal:      Right lower leg: Edema present.      Left lower leg: Edema  present.   Skin:     General: Skin is warm and dry.      Capillary Refill: Capillary refill takes less than 2 seconds.   Neurological:      General: No focal deficit present.      Mental Status: She is alert and oriented to person, place, and time.   Psychiatric:         Mood and Affect: Mood normal.         Thought Content: Thought content normal.   Vents:     Lines/Drains/Airways       Drain  Duration             Female External Urinary Catheter w/ Suction 04/04/25 1941 3 days              Peripheral Intravenous Line  Duration                  Peripheral IV - Single Lumen 04/04/25 1926 20 G No Right Antecubital 3 days         Peripheral IV - Single Lumen 04/07/25 0627 22 G 1 in No Left Antecubital 1 day         Peripheral IV - Single Lumen 04/08/25 0545 20 G 1 3/4 in Anterior;Proximal;Right Upper Arm <1 day                  Significant Labs:    CBC/Anemia Profile:  Recent Labs   Lab 04/07/25  0545 04/08/25  0546   WBC 7.20 3.91   HGB 8.1* 6.8*   HCT 27.1* 21.7*   * 99*   * 96   RDW 15.6* 15.2*        Chemistries:  Recent Labs   Lab 04/07/25  0545 04/08/25  0546   * 138   K 3.9 3.3*   * 116*   CO2 15* 16*   BUN 51* 49*   CREATININE 4.7* 4.4*   CALCIUM 7.8* 7.3*   ALBUMIN 2.4* 1.9*   BILITOT 1.2* 1.0   ALKPHOS 39* 30*   ALT 7* 6*   AST 23 14   GLUCOSE 143* 112*   MG 2.0 1.7   PHOS 4.8* 4.2       All pertinent labs within the past 24 hours have been reviewed.    Significant Imaging:  I have reviewed all pertinent imaging results/findings within the past 24 hours.    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   Currently asymptomatic and satting well on RA.   4/8 CT chest non con then consider thoracentesis    Cardiac/Vascular  Bradycardia  Chronic. Intermittent asx bradycardia to 30s-40s. H/o LAFB, RBBB.  -  continue to monitor on telemetry.   - avoid lazaro blockade    Hypotension  Continue Midodrine  Has been running low at home and aldactone/Lasix were recently discontinued outpatient   Hepatology consulted  Nephrology consulted  -4/7 recommendations - Consider albumin and IV fluids: pt refusing dialysis  Transfuse for Hb <7  MAP goals >85 for HRS    Elevated troponin  - 238 -> 228, currently with no signs or symptoms of ACS, likely demand ischemia   - TTE performed 4/6 without systolic or diastolic dysfunction.    Renal/  Uterine prolapse  Large pelvic floor prolapse noted on CT.   - chronic, occurs when ascites builds, improves with paracentesis    Lactic acidosis  Lactic acid 4.5 on admission, initially decreased to 3.6 though increased to 7 again  Blood cx NGTD  Continue BS abx  Q6h lactic acid    Hepatorenal syndrome  Creatinine stable for now. BMP reviewed- noted Estimated Creatinine Clearance: 10 mL/min (A) (based on SCr of 3.9 mg/dL (H)). according to latest data. Based on current GFR, CKD stage is stage 4 - GFR 15-29.  Monitor UOP and serial BMP and adjust therapy as needed. Renally dose meds. Avoid nephrotoxic medications and procedures.     Creatinine 4.0 on admit, baseline around 2.5-2.8.  Likely secondary to pre-renal etiology from dehydration and volume losses given vomiting/diarrhea and hypotension. There are concerns for hepatorenal syndrome.      - Strict I&Os and daily weights   - Gentle IVF  - Avoid nephrotoxic agents such as NSAIDs, gadolinium, and IV radiocontrast  - Renally dose meds to current GFR  - Urine Na <10, Urine Osm > serum osm, and FeNa suggestive of pre-renal etiology. 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 Negative for SBP.      MELD 3.0: 24 at 4/7/2025  5:45 AM  MELD-Na: 24 at 4/7/2025  5:45 AM  Calculated from:  Serum Creatinine: 4.7 mg/dL (Using max of 3 mg/dL) at 4/7/2025  5:45 AM  Serum Sodium: 135 mmol/L at 4/7/2025  5:45 AM  Total Bilirubin: 1.2 mg/dL at 4/7/2025  5:45 AM  Serum Albumin: 2.4 g/dL at 4/7/2025  5:45 AM  INR(ratio): 1.3 at 4/7/2025  5:45 AM  Age at listing (hypothetical): 79 years  Sex: Female at 4/7/2025  5:45 AM    - Continue home meds. Etiology likely related to chronic HCV hepatitis and HCC s/p Y90 .   - Avoid any hepatotoxic meds   - Daily CBC, CMP, INR  - Ammonia 49, pt is mentating appropriately. Restarting lactulose  - paracentesis performed 4/6  - f/u ascitic studies when collected (albumin, total protein, cell count, LDH, culture, stain)   - Na restriction <2g  - Has received 1g rocephin for SBP ppx. As now with increasing lactic and persistent hypotension, started on broad spectrum abx, de-escalate as indicated  - monitor mental status; q4h Neuro checks.   - Hepatology following    4/7  -ascitic fluid SAAG?1.1. portal hypertension likely cause; No organisms seen.        Nausea vomiting and diarrhea  - antiemetics PRN  - C diff negative       Chronic hepatitis C with cirrhosis  See decompensated cirrhosis        Critical Care Daily Checklist:    A: Awake: RASS Goal/Actual Goal:    Actual:     B: Spontaneous Breathing Trial Performed?     C: SAT & SBT Coordinated?  na                      D: Delirium: CAM-ICU     E: Early Mobility Performed? Yes, PTOT consulted   F: Feeding Goal: Goals: Meet % een/epn by next RD f/u  Status: Nutrition Goal Status: new   Current Diet Order   Procedures    Diet Low Sodium, 2gm      AS: Analgesia/Sedation none   T:  Thromboembolic Prophylaxis heparin   H: HOB > 300 Yes   U: Stress Ulcer Prophylaxis (if needed) na   G: Glucose Control wnl   B: Bowel Function Stool Occurrence: 1   I: Indwelling Catheter (Lines & Stephens) Necessity PIV x 2   D: De-escalation of Antimicrobials/Pharmacotherapies zosyn    Plan for the day/ETD Ct chest non con    Code Status:  Family/Goals of Care: DNR         Critical secondary to Patient has a condition that poses threat to life and bodily function: Acute Renal Failure      Critical care was time spent personally by me on the following activities: development of treatment plan with patient or surrogate and bedside caregivers, discussions with consultants, evaluation of patient's response to treatment, examination of patient, ordering and performing treatments and interventions, ordering and review of laboratory studies, ordering and review of radiographic studies, pulse oximetry, re-evaluation of patient's condition. This critical care time did not overlap with that of any other provider or involve time for any procedures.     Caro Patterson MD  Critical Care Medicine  [facility] - Medical ICU

## 2025-04-08 NOTE — ASSESSMENT & PLAN NOTE
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   Currently asymptomatic and satting well on RA.   4/8 CT chest non con then consider thoracentesis

## 2025-04-08 NOTE — PT/OT/SLP EVAL
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  Plan of Care Expires:  05/08/25  Plan of Care Reviewed with: patient, spouse    Assessment:     Seema Gann is a 79 y.o. 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 Pt presents today supine agreeable to therapy session. RN informs pt on increased pressor support today (0.16 Levo) for HRS to maintain MAP 80-85 but cleared pt for session. Pt required only light (A) for all mobility today but unfortunately limited by drastic drop in MAP from 79 to 55 once sitting EOB, so returned to supine for safety. Pt MAP back to 77 at conclusion of session in supine position. Pt has good potential to progress towards her goals with increased activity tolerance. Patient would benefit from skilled therapy services to maximize safety and independence, increase activity tolerance, decrease fall risk, decrease caregiver burden, improve QOL, improve patient's functional mobility, and decrease risk of contractures and pressure sores. Patient currently demonstrates a need for low intensity therapy on a scheduled basis secondary to a decline in functional status due to illness     Problem List: weakness, impaired endurance, impaired self care skills, impaired functional mobility, gait instability, impaired  balance.  Rehab Prognosis: Good; patient would benefit from acute skilled PT services to address these deficits and reach maximum level of function.      Goals:   Multidisciplinary Problems       Physical Therapy Goals          Problem: Physical Therapy    Goal Priority Disciplines Outcome Interventions   Physical Therapy Goal     PT, PT/OT Progressing    Description: Goals to be met by: 25     Patient will increase functional independence with mobility by performin. Supine to sit with [place]  2. Sit to stand transfer with Stand-by Assistance  3. Bed to chair transfer with Stand-by Assistance using LRAD  4. Gait  x 100 feet with Stand-by Assistance using LRAD.   5. Ascend/descend 5 stair with bilateral Handrails Contact Guard Assistance using LRAD.   6. Stand for 10 minutes with Stand-by Assistance using LRAD                         Subjective     RN notified prior to session. Spouse present upon PT entrance into room. Patient agreeable to PT evaluation.    Chief Complaint: none stated  Patient/Family Comments/goals: increased mobility   Pain/Comfort:  Pain Rating 1: 0/10  Pain Rating Post-Intervention 1: 0/10    Social History:  Residence:  lives with her spouse and 29 y/o son of spouse  2-story house with 5 [address]ARON and B HR (pt's B&B on 1st floor).   Support available: family  Equipment Owned (not using): none  Equipment Used: None  Prior level of function: independent    Patient reports they will have assistance from spouse upon discharge.    Objective:     Additional staff present: OT; OT for co-evaluation due to suspected patient need for two skilled therapists to appropriately assess patient's functional deficits as well as ensure patient safety, accommodate for limited activity tolerance, and provide appropriate, skilled assistance to maximize functional potential during evaluation.    Patient found supine with: blood pressure cuff, telemetry, pulse ox (continuous), peripheral IV, PureWick  "    General Precautions: Standard, Cardiac fall   Orthopedic Precautions:N/A   Braces: N/A   Body mass index is 19.53 kg/m².  Oxygen Device: Room Air  Vitals: /63 (BP Location: Left arm, Patient Position: Lying)   Pulse 63   Temp 98.1 °F (36.7 °C) (Oral)   Resp (!) 21   Ht 5' 4" (1.626 m)   Wt 51.6 kg (113 lb 12.8 oz)   SpO2 97%   BMI 19.53 kg/m²     Outcome Measures:  AM-PAC 6 CLICK MOBILITY  Turning over in bed (including adjusting bedclothes, sheets and blankets)?: 3  Sitting down on and standing up from a chair with arms (e.g., wheelchair, bedside commode, etc.): 3  Moving from lying on back to sitting on the side of the bed?: 3  Moving to and from a bed to a chair (including a wheelchair)?: 3  Need to walk in hospital room?: 3  Climbing 3-5 steps with a railing?: 3  Basic Mobility Total Score: 18     Exams:    Cognition:  Alert and Cooperative  Command following: Follows multistep verbal commands  Communication: clear/fluent    Sensation:   Light touch sensation: Intact BLEs    Gross Motor Coordination: No deficits noted during functional mobility tasks     Edema/Skin Integrity: None noted; Visible skin intact    Postural examination/scapula alignment: Rounded shoulder and Head forward    Lower Extremity Range of Motion:  Right Lower Extremity: WFL  Left Lower Extremity: WFL    Lower Extremity Strength:  Right Lower Extremity: WFL  Left Lower Extremity: WFL      Functional Mobility:    Bed Mobility:  Supine > Sit with stand by assistance  Sit > Supine with minimum assistance    Transfers:   Sit <> Stand Transfer: not performed 2/2 hypotension    Balance:  Sitting Balance  Static: stand by assistance  Dynamic: stand by assistance  Time: 5 minutes    Pt with MAP 79 prior to session.   Upon sitting EOB, MAP drop to 55. MAP increased to 60s with time but ultimately returned to supine after ~5 minutes for safety due to low MAP  MAP 77 upon exit with pt in supine position  RN in room and " aware    Education:    Time provided for education, counseling and discussion of health disposition in regards to patient's current status  All questions answered within PT scope of practice and to patient's satisfaction  PT role in POC to address current functional deficits  Call nursing/pct to transfer to chair/use bathroom. Pt stated understanding.    Therapeutic exercise performed in the form of bed mobility and sitting balance to increase patient's activity tolerance and postural control.     Patient left supine with all lines intact, call button in reach, RN notified, and spouse present.    History:     Past Medical History:   Diagnosis Date    Cirrhosis     HCC (hepatocellular carcinoma)     Hepatitis     Hypertension        Past Surgical History:   Procedure Laterality Date    APPENDECTOMY      HYSTERECTOMY         No family history on file.    Social History     Socioeconomic History    Marital status:    Tobacco Use    Smoking status: Never     Passive exposure: Never    Smokeless tobacco: Never   Substance and Sexual Activity    Alcohol use: No    Drug use: No    Sexual activity: Yes   Social History Narrative    ** Merged History Encounter **          Social Drivers of Health     Financial Resource Strain: Low Risk  (4/5/2025)    Overall Financial Resource Strain (CARDIA)     Difficulty of Paying Living Expenses: Not very hard   Recent Concern: Financial Resource Strain - Medium Risk (2/24/2025)    Overall Financial Resource Strain (CARDIA)     Difficulty of Paying Living Expenses: Somewhat hard   Food Insecurity: No Food Insecurity (4/5/2025)    Hunger Vital Sign     Worried About Running Out of Food in the Last Year: Never true     Ran Out of Food in the Last Year: Never true   Recent Concern: Food Insecurity - Food Insecurity Present (2/24/2025)    Hunger Vital Sign     Worried About Running Out of Food in the Last Year: Sometimes true     Ran Out of Food in the Last Year: Sometimes true    Transportation Needs: No Transportation Needs (4/5/2025)    PRAPARE - Transportation     Lack of Transportation (Medical): No     Lack of Transportation (Non-Medical): No   Physical Activity: Inactive (2/24/2025)    Exercise Vital Sign     Days of Exercise per Week: 0 days     Minutes of Exercise per Session: 0 min   Stress: Stress Concern Present (4/5/2025)    [employer] - Occupational Stress Questionnaire     Feeling of Stress : To some extent   Housing Stability: Low Risk  (4/5/2025)    Housing Stability Vital Sign     Unable to Pay for Housing in the Last Year: No     Number of Times Moved in the Last Year: 0     Homeless in the Last Year: No       Time Tracking:     PT Received On: 04/08/25  PT Start Time: 0836     PT Stop Time: 0852  PT Total Time (min): 16 min     Billable Minutes: Evaluation 8 minutes and Therapeutic Exercise 8 minutes    4/8/2025

## 2025-04-09 NOTE — PROCEDURES
"Seema Gann is a 79 y.o. female patient.    Temp: 98.6 °F (37 °C) (04/09/25 1100)  Pulse: (!) 59 (04/09/25 1200)  Resp: (!) 23 (04/09/25 1200)  BP: 110/64 (04/09/25 1200)  SpO2: 97 % (04/09/25 1200)  Weight: 54 kg (119 lb 0.8 oz) (04/09/25 0528)  Height: 5' 4" (162.6 cm) (04/07/25 0615)    PICC  Date/Time: 4/9/2025 2:57 PM  Performed by: Anu Hermosillo RN  Assisting provider: Charlene Graves LPN  Consent Done: Yes  Time out: Immediately prior to procedure a time out was called to verify the correct patient, procedure, equipment, support staff and site/side marked as required  Indications: med administration and vascular access  Anesthesia: local infiltration  Local anesthetic: lidocaine 1% without epinephrine  Anesthetic Total (mL): 3  Description of findings: picc  Preparation: skin prepped with ChloraPrep  Skin prep agent dried: skin prep agent completely dried prior to procedure  Sterile barriers: all five maximum sterile barriers used - cap, mask, sterile gown, sterile gloves, and large sterile sheet  Hand hygiene: hand hygiene performed prior to central venous catheter insertion  Location details: right brachial  Catheter type: double lumen  Catheter size: 5 Fr  Catheter Length: 35cm    Ultrasound guidance: yes  Vessel Caliber: medium and patent, compressibility normal  Vascular Doppler: not done  Needle advanced into vessel with real time Ultrasound guidance.  Guidewire confirmed in vessel.  Image recorded and saved.  Sterile sheath used.  no esophageal manometryNumber of attempts: 1  Post-procedure: blood return through all ports, chlorhexidine patch and sterile dressing applied  Technical procedures used: 3CG  Specimens: No  Implants: No  Assessment: placement verified by x-ray and successful placement  Complications: none        Name Charlene Graves LPN  4/9/2025    "

## 2025-04-09 NOTE — SUBJECTIVE & OBJECTIVE
Interval History/Significant Events: naeon    Review of Systems  Objective:     Vital Signs (Most Recent):  Temp: 98.3 °F (36.8 °C) (04/09/25 0701)  Pulse: (!) 58 (04/09/25 0800)  Resp: 18 (04/09/25 0800)  BP: 119/61 (04/09/25 0800)  SpO2: 98 % (04/09/25 0800) Vital Signs (24h Range):  Temp:  [98 °F (36.7 °C)-98.3 °F (36.8 °C)] 98.3 °F (36.8 °C)  Pulse:  [52-79] 58  Resp:  [12-29] 18  SpO2:  [92 %-99 %] 98 %  BP: ()/(53-73) 119/61   Weight: 54 kg (119 lb 0.8 oz)  Body mass index is 20.43 kg/m².      Intake/Output Summary (Last 24 hours) at 4/9/2025 0815  Last data filed at 4/9/2025 0659  Gross per 24 hour   Intake 1106.02 ml   Output 390 ml   Net 716.02 ml          Physical Exam     Physical Exam  Vitals and nursing note reviewed.   Constitutional:       General: She is not in acute distress.     Appearance: She is not toxic-appearing or diaphoretic.      Comments: Chronically-ill appearing   HENT:      Head: Normocephalic and atraumatic.      Nose: Nose normal.      Mouth/Throat:      Mouth: Mucous membranes are moist.   Eyes:      Conjunctiva/sclera: Conjunctivae normal.   Cardiovascular:      Rate and Rhythm: Normal rate and regular rhythm.      Pulses: Normal pulses.      Heart sounds: Normal heart sounds. No murmur heard.  Pulmonary:      Effort: Pulmonary effort is normal.      Breath sounds: Normal breath sounds.      Comments: Decreased breath sounds on the left.   Abdominal:      General: There is distension.      Palpations: Abdomen is soft.      Tenderness: There is no abdominal tenderness. There is no guarding or rebound.      Comments: Easily reducible umbilical hernia   Musculoskeletal:      Right lower leg: Edema present.      Left lower leg: Edema present.   Skin:     General: Skin is warm and dry.      Capillary Refill: Capillary refill takes less than 2 seconds.   Neurological:      General: No focal deficit present.      Mental Status: She is alert and oriented to person, place, and time.    Psychiatric:         Mood and Affect: Mood normal.         Thought Content: Thought content normal.  Vents:     Lines/Drains/Airways       Drain  Duration             Female External Urinary Catheter w/ Suction 04/04/25 1941 4 days              Peripheral Intravenous Line  Duration                  Peripheral IV - Single Lumen 04/04/25 1926 20 G No Right Antecubital 4 days         Peripheral IV - Single Lumen 04/07/25 0627 22 G 1 in No Left Antecubital 2 days         Peripheral IV - Single Lumen 04/08/25 0545 20 G 1 3/4 in Anterior;Proximal;Right Upper Arm 1 day                  Significant Labs:    CBC/Anemia Profile:  Recent Labs   Lab 04/08/25  0546 04/08/25  0822 04/09/25  0541   WBC 3.91 5.26 4.17   HGB 6.8* 7.8* 7.5*   HCT 21.7* 24.7* 23.5*   PLT 99* 113* 100*   MCV 96 96 96   RDW 15.2* 15.2* 15.3*        Chemistries:  Recent Labs   Lab 04/08/25  0546 04/08/25  0822 04/09/25  0541    138 137   K 3.3* 3.5 3.8   * 111* 115*   CO2 16* 17* 16*   BUN 49* 50* 47*   CREATININE 4.4* 4.7* 4.6*   CALCIUM 7.3* 7.7* 7.6*   ALBUMIN 1.9* 2.0* 1.9*   BILITOT 1.0 1.1* 0.9   ALKPHOS 30* 32* 30*   ALT 6* 6* 7*   AST 14 15 21   GLUCOSE 112* 116* 161*   MG 1.7  --  1.8   PHOS 4.2  --  4.1       All pertinent labs within the past 24 hours have been reviewed.    Significant Imaging:  I have reviewed all pertinent imaging results/findings within the past 24 hours.

## 2025-04-09 NOTE — PLAN OF CARE
78 y/o female with decompensated HCV liver cirrhosis. We are consulted for SHARA on CKD (Cr 2.5-2.8 12/2024) likely 2/2 prolonged pre-renal/hemodynamic instability. Cr fluctuated around 4.4-4.7, likely reaching plateau. Will continue to monitor. Pt declined RRT if indicated (okay for the PICC in this case).    Para per the team.

## 2025-04-09 NOTE — PROGRESS NOTES
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 Gann is a 79 y.o. F with a PMHx of HTN, decompensated HCV cirrhosis with recurrent ascites requiring large volume paracentesis approximately every 2 weeks, thrombocytopenia, hepatocellular carcinoma s/p Y90 x2, CKD IV (baseline Cr 2.5-2.8) who initially presented to [facility] ED on 4/4 for n/v/d x1 day. She had over 6 episodes of NBNB emesis and 3 episodes of non-bloody diarrhea. Last IR paracentesis was on 3/26 and 4.6L were removed, no evidence of SBP on prior fluid studies.     In the ED, pt was hypotensive (BP 86/50) otherwise vitals WNL. Lab work remarkable for Na 135, Bicarb 15, BUN 41/Cr 4. Initial lactic acid 4.5. She received 1L NS and 5mg midodrine with improvement in BP. CTAP with liver cirrhosis, portal hypertension, large volume of ascites, and moderate L pleural effusions. She was admitted to  for further management of decompensated cirrhosis and concern for hepatorenal syndrome.     [facility] consulted on 4/5 for increasing lactic acid (7.0), hypotension requiring levophed, and concern for sepsis. Currently receiving Albumin 25% 25g q8h, midodrine 15mg TID, octreotide 100mcg q8h, Zosyn, and Vancomycin.      Pt admitted to [facility].     Hospital/ICU Course:  Stepped up to [facility] on 4/5 for worsening lactic acidosis and concern for HRS requiring MAP goals. Started on levo for MAP goal >85. Nephrology and hepatology consulted. Nephrology offered dialysis, which she refused. Cr continuing to worsen. Restarted home lactulose. Repeat CXR worsened, likely worsened volume overload following albumin administration. Will perform  thoracentesis and paracentesis today. Will send fluid to evaluate for infection given condition may still be due to sepsis rather than HRS. On Vanc/zosyn.     4/8 Plan for CT chest non for better characterization of right hilar opacity on CXR. Ultrasound of left pleural effusion without septation in keeping with though that this is 2/2 hepatic hydrothorax      Interval History/Significant Events: naeon    Review of Systems  Objective:     Vital Signs (Most Recent):  Temp: 98.3 °F (36.8 °C) (04/09/25 0701)  Pulse: (!) 58 (04/09/25 0800)  Resp: 18 (04/09/25 0800)  BP: 119/61 (04/09/25 0800)  SpO2: 98 % (04/09/25 0800) Vital Signs (24h Range):  Temp:  [98 °F (36.7 °C)-98.3 °F (36.8 °C)] 98.3 °F (36.8 °C)  Pulse:  [52-79] 58  Resp:  [12-29] 18  SpO2:  [92 %-99 %] 98 %  BP: ()/(53-73) 119/61   Weight: 54 kg (119 lb 0.8 oz)  Body mass index is 20.43 kg/m².      Intake/Output Summary (Last 24 hours) at 4/9/2025 0815  Last data filed at 4/9/2025 0659  Gross per 24 hour   Intake 1106.02 ml   Output 390 ml   Net 716.02 ml          Physical Exam     Physical Exam  Vitals and nursing note reviewed.   Constitutional:       General: She is not in acute distress.     Appearance: She is not toxic-appearing or diaphoretic.      Comments: Chronically-ill appearing   HENT:      Head: Normocephalic and atraumatic.      Nose: Nose normal.      Mouth/Throat:      Mouth: Mucous membranes are moist.   Eyes:      Conjunctiva/sclera: Conjunctivae normal.   Cardiovascular:      Rate and Rhythm: Normal rate and regular rhythm.      Pulses: Normal pulses.      Heart sounds: Normal heart sounds. No murmur heard.  Pulmonary:      Effort: Pulmonary effort is normal.      Breath sounds: Normal breath sounds.      Comments: Decreased breath sounds on the left.   Abdominal:      General: There is distension.      Palpations: Abdomen is soft.      Tenderness: There is no abdominal tenderness. There is no guarding or rebound.      Comments:  Easily reducible umbilical hernia   Musculoskeletal:      Right lower leg: Edema present.      Left lower leg: Edema present.   Skin:     General: Skin is warm and dry.      Capillary Refill: Capillary refill takes less than 2 seconds.   Neurological:      General: No focal deficit present.      Mental Status: She is alert and oriented to person, place, and time.   Psychiatric:         Mood and Affect: Mood normal.         Thought Content: Thought content normal.  Vents:     Lines/Drains/Airways       Drain  Duration             Female External Urinary Catheter w/ Suction 04/04/25 1941 4 days              Peripheral Intravenous Line  Duration                  Peripheral IV - Single Lumen 04/04/25 1926 20 G No Right Antecubital 4 days         Peripheral IV - Single Lumen 04/07/25 0627 22 G 1 in No Left Antecubital 2 days         Peripheral IV - Single Lumen 04/08/25 0545 20 G 1 3/4 in Anterior;Proximal;Right Upper Arm 1 day                  Significant Labs:    CBC/Anemia Profile:  Recent Labs   Lab 04/08/25  0546 04/08/25  0822 04/09/25  0541   WBC 3.91 5.26 4.17   HGB 6.8* 7.8* 7.5*   HCT 21.7* 24.7* 23.5*   PLT 99* 113* 100*   MCV 96 96 96   RDW 15.2* 15.2* 15.3*        Chemistries:  Recent Labs   Lab 04/08/25  0546 04/08/25  0822 04/09/25  0541    138 137   K 3.3* 3.5 3.8   * 111* 115*   CO2 16* 17* 16*   BUN 49* 50* 47*   CREATININE 4.4* 4.7* 4.6*   CALCIUM 7.3* 7.7* 7.6*   ALBUMIN 1.9* 2.0* 1.9*   BILITOT 1.0 1.1* 0.9   ALKPHOS 30* 32* 30*   ALT 6* 6* 7*   AST 14 15 21   GLUCOSE 112* 116* 161*   MG 1.7  --  1.8   PHOS 4.2  --  4.1       All pertinent labs within the past 24 hours have been reviewed.    Significant Imaging:  I have reviewed all pertinent imaging results/findings within the past 24 hours.    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   Currently asymptomatic and satting well on RA.   -4/8 CT chest non con: Bilateral pleural effusions, larger on the left with complete collapse of the left lower lobe.       Cardiac/Vascular  Bradycardia  Chronic. Intermittent asx bradycardia to 30s-40s. H/o LAFB, RBBB.  - continue to monitor on telemetry.   - avoid lazaro blockade    Hypotension  Continue Midodrine  Has been running low at home and aldactone/Lasix were recently discontinued outpatient   Hepatology consulted  Nephrology consulted  -4/7 recommendations - Consider albumin and IV fluids: pt refusing dialysis  Transfuse for Hb <7  MAP goals >85 for HRS    Elevated troponin  - 238 -> 228, currently with no signs or symptoms of ACS, likely demand ischemia   - TTE performed 4/6 without systolic or diastolic dysfunction.    Renal/  Uterine prolapse  Large pelvic floor prolapse noted on CT.   - chronic, occurs when ascites builds, improves with paracentesis    Lactic acidosis  Lactic acid 4.5 on admission, initially decreased to 3.6 though increased to 7 again  Blood cx NGTD  Continue BS abx  Q6h lactic acid    Hepatorenal syndrome  Creatinine stable for now. BMP reviewed- noted Estimated Creatinine Clearance: 10 mL/min (A) (based on SCr of 3.9 mg/dL (H)). according to latest data. Based on current GFR, CKD stage is stage 4 - GFR 15-29.  Monitor UOP and serial BMP and adjust therapy as needed. Renally dose meds. Avoid nephrotoxic medications and procedures.     Creatinine 4.0 on admit, baseline around 2.5-2.8.  Likely secondary to pre-renal etiology from dehydration and volume losses given vomiting/diarrhea and hypotension. There are concerns for hepatorenal syndrome.      - Strict I&Os and daily weights   - Gentle IVF  - Avoid nephrotoxic agents such as NSAIDs, gadolinium, and IV radiocontrast  - Renally dose meds to current GFR  - Urine Na <10, Urine Osm > serum osm, and FeNa suggestive of pre-renal etiology. 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 Negative for SBP.      MELD 3.0: 24 at 4/7/2025  5:45 AM  MELD-Na: 24 at 4/7/2025  5:45 AM  Calculated from:  Serum Creatinine: 4.7 mg/dL (Using max of 3 mg/dL) at 4/7/2025  5:45 AM  Serum Sodium: 135 mmol/L at 4/7/2025  5:45 AM  Total Bilirubin: 1.2 mg/dL at 4/7/2025  5:45 AM  Serum Albumin: 2.4 g/dL at 4/7/2025  5:45 AM  INR(ratio): 1.3 at 4/7/2025  5:45 AM  Age at listing (hypothetical): 79 years  Sex: Female at 4/7/2025  5:45 AM    - Continue home meds. Etiology likely related to chronic HCV hepatitis and HCC s/p Y90 .   - Avoid any hepatotoxic meds   - Daily CBC, CMP, INR  - Ammonia 49, pt is mentating appropriately. Restarting lactulose  - paracentesis performed 4/6  - f/u ascitic studies when collected (albumin, total protein, cell count, LDH, culture, stain)   - Na restriction <2g  - Has received 1g rocephin for SBP ppx. As now with increasing lactic and persistent hypotension, started on broad spectrum abx, de-escalate as indicated  - monitor mental status; q4h Neuro checks.   - Hepatology following    4/7  -ascitic fluid SAAG?1.1. portal hypertension likely cause; No organisms seen.        Nausea vomiting and diarrhea  - antiemetics PRN  - C diff negative       Chronic hepatitis C with cirrhosis  See decompensated cirrhosis         Critical Care Daily Checklist:     A: Awake: RASS Goal/Actual Goal:    Actual:     B: Spontaneous Breathing Trial Performed?    C:  SAT & SBT Coordinated?  na                      D: Delirium: CAM-ICU    E: Early Mobility Performed? Yes, PTOT consulted   F: Feeding Goal: Goals: Meet % een/epn by next RD f/u  Status: Nutrition Goal Status: new       Current Diet Order   Procedures    Diet Low Sodium, 2gm      AS: Analgesia/Sedation none   T: Thromboembolic Prophylaxis heparin   H: HOB > 300 Yes   U: Stress Ulcer Prophylaxis (if needed) na   G: Glucose Control wnl   B: Bowel Function yes   I: Indwelling Catheter (Lines & Stephens) Necessity PIV x 2   D: De-escalation of Antimicrobials/Pharmacotherapies zosyn     Plan for the day/ETD Ct chest non con     Code Status:  Family/Goals of Care: DNR             Critical secondary to Patient has a condition that poses threat to life and bodily function: Acute Renal Failure      Critical care was time spent personally by me on the following activities: development of treatment plan with patient or surrogate and bedside caregivers, discussions with consultants, evaluation of patient's response to treatment, examination of patient, ordering and performing treatments and interventions, ordering and review of laboratory studies, ordering and review of radiographic studies, pulse oximetry, re-evaluation of patient's condition. This critical care time did not overlap with that of any other provider or involve time for any procedures.     Caro Patterson MD  Critical Care Medicine  [facility] - Medical ICU

## 2025-04-09 NOTE — SUBJECTIVE & OBJECTIVE
Interval History:     NAEON, AFVSS, stable lytes, sodium 137, potassium 3.8, CO2 16 (acidemic), serum creatinine 4.6 (plateaued), baseline creatinine 2.5-2.8  urine output 465 cc per 24 hour serum albumin 1.9.  Blood pressure 119/61, currently on Levophed      Review of patient's allergies indicates:  No Known Allergies  Current Facility-Administered Medications   Medication Frequency    0.9%  NaCl infusion (for blood administration) Q24H PRN    acetaminophen tablet 650 mg Q6H PRN    dextrose 50% injection 12.5 g PRN    dextrose 50% injection 25 g PRN    glucagon (human recombinant) injection 1 mg PRN    glucose chewable tablet 16 g PRN    glucose chewable tablet 24 g PRN    heparin (porcine) injection 5,000 Units Q8H    lactulose 20 gram/30 mL solution Soln 20 g BID    melatonin tablet 6 mg Nightly PRN    mirtazapine tablet 7.5 mg QHS    mupirocin 2 % ointment BID    naloxone 0.4 mg/mL injection 0.02 mg PRN    ondansetron disintegrating tablet 8 mg Q8H PRN    piperacillin-tazobactam (ZOSYN) 4.5 g in D5W 100 mL IVPB (MB+) Q12H    prochlorperazine tablet 10 mg Q6H PRN    sevelamer carbonate pwpk 0.8 g TID WM    sodium chloride 0.9% flush 10 mL Q12H PRN       Objective:     Vital Signs (Most Recent):  Temp: 98.6 °F (37 °C) (04/09/25 1100)  Pulse: (!) 59 (04/09/25 1200)  Resp: (!) 23 (04/09/25 1200)  BP: 110/64 (04/09/25 1200)  SpO2: 97 % (04/09/25 1200) Vital Signs (24h Range):  Temp:  [98 °F (36.7 °C)-98.6 °F (37 °C)] 98.6 °F (37 °C)  Pulse:  [52-85] 59  Resp:  [12-29] 23  SpO2:  [91 %-99 %] 97 %  BP: ()/(50-73) 110/64     Weight: 54 kg (119 lb 0.8 oz) (04/09/25 0528)  Body mass index is 20.43 kg/m².  Body surface area is 1.56 meters squared.    I/O last 3 completed shifts:  In: 1943.9 [P.O.:640; I.V.:1004.7; IV Piggyback:299.2]  Out: 665 [Urine:665]     Physical Exam  Constitutional:       General: She is not in acute distress.     Appearance: She is not ill-appearing or toxic-appearing.   HENT:      Head:  Normocephalic and atraumatic.   Eyes:      Extraocular Movements: Extraocular movements intact.      Pupils: Pupils are equal, round, and reactive to light.   Cardiovascular:      Rate and Rhythm: Bradycardia present.      Heart sounds: No murmur heard.  Pulmonary:      Effort: No respiratory distress.      Breath sounds: No wheezing.   Abdominal:      General: There is distension.      Tenderness: There is no abdominal tenderness. There is no guarding.   Musculoskeletal:      Right lower leg: Edema present.      Left lower leg: Edema present.   Skin:     Coloration: Skin is pale. Skin is not jaundiced.   Neurological:      Mental Status: She is oriented to person, place, and time.          Significant Labs:  ABGs:   Recent Labs   Lab 04/05/25  0657   PH 7.284   PCO2 34.1   HCO3 15.9     CBC:   Recent Labs   Lab 04/09/25  0541   WBC 4.17   RBC 2.44*   HGB 7.5*   HCT 23.5*   *   MCV 96   MCH 30.7   MCHC 31.9*     CMP:   Recent Labs   Lab 04/09/25  0541   CALCIUM 7.6*   ALBUMIN 1.9*      K 3.8   CO2 16*   *   BUN 47*   CREATININE 4.6*   ALKPHOS 30*   ALT 7*   AST 21   BILITOT 0.9     LFTs:   Recent Labs   Lab 04/09/25  0541   ALT 7*   AST 21   ALKPHOS 30*   BILITOT 0.9   ALBUMIN 1.9*

## 2025-04-09 NOTE — PLAN OF CARE
MICU DAILY GOALS     Family/Goals of care/Code Status   Code Status: DNR    24H Vital Sign Range  Temp:  [98 °F (36.7 °C)-98.3 °F (36.8 °C)]   Pulse:  [52-83]   Resp:  [12-29]   BP: ()/(52-73)   SpO2:  [87 %-99 %]      Shift Events (include procedures and significant events)   No acute events throughout shift    AWAKE RASS: Goal -    Actual - RASS (Valerio Agitation-Sedation Scale): alert and calm    Restraint necessity: Not necessary   BREATHE SBT: Not intubated    Coordinate A & B, analgesics/sedatives Pain: managed   SAT: Not intubated   Delirium CAM-ICU:     Early(intubated/ Progressive (non-intubated) Mobility MOVE Screen (INTUBATED ONLY): Not intubated    Activity: Activity Management: Arm raise - L1, Ankle pumps - L1, Rolling - L1   Feeding/Nutrition Diet order: Diet/Nutrition Received: 2 gram sodium, Specialty Diet/Nutrition Received: renal diet   Thrombus DVT prophylaxis: VTE Core Measure: Pharmacological prophylaxis initiated/maintained   HOB Elevation Head of Bed (HOB) Positioning: HOB at 30-45 degrees   Ulcer Prophylaxis GI: yes   Glucose control managed     Skin Skin assessment:     Sacrum intact/not altered? Yes  Heels intact/not altered? Yes  Surgical wound? No    CHECK ONE!   (no altered skin or altered skin) and sub boxes:  [] No Altered Skin Integrity Present    [x]Prevention Measures Documented    [] Altered Skin Integrity Present or Discovered   [x] LDA already present in EPIC, daily doc completed              [x] LDA added if not already in EPIC (describe/stage wound).               [] Wound Image Taken (required on admit,                   transfer/discharge and every Tuesday)    Wound Care Consulted? No   Bowel Function no issues    Indwelling Catheter Necessity         NA   De-escalation Antibiotics Yes        VS and assessment per flow sheet, patient progressing towards goals as tolerated, plan of care reviewed with [unfilled], all concerns addressed, will continue to monitor.

## 2025-04-09 NOTE — CONSULTS
[facility] VASCULAR ACCESS NOTE       Bed:7067/7067 A    Double Lumen PICC Inserted to Right Brachial vein using Ultrasound Guidance and Modified Seldinger Technique.    Vessel image recorded and saved to EMR.    Indication: PRESSORS    Length: 35 cm  A/C: 21 cm  Exposed: 2 cm    Lot #: [id number]  Attempts: 1    Patient Cleared by Nephrology, MD Dr. Bourne, for PICC/ML insertion d/t current renal function.       Charlene Graves LPN

## 2025-04-09 NOTE — ASSESSMENT & PLAN NOTE
SHARA (acute kidney injury)  ASSESSMENT AND PLAN:      79 y.o. female ,is admitted on 4/4/2025  with past medical history of decompensated HCV cirrhosis with recurrent ascites requiring frequent large volume paracentesis q2wk.Admitted with vomiting and diarrhea Nephrology  is consulted for concerns of HRS        Assessment and plan     SHARA KDIGO stage 3 on CKD 4    Baseline Creatinine: 2.5-2.8  Creatinine at time of consult: 4.4  Tests done:UPCR 0.09  Etiology of SHARA: Likely HRS. Other causes could be hypotension and increased abdominal pressure compartment syndrome   Acid Base Disorder: metabolic acidosis with Na bicarbonate of 15        Recommendations :      - Frequent ascites fluid (paracentesis), last paracentesis April 7th with  1.5 L removed  - Avoid hypotension, last midodrine dose April 7th, currently on lactulose,  - last albumin dose April 7th, current albumin level 2.4,   - Urine lytes, urine sodium less than 10, urine chloride less than 20, urine osmolality 331, urine protein creatinine ratio 0.09  - Patient refused hemodialysis, family honored her wishes  - Paracentesis as indicated per primary team  - Monitor serum chemistries daily, strict intake and output Qshift, daily weights if able.  - Renal protective measures: Please adjust medications for reduced clearance  - Avoid nephrotoxic medications (NSAID, IV contrast)  - Avoid ACEi and ARBs in the setting of SHARA  - Maintain MAP > 85 currently on Levophed  - Transfuse for Hb <7

## 2025-04-09 NOTE — PROGRESS NOTES
[facility]  Nephrology  Progress Note    Patient Name: Seema Gann  MRN: [id number]  Admission Date: 4/4/2025  Hospital Length of Stay: 4 days  Attending Provider: Yemi Hook MD   Primary Care Physician: Bethany Mesa MD  Principal Problem:Decompensated cirrhosis related to hepatitis C virus (HCV)    Subjective:     HPI: No notes on file    Interval History:     NAEON, AFVSS, stable lytes, sodium 137, potassium 3.8, CO2 16 (acidemic), serum creatinine 4.6 (plateaued), baseline creatinine 2.5-2.8  urine output 465 cc per 24 hour serum albumin 1.9.  Blood pressure 119/61, currently on Levophed      Review of patient's allergies indicates:  No Known Allergies  Current Facility-Administered Medications   Medication Frequency    0.9%  NaCl infusion (for blood administration) Q24H PRN    acetaminophen tablet 650 mg Q6H PRN    dextrose 50% injection 12.5 g PRN    dextrose 50% injection 25 g PRN    glucagon (human recombinant) injection 1 mg PRN    glucose chewable tablet 16 g PRN    glucose chewable tablet 24 g PRN    heparin (porcine) injection 5,000 Units Q8H    lactulose 20 gram/30 mL solution Soln 20 g BID    melatonin tablet 6 mg Nightly PRN    mirtazapine tablet 7.5 mg QHS    mupirocin 2 % ointment BID    naloxone 0.4 mg/mL injection 0.02 mg PRN    ondansetron disintegrating tablet 8 mg Q8H PRN    piperacillin-tazobactam (ZOSYN) 4.5 g in D5W 100 mL IVPB (MB+) Q12H    prochlorperazine tablet 10 mg Q6H PRN    sevelamer carbonate pwpk 0.8 g TID WM    sodium chloride 0.9% flush 10 mL Q12H PRN       Objective:     Vital Signs (Most Recent):  Temp: 98.6 °F (37 °C) (04/09/25 1100)  Pulse: (!) 59 (04/09/25 1200)  Resp: (!) 23 (04/09/25 1200)  BP: 110/64 (04/09/25 1200)  SpO2: 97 % (04/09/25 1200) Vital Signs (24h Range):  Temp:  [98 °F (36.7 °C)-98.6 °F (37 °C)] 98.6 °F (37 °C)  Pulse:  [52-85] 59  Resp:  [12-29] 23  SpO2:  [91 %-99 %] 97 %  BP: ()/(50-73) 110/64     Weight: 54 kg  (119 lb 0.8 oz) (04/09/25 0528)  Body mass index is 20.43 kg/m².  Body surface area is 1.56 meters squared.    I/O last 3 completed shifts:  In: 1943.9 [P.O.:640; I.V.:1004.7; IV Piggyback:299.2]  Out: 665 [Urine:665]     Physical Exam  Constitutional:       General: She is not in acute distress.     Appearance: She is not ill-appearing or toxic-appearing.   HENT:      Head: Normocephalic and atraumatic.   Eyes:      Extraocular Movements: Extraocular movements intact.      Pupils: Pupils are equal, round, and reactive to light.   Cardiovascular:      Rate and Rhythm: Bradycardia present.      Heart sounds: No murmur heard.  Pulmonary:      Effort: No respiratory distress.      Breath sounds: No wheezing.   Abdominal:      General: There is distension.      Tenderness: There is no abdominal tenderness. There is no guarding.   Musculoskeletal:      Right lower leg: Edema present.      Left lower leg: Edema present.   Skin:     Coloration: Skin is pale. Skin is not jaundiced.   Neurological:      Mental Status: She is oriented to person, place, and time.          Significant Labs:  ABGs:   Recent Labs   Lab 04/05/25  0657   PH 7.284   PCO2 34.1   HCO3 15.9     CBC:   Recent Labs   Lab 04/09/25  0541   WBC 4.17   RBC 2.44*   HGB 7.5*   HCT 23.5*   *   MCV 96   MCH 30.7   MCHC 31.9*     CMP:   Recent Labs   Lab 04/09/25  0541   CALCIUM 7.6*   ALBUMIN 1.9*      K 3.8   CO2 16*   *   BUN 47*   CREATININE 4.6*   ALKPHOS 30*   ALT 7*   AST 21   BILITOT 0.9     LFTs:   Recent Labs   Lab 04/09/25  0541   ALT 7*   AST 21   ALKPHOS 30*   BILITOT 0.9   ALBUMIN 1.9*       Assessment/Plan:     Renal/  SHARA (acute kidney injury)  SHARA (acute kidney injury)  ASSESSMENT AND PLAN:      79 y.o. female ,is admitted on 4/4/2025  with past medical history of decompensated HCV cirrhosis with recurrent ascites requiring frequent large volume paracentesis q2wk.Admitted with vomiting and diarrhea Nephrology  is consulted  for concerns of HRS        Assessment and plan     SHARA KDIGO stage 3 on CKD 4    Baseline Creatinine: 2.5-2.8  Creatinine at time of consult: 4.4  Tests done:UPCR 0.09  Etiology of SHARA: Likely HRS. Other causes could be hypotension and increased abdominal pressure compartment syndrome   Acid Base Disorder: metabolic acidosis with Na bicarbonate of 15        Recommendations :      - Frequent ascites fluid (paracentesis), last paracentesis April 7th with  1.5 L removed  - Avoid hypotension, last midodrine dose April 7th, currently on lactulose,  - last albumin dose April 7th, current albumin level 1.9,   - Urine lytes, urine sodium less than 10, urine chloride less than 20, urine osmolality 331, urine protein creatinine ratio 0.09  - Patient refused hemodialysis, family honored her wishes  - Paracentesis as indicated per primary team  - Monitor serum chemistries daily, strict intake and output Qshift, daily weights if able.  - Renal protective measures: Please adjust medications for reduced clearance  - Avoid nephrotoxic medications (NSAID, IV contrast)  - Avoid ACEi and ARBs in the setting of SHARA  - Maintain MAP > 85 currently on Levophed  - Transfuse for Hb <7           Thank you for your consult. I will follow-up with patient. Please contact us if you have any additional questions.    Nilton Coon MD  Nephrology  [facility] [address] - Medical ICU

## 2025-04-09 NOTE — ASSESSMENT & PLAN NOTE
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   Currently asymptomatic and satting well on RA.   -4/8 CT chest non con: Bilateral pleural effusions, larger on the left with complete collapse of the left lower lobe.

## 2025-04-09 NOTE — ASSESSMENT & PLAN NOTE
Creatinine stable for now. BMP reviewed- noted Estimated Creatinine Clearance: 10 mL/min (A) (based on SCr of 3.9 mg/dL (H)). according to latest data. Based on current GFR, CKD stage is stage 4 - GFR 15-29.  Monitor UOP and serial BMP and adjust therapy as needed. Renally dose meds. Avoid nephrotoxic medications and procedures.     Creatinine 4.0 on admit, baseline around 2.5-2.8.  Likely secondary to pre-renal etiology from dehydration and volume losses given vomiting/diarrhea and hypotension. There are concerns for hepatorenal syndrome.      - Strict I&Os and daily weights   - Gentle IVF  - Avoid nephrotoxic agents such as NSAIDs, gadolinium, and IV radiocontrast  - Renally dose meds to current GFR  - Urine Na <10, Urine Osm > serum osm, and FeNa suggestive of pre-renal etiology. Has not received any diuretics, likely consistent with HRS  - continue midodrine 15 mg PO TID (d/c on 4/7)  - Goal MAP > 85mmHg  - Levo for MAP goals PRN  - nephrology consulted

## 2025-04-10 NOTE — ASSESSMENT & PLAN NOTE
Malnutrition Type:  Context: chronic illness  Level: moderate    Related to (etiology):   Increased protein/calorie needs secondary to cirrhosis     Signs and Symptoms (as evidenced by):   Moderate muscle and fat depletion, moderate-severe edema in LE     Malnutrition Characteristic Summary:  Subcutaneous Fat (Malnutrition): moderate depletion  Muscle Mass (Malnutrition): moderate depletion  Fluid Accumulation (Malnutrition): moderate to severe (in LE)      Interventions/Recommendations (treatment strategy):  Collaboration with other providers    Nutrition Diagnosis Status:   New

## 2025-04-10 NOTE — PLAN OF CARE
Recommendations  Continue Low sodium diet  RD discontinues Novasource renal TID - not drinking  RD added Boost Breeze daily  Nursing, continue documenting PO intake via flowsheets  Encourage PO intake  Monitor labs, meds, weight, skin    Goals: Meet % een/epn by next RD f/u  Nutrition Goal Status: progressing towards goal  Communication of RD Recs: other (comment) (poc)

## 2025-04-10 NOTE — EICU
Intervention Initiated From:  [facility] / GERALD Francisco intervened regarding:  Rounding (Video assessment)

## 2025-04-10 NOTE — PT/OT/SLP PROGRESS
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  Performance deficits affecting function are weakness, impaired endurance, impaired self care skills, impaired functional mobility, gait instability, impaired balance. Pt tolerated session with good effort and performance.     Rehab Prognosis:  Good; patient would benefit from acute skilled OT services to address these deficits and reach maximum level of function.       Plan:     Patient to be seen 3 x/week to address the above listed problems via self-care/home management, therapeutic activities, therapeutic exercises  Plan of Care Expires:    Plan of Care Reviewed with: patient, spouse    Subjective     Pt agreeable to therapy.   Pt without c/o throughout session.     Pain/Comfort:  Pain Rating 1: 0/10    Objective:     Communicated with: nsg prior to session.  Patient found bed with tele, pulse ox, BP cuff   Spouse in room but left during session. Nsg also present during most of session.   Cotx completed this date to optimize functional performance and safety given impaired tolerance for activity in setting.     General Precautions: Standard, fall      Occupational Performance:     Bed Mobility:    Supine>sit SBA with increased time.     Functional Mobility/Transfers:  Pt completed sit>stand with CGA x 2 trials.   Few steps to chair with CGA    Activities of Daily Living:  G/H: seated with set-up    [facility] 6 Click ADL: 16    Treatment & Education:  Pt awake, alert and following commands. Pt without c/o throughout session.   MAP goal 80-85 with MAP 82 on arrival with levo .18. MAP decreased to 70s with EOB and remained stable at 70 with  sitting/standing and t/f to chair. Medical team outside of room and approved t/f to chair despite MAP not in parameter.     Pt engaged with UE AROM exs 3 planes to increase functional ROM and allow for time and assistance with maintaining optimal BP.    Education provided to pt/nsg and spouse re: exs to complete in chair to optimize functional strength/endurance and BP.   Education provided re: role of OT and safety with functional mobility/ADl skills.       Multidisciplinary Problems       Occupational Therapy Goals          Problem: Occupational Therapy    Goal Priority Disciplines Outcome Interventions   Occupational Therapy Goal     OT, PT/OT Progressing    Description: Goals to be met by: 7 days 4/15/25     Patient will increase functional independence with ADLs by performing:      Pt to complete toilieting with SBA  Pt to complete standing g/h skills with SBA  Pt to complete t/f bed, chair and commode with SBA  Pt to complete UE/LE dressing with SBA                          Time Tracking:     OT Date of Treatment: 04/10/25  OT Start Time: 0908  OT Stop Time: 0932  OT Total Time (min): 24 min    Billable Minutes:Therapeutic Activity 15    OT/CATHERINE: OT          4/10/2025

## 2025-04-10 NOTE — CARE UPDATE
Nephrology Chart Review      Intake/Output Summary (Last 24 hours) at 4/10/2025 0950  Last data filed at 4/10/2025 0842  Gross per 24 hour   Intake 1295.09 ml   Output 770 ml   Net 525.09 ml       Vitals:    04/10/25 0608 04/10/25 0632 04/10/25 0647 04/10/25 0701   BP:  104/68 101/67 109/73   BP Location:       Patient Position:    Lying   Pulse: 75 105 96 95   Resp:  18 19 19   Temp:       TempSrc:       SpO2:  96% 95% 95%   Weight:       Height:           Recent Labs   Lab 04/08/25  0546 04/08/25  0822 04/09/25  0541 04/10/25  0555    138 137 141   K 3.3* 3.5 3.8 3.2*   * 111* 115* 118*   CO2 16* 17* 16* 16*   BUN 49* 50* 47* 44*   CREATININE 4.4* 4.7* 4.6* 3.9*   GLUCOSE 112* 116* 161* 144*   CALCIUM 7.3* 7.7* 7.6* 7.3*   PHOS 4.2  --  4.1 3.6       - Hypokalemia: Replace and daily trend K      No other related issues identified. Please call Nephrology as needed; We will continue to follow.      Nilton Coon MD  Nephrology Fellow

## 2025-04-10 NOTE — PROGRESS NOTES
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 Gann is a 79 y.o. F with a PMHx of HTN, decompensated HCV cirrhosis with recurrent ascites requiring large volume paracentesis approximately every 2 weeks, thrombocytopenia, hepatocellular carcinoma s/p Y90 x2, CKD IV (baseline Cr 2.5-2.8) who initially presented to [facility] ED on 4/4 for n/v/d x1 day. She had over 6 episodes of NBNB emesis and 3 episodes of non-bloody diarrhea. Last IR paracentesis was on 3/26 and 4.6L were removed, no evidence of SBP on prior fluid studies.     In the ED, pt was hypotensive (BP 86/50) otherwise vitals WNL. Lab work remarkable for Na 135, Bicarb 15, BUN 41/Cr 4. Initial lactic acid 4.5. She received 1L NS and 5mg midodrine with improvement in BP. CTAP with liver cirrhosis, portal hypertension, large volume of ascites, and moderate L pleural effusions. She was admitted to  for further management of decompensated cirrhosis and concern for hepatorenal syndrome.     [facility] consulted on 4/5 for increasing lactic acid (7.0), hypotension requiring levophed, and concern for sepsis. Currently receiving Albumin 25% 25g q8h, midodrine 15mg TID, octreotide 100mcg q8h, Zosyn, and Vancomycin.      Pt admitted to [facility].     Hospital/ICU Course:  Stepped up to [facility] on 4/5 for worsening lactic acidosis and concern for HRS requiring MAP goals. Started on levo for MAP goal >85. Nephrology and hepatology consulted. Nephrology offered dialysis, which she refused. Cr continuing to worsen. Restarted home lactulose. Repeat CXR worsened, likely worsened volume overload following albumin administration. Will perform  thoracentesis and paracentesis today. Will send fluid to evaluate for infection given condition may still be due to sepsis rather than HRS. On Vanc/zosyn.     4/8 Plan for CT chest non for better characterization of right hilar opacity on CXR. Ultrasound of left pleural effusion without septation in keeping with though that this is 2/2 hepatic hydrothorax    4/9 right brachial PICC line placed        No new subjective & objective note has been filed under this hospital service since the last note was generated.      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   Currently asymptomatic and satting well on RA.   -4/8 CT chest non con: Bilateral pleural effusions, larger on the left with complete collapse of the left lower lobe.   -4/9 No septations on bedside ultrasound, pt breathing comfortably on room air. Consider PleurX if pt decides to continue with Hospice       Cardiac/Vascular  Bradycardia  Chronic. Intermittent asx bradycardia to 30s-40s. H/o LAFB, RBBB.  - continue to monitor on telemetry.   - avoid lazaro blockade    Hypotension  Continue Midodrine  Has been running low at home and aldactone/Lasix were recently discontinued outpatient   Hepatology consulted  Nephrology consulted  -4/7 recommendations - Consider albumin and IV fluids: pt refusing dialysis  Transfuse for Hb <7  MAP goals >85 for HRS    Elevated troponin  - 238 -> 228, currently with no signs or symptoms of ACS, likely demand ischemia   - TTE performed 4/6 without systolic or diastolic dysfunction.    Renal/  Uterine prolapse  Large pelvic floor prolapse noted on CT.   - chronic, occurs when ascites builds, improves with paracentesis    Lactic acidosis  Lactic acid 4.5 on admission, initially decreased to 3.6 though increased to 7 again  Blood cx  NGTD  Continue BS abx  Q6h lactic acid    Hepatorenal syndrome  Creatinine stable for now. BMP reviewed- noted Estimated Creatinine Clearance: 10 mL/min (A) (based on SCr of 3.9 mg/dL (H)). according to latest data. Based on current GFR, CKD stage is stage 4 - GFR 15-29.  Monitor UOP and serial BMP and adjust therapy as needed. Renally dose meds. Avoid nephrotoxic medications and procedures.     Creatinine 4.0 on admit, baseline around 2.5-2.8.  Likely secondary to pre-renal etiology from dehydration and volume losses given vomiting/diarrhea and hypotension. There are concerns for hepatorenal syndrome.      - Strict I&Os and daily weights   - Gentle IVF  - Avoid nephrotoxic agents such as NSAIDs, gadolinium, and IV radiocontrast  - Renally dose meds to current GFR  - Urine Na <10, Urine Osm > serum osm, and FeNa suggestive of pre-renal etiology. 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 Negative for SBP.      MELD 3.0: 22 at 4/10/2025  5:55 AM  MELD-Na: 22 at 4/10/2025  5:55 AM  Calculated from:  Serum Creatinine: 3.9 mg/dL (Using max of 3 mg/dL) at 4/10/2025  5:55 AM  Serum Sodium: 141 mmol/L (Using max of 137 mmol/L) at 4/10/2025  5:55 AM  Total Bilirubin: 1 mg/dL at 4/10/2025  5:55 AM  Serum Albumin: 1.8 g/dL at 4/10/2025  5:55 AM  INR(ratio): 1.3 at 4/10/2025  5:55 AM  Age at listing (hypothetical): 79 years  Sex: Female at 4/10/2025  5:55 AM    - Continue home meds. Etiology likely related to chronic HCV hepatitis and HCC  s/p Y90 .   - Avoid any hepatotoxic meds   - Daily CBC, CMP, INR  - Ammonia 49, pt is mentating appropriately. Restarting lactulose  - paracentesis performed 4/6  - f/u ascitic studies when collected (albumin, total protein, cell count, LDH, culture, stain)   - Na restriction <2g  - Has received 1g rocephin for SBP ppx. As now with increasing lactic and persistent hypotension, started on broad spectrum abx, de-escalate as indicated  - monitor mental status; q4h Neuro checks.   - Hepatology following    4/7  -ascitic fluid SAAG?1.1. portal hypertension likely cause; No organisms seen. Patient is not a liver transplant candidate given she does not want invasive procedures and escalation of care.        Nausea vomiting and diarrhea  - antiemetics PRN  - C diff negative       Chronic hepatitis C with cirrhosis  See decompensated cirrhosis        Critical Care Daily Checklist:    A: Awake: RASS Goal/Actual Goal:    Actual:     B: Spontaneous Breathing Trial Performed?     C: SAT & SBT Coordinated?  no                      D: Delirium: CAM-ICU     E: Early Mobility Performed? Yes   F: Feeding Goal: Goals: Meet % een/epn by next RD f/u  Status: Nutrition Goal Status: new   Current Diet Order   Procedures    Diet Low Sodium, 2gm      AS: Analgesia/Sedation none   T: Thromboembolic Prophylaxis heparin   H: HOB > 300 Yes   U: Stress Ulcer Prophylaxis (if needed) no   G: Glucose Control yes   B: Bowel Function Stool Occurrence: 1   I: Indwelling Catheter (Lines & Stephens) Necessity PIV, PICC   D: De-escalation of Antimicrobials/Pharmacotherapies D/c zosyn today    Plan for the day/ETD Goals of care    Code Status:  Family/Goals of Care: DNR         Critical secondary to Patient has a condition that poses threat to life and bodily function: Acute Renal Failure      Critical care was time spent personally by me on the following activities: development of treatment plan with patient or surrogate and bedside caregivers,  discussions with consultants, evaluation of patient's response to treatment, examination of patient, ordering and performing treatments and interventions, ordering and review of laboratory studies, ordering and review of radiographic studies, pulse oximetry, re-evaluation of patient's condition. This critical care time did not overlap with that of any other provider or involve time for any procedures.     Caro Patterson MD  Critical Care Medicine  [facility] - [facility]

## 2025-04-10 NOTE — ASSESSMENT & PLAN NOTE
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   Currently asymptomatic and satting well on RA.   -4/8 CT chest non con: Bilateral pleural effusions, larger on the left with complete collapse of the left lower lobe.   -4/9 No septations on bedside ultrasound, pt breathing comfortably on room air. Consider PleurX if pt decides to continue with Hospice

## 2025-04-10 NOTE — PLAN OF CARE
MICU DAILY GOALS     Family/Goals of care/Code Status   Code Status: DNR    24H Vital Sign Range  Temp:  [98 °F (36.7 °C)-98.6 °F (37 °C)]   Pulse:  []   Resp:  [13-26]   BP: ()/(50-72)   SpO2:  [91 %-99 %]      Shift Events (include procedures and significant events)   No acute events throughout shift    AWAKE RASS: Goal -    Actual - RASS (Valerio Agitation-Sedation Scale): alert and calm    Restraint necessity: Not necessary   BREATHE SBT: Not intubated    Coordinate A & B, analgesics/sedatives Pain: managed   SAT: Not intubated   Delirium CAM-ICU:     Early(intubated/ Progressive (non-intubated) Mobility MOVE Screen (INTUBATED ONLY): Not intubated    Activity: Activity Management: Rolling - L1   Feeding/Nutrition Diet order: Diet/Nutrition Received: 2 gram sodium, Specialty Diet/Nutrition Received: renal diet   Thrombus DVT prophylaxis: VTE Core Measure: Pharmacological prophylaxis initiated/maintained   HOB Elevation Head of Bed (HOB) Positioning: HOB at 45 degrees   Ulcer Prophylaxis GI: yes   Glucose control managed     Skin Skin assessment:     Sacrum intact/not altered? Yes  Heels intact/not altered? Yes  Surgical wound? No    CHECK ONE!   (no altered skin or altered skin) and sub boxes:  [] No Altered Skin Integrity Present    [x]Prevention Measures Documented    [] Altered Skin Integrity Present or Discovered   [x] LDA already present in EPIC, daily doc completed              [x] LDA added if not already in EPIC (describe/stage wound).               [] Wound Image Taken (required on admit,                   transfer/discharge and every Tuesday)    Wound Care Consulted? No   Bowel Function no issues    Indwelling Catheter Necessity      PICC Double Lumen 04/09/25 1457 right brachial-Line Necessity Review: Medication caustic to vasculature  NA urinary cath   De-escalation Antibiotics Yes        VS and assessment per flow sheet, patient progressing towards goals as tolerated, plan of care  reviewed with [unfilled], all concerns addressed, will continue to monitor.

## 2025-04-10 NOTE — EICU
Intervention Initiated From:  [facility] / EICU    Nolan intervened regarding:  Rounding (Video assessment)  Virtual ICU Quality Rounds    Admit Date: 4/4/2025  Hospital Day: 4    ICU Day: 4d 1h    VICU Surveillance Screening    Daily review of  line necessity(optional): Central line(s) in place    Central line type (optional): PICC    Central line indications : Vasopressors (in past 24/hrs)      Interventions    [x]II  LDA reconciliation completed

## 2025-04-10 NOTE — PT/OT/SLP PROGRESS
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 Patient's mobility limitation cannot be sufficiently resolved with the use of a cane, but can be sufficiently resolved with the use of a rolling walker.The use of a rolling walker will considerably improve their ability to participate in MRADLs. Patient will use the walker on a regular basis at home.     Plan:     During this hospitalization, patient to be seen 3 x/week to address the identified rehab impairments via gait training, therapeutic activities, therapeutic exercises, neuromuscular re-education and progress towards the established goals.  Plan of Care Expires:  05/08/25  Plan of Care Reviewed with: patient, spouse    Assessment:     Seema Gann is a 79 y.o. 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 Pt found supine agreeable to therapy session. Pt on increased Levo again today (0.18) with MAP goal 80-85, RN cleared pt for session. Pt demo'd improvements today as she progressed to step transfer to chair today. Pt with MAP 70-72 seated EOB, MD approved pt to transfer to chair. Pt requiring only light (A) for all mobility at this time. Patient would benefit from skilled therapy services to maximize safety and independence, increase activity tolerance, decrease fall risk, decrease caregiver burden, improve QOL, improve patient's functional mobility, and decrease risk of  contractures and pressure sores.  Patient continues to demonstrate the need for low intensity therapy on a scheduled basis exhibited by decreased independence with functional mobility    Problem List: weakness, impaired endurance, impaired self care skills, impaired functional mobility, impaired balance, gait instability, impaired cardiopulmonary response to activity.  Rehab Prognosis: Good; patient would benefit from acute skilled PT services to address these deficits and reach maximum level of function.      Goals:   Multidisciplinary Problems       Physical Therapy Goals          Problem: Physical Therapy    Goal Priority Disciplines Outcome Interventions   Physical Therapy Goal     PT, PT/OT Progressing    Description: Goals to be met by: 25     Patient will increase functional independence with mobility by performin. Supine to sit with Jones  2. Sit to stand transfer with Stand-by Assistance  3. Bed to chair transfer with Stand-by Assistance using LRAD  4. Gait  x 100 feet with Stand-by Assistance using LRAD.   5. Ascend/descend 5 stair with bilateral Handrails Contact Guard Assistance using LRAD.   6. Stand for 10 minutes with Stand-by Assistance using LRAD                         Subjective     RN notified prior to session. Spouse present upon PT entrance into room. Patient agreeable to PT treatment session.    Chief Complaint: no complaints  Patient/Family Comments/goals: none stated  Pain/Comfort:  Pain Rating 1: 0/10  Pain Rating Post-Intervention 1: 0/10      Objective:     Additional staff present: OT; OT for cotx due to pt's multiple medical comorbidities and functional deficits req'ing two skilled therapists to appropriately maximize functional potential by progressing pt's musculoskeletal strength and endurance, facilitating neuromuscular postural balance and control ,and accommodating for patient's impaired cardiopulmonary tolerance to activities.     Patient found supine with:  "blood pressure cuff, telemetry, pulse ox (continuous), PureWick, peripheral IV, PICC line   Cognition:   Alert and Cooperative  Patient is oriented to Person, Place, Time, Situation  General Precautions: Standard, Cardiac fall   Orthopedic Precautions:N/A   Braces: N/A   Body mass index is 20.43 kg/m².  Oxygen Device: Room Air  Vitals: /72   Pulse 82   Temp 98.4 °F (36.9 °C) (Oral)   Resp (!) 22   Ht 5' 4" (1.626 m)   Wt 54 kg (119 lb 0.8 oz)   SpO2 98%   BMI 20.43 kg/m²     Outcome Measures:  AM-PAC 6 CLICK MOBILITY  Turning over in bed (including adjusting bedclothes, sheets and blankets)?: 3  Sitting down on and standing up from a chair with arms (e.g., wheelchair, bedside commode, etc.): 3  Moving from lying on back to sitting on the side of the bed?: 3  Moving to and from a bed to a chair (including a wheelchair)?: 3  Need to walk in hospital room?: 3  Climbing 3-5 steps with a railing?: 3  Basic Mobility Total Score: 18     Functional Mobility:    Bed Mobility:   Supine > Sit with stand by assistance    Transfers:   Sit <> Stand Transfer: contact guard assistance with no assistive device x2 trials from EOB    Balance:  Sitting Balance  Static: stand by assistance  Dynamic: stand by assistance  Standing:  Static: contact guard assistance  Dynamic: contact guard assistance      Gait:  Patient ambulated: 2 side steps L + 2' transfer to chair (seated break between to monitor BP)   Patient required: contact guard  Patient used:  no assistive device   Gait Deviation(s): steady gait, decreased step length, narrow base of support, flexed posture, and decreased ashley  all lines remained intact throughout ambulation trial  Comments: Patient steady with no LOB    Therapeutic Exercises:   Patient performed 1 set(s) of 10 repetitions of  the following seated exercises: long arc quads for bilateral LE. Patient required skilled PT for instruction of exercises and appropriate cues to perform exercises safely " and appropriately.    Treatment and Education:    MAP goal 80-85--   Pt with MAP 82 prior to session. Once sitting EOB, pt with MAP 70-72 taken multiple times  After stand and side steps, MAP dropped to 59  After a few minutes and BLE LAQ, MAP back up to 69  Pt transferred to chair with MD clearance and MAP 70 once in chair.     Time provided for education, counseling and discussion of health disposition in regards to patient's current status  All questions answered within PT scope of practice and to patient's satisfaction  PT role in POC to address current functional deficits  Call nursing/pct to transfer to chair/use bathroom. Pt stated understanding.    Patient left up in chair with all lines intact, call button in reach, and RN present.    Time Tracking:     PT Received On: 04/10/25  PT Start Time: 0909     PT Stop Time: 0932  PT Total Time (min): 23 min     Billable Minutes:   Therapeutic Exercise 23 minutes    Treatment Type: Treatment  PT/PTA: PT       4/10/2025

## 2025-04-10 NOTE — ASSESSMENT & PLAN NOTE
Last diagnostic/therapeutic paracentesis on 3/26 with removal of 4.6L. Negative for SBP.      MELD 3.0: 22 at 4/10/2025  5:55 AM  MELD-Na: 22 at 4/10/2025  5:55 AM  Calculated from:  Serum Creatinine: 3.9 mg/dL (Using max of 3 mg/dL) at 4/10/2025  5:55 AM  Serum Sodium: 141 mmol/L (Using max of 137 mmol/L) at 4/10/2025  5:55 AM  Total Bilirubin: 1 mg/dL at 4/10/2025  5:55 AM  Serum Albumin: 1.8 g/dL at 4/10/2025  5:55 AM  INR(ratio): 1.3 at 4/10/2025  5:55 AM  Age at listing (hypothetical): 79 years  Sex: Female at 4/10/2025  5:55 AM    - Continue home meds. Etiology likely related to chronic HCV hepatitis and HCC s/p Y90 .   - Avoid any hepatotoxic meds   - Daily CBC, CMP, INR  - Ammonia 49, pt is mentating appropriately. Restarting lactulose  - paracentesis performed 4/6  - f/u ascitic studies when collected (albumin, total protein, cell count, LDH, culture, stain)   - Na restriction <2g  - Has received 1g rocephin for SBP ppx. As now with increasing lactic and persistent hypotension, started on broad spectrum abx, de-escalate as indicated  - monitor mental status; q4h Neuro checks.   - Hepatology following    4/7  -ascitic fluid SAAG?1.1. portal hypertension likely cause; No organisms seen. Patient is not a liver transplant candidate given she does not want invasive procedures and escalation of care.

## 2025-04-10 NOTE — PROGRESS NOTES
[facility] - [facility]  Discharge Reassessment    Primary Care Provider: Bethany Mesa MD    Expected Discharge Date: 4/15/2025    Reassessment (most recent)       Discharge Reassessment - 04/10/25 1159          Discharge Reassessment    Assessment Type Discharge Planning Reassessment     Discharge Plan discussed with: Spouse/sig other;Patient     Communicated LETA with patient/caregiver No     Discharge Plan A Other   Pt prefers out pt therapy to HH    Discharge Plan B Home with family     DME Needed Upon Discharge  walker, rolling     Transition of Care Barriers None     Why the patient remains in the hospital Requires continued medical care        Post-Acute Status    Discharge Delays None known at this time                   Will inform team of pt's request for walker.     Discharge Plan A and Plan B have been determined by review of patient's clinical status, future medical and therapeutic needs, and coverage/benefits for post-acute care in coordination with multidisciplinary team members.

## 2025-04-10 NOTE — PROGRESS NOTES
[facility] - [facility]  Adult Nutrition  Progress Note    SUMMARY   Recommendations  Continue Low sodium diet  RD discontinues Novasource renal TID - not drinking  RD added Boost Breeze daily  Nursing, continue documenting PO intake via flowsheets  Encourage PO intake  Monitor labs, meds, weight, skin    Goals: Meet % een/epn by next RD f/u  Nutrition Goal Status: progressing towards goal  Communication of RD Recs: other (comment) (poc)    Nutrition Discharge Planning    Nutrition Discharge Planning: Therapeutic diet (comments)  Therapeutic diet (comments): low sodium    Assessment and Plan    Endocrine  Moderate protein-calorie malnutrition  Malnutrition Type:  Context: chronic illness  Level: moderate    Related to (etiology):   Increased protein/calorie needs secondary to cirrhosis     Signs and Symptoms (as evidenced by):   Moderate muscle and fat depletion, moderate-severe edema in LE     Malnutrition Characteristic Summary:  Subcutaneous Fat (Malnutrition): moderate depletion  Muscle Mass (Malnutrition): moderate depletion  Fluid Accumulation (Malnutrition): moderate to severe (in LE)      Interventions/Recommendations (treatment strategy):  Collaboration with other providers    Nutrition Diagnosis Status:   New         Malnutrition Assessment  Malnutrition Context: chronic illness  Malnutrition Level: moderate  Skin (Micronutrient): none  Nails (Micronutrient): none  Hair/Scalp (Micronutrient): none  Eyes (Micronutrient): none  Extraoral (Micronutrient): none  Gums (Micronutrient): none  Lips/Mucous Membranes (Micronutrient): none  Teeth (Micronutrient): none  Tongue (Micronutrient): none  Neck/Chest (Micronutrient): none  Musculoskeletal/Lower Extremities: edema, muscle wasting, subcutaneous fat loss       Subcutaneous Fat (Malnutrition): moderate depletion  Muscle Mass (Malnutrition): moderate depletion  Fluid Accumulation (Malnutrition): moderate to severe (in LE)   Orbital Region (Subcutaneous Fat  "Loss): severe depletion  Upper Arm Region (Subcutaneous Fat Loss): moderate depletion                 Reason for Assessment    Reason For Assessment: RD follow-up  Diagnosis: liver disease  General Information Comments: RD f/u. Pt endorses 25-50% of meals. Endorses some diarrhea. Not getting novasource renal, has a boost breeze at bedside. States she likes them. -115#. #. NFPE completed, muscle and fat depletion noted. See PES for details.     Nutrition Related Social Determinants of Health: SDOH: Adequate food in home environment      Nutrition/Diet History    Spiritual, Cultural Beliefs, Taoist Practices, Values that Affect Care: no  Food Allergies: NKFA    Anthropometrics    Height: 5' 4" (162.6 cm)  Height (inches): 64 in  Height Method: Stated  Weight: 54 kg (119 lb 0.8 oz)  Weight (lb): 119.05 lb  Weight Method: Bed Scale  Ideal Body Weight (IBW), Female: 120 lb  % Ideal Body Weight, Female (lb): 98.33 %  BMI (Calculated): 20.4  BMI Grade: 18.5-24.9 - normal       Lab/Procedures/Meds    Pertinent Labs Reviewed: reviewed  Pertinent Labs Comments: Potassium: 3.2, BUN: 44, creatinine: 3.9, GFR: 11, Glu: 144  Pertinent Medications Reviewed: reviewed  Pertinent Medications Comments: Heparin, lactulose, mag sulfate, potassium, sevelamer, norepinephrine    Estimated/Assessed Needs    Weight Used For Calorie Calculations: 53.5 kg (117 lb 15.1 oz)  Energy Calorie Requirements (kcal): 3016-4693 (25-30 kcal/kg - liver disease)  Energy Need Method: Kcal/kg  Protein Requirements: 53-80 (1-1.5 g/kg)  Weight Used For Protein Calculations: 53.5 kg (117 lb 15.1 oz)     Estimated Fluid Requirement Method: RDA Method  RDA Method (mL): 1337         Nutrition Prescription Ordered    Current Diet Order: Low sodium  Oral Nutrition Supplement: Novasource renal TID    Evaluation of Received Nutrient/Fluid Intake    I/O: +3.8 L since admit  Comments: LBM 4/10 (loose)  % Intake of Estimated Energy Needs: 25 - 50 %  % " Meal Intake: 25 - 50 %    Nutrition Risk    Level of Risk/Frequency of Follow-up: moderate (f/u 1-2x/week)     Monitor and Evaluation    Monitor and Evaluation: Diet order, Beliefs and attitudes, Electrolyte and renal panel, Gastrointestinal profile, Glucose/endocrine profile, Inflammatory profile, Lipid profile, Nutrition focused physical findings, Skin     Nutrition Follow-Up    RD Follow-up?: Yes

## 2025-04-10 NOTE — ASSESSMENT & PLAN NOTE
Creatinine stable for now. BMP reviewed- noted Estimated Creatinine Clearance: 10 mL/min (A) (based on SCr of 3.9 mg/dL (H)). according to latest data. Based on current GFR, CKD stage is stage 4 - GFR 15-29.  Monitor UOP and serial BMP and adjust therapy as needed. Renally dose meds. Avoid nephrotoxic medications and procedures.     Creatinine 4.0 on admit, baseline around 2.5-2.8.  Likely secondary to pre-renal etiology from dehydration and volume losses given vomiting/diarrhea and hypotension. There are concerns for hepatorenal syndrome.      - Strict I&Os and daily weights   - Gentle IVF  - Avoid nephrotoxic agents such as NSAIDs, gadolinium, and IV radiocontrast  - Renally dose meds to current GFR  - Urine Na <10, Urine Osm > serum osm, and FeNa suggestive of pre-renal etiology. Has not received any diuretics, likely consistent with HRS  - continue midodrine 15 mg PO TID (d/c on 4/7)  - Levo for MAP goals PRN  - nephrology consulted

## 2025-04-11 NOTE — PLAN OF CARE
MICU DAILY GOALS     Family/Goals of care/Code Status   Code Status: DNR    24H Vital Sign Range  Temp:  [97.8 °F (36.6 °C)-98.4 °F (36.9 °C)]   Pulse:  []   Resp:  [12-28]   BP: ()/(53-83)   SpO2:  [94 %-99 %]      Shift Events (include procedures and significant events)   No acute events throughout shift    AWAKE RASS: Goal -    Actual - RASS (Valerio Agitation-Sedation Scale): alert and calm    Restraint necessity: Not necessary   BREATHE SBT: Not intubated    Coordinate A & B, analgesics/sedatives Pain: managed   SAT: Not intubated   Delirium CAM-ICU:     Early(intubated/ Progressive (non-intubated) Mobility MOVE Screen (INTUBATED ONLY): Not intubated    Activity: Activity Management: Rolling - L1   Feeding/Nutrition Diet order: Diet/Nutrition Received: 2 gram sodium, Specialty Diet/Nutrition Received: renal diet   Thrombus DVT prophylaxis: VTE Core Measure: Pharmacological prophylaxis initiated/maintained   HOB Elevation Head of Bed (HOB) Positioning: HOB elevated   Ulcer Prophylaxis GI: yes   Glucose control managed     Skin Skin assessment:     Sacrum intact/not altered? Yes  Heels intact/not altered? Yes  Surgical wound? No    CHECK ONE!   (no altered skin or altered skin) and sub boxes:  [x] No Altered Skin Integrity Present    [x]Prevention Measures Documented    [] Altered Skin Integrity Present or Discovered   [] LDA already present in EPIC, daily doc completed              [] LDA added if not already in EPIC (describe/stage wound).               [] Wound Image Taken (required on admit,                   transfer/discharge and every Tuesday)    Wound Care Consulted? No   Bowel Function no issues    Indwelling Catheter Necessity      PICC Double Lumen 04/09/25 1457 right brachial-Line Necessity Review: Medication caustic to vasculature     De-escalation Antibiotics No        VS and assessment per flow sheet, patient progressing towards goals as tolerated, plan of care reviewed with patient,  all concerns addressed.  Problem: Adult Inpatient Plan of Care  Goal: Plan of Care Review  Outcome: Progressing  Goal: Patient-Specific Goal (Individualized)  Outcome: Progressing  Goal: Optimal Comfort and Wellbeing  Outcome: Progressing     Problem: Infection  Goal: Absence of Infection Signs and Symptoms  Outcome: Progressing

## 2025-04-11 NOTE — NURSING
MICU DAILY GOALS     Family/Goals of care/Code Status   Code Status: DNR    24H Vital Sign Range  Temp:  [97.6 °F (36.4 °C)-98 °F (36.7 °C)]   Pulse:  []   Resp:  [12-21]   BP: ()/(60-83)   SpO2:  [93 %-99 %]      Shift Events (include procedures and significant events)   No acute events throughout shift, patient and patient daughter request meeting with hospice to be dc home. Case management following. Pt MAP goals unchanged.     AWAKE RASS: Goal -    Actual - RASS (Valerio Agitation-Sedation Scale): alert and calm    Restraint necessity: Not necessary   BREATHE SBT: NA    Coordinate A & B, analgesics/sedatives Pain: managed   SAT: Not attempted   Delirium CAM-ICU:     Early(intubated/ Progressive (non-intubated) Mobility MOVE Screen (INTUBATED ONLY): NA    Activity: Activity Management: Rolling - L1   Feeding/Nutrition Diet order: Diet/Nutrition Received: 2 gram sodium, Specialty Diet/Nutrition Received: renal diet   Thrombus DVT prophylaxis: VTE Core Measure: Pharmacological prophylaxis initiated/maintained   HOB Elevation Head of Bed (HOB) Positioning: HOB at 30-45 degrees   Ulcer Prophylaxis GI: yes   Glucose control managed     Skin Skin assessment:     Sacrum intact/not altered? Yes  Heels intact/not altered? Yes  Surgical wound? No    CHECK ONE!   (no altered skin or altered skin) and sub boxes:  [x] No Altered Skin Integrity Present    []Prevention Measures Documented    [] Altered Skin Integrity Present or Discovered   [] LDA already present in EPIC, daily doc completed              [] LDA added if not already in EPIC (describe/stage wound).               [] Wound Image Taken (required on admit,                   transfer/discharge and every Tuesday)    Wound Care Consulted? No   Bowel Function no issues    Indwelling Catheter Necessity      PICC Double Lumen 04/09/25 1457 right brachial-Line Necessity Review: Medication caustic to vasculature  No royal present   De-escalation Antibiotics Yes         VS and assessment per flow sheet, patient progressing towards goals as tolerated, plan of care reviewed with [unfilled] and family, all concerns addressed, will continue to monitor.

## 2025-04-11 NOTE — PROGRESS NOTES
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 Gann is a 79 y.o. F with a PMHx of HTN, decompensated HCV cirrhosis with recurrent ascites requiring large volume paracentesis approximately every 2 weeks, thrombocytopenia, hepatocellular carcinoma s/p Y90 x2, CKD IV (baseline Cr 2.5-2.8) who [place] [facility] [place] n/v/d x1 day. She had over 6 episodes of NBNB emesis and 3 episodes of non-bloody diarrhea. Last IR paracentesis was on 3/26 and 4.6L were removed, no evidence of SBP on prior fluid studies.     In the ED, pt was hypotensive (BP 86/50) otherwise vitals WNL. Lab work remarkable for Na 135, Bicarb 15, BUN 41/Cr 4. Initial lactic acid 4.5. She received 1L NS and 5mg midodrine with improvement in BP. CTAP with liver cirrhosis, portal hypertension, large volume of ascites, and moderate L pleural effusions. She was admitted to  for further management of decompensated cirrhosis and concern for hepatorenal syndrome.     [facility] consulted on 4/5 for increasing lactic acid (7.0), hypotension requiring levophed, and concern for sepsis. Currently receiving Albumin 25% 25g q8h, midodrine 15mg TID, octreotide 100mcg q8h, Zosyn, and Vancomycin.      Pt admitted to [facility].     Hospital/ICU Course:  Stepped up to [facility] on 4/5 for worsening lactic acidosis and concern for HRS requiring MAP goals. Started on levo for MAP goal >85. Nephrology and hepatology consulted. Nephrology offered dialysis, which she refused. Cr continuing to worsen. Restarted home lactulose. Repeat CXR worsened, likely worsened volume overload following albumin administration. Will perform  thoracentesis and paracentesis today. Will send fluid to evaluate for infection given condition may still be due to sepsis rather than HRS. On Vanc/zosyn.     4/8 Plan for CT chest non for better characterization of right hilar opacity on CXR. Ultrasound of left pleural effusion without septation in keeping with though that this is 2/2 hepatic hydrothorax    4/9 right brachial PICC line placed        Interval History/Significant Events: naeon    Review of Systems  Objective:     Vital Signs (Most Recent):  Temp: 97.9 °F (36.6 °C) (04/11/25 0301)  Pulse: 63 (04/11/25 0400)  Resp: 16 (04/11/25 0400)  BP: 106/67 (04/11/25 0400)  SpO2: 96 % (04/11/25 0400) Vital Signs (24h Range):  Temp:  [97.8 °F (36.6 °C)-98.3 °F (36.8 °C)] 97.9 °F (36.6 °C)  Pulse:  [] 63  Resp:  [12-27] 16  SpO2:  [95 %-99 %] 96 %  BP: ()/(53-83) 106/67   Weight: 54 kg (119 lb 0.8 oz)  Body mass index is 20.43 kg/m².      Intake/Output Summary (Last 24 hours) at 4/11/2025 0909  Last data filed at 4/11/2025 0600  Gross per 24 hour   Intake 1678.61 ml   Output 300 ml   Net 1378.61 ml          Physical Exam     Physical Exam  Vitals and nursing note reviewed.   Constitutional:       General: She is not in acute distress.     Appearance: She is not toxic-appearing or diaphoretic.      Comments: Chronically-ill appearing   HENT:      Head: Normocephalic and atraumatic.      Nose: Nose normal.      Mouth/Throat:      Mouth: Mucous membranes are moist.   Eyes:      Conjunctiva/sclera: Conjunctivae normal.   Cardiovascular:      Rate and Rhythm: Normal rate and regular rhythm.      Pulses: Normal pulses.      Heart sounds: Normal heart sounds. No murmur heard.  Pulmonary:      Effort: Pulmonary effort is normal.      Breath sounds: Normal breath sounds.      Comments: Decreased breath sounds on the left.   Abdominal:      General: There is distension.      Palpations: Abdomen is soft.      Tenderness: There is no abdominal tenderness. There is  no guarding or rebound.      Comments: Easily reducible umbilical hernia   Musculoskeletal:      Right lower leg: Edema present.      Left lower leg: Edema present.   Skin:     General: Skin is warm and dry.      Capillary Refill: Capillary refill takes less than 2 seconds.   Neurological:      General: No focal deficit present.      Mental Status: She is alert and oriented to person, place, and time.   Psychiatric:         Mood and Affect: Mood normal.         Thought Content: Thought content normal.  Vents:     Lines/Drains/Airways       Peripherally Inserted Central Catheter Line  Duration             PICC Double Lumen 04/09/25 1457 right brachial 1 day              Drain  Duration             Female External Urinary Catheter w/ Suction 04/04/25 1941 6 days              Peripheral Intravenous Line  Duration                  Peripheral IV - Single Lumen 04/04/25 1926 20 G No Right Antecubital 6 days         Peripheral IV - Single Lumen 04/08/25 0545 20 G 1 3/4 in Anterior;Proximal;Right Upper Arm 3 days                  Significant Labs:    CBC/Anemia Profile:  Recent Labs   Lab 04/10/25  0555 04/11/25  0326   WBC 4.66 5.08   HGB 7.4* 7.2*   HCT 25.1* 22.5*   PLT 86* 90*   * 95   RDW 15.4* 15.7*        Chemistries:  Recent Labs   Lab 04/10/25  0555 04/11/25  0326    139   K 3.2* 3.2*   * 118*   CO2 16* 16*   BUN 44* 40*   CREATININE 3.9* 3.6*   CALCIUM 7.3* 7.5*   ALBUMIN 1.8* 1.8*   BILITOT 1.0 1.0   ALKPHOS 30* 29*   ALT 10 13   AST 26 34   GLUCOSE 144* 127*   MG 1.6 1.7   PHOS 3.6 3.5       All pertinent labs within the past 24 hours have been reviewed.    Significant Imaging:  I have reviewed all pertinent imaging results/findings within the past 24 hours.    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    Currently asymptomatic and satting well on RA.   -4/8 CT chest non con: Bilateral pleural effusions, larger on the left with complete collapse of the left lower lobe.   -4/9 No septations on bedside ultrasound, pt breathing comfortably on room air. Consider PleurX if pt decides to continue with Hospice       Cardiac/Vascular  Bradycardia  Chronic. Intermittent asx bradycardia to 30s-40s. H/o LAFB, RBBB.  - continue to monitor on telemetry.   - avoid lazaro blockade    Hypotension  Continue Midodrine  Has been running low at home and aldactone/Lasix were recently discontinued outpatient   Hepatology consulted  Nephrology consulted  -4/7 recommendations - Consider albumin and IV fluids: pt refusing dialysis  Transfuse for Hb <7  MAP goals 80-85 for HRS    Elevated troponin  - 238 -> 228, currently with no signs or symptoms of ACS, likely demand ischemia   - TTE performed 4/6 without systolic or diastolic dysfunction.    Renal/  Uterine prolapse  Large pelvic floor prolapse noted on CT.   - chronic, occurs when ascites builds, improves with paracentesis    Lactic acidosis  Lactic acid 4.5 on admission, initially decreased to 3.6 though increased to 7 again  Blood cx NGTD  Continue BS abx  Q6h lactic acid    Hepatorenal syndrome  Creatinine stable for now. BMP reviewed- noted Estimated Creatinine Clearance: 10 mL/min (A) (based on SCr of 3.9 mg/dL (H)). according to latest data. Based on current GFR, CKD stage is stage 4 - GFR 15-29.  Monitor UOP and serial BMP and adjust therapy as needed. Renally dose meds. Avoid nephrotoxic medications and procedures.     Creatinine 4.0 on admit, baseline around 2.5-2.8.  Likely secondary to pre-renal etiology from dehydration and volume losses given vomiting/diarrhea and hypotension. There are concerns for hepatorenal syndrome.      - Strict I&Os and daily weights   - Gentle IVF  - Avoid nephrotoxic agents such as NSAIDs, gadolinium, and IV radiocontrast  - Renally dose meds to  current GFR  - Urine Na <10, Urine Osm > serum osm, and FeNa suggestive of pre-renal etiology. 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 Negative for SBP.      MELD 3.0: 22 at 4/10/2025  5:55 AM  MELD-Na: 22 at 4/10/2025  5:55 AM  Calculated from:  Serum Creatinine: 3.9 mg/dL (Using max of 3 mg/dL) at 4/10/2025  5:55 AM  Serum Sodium: 141 mmol/L (Using max of 137 mmol/L) at 4/10/2025  5:55 AM  Total Bilirubin: 1 mg/dL at 4/10/2025  5:55 AM  Serum Albumin: 1.8 g/dL at 4/10/2025  5:55 AM  INR(ratio): 1.3 at 4/10/2025  5:55 AM  Age at listing (hypothetical): 79 years  Sex: Female at 4/10/2025  5:55 AM    - Continue home meds. Etiology likely related to chronic HCV hepatitis and HCC s/p Y90 .   - Avoid any hepatotoxic meds   - Daily CBC, CMP, INR  - Ammonia 49, pt is mentating appropriately. Restarting lactulose  - paracentesis performed 4/6  - f/u ascitic studies when collected (albumin, total protein, cell count, LDH, culture, stain)   - Na restriction <2g  - Has received 1g rocephin for SBP ppx. As now with increasing lactic and persistent hypotension, started on broad spectrum abx, de-escalate as indicated  - monitor mental status; q4h Neuro checks.   - Hepatology following    4/7  -ascitic fluid SAAG?1.1. portal hypertension likely cause; No organisms seen. Patient is not a liver transplant candidate given she does not want invasive procedures and escalation of care.        Nausea vomiting and diarrhea  - antiemetics PRN  - C  diff negative       Chronic hepatitis C with cirrhosis  See decompensated cirrhosis          Critical Care Daily Checklist:     A: Awake: RASS Goal/Actual Goal:    Actual:     B: Spontaneous Breathing Trial Performed?    C: SAT & SBT Coordinated?  no                      D: Delirium: CAM-ICU    E: Early Mobility Performed? Yes   F: Feeding Goal: Goals: Meet % een/epn by next RD f/u  Status: Nutrition Goal Status: new       Current Diet Order   Procedures    Diet Low Sodium, 2gm      AS: Analgesia/Sedation none   T: Thromboembolic Prophylaxis heparin   H: HOB > 300 Yes   U: Stress Ulcer Prophylaxis (if needed) no   G: Glucose Control yes   B: Bowel Function Stool Occurrence: 1   I: Indwelling Catheter (Lines & Stephens) Necessity PIV, PICC   D: De-escalation of Antimicrobials/Pharmacotherapies No antimicrobials     Plan for the day/ETD Goals of care     Code Status:  Family/Goals of Care: DNR              Critical secondary to Patient has a condition that poses threat to life and bodily function: Acute Renal Failure      Critical care was time spent personally by me on the following activities: development of treatment plan with patient or surrogate and bedside caregivers, discussions with consultants, evaluation of patient's response to treatment, examination of patient, ordering and performing treatments and interventions, ordering and review of laboratory studies, ordering and review of radiographic studies, pulse oximetry, re-evaluation of patient's condition. This critical care time did not overlap with that of any other provider or involve time for any procedures.     Caro Patterson MD  Critical Care Medicine  [facility] Medical ICU

## 2025-04-11 NOTE — EICU
Discussed with day provider possibly inaccurate labs from earlier today. Repeat BMP was pending.   Repeat BMP results as below.     Basic Metabolic Panel - STAT (07.09.22 @ 18:36)   Sodium, Serum: 123 mmol/L   Potassium, Serum: 3.7 mmol/L   Chloride, Serum: 98 mmol/L   Carbon Dioxide, Serum: 15 mmol/L   Anion Gap, Serum: 10 mmol/L   Blood Urea Nitrogen, Serum: 8 mg/dL   Creatinine, Serum: 0.51 mg/dL   Glucose, Serum: 87 mg/dL   Calcium, Total Serum: 7.7 mg/dL   eGFR: 95: The estimated glomerular filtration rate (eGFR) is calculated using the   2021 CKD-EPI creatinine equation, which does not have a coefficient for   race and is validated in individuals 18 years of age and older (N Engl J   Med 2021; 385:6933-1449). Creatinine-based eGFR may be inaccurate in   various situations including but not limited to extremes of muscle mass,   altered dietary protein intake, or medications that affect renal tubular   creatinine secretion. mL/min/1.73m2     Potassium is within normal limits.   Sodium at 123 is unchanged from the past couple of days but off baseline of 133 on 7/3.  Discussed with Dr. Sims. Renal to be consulted.  Started pt on 50cc NS for 12 hours with free water restriction  Will continue to monitor  Repeat BMP in AM    Lelia Leos PA-C  Department of Medicine Intervention Initiated From:  COR / [id number]    Nolan intervened regarding:  Rounding (Video assessment)    VICU Night Rounds Checklist

## 2025-04-11 NOTE — ASSESSMENT & PLAN NOTE
SHARA (acute kidney injury)        79 y.o. female ,is admitted on 4/4/2025  with past medical history of decompensated HCV cirrhosis with recurrent ascites requiring frequent large volume paracentesis q2wk.Admitted with vomiting and diarrhea Nephrology  is consulted for concerns of HRS        Assessment and plan     SHARA KDIGO stage 3 on CKD 4    Baseline Creatinine: 2.5-2.8  Creatinine at time of consult: 4.4  Tests done:UPCR 0.09  Etiology of SHARA: Likely HRS. Other causes could be hypotension and increased abdominal pressure compartment syndrome   Acid Base Disorder: metabolic acidosis with Na bicarbonate of 15        Recommendations :      - Frequent ascites fluid (paracentesis), last paracentesis April 7th with  1.5 L removed  - Avoid hypotension, last midodrine dose April 7th, currently on lactulose,  - last albumin dose April 6 th, current albumin level 1.8,   - Urine lytes, urine sodium less than 10, urine chloride less than 20, urine osmolality 331, urine protein creatinine ratio 0.09  - Patient refused hemodialysis, family honored her wishes  - Paracentesis as indicated per primary team  - Monitor serum chemistries daily,   - Today's potassium 3.2 -received IV potassium 40 mEq 9:08 a.m. Please replace and maintain within normal range (3.5-5)  strict intake and output Qshift, daily weights if able.  - Renal protective measures: Please adjust medications for reduced clearance  - Avoid nephrotoxic medications (NSAID, IV contrast)  - Avoid ACEi and ARBs in the setting of SHARA  - Maintain MAP > 85 currently on Levophed  - Transfuse for Hb <7

## 2025-04-11 NOTE — SUBJECTIVE & OBJECTIVE
Interval History/Significant Events: naeon    Review of Systems  Objective:     Vital Signs (Most Recent):  Temp: 97.9 °F (36.6 °C) (04/11/25 0301)  Pulse: 63 (04/11/25 0400)  Resp: 16 (04/11/25 0400)  BP: 106/67 (04/11/25 0400)  SpO2: 96 % (04/11/25 0400) Vital Signs (24h Range):  Temp:  [97.8 °F (36.6 °C)-98.3 °F (36.8 °C)] 97.9 °F (36.6 °C)  Pulse:  [] 63  Resp:  [12-27] 16  SpO2:  [95 %-99 %] 96 %  BP: ()/(53-83) 106/67   Weight: 54 kg (119 lb 0.8 oz)  Body mass index is 20.43 kg/m².      Intake/Output Summary (Last 24 hours) at 4/11/2025 0909  Last data filed at 4/11/2025 0600  Gross per 24 hour   Intake 1678.61 ml   Output 300 ml   Net 1378.61 ml          Physical Exam     Physical Exam  Vitals and nursing note reviewed.   Constitutional:       General: She is not in acute distress.     Appearance: She is not toxic-appearing or diaphoretic.      Comments: Chronically-ill appearing   HENT:      Head: Normocephalic and atraumatic.      Nose: Nose normal.      Mouth/Throat:      Mouth: Mucous membranes are moist.   Eyes:      Conjunctiva/sclera: Conjunctivae normal.   Cardiovascular:      Rate and Rhythm: Normal rate and regular rhythm.      Pulses: Normal pulses.      Heart sounds: Normal heart sounds. No murmur heard.  Pulmonary:      Effort: Pulmonary effort is normal.      Breath sounds: Normal breath sounds.      Comments: Decreased breath sounds on the left.   Abdominal:      General: There is distension.      Palpations: Abdomen is soft.      Tenderness: There is no abdominal tenderness. There is no guarding or rebound.      Comments: Easily reducible umbilical hernia   Musculoskeletal:      Right lower leg: Edema present.      Left lower leg: Edema present.   Skin:     General: Skin is warm and dry.      Capillary Refill: Capillary refill takes less than 2 seconds.   Neurological:      General: No focal deficit present.      Mental Status: She is alert and oriented to person, place, and time.    Psychiatric:         Mood and Affect: Mood normal.         Thought Content: Thought content normal.  Vents:     Lines/Drains/Airways       Peripherally Inserted Central Catheter Line  Duration             PICC Double Lumen 04/09/25 1457 right brachial 1 day              Drain  Duration             Female External Urinary Catheter w/ Suction 04/04/25 1941 6 days              Peripheral Intravenous Line  Duration                  Peripheral IV - Single Lumen 04/04/25 1926 20 G No Right Antecubital 6 days         Peripheral IV - Single Lumen 04/08/25 0545 20 G 1 3/4 in Anterior;Proximal;Right Upper Arm 3 days                  Significant Labs:    CBC/Anemia Profile:  Recent Labs   Lab 04/10/25  0555 04/11/25  0326   WBC 4.66 5.08   HGB 7.4* 7.2*   HCT 25.1* 22.5*   PLT 86* 90*   * 95   RDW 15.4* 15.7*        Chemistries:  Recent Labs   Lab 04/10/25  0555 04/11/25  0326    139   K 3.2* 3.2*   * 118*   CO2 16* 16*   BUN 44* 40*   CREATININE 3.9* 3.6*   CALCIUM 7.3* 7.5*   ALBUMIN 1.8* 1.8*   BILITOT 1.0 1.0   ALKPHOS 30* 29*   ALT 10 13   AST 26 34   GLUCOSE 144* 127*   MG 1.6 1.7   PHOS 3.6 3.5       All pertinent labs within the past 24 hours have been reviewed.    Significant Imaging:  I have reviewed all pertinent imaging results/findings within the past 24 hours.

## 2025-04-11 NOTE — PROGRESS NOTES
[facility]  Nephrology  Progress Note    Patient Name: Seema Gann  MRN: [id number]  Admission Date: 4/4/2025  Hospital Length of Stay: 6 days  Attending Provider: Yemi Hook MD   Primary Care Physician: Bethany Mesa MD  Principal Problem:Decompensated cirrhosis related to hepatitis C virus (HCV)    Subjective:     HPI: No notes on file    Interval History:     NAEON, AFVSS, remains on Levophed, blood pressure 106/67 mmHg, MAP ranges from 70-86.  Potassium 3.2, sodium 139 CO2 16.  Urine output 450 cc/12 hours    Review of patient's allergies indicates:  No Known Allergies  Current Facility-Administered Medications   Medication Frequency    acetaminophen tablet 650 mg Q6H PRN    dextrose 50% injection 12.5 g PRN    dextrose 50% injection 25 g PRN    glucagon (human recombinant) injection 1 mg PRN    glucose chewable tablet 16 g PRN    glucose chewable tablet 24 g PRN    heparin (porcine) injection 5,000 Units Q8H    lactulose 20 gram/30 mL solution Soln 20 g BID    melatonin tablet 6 mg Nightly PRN    mirtazapine tablet 7.5 mg QHS    naloxone 0.4 mg/mL injection 0.02 mg PRN    NORepinephrine 4 mg in sodium chloride 0.9% 250 mL infusion (premix) Continuous    ondansetron disintegrating tablet 8 mg Q8H PRN    prochlorperazine tablet 10 mg Q6H PRN    sevelamer carbonate pwpk 0.8 g TID WM    sodium chloride 0.9% flush 10 mL Q12H PRN    sodium chloride 0.9% flush 10 mL Q12H PRN       Objective:     Vital Signs (Most Recent):  Temp: 98 °F (36.7 °C) (04/11/25 1105)  Pulse: 82 (04/11/25 1305)  Resp: 19 (04/11/25 1105)  BP: 113/69 (04/11/25 1305)  SpO2: 97 % (04/11/25 1305) Vital Signs (24h Range):  Temp:  [97.6 °F (36.4 °C)-98.2 °F (36.8 °C)] 98 °F (36.7 °C)  Pulse:  [] 82  Resp:  [12-27] 19  SpO2:  [93 %-99 %] 97 %  BP: ()/(53-83) 113/69     Weight: 54 kg (119 lb 0.8 oz) (04/09/25 0528)  Body mass index is 20.43 kg/m².  Body surface area is 1.56 meters squared.    I/O last  3 completed shifts:  In: 2351.4 [P.O.:808; I.V.:1443.8; IV Piggyback:99.6]  Out: 750 [Urine:750]     Physical Exam  Constitutional:       Appearance: She is ill-appearing. She is not toxic-appearing.   HENT:      Head: Normocephalic and atraumatic.   Eyes:      Extraocular Movements: Extraocular movements intact.   Cardiovascular:      Rate and Rhythm: Normal rate.      Heart sounds: No murmur heard.  Pulmonary:      Effort: No respiratory distress.      Breath sounds: No wheezing or rales.   Abdominal:      General: There is distension.      Tenderness: There is no abdominal tenderness. There is no guarding.   Musculoskeletal:      Right lower leg: Edema present.      Left lower leg: Edema present.   Skin:     Coloration: Skin is pale.   Neurological:      Mental Status: She is oriented to person, place, and time.          Significant Labs:  ABGs:   Recent Labs   Lab 04/05/25  0657   PH 7.284   PCO2 34.1   HCO3 15.9     CBC:   Recent Labs   Lab 04/11/25  0326   WBC 5.08   RBC 2.36*   HGB 7.2*   HCT 22.5*   PLT 90*   MCV 95   MCH 30.5   MCHC 32.0     CMP:   Recent Labs   Lab 04/11/25  0326   CALCIUM 7.5*   ALBUMIN 1.8*      K 3.2*   CO2 16*   *   BUN 40*   CREATININE 3.6*   ALKPHOS 29*   ALT 13   AST 34   BILITOT 1.0     LFTs:   Recent Labs   Lab 04/11/25  0326   ALT 13   AST 34   ALKPHOS 29*   BILITOT 1.0   ALBUMIN 1.8*     Recent Labs   Lab 04/06/25  1137   COLORU Yellow   SPECGRAV 1.020   PHUR 5.0   PROTEINUA Negative   NITRITE Negative   LEUKOCYTESUR Negative   UROBILINOGEN Negative       Assessment/Plan:     Renal/  SHARA (acute kidney injury)  SHARA (acute kidney injury)        79 y.o. female ,is admitted on 4/4/2025  with past medical history of decompensated HCV cirrhosis with recurrent ascites requiring frequent large volume paracentesis q2wk.Admitted with vomiting and diarrhea Nephrology  is consulted for concerns of HRS        Assessment and plan     SHARA KDIGO stage 3 on CKD 4    Baseline  Creatinine: 2.5-2.8  Creatinine at time of consult: 4.4  Tests done:UPCR 0.09  Etiology of SHARA: Likely HRS. Other causes could be hypotension and increased abdominal pressure compartment syndrome   Acid Base Disorder: metabolic acidosis with Na bicarbonate of 15        Recommendations :      - Frequent ascites fluid (paracentesis), last paracentesis April 7th with  1.5 L removed  - Avoid hypotension, last midodrine dose April 7th, currently on lactulose,  - last albumin dose April 6 th, current albumin level 1.8,   - Urine lytes, urine sodium less than 10, urine chloride less than 20, urine osmolality 331, urine protein creatinine ratio 0.09  - Patient refused hemodialysis, family honored her wishes  - Paracentesis as indicated per primary team  - Monitor serum chemistries daily,   - Today's potassium 3.2 -received IV potassium 40 mEq 9:08 a.m. Please replace and maintain within normal range (3.5-5)  - Strict intake and output Qshift, daily weights if able.  - Avoid nephrotoxic medications (NSAID, IV contrast)  - Maintain MAP > 85 currently on Levophed  - Transfuse for Hb <7           Thank you for your consult. I will follow-up with patient. Please contact us if you have any additional questions.    Nilton Coon MD  Nephrology  [facility] - Medical ICU

## 2025-04-11 NOTE — PROGRESS NOTES
[facility]  Discharge Reassessment    Primary Care Provider: Bethany Mesa MD    Expected Discharge Date: 4/15/2025    Reassessment (most recent)       Discharge Reassessment - 04/11/25 1424          Discharge Reassessment    Assessment Type Discharge Planning Reassessment     Did the patient's condition or plan change since previous assessment? No     Discharge Plan discussed with: Patient;Sibling;Adult children   Discussed with pt and younger sister at bedside. CM phoned natalie Gregory with pt's permission.    Discharge Plan A Hospice/home     Discharge Plan B Home with family;Home Health     DME Needed Upon Discharge  wheelchair   TBD    Transition of Care Barriers None     Why the patient remains in the hospital Requires continued medical care        Post-Acute Status    Post-Acute Authorization Hospice     Hospice Status Referrals Sent     Discharge Delays None known at this time                     Per MDR, pt has decided to discharge with hospice. Met with pt to discuss her decision and clarified that she wants to go home with hospice services. This writer phoned natalie Gregory with pt's permission.  List sent to Bri via email [email or web address].      Patient/family provided list of facilities in-network with patient's payor plan.  Providers that are owned, operated, or affiliated with [facility] are included on the list.     Notified that referral sent to below listed facilities from list based on proximity to home/family support:     [facility] at [address], will have a  reach out to family and phone pt's nurse for additional information.  2.  [facility] at [address], address is outside of service area  3.  [facility] at [address] will send message to [employer] office requesting that they call this writer        regarding the referral. 1605 Megan present to speak to pt and natalie Gregory.   4.   5.    Patient/family instructed to identify preference.      Preferred  Facility: no preference    If an additional preferred facility not listed above is identified, additional referral to be sent.  If above facilities unable to accept, will send additional referrals to in-network providers.

## 2025-04-11 NOTE — SUBJECTIVE & OBJECTIVE
Interval History:     JARAD, JAYNEVSS, remains on Levophed, blood pressure 106/67 mmHg, MAP ranges from 70-86.  Potassium 3.2, sodium 139 CO2 16.  Urine output 450 cc/12 hours    Review of patient's allergies indicates:  No Known Allergies  Current Facility-Administered Medications   Medication Frequency    acetaminophen tablet 650 mg Q6H PRN    dextrose 50% injection 12.5 g PRN    dextrose 50% injection 25 g PRN    glucagon (human recombinant) injection 1 mg PRN    glucose chewable tablet 16 g PRN    glucose chewable tablet 24 g PRN    heparin (porcine) injection 5,000 Units Q8H    lactulose 20 gram/30 mL solution Soln 20 g BID    melatonin tablet 6 mg Nightly PRN    mirtazapine tablet 7.5 mg QHS    naloxone 0.4 mg/mL injection 0.02 mg PRN    NORepinephrine 4 mg in sodium chloride 0.9% 250 mL infusion (premix) Continuous    ondansetron disintegrating tablet 8 mg Q8H PRN    prochlorperazine tablet 10 mg Q6H PRN    sevelamer carbonate pwpk 0.8 g TID WM    sodium chloride 0.9% flush 10 mL Q12H PRN    sodium chloride 0.9% flush 10 mL Q12H PRN       Objective:     Vital Signs (Most Recent):  Temp: 98 °F (36.7 °C) (04/11/25 1105)  Pulse: 82 (04/11/25 1305)  Resp: 19 (04/11/25 1105)  BP: 113/69 (04/11/25 1305)  SpO2: 97 % (04/11/25 1305) Vital Signs (24h Range):  Temp:  [97.6 °F (36.4 °C)-98.2 °F (36.8 °C)] 98 °F (36.7 °C)  Pulse:  [] 82  Resp:  [12-27] 19  SpO2:  [93 %-99 %] 97 %  BP: ()/(53-83) 113/69     Weight: 54 kg (119 lb 0.8 oz) (04/09/25 0528)  Body mass index is 20.43 kg/m².  Body surface area is 1.56 meters squared.    I/O last 3 completed shifts:  In: 2351.4 [P.O.:808; I.V.:1443.8; IV Piggyback:99.6]  Out: 750 [Urine:750]     Physical Exam  Constitutional:       Appearance: She is ill-appearing. She is not toxic-appearing.   HENT:      Head: Normocephalic and atraumatic.   Eyes:      Extraocular Movements: Extraocular movements intact.   Cardiovascular:      Rate and Rhythm: Normal rate.      Heart  sounds: No murmur heard.  Pulmonary:      Effort: No respiratory distress.      Breath sounds: No wheezing or rales.   Abdominal:      General: There is distension.      Tenderness: There is no abdominal tenderness. There is no guarding.   Musculoskeletal:      Right lower leg: Edema present.      Left lower leg: Edema present.   Skin:     Coloration: Skin is pale.   Neurological:      Mental Status: She is oriented to person, place, and time.          Significant Labs:  ABGs:   Recent Labs   Lab 04/05/25  0657   PH 7.284   PCO2 34.1   HCO3 15.9     CBC:   Recent Labs   Lab 04/11/25  0326   WBC 5.08   RBC 2.36*   HGB 7.2*   HCT 22.5*   PLT 90*   MCV 95   MCH 30.5   MCHC 32.0     CMP:   Recent Labs   Lab 04/11/25  0326   CALCIUM 7.5*   ALBUMIN 1.8*      K 3.2*   CO2 16*   *   BUN 40*   CREATININE 3.6*   ALKPHOS 29*   ALT 13   AST 34   BILITOT 1.0     LFTs:   Recent Labs   Lab 04/11/25  0326   ALT 13   AST 34   ALKPHOS 29*   BILITOT 1.0   ALBUMIN 1.8*     Recent Labs   Lab 04/06/25  1137   COLORU Yellow   SPECGRAV 1.020   PHUR 5.0   PROTEINUA Negative   NITRITE Negative   LEUKOCYTESUR Negative   UROBILINOGEN Negative

## 2025-04-11 NOTE — EICU
Virtual ICU Quality Rounds    Admit Date: 4/4/2025  Hospital Day: 6    ICU Day: 5d 13h    VICU Surveillance Screening    Daily review of  line necessity(optional): Central line(s) in place    Central line type (optional): PICC    Central line indications : Vasopressors (in past 24/hrs)        LDA reconciliation : Yes

## 2025-04-11 NOTE — ASSESSMENT & PLAN NOTE
Continue Midodrine  Has been running low at home and aldactone/Lasix were recently discontinued outpatient   Hepatology consulted  Nephrology consulted  -4/7 recommendations - Consider albumin and IV fluids: pt refusing dialysis  Transfuse for Hb <7  MAP goals 80-85 for HRS

## 2025-04-12 NOTE — PLAN OF CARE
No response from epic message sent to [facility]. Left patient's daughter, Barbra Hermosillo, a voice message requesting a return call. Will continue to follow-up.      Isabel Toure RN  Weekend  - [facility]  [phone]

## 2025-04-12 NOTE — ASSESSMENT & PLAN NOTE
Last diagnostic/therapeutic paracentesis on 3/26 with removal of 4.6L. Negative for SBP.      MELD 3.0: 21 at 4/12/2025  3:56 AM  MELD-Na: 20 at 4/12/2025  3:56 AM  Calculated from:  Serum Creatinine: 3.3 mg/dL (Using max of 3 mg/dL) at 4/12/2025  3:56 AM  Serum Sodium: 139 mmol/L (Using max of 137 mmol/L) at 4/12/2025  3:56 AM  Total Bilirubin: 0.8 mg/dL (Using min of 1 mg/dL) at 4/12/2025  3:56 AM  Serum Albumin: 1.8 g/dL at 4/12/2025  3:56 AM  INR(ratio): 1.2 at 4/12/2025  3:56 AM  Age at listing (hypothetical): 79 years  Sex: Female at 4/12/2025  3:56 AM    - Continue home meds. Etiology likely related to chronic HCV hepatitis and HCC s/p Y90 .   - Avoid any hepatotoxic meds   - Daily CBC, CMP, INR  - Ammonia 49, pt is mentating appropriately. Restarting lactulose  - paracentesis performed 4/6  - f/u ascitic studies when collected (albumin, total protein, cell count, LDH, culture, stain)   - Na restriction <2g  - Has received 1g rocephin for SBP ppx. As now with increasing lactic and persistent hypotension, started on broad spectrum abx, de-escalate as indicated  - monitor mental status; q4h Neuro checks.   - Hepatology following    4/7  -ascitic fluid SAAG?1.1. portal hypertension likely cause; No organisms seen. Patient is not a liver transplant candidate given she does not want invasive procedures and escalation of care.    - along those lines, patient and family wish to transition goals of care to prioritize quality of life and comfort. Patient requests to enroll with home hospice services. Working with CM to arrange.

## 2025-04-12 NOTE — CLINICAL REVIEW
Intake/Output Summary (Last 24 hours) at 4/12/2025 1208  Last data filed at 4/12/2025 1116  Gross per 24 hour   Intake 1195.54 ml   Output 200 ml   Net 995.54 ml       Vitals:    04/12/25 0800 04/12/25 0900 04/12/25 1000 04/12/25 1100   BP: 111/68 108/68 126/69 107/65   BP Location:    Left arm   Patient Position:    Lying   Pulse: 75 90 98 80   Resp: 15 20 20 19   Temp:    98 °F (36.7 °C)   TempSrc:    Oral   SpO2: 96% 96% 95% 96%   Weight:       Height:           Recent Labs   Lab 04/10/25  0555 04/11/25  0326 04/12/25  0356    139 139   K 3.2* 3.2* 4.0   * 118* 119*   CO2 16* 16* 16*   BUN 44* 40* 38*   CREATININE 3.9* 3.6* 3.3*   GLUCOSE 144* 127* 131*   CALCIUM 7.3* 7.5* 7.3*   PHOS 3.6 3.5 3.5     SHARA on CKD4 - (BL Cr 2.5-2.8) secondary to prolonged prerenal/hemodynamic instability. HRS?  Decompensated HCV liver cirrhosis   Shock  Hyperphosphatemia  Hypoalbuminemia  L pleural effusion  Non anion gap metabolic acidosis     -patient declined dialysis.   -Cultures negative. Echo reviewed. Concern for HRS. MAP has been 80-90 for last 24 hrs w improvement in Cr to 3.3 today.   -levo 0.18  -sevelamer 800 tid    No other related issues identified. Please call Nephrology as needed; We will continue to follow.

## 2025-04-12 NOTE — ASSESSMENT & PLAN NOTE
Creatinine stable for now. BMP reviewed- noted Estimated Creatinine Clearance: 10 mL/min (A) (based on SCr of 3.9 mg/dL (H)). according to latest data. Based on current GFR, CKD stage is stage 4 - GFR 15-29.  Monitor UOP and serial BMP and adjust therapy as needed. Renally dose meds. Avoid nephrotoxic medications and procedures.     Creatinine 4.0 on admit, baseline around 2.5-2.8.  Likely secondary to pre-renal etiology from dehydration and volume losses given vomiting/diarrhea and hypotension. There are concerns for hepatorenal syndrome.      - Strict I&Os and daily weights   - Gentle IVF  - Avoid nephrotoxic agents such as NSAIDs, gadolinium, and IV radiocontrast  - Renally dose meds to current GFR  - Urine Na <10, Urine Osm > serum osm, and FeNa suggestive of pre-renal etiology. Has not received any diuretics, likely consistent with HRS  - continue midodrine 15 mg PO TID (d/c on 4/7)  - Levo for MAP goals of 80-85  - Creatinine starting to improve on 4/12.

## 2025-04-12 NOTE — EICU
Intervention Initiated From:  [facility] / EICU      Comments: Virtual ICU Quality Rounds    Admit Date: 4/4/2025  Hospital Day: 7    ICU Day: 6d 12h    24H Vital Sign Range:  Temp:  [97.8 °F (36.6 °C)-98.3 °F (36.8 °C)]   Pulse:  []   Resp:  [13-33]   BP: ()/(57-76)   SpO2:  [95 %-98 %]     VICU Surveillance Screening    Daily review of  line necessity(optional): Central line(s) in place    Central line type (optional): PICC    Central line indications : Poor IV access, Vasopressors (in past 24/hrs), and Vesicants        LDA reconciliation : Yes    Nursing orders placed : IP YADIRA Central Line Care

## 2025-04-12 NOTE — PROGRESS NOTES
[facility] Medical [facility]  Critical Care Medicine  Progress Note    Patient Name: Seema Gann  MRN: [id number]  Admission Date: 4/4/2025  Hospital Length of Stay: 7 days  Code Status: DNR  Attending Provider: Yemi Hook MD  Primary Care Provider: Bethany Mesa MD   Principal Problem: Decompensated cirrhosis related to hepatitis C virus (HCV)    Subjective:     HPI:  Ms. Gann is a 79 y.o. F with a PMHx of HTN, decompensated HCV cirrhosis with recurrent ascites requiring large volume paracentesis approximately every 2 weeks, thrombocytopenia, hepatocellular carcinoma s/p Y90 x2, CKD IV (baseline Cr 2.5-2.8) who initially presented to [facility] ED on 4/4 for n/v/d x1 day. She had over 6 episodes of NBNB emesis and 3 episodes of non-bloody diarrhea. Last IR paracentesis was on 3/26 and 4.6L were removed, no evidence of SBP on prior fluid studies.     In the ED, pt was hypotensive (BP 86/50) otherwise vitals WNL. Lab work remarkable for Na 135, Bicarb 15, BUN 41/Cr 4. Initial lactic acid 4.5. She received 1L NS and 5mg midodrine with improvement in BP. CTAP with liver cirrhosis, portal hypertension, large volume of ascites, and moderate L pleural effusions. She was admitted to  for further management of decompensated cirrhosis and concern for hepatorenal syndrome.     [facility] consulted on 4/5 for increasing lactic acid (7.0), hypotension requiring levophed, and concern for sepsis. Currently receiving Albumin 25% 25g q8h, midodrine 15mg TID, octreotide 100mcg q8h, Zosyn, and Vancomycin.      Pt admitted to [facility].     Hospital/ICU Course:  Stepped up to [facility] on 4/5 for worsening lactic acidosis and concern for HRS requiring MAP goals. Started on levo for MAP goal >85. Nephrology and hepatology consulted. Nephrology offered dialysis, which she refused. Cr continuing to worsen. Restarted home lactulose. Repeat CXR worsened, likely worsened volume overload following albumin administration. However, gradually with  some improvement in urine output while being on levophed, however renal recovery remains very slow and modest. On 4/11, patient and her family expressed desire to transition goals of care to being comfort focused. Along those lines, they expressed desire to pursue home with hospice. Working to arrange home hospice agency for patient. Creatinine starting to improve on 4/12.    Interval History/Significant Events: No acute events overnight. Patient reports good sleep overnight. Feels fine today.  at bedside. Engaged patient and  with discussion regarding goals of care, and both of them reiterate their desire to shift focus to preserving quality of life rather than aggressive invasive medical therapies. Working with CM to arrange for home hospice agencies agreeable to the pt and her family.     Review of Systems   Constitutional:  Negative for chills and fever.   HENT:  Negative for congestion and sore throat.    Eyes:  Negative for photophobia and visual disturbance.   Respiratory:  Negative for cough and shortness of breath.    Cardiovascular:  Negative for chest pain and palpitations.   Gastrointestinal:  Negative for abdominal pain and diarrhea.   Genitourinary:  Negative for dysuria and hematuria.   Musculoskeletal:  Negative for arthralgias and gait problem.   Skin:  Negative for rash and wound.   Neurological:  Negative for syncope and headaches.   Psychiatric/Behavioral:  Negative for agitation and behavioral problems.      Objective:     Vital Signs (Most Recent):  Temp: 98 °F (36.7 °C) (04/12/25 1100)  Pulse: 88 (04/12/25 1400)  Resp: 20 (04/12/25 1400)  BP: 110/72 (04/12/25 1400)  SpO2: 96 % (04/12/25 1400) Vital Signs (24h Range):  Temp:  [97.8 °F (36.6 °C)-98.3 °F (36.8 °C)] 98 °F (36.7 °C)  Pulse:  [] 88  Resp:  [13-33] 20  SpO2:  [95 %-98 %] 96 %  BP: ()/(57-76) 110/72   Weight: 54 kg (119 lb 0.8 oz)  Body mass index is 20.43 kg/m².      Intake/Output Summary (Last 24 hours) at  "4/12/2025 1449  Last data filed at 4/12/2025 1426  Gross per 24 hour   Intake 1410.66 ml   Output 200 ml   Net 1210.66 ml          Physical Exam  Constitutional:       Appearance: She is ill-appearing. She is not toxic-appearing.   HENT:      Head: Normocephalic and atraumatic.   Eyes:      Extraocular Movements: Extraocular movements intact.   Cardiovascular:      Rate and Rhythm: Normal rate.      Heart sounds: No murmur heard.  Pulmonary:      Effort: No respiratory distress.      Breath sounds: No wheezing or rales.   Abdominal:      General: There is distension.      Tenderness: There is no abdominal tenderness. There is no guarding.   Musculoskeletal:      Right lower leg: Edema present.      Left lower leg: Edema present.   Skin:     Coloration: Skin is pale.   Neurological:      Mental Status: She is oriented to person, place, and time.            Vents:     Lines/Drains/Airways       Drain  Duration             Female External Urinary Catheter w/ Suction 04/04/25 1941 7 days              Peripheral Intravenous Line  Duration                  Peripheral IV - Single Lumen 04/08/25 0545 20 G 1 3/4 in Anterior;Proximal;Right Upper Arm 4 days                  Significant Labs:    CBC/Anemia Profile:  Recent Labs   Lab 04/11/25  0326 04/12/25  0356   WBC 5.08 5.85   HGB 7.2* 7.2*   HCT 22.5* 23.2*   PLT 90* 92*   MCV 95 98   RDW 15.7* 16.6*        Chemistries:  Recent Labs   Lab 04/11/25  0326 04/12/25  0356    139   K 3.2* 4.0   * 119*   CO2 16* 16*   BUN 40* 38*   CREATININE 3.6* 3.3*   CALCIUM 7.5* 7.3*   ALBUMIN 1.8* 1.8*   BILITOT 1.0 0.8   ALKPHOS 29* 31*   ALT 13 13   AST 34 25   GLUCOSE 127* 131*   MG 1.7 2.0   PHOS 3.5 3.5       All pertinent labs within the past 24 hours have been reviewed.    Significant Imaging:  I have reviewed all pertinent imaging results/findings within the past 24 hours.    ABG  No results for input(s): "PH", "PO2", "PCO2", "HCO3", "BE" in the last 168 " hours.  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   Currently asymptomatic and satting well on RA.   -4/8 CT chest non con: Bilateral pleural effusions, larger on the left with complete collapse of the left lower lobe.   -4/9 No septations on bedside ultrasound, pt breathing comfortably on room air. Consider PleurX if pt decides to continue with Hospice       Cardiac/Vascular  Bradycardia  Chronic. Intermittent asx bradycardia to 30s-40s. H/o LAFB, RBBB.  - continue to monitor on telemetry.   - avoid lazaro blockade    Hypotension  Continue Midodrine  Has been running low at home and aldactone/Lasix were recently discontinued outpatient   Hepatology consulted  Nephrology consulted  -4/7 recommendations - Consider albumin and IV fluids: pt refusing dialysis  Transfuse for Hb <7  MAP goals 80-85 for HRS    Elevated troponin  - 238 -> 228, currently with no signs or symptoms of ACS, likely demand ischemia   - TTE performed 4/6 without systolic or diastolic dysfunction.    Renal/  Uterine prolapse  Large pelvic floor prolapse noted on CT.   - chronic, occurs when ascites builds, improves with paracentesis    Lactic acidosis  Lactic acid 4.5 on admission, initially decreased to 3.6 though increased to 7 again  Blood cx NGTD  Continue BS abx  Q6h lactic acid    Hepatorenal syndrome  Creatinine stable for now. BMP reviewed- noted Estimated Creatinine Clearance: 10 mL/min (A) (based on SCr of 3.9 mg/dL (H)). according to latest data. Based on current GFR, CKD stage is stage 4 - GFR 15-29.  Monitor UOP and serial BMP and adjust therapy as needed. Renally dose meds. Avoid nephrotoxic medications and procedures.     Creatinine 4.0 on admit, baseline around 2.5-2.8.  Likely secondary to pre-renal etiology from dehydration and volume losses given vomiting/diarrhea and hypotension. There are concerns for hepatorenal syndrome.     "  - Strict I&Os and daily weights   - Gentle IVF  - Avoid nephrotoxic agents such as NSAIDs, gadolinium, and IV radiocontrast  - Renally dose meds to current GFR  - Urine Na <10, Urine Osm > serum osm, and FeNa suggestive of pre-renal etiology. Has not received any diuretics, likely consistent with HRS  - continue midodrine 15 mg PO TID (d/c on 4/7)  - Levo for MAP goals of 80-85  - Creatinine starting to improve on 4/12.    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 Negative for SBP.      MELD 3.0: 21 at 4/12/2025  3:56 AM  MELD-Na: 20 at 4/12/2025  3:56 AM  Calculated from:  Serum Creatinine: 3.3 mg/dL (Using max of 3 mg/dL) at 4/12/2025  3:56 AM  Serum Sodium: 139 mmol/L (Using max of 137 mmol/L) at 4/12/2025  3:56 AM  Total Bilirubin: 0.8 mg/dL (Using min of 1 mg/dL) at 4/12/2025  3:56 AM  Serum Albumin: 1.8 g/dL at 4/12/2025  3:56 AM  INR(ratio): 1.2 at 4/12/2025  3:56 AM  Age at listing (hypothetical): 79 years  Sex: Female at 4/12/2025  3:56 AM    - Continue home meds. Etiology likely related to chronic HCV hepatitis and HCC s/p Y90 .   - Avoid any hepatotoxic meds   - Daily CBC, CMP, INR  - Ammonia 49, pt is mentating appropriately. Restarting lactulose  - paracentesis performed 4/6  - f/u ascitic studies when collected (albumin, total protein, cell count, LDH, culture, stain)   - Na restriction <2g  - Has received 1g rocephin for SBP ppx. As now with increasing lactic and persistent hypotension, started on broad spectrum abx, de-escalate as indicated  - monitor mental status; q4h Neuro checks.   - Hepatology following    4/7  -ascitic " fluid SAAG?1.1. portal hypertension likely cause; No organisms seen. Patient is not a liver transplant candidate given she does not want invasive procedures and escalation of care.    - along those lines, patient and family wish to transition goals of care to prioritize quality of life and comfort. Patient requests to enroll with home hospice services. Working with CM to arrange.    Nausea vomiting and diarrhea  - antiemetics PRN  - C diff negative       Chronic hepatitis C with cirrhosis  See decompensated cirrhosis        Critical Care Daily Checklist:    A: Awake: RASS Goal/Actual Goal:    Actual:     B: Spontaneous Breathing Trial Performed?     C: SAT & SBT Coordinated?  N/a                      D: Delirium: CAM-ICU     E: Early Mobility Performed? Yes   F: Feeding Goal: Goals: Meet % een/epn by next RD f/u  Status: Nutrition Goal Status: progressing towards goal   Current Diet Order   Procedures    Diet Low Sodium, 2gm      AS: Analgesia/Sedation None   T: Thromboembolic Prophylaxis SQ heparin   H: HOB > 300 Yes   U: Stress Ulcer Prophylaxis (if needed) None needed   G: Glucose Control 140-180 goal   B: Bowel Function Stool Occurrence: 1   I: Indwelling Catheter (Lines & Stephens) Necessity PIVx2, R PICC   D: De-escalation of Antimicrobials/Pharmacotherapies N/a    Plan for the day/ETD Continue working with CM to select suitable home hospice agency    Code Status:  Family/Goals of Care: DNR         Critical secondary to Patient has a condition that poses threat to life and bodily function: Acute Renal Failure and HRS requiring levophed      Critical care was time spent personally by me on the following activities: development of treatment plan with patient or surrogate and bedside caregivers, discussions with consultants, evaluation of patient's response to treatment, examination of patient, ordering and performing treatments and interventions, ordering and review of laboratory studies, ordering and review  of radiographic studies, pulse oximetry, re-evaluation of patient's condition. This critical care time did not overlap with that of any other provider or involve time for any procedures.     Cresencio Thompson MD  Critical Care Medicine  [facility]

## 2025-04-12 NOTE — SUBJECTIVE & OBJECTIVE
Interval History:     overnight remained stable and received total of 1u prbc. Given antiemetic without further hematemesis.  Remain hypotensive, blood pressure 82/53, despite being on Levophed, urine output 100 cc- transitioning to comfort care      Review of patient's allergies indicates:  No Known Allergies  Current Facility-Administered Medications   Medication Frequency    acetaminophen tablet 650 mg Q6H PRN    dextrose 50% injection 12.5 g PRN    dextrose 50% injection 25 g PRN    glucagon (human recombinant) injection 1 mg PRN    glucose chewable tablet 16 g PRN    glucose chewable tablet 24 g PRN    heparin (porcine) injection 5,000 Units Q8H    lactulose 20 gram/30 mL solution Soln 20 g BID    melatonin tablet 6 mg Nightly PRN    mirtazapine tablet 7.5 mg QHS    naloxone 0.4 mg/mL injection 0.02 mg PRN    NORepinephrine 4 mg in sodium chloride 0.9% 250 mL infusion (premix) Continuous    ondansetron disintegrating tablet 8 mg Q8H PRN    prochlorperazine tablet 10 mg Q6H PRN    sevelamer carbonate pwpk 0.8 g TID WM    sodium chloride 0.9% flush 10 mL Q12H PRN    sodium chloride 0.9% flush 10 mL Q12H PRN       Objective:     Vital Signs (Most Recent):  Temp: 98.2 °F (36.8 °C) (04/12/25 0301)  Pulse: 87 (04/12/25 0530)  Resp: 13 (04/12/25 0530)  BP: 113/67 (04/12/25 0530)  SpO2: 97 % (04/12/25 0530) Vital Signs (24h Range):  Temp:  [97.6 °F (36.4 °C)-98.3 °F (36.8 °C)] 98.2 °F (36.8 °C)  Pulse:  [] 87  Resp:  [13-33] 13  SpO2:  [93 %-98 %] 97 %  BP: ()/(57-76) 113/67     Weight: 54 kg (119 lb 0.8 oz) (04/09/25 0528)  Body mass index is 20.43 kg/m².  Body surface area is 1.56 meters squared.    I/O last 3 completed shifts:  In: 2018.9 [P.O.:808; I.V.:1210.9]  Out: 550 [Urine:550]     Physical Exam  Constitutional:       Appearance: She is ill-appearing. She is not toxic-appearing.   HENT:      Head: Normocephalic and atraumatic.   Eyes:      Extraocular Movements: Extraocular movements intact.    Cardiovascular:      Rate and Rhythm: Normal rate.      Heart sounds: No murmur heard.  Pulmonary:      Effort: No respiratory distress.      Breath sounds: No wheezing or rales.   Abdominal:      General: There is distension.      Tenderness: There is no abdominal tenderness. There is no guarding.   Musculoskeletal:      Right lower leg: Edema present.      Left lower leg: Edema present.   Skin:     Coloration: Skin is pale.   Neurological:      Mental Status: She is oriented to person, place, and time.          Significant Labs:  ABGs:   Recent Labs   Lab 04/05/25  0657   PH 7.284   PCO2 34.1   HCO3 15.9     CBC:   Recent Labs   Lab 04/12/25  0356   WBC 5.85   RBC 2.37*   HGB 7.2*   HCT 23.2*   PLT 92*   MCV 98   MCH 30.4   MCHC 31.0*     CMP:   Recent Labs   Lab 04/12/25  0356   CALCIUM 7.3*   ALBUMIN 1.8*      K 4.0   CO2 16*   *   BUN 38*   CREATININE 3.3*   ALKPHOS 31*   ALT 13   AST 25   BILITOT 0.8     LFTs:   Recent Labs   Lab 04/12/25  0356   ALT 13   AST 25   ALKPHOS 31*   BILITOT 0.8   ALBUMIN 1.8*     Recent Labs   Lab 04/06/25  1137   COLORU Yellow   SPECGRAV 1.020   PHUR 5.0   PROTEINUA Negative   NITRITE Negative   LEUKOCYTESUR Negative   UROBILINOGEN Negative

## 2025-04-12 NOTE — SUBJECTIVE & OBJECTIVE
Interval History/Significant Events: No acute events overnight. Patient reports good sleep overnight. Feels fine today.  at bedside. Engaged patient and  with discussion regarding goals of care, and both of them reiterate their desire to shift focus to preserving quality of life rather than aggressive invasive medical therapies. Working with CM to arrange for home hospice agencies agreeable to the pt and her family.     Review of Systems   Constitutional:  Negative for chills and fever.   HENT:  Negative for congestion and sore throat.    Eyes:  Negative for photophobia and visual disturbance.   Respiratory:  Negative for cough and shortness of breath.    Cardiovascular:  Negative for chest pain and palpitations.   Gastrointestinal:  Negative for abdominal pain and diarrhea.   Genitourinary:  Negative for dysuria and hematuria.   Musculoskeletal:  Negative for arthralgias and gait problem.   Skin:  Negative for rash and wound.   Neurological:  Negative for syncope and headaches.   Psychiatric/Behavioral:  Negative for agitation and behavioral problems.      Objective:     Vital Signs (Most Recent):  Temp: 98 °F (36.7 °C) (04/12/25 1100)  Pulse: 88 (04/12/25 1400)  Resp: 20 (04/12/25 1400)  BP: 110/72 (04/12/25 1400)  SpO2: 96 % (04/12/25 1400) Vital Signs (24h Range):  Temp:  [97.8 °F (36.6 °C)-98.3 °F (36.8 °C)] 98 °F (36.7 °C)  Pulse:  [] 88  Resp:  [13-33] 20  SpO2:  [95 %-98 %] 96 %  BP: ()/(57-76) 110/72   Weight: 54 kg (119 lb 0.8 oz)  Body mass index is 20.43 kg/m².      Intake/Output Summary (Last 24 hours) at 4/12/2025 1449  Last data filed at 4/12/2025 1426  Gross per 24 hour   Intake 1410.66 ml   Output 200 ml   Net 1210.66 ml          Physical Exam  Constitutional:       Appearance: She is ill-appearing. She is not toxic-appearing.   HENT:      Head: Normocephalic and atraumatic.   Eyes:      Extraocular Movements: Extraocular movements intact.   Cardiovascular:      Rate and  Rhythm: Normal rate.      Heart sounds: No murmur heard.  Pulmonary:      Effort: No respiratory distress.      Breath sounds: No wheezing or rales.   Abdominal:      General: There is distension.      Tenderness: There is no abdominal tenderness. There is no guarding.   Musculoskeletal:      Right lower leg: Edema present.      Left lower leg: Edema present.   Skin:     Coloration: Skin is pale.   Neurological:      Mental Status: She is oriented to person, place, and time.            Vents:     Lines/Drains/Airways       Drain  Duration             Female External Urinary Catheter w/ Suction 04/04/25 1941 7 days              Peripheral Intravenous Line  Duration                  Peripheral IV - Single Lumen 04/08/25 0545 20 G 1 3/4 in Anterior;Proximal;Right Upper Arm 4 days                  Significant Labs:    CBC/Anemia Profile:  Recent Labs   Lab 04/11/25 0326 04/12/25  0356   WBC 5.08 5.85   HGB 7.2* 7.2*   HCT 22.5* 23.2*   PLT 90* 92*   MCV 95 98   RDW 15.7* 16.6*        Chemistries:  Recent Labs   Lab 04/11/25 0326 04/12/25  0356    139   K 3.2* 4.0   * 119*   CO2 16* 16*   BUN 40* 38*   CREATININE 3.6* 3.3*   CALCIUM 7.5* 7.3*   ALBUMIN 1.8* 1.8*   BILITOT 1.0 0.8   ALKPHOS 29* 31*   ALT 13 13   AST 34 25   GLUCOSE 127* 131*   MG 1.7 2.0   PHOS 3.5 3.5       All pertinent labs within the past 24 hours have been reviewed.    Significant Imaging:  I have reviewed all pertinent imaging results/findings within the past 24 hours.

## 2025-04-13 NOTE — NURSING
Pt vomited 200 ml blood at 1730. Team bedside. H&H dropped 6.2/19.7. Type and screen sent. Unit of blood released.

## 2025-04-13 NOTE — SUBJECTIVE & OBJECTIVE
Interval History/Significant Events: overnight felt some chest pain. Pt reported to o/n resident that even if this is ACS, she does not want any intervention. Troponin was collected and was 217 which is similar to prior value. Pt was given morphine.     Review of Systems  Objective:     Vital Signs (Most Recent):  Temp: 98.4 °F (36.9 °C) (04/13/25 0301)  Pulse: 82 (04/13/25 0500)  Resp: 12 (04/13/25 0500)  BP: 112/67 (04/13/25 0500)  SpO2: 95 % (04/13/25 0500) Vital Signs (24h Range):  Temp:  [98 °F (36.7 °C)-98.7 °F (37.1 °C)] 98.4 °F (36.9 °C)  Pulse:  [65-98] 82  Resp:  [12-28] 12  SpO2:  [95 %-97 %] 95 %  BP: (100-132)/(64-77) 112/67   Weight: 54 kg (119 lb 0.8 oz)  Body mass index is 20.43 kg/m².      Intake/Output Summary (Last 24 hours) at 4/13/2025 0732  Last data filed at 4/12/2025 1846  Gross per 24 hour   Intake 716.54 ml   Output 200 ml   Net 516.54 ml          Physical Exam     Physical Exam  Constitutional:       Appearance: She is ill-appearing. She is not toxic-appearing.   HENT:      Head: Normocephalic and atraumatic.   Eyes:      Extraocular Movements: Extraocular movements intact.   Cardiovascular:      Rate and Rhythm: Normal rate.      Heart sounds: No murmur heard.  Pulmonary:      Effort: No respiratory distress.      Breath sounds: No wheezing or rales.   Abdominal:      General: There is distension.      Tenderness: There is no abdominal tenderness. There is no guarding.   Musculoskeletal:      Right lower leg: Edema present.      Left lower leg: Edema present.   Skin:     Coloration: Skin is pale.   Neurological:      Mental Status: She is oriented to person, place, and time.   Vents:     Lines/Drains/Airways       Peripherally Inserted Central Catheter Line  Duration             PICC Double Lumen 04/09/25 1457 right brachial 3 days              Drain  Duration             Female External Urinary Catheter w/ Suction 04/04/25 1941 8 days                  Significant Labs:    CBC/Anemia  Profile:  Recent Labs   Lab 04/12/25  0356   WBC 5.85   HGB 7.2*   HCT 23.2*   PLT 92*   MCV 98   RDW 16.6*        Chemistries:  Recent Labs   Lab 04/12/25  0356      K 4.0   *   CO2 16*   BUN 38*   CREATININE 3.3*   CALCIUM 7.3*   ALBUMIN 1.8*   BILITOT 0.8   ALKPHOS 31*   ALT 13   AST 25   GLUCOSE 131*   MG 2.0   PHOS 3.5       All pertinent labs within the past 24 hours have been reviewed.    Significant Imaging:  I have reviewed all pertinent imaging results/findings within the past 24 hours.

## 2025-04-13 NOTE — EICU
Virtual ICU Quality Rounds    Admit Date: 4/4/2025  Hospital Day: 8    ICU Day: 7d 12h    24H Vital Sign Range:  Temp:  [97.8 °F (36.6 °C)-98.7 °F (37.1 °C)]   Pulse:  [72-98]   Resp:  [12-28]   BP: (100-132)/(64-77)   SpO2:  [95 %-97 %]     VICU Surveillance Screening    Daily review of  line necessity(optional): Central line(s) in place    Central line type (optional): PICC    LDA reconciliation : Yes

## 2025-04-13 NOTE — CLINICAL REVIEW
Intake/Output Summary (Last 24 hours) at 4/13/2025 0638  Last data filed at 4/12/2025 1846  Gross per 24 hour   Intake 780.89 ml   Output 200 ml   Net 580.89 ml       Vitals:    04/13/25 0330 04/13/25 0400 04/13/25 0430 04/13/25 0500   BP: 115/69 115/67 115/69 112/67   Pulse: 83 82 84 82   Resp: 14 14 14 12   Temp:       TempSrc:       SpO2: 96% 95% 95% 95%   Weight:       Height:           Recent Labs   Lab 04/10/25  0555 04/11/25  0326 04/12/25  0356    139 139   K 3.2* 3.2* 4.0   * 118* 119*   CO2 16* 16* 16*   BUN 44* 40* 38*   CREATININE 3.9* 3.6* 3.3*   GLUCOSE 144* 127* 131*   CALCIUM 7.3* 7.5* 7.3*   PHOS 3.6 3.5 3.5     Cr stable 3.2. She has received levophed 1 week, Cr now leveling off, not much further benefit to be gained from higher MAP goals. New MAP goal per ICU.     No other related issues identified. Please call Nephrology as needed; We will continue to follow.

## 2025-04-13 NOTE — PLAN OF CARE
Update - 4:45 PM  Spoke with patients daughter. Family selected [facility] as agency choice. Family meeting with Megan from [facility], tomorrow at 12 Noon. Updated team.       Epic message sent to [facility] requesting update. Contacted Megan at [facility], requested return call. Will follow-up.      Isabel Toure RN  Weekend  - [facility]  [phone]

## 2025-04-13 NOTE — EICU
VICU Night Rounds Checklist  24H Vital Sign Range:  Temp:  [98 °F (36.7 °C)-98.3 °F (36.8 °C)]   Pulse:  []   Resp:  [13-33]   BP: ()/(57-75)   SpO2:  [95 %-98 %]     Video rounds    Nursing orders placed : IP [facility]

## 2025-04-13 NOTE — PROGRESS NOTES
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 Gann is a 79 y.o. F with a PMHx of HTN, decompensated HCV cirrhosis with recurrent ascites requiring large volume paracentesis approximately every 2 weeks, thrombocytopenia, hepatocellular carcinoma s/p Y90 x2, CKD IV (baseline Cr 2.5-2.8) who initially presented to [facility] ED on 4/4 for n/v/d x1 day. She had over 6 episodes of NBNB emesis and 3 episodes of non-bloody diarrhea. Last IR paracentesis was on 3/26 and 4.6L were removed, no evidence of SBP on prior fluid studies.     In the ED, pt was hypotensive (BP 86/50) otherwise vitals WNL. Lab work remarkable for Na 135, Bicarb 15, BUN 41/Cr 4. Initial lactic acid 4.5. She received 1L NS and 5mg midodrine with improvement in BP. CTAP with liver cirrhosis, portal hypertension, large volume of ascites, and moderate L pleural effusions. She was admitted to  for further management of decompensated cirrhosis and concern for hepatorenal syndrome.     [facility] consulted on 4/5 for increasing lactic acid (7.0), hypotension requiring levophed, and concern for sepsis. Currently receiving Albumin 25% 25g q8h, midodrine 15mg TID, octreotide 100mcg q8h, Zosyn, and Vancomycin.      Pt admitted to [facility].     Hospital/ICU Course:  Stepped up to [facility] on 4/5 for worsening lactic acidosis and concern for HRS requiring MAP goals. Started on levo for MAP goal >85. Nephrology and hepatology consulted. Nephrology offered dialysis, which she refused. Cr continuing to worsen. Restarted home lactulose. Repeat CXR worsened, likely worsened volume overload following albumin administration. However, gradually with  some improvement in urine output while being on levophed, however renal recovery remains very slow and modest. On 4/11, patient and her family expressed desire to transition goals of care to being comfort focused. Along those lines, they expressed desire to pursue home with hospice. Working to arrange home hospice agency for patient. Creatinine starting to improve on 4/12.    Interval History/Significant Events: overnight felt some chest pain. Pt reported to o/n resident that even if this is ACS, she does not want any intervention. Troponin was collected and was 217 which is similar to prior value. Pt was given morphine.     Review of Systems  Objective:     Vital Signs (Most Recent):  Temp: 98.4 °F (36.9 °C) (04/13/25 0301)  Pulse: 82 (04/13/25 0500)  Resp: 12 (04/13/25 0500)  BP: 112/67 (04/13/25 0500)  SpO2: 95 % (04/13/25 0500) Vital Signs (24h Range):  Temp:  [98 °F (36.7 °C)-98.7 °F (37.1 °C)] 98.4 °F (36.9 °C)  Pulse:  [65-98] 82  Resp:  [12-28] 12  SpO2:  [95 %-97 %] 95 %  BP: (100-132)/(64-77) 112/67   Weight: 54 kg (119 lb 0.8 oz)  Body mass index is 20.43 kg/m².      Intake/Output Summary (Last 24 hours) at 4/13/2025 0732  Last data filed at 4/12/2025 1846  Gross per 24 hour   Intake 716.54 ml   Output 200 ml   Net 516.54 ml          Physical Exam     Physical Exam  Constitutional:       Appearance: She is ill-appearing. She is not toxic-appearing.   HENT:      Head: Normocephalic and atraumatic.   Eyes:      Extraocular Movements: Extraocular movements intact.   Cardiovascular:      Rate and Rhythm: Normal rate.      Heart sounds: No murmur heard.  Pulmonary:      Effort: No respiratory distress.      Breath sounds: No wheezing or rales.   Abdominal:      General: There is distension.      Tenderness: There is no abdominal tenderness. There is no guarding.   Musculoskeletal:      Right lower leg: Edema present.      Left lower leg: Edema present.   Skin:     Coloration: Skin is pale.   Neurological:       "Mental Status: She is oriented to person, place, and time.   Vents:     Lines/Drains/Airways       Peripherally Inserted Central Catheter Line  Duration             PICC Double Lumen 04/09/25 1457 right brachial 3 days              Drain  Duration             Female External Urinary Catheter w/ Suction 04/04/25 1941 8 days                  Significant Labs:    CBC/Anemia Profile:  Recent Labs   Lab 04/12/25  0356   WBC 5.85   HGB 7.2*   HCT 23.2*   PLT 92*   MCV 98   RDW 16.6*        Chemistries:  Recent Labs   Lab 04/12/25  0356      K 4.0   *   CO2 16*   BUN 38*   CREATININE 3.3*   CALCIUM 7.3*   ALBUMIN 1.8*   BILITOT 0.8   ALKPHOS 31*   ALT 13   AST 25   GLUCOSE 131*   MG 2.0   PHOS 3.5       All pertinent labs within the past 24 hours have been reviewed.    Significant Imaging:  I have reviewed all pertinent imaging results/findings within the past 24 hours.    ABG  No results for input(s): "PH", "PO2", "PCO2", "HCO3", "BE" in the last 168 hours.  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   Currently asymptomatic and satting well on RA.   -4/8 CT chest non con: Bilateral pleural effusions, larger on the left with complete collapse of the left lower lobe.   -4/9 No septations on bedside ultrasound, pt breathing comfortably on room air. Consider PleurX if pt decides to continue with Hospice       Cardiac/Vascular  Bradycardia  Chronic. Intermittent asx bradycardia to 30s-40s. H/o LAFB, RBBB.  - continue to monitor on telemetry.   - avoid lazaro blockade    Hypotension  Continue Midodrine  Has been running low at home and aldactone/Lasix were recently discontinued outpatient   Hepatology consulted  Nephrology consulted  -4/7 recommendations - Consider albumin and IV fluids: pt refusing dialysis  Transfuse for Hb <7  MAP goals 80-85 for HRS    Elevated troponin  - 238 -> 228, currently with no signs or " "symptoms of ACS, likely demand ischemia   - TTE performed 4/6 without systolic or diastolic dysfunction.    Renal/  Uterine prolapse  Large pelvic floor prolapse noted on CT.   - chronic, occurs when ascites builds, improves with paracentesis    Lactic acidosis  Lactic acid 4.5 on admission, initially decreased to 3.6 though increased to 7 again  Blood cx NGTD  Continue BS abx  Q6h lactic acid    Hepatorenal syndrome  Creatinine stable for now. BMP reviewed- noted Estimated Creatinine Clearance: 10 mL/min (A) (based on SCr of 3.9 mg/dL (H)). according to latest data. Based on current GFR, CKD stage is stage 4 - GFR 15-29.  Monitor UOP and serial BMP and adjust therapy as needed. Renally dose meds. Avoid nephrotoxic medications and procedures.     Creatinine 4.0 on admit, baseline around 2.5-2.8.  Likely secondary to pre-renal etiology from dehydration and volume losses given vomiting/diarrhea and hypotension. There are concerns for hepatorenal syndrome.      - Strict I&Os and daily weights   - Gentle IVF  - Avoid nephrotoxic agents such as NSAIDs, gadolinium, and IV radiocontrast  - Renally dose meds to current GFR  - Urine Na <10, Urine Osm > serum osm, and FeNa suggestive of pre-renal etiology. Has not received any diuretics, likely consistent with HRS  - continue midodrine 15 mg PO TID (d/c on 4/7)  - Levo for MAP goals of 80-85  - Creatinine starting to improve on 4/12.    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 Negative for SBP.      MELD 3.0: 21 at 4/12/2025  3:56 AM  MELD-Na: 20 at 4/12/2025  3:56 AM  Calculated from:  Serum " Creatinine: 3.3 mg/dL (Using max of 3 mg/dL) at 4/12/2025  3:56 AM  Serum Sodium: 139 mmol/L (Using max of 137 mmol/L) at 4/12/2025  3:56 AM  Total Bilirubin: 0.8 mg/dL (Using min of 1 mg/dL) at 4/12/2025  3:56 AM  Serum Albumin: 1.8 g/dL at 4/12/2025  3:56 AM  INR(ratio): 1.2 at 4/12/2025  3:56 AM  Age at listing (hypothetical): 79 years  Sex: Female at 4/12/2025  3:56 AM    - Continue home meds. Etiology likely related to chronic HCV hepatitis and HCC s/p Y90 .   - Avoid any hepatotoxic meds   - Daily CBC, CMP, INR  - Ammonia 49, pt is mentating appropriately. Restarting lactulose  - paracentesis performed 4/6  - f/u ascitic studies when collected (albumin, total protein, cell count, LDH, culture, stain)   - Na restriction <2g  - Has received 1g rocephin for SBP ppx. As now with increasing lactic and persistent hypotension, started on broad spectrum abx, de-escalate as indicated  - monitor mental status; q4h Neuro checks.   - Hepatology following    4/7  -ascitic fluid SAAG?1.1. portal hypertension likely cause; No organisms seen. Patient is not a liver transplant candidate given she does not want invasive procedures and escalation of care.    - along those lines, patient and family wish to transition goals of care to prioritize quality of life and comfort. Patient requests to enroll with home hospice services. Working with CM to arrange.    Nausea vomiting and diarrhea  - antiemetics PRN  - C diff negative       Chronic hepatitis C with cirrhosis  See decompensated cirrhosis        Critical Care Daily Checklist:    A: Awake: RASS Goal/Actual Goal:    Actual:     B: Spontaneous Breathing Trial Performed?     C: SAT & SBT Coordinated?  na                      D: Delirium: CAM-ICU     E: Early Mobility Performed? Yes   F: Feeding Goal: Goals: Meet % een/epn by next RD f/u  Status: Nutrition Goal Status: progressing towards goal   Current Diet Order   Procedures    Diet Low Sodium, 2gm      AS:  Analgesia/Sedation na   T: Thromboembolic Prophylaxis heparin   H: HOB > 300 Yes   U: Stress Ulcer Prophylaxis (if needed) no   G: Glucose Control yes   B: Bowel Function Stool Occurrence: 1   I: Indwelling Catheter (Lines & Stephens) Necessity picc   D: De-escalation of Antimicrobials/Pharmacotherapies na    Plan for the day/ETD hospice    Code Status:  Family/Goals of Care: DNR         Critical secondary to Patient has a condition that poses threat to life and bodily function: Acute Renal Failure      Critical care was time spent personally by me on the following activities: development of treatment plan with patient or surrogate and bedside caregivers, discussions with consultants, evaluation of patient's response to treatment, examination of patient, ordering and performing treatments and interventions, ordering and review of laboratory studies, ordering and review of radiographic studies, pulse oximetry, re-evaluation of patient's condition. This critical care time did not overlap with that of any other provider or involve time for any procedures.     Caro Patterson MD  Critical Care Medicine  [facility] - Medical ICU

## 2025-04-14 NOTE — PT/OT/SLP DISCHARGE
Physical Therapy Discharge Summary    Name: Seema Gann  MRN: [id number]   Principal Problem: Decompensated cirrhosis related to hepatitis C virus (HCV)     Patient Discharged from acute Physical Therapy on 2025.  Please refer to prior PT noted date on 4/10/2025 for functional status.     Assessment:     Patient appropriate for care in another setting. Pt and family moving forward with hospice.    Objective:     GOALS:   Multidisciplinary Problems       Physical Therapy Goals          Problem: Physical Therapy    Goal Priority Disciplines Outcome Interventions   Physical Therapy Goal     PT, PT/OT Progressing    Description: Goals to be met by: 25     Patient will increase functional independence with mobility by performin. Supine to sit with [place]  2. Sit to stand transfer with Stand-by Assistance  3. Bed to chair transfer with Stand-by Assistance using LRAD  4. Gait  x 100 feet with Stand-by Assistance using LRAD.   5. Ascend/descend 5 stair with bilateral Handrails Contact Guard Assistance using LRAD.   6. Stand for 10 minutes with Stand-by Assistance using LRAD                         Reasons for Discontinuation of Therapy Services  Transfer to alternate level of care.      Plan:     Patient Discharged to: plan to d/c with hospice.      2025

## 2025-04-14 NOTE — PLAN OF CARE
MICU DAILY GOALS     Family/Goals of care/Code Status   Code Status: DNR    24H Vital Sign Range  Temp:  [97.8 °F (36.6 °C)-98.4 °F (36.9 °C)]   Pulse:  []   Resp:  [13-26]   BP: ()/(61-82)   SpO2:  [92 %-100 %]      Shift Events (include procedures and significant events)   AAOx4. On room air. 1 unit PRBC given. Emesis x2. Levo titrated as tolerated.    AWAKE RASS: Goal -    Actual - RASS (Valerio Agitation-Sedation Scale): alert and calm    Restraint necessity: Not necessary   BREATHE SBT: Not intubated    Coordinate A & B, analgesics/sedatives Pain: managed   SAT: Not intubated   Delirium CAM-ICU:     Early(intubated/ Progressive (non-intubated) Mobility MOVE Screen (INTUBATED ONLY): Not intubated    Activity: Activity Management: Rolling - L1   Feeding/Nutrition Diet order: Diet/Nutrition Received: 2 gram sodium, Specialty Diet/Nutrition Received: renal diet   Thrombus DVT prophylaxis: VTE Core Measure: Pharmacological prophylaxis initiated/maintained   HOB Elevation Head of Bed (HOB) Positioning: HOB elevated   Ulcer Prophylaxis GI: no   Glucose control managed     Skin Skin assessment:     Sacrum intact/not altered? Yes  Heels intact/not altered? Yes  Surgical wound? No    CHECK ONE!   (no altered skin or altered skin) and sub boxes:  [x] No Altered Skin Integrity Present    [x]Prevention Measures Documented    [] Altered Skin Integrity Present or Discovered   [] LDA already present in EPIC, daily doc completed              [] LDA added if not already in EPIC (describe/stage wound).               [] Wound Image Taken (required on admit,                   transfer/discharge and every Tuesday)    Wound Care Consulted? No   Bowel Function diarrhea    Indwelling Catheter Necessity      PICC Double Lumen 04/09/25 1457 right brachial-Line Necessity Review: Medication caustic to vasculature     De-escalation Antibiotics No        VS and assessment per flow sheet, patient progressing towards goals as  tolerated, plan of care reviewed with patient and family, all concerns addressed.  Problem: Adult Inpatient Plan of Care  Goal: Plan of Care Review  Outcome: Progressing     Problem: Fall Injury Risk  Goal: Absence of Fall and Fall-Related Injury  Outcome: Progressing

## 2025-04-14 NOTE — ASSESSMENT & PLAN NOTE
SHARA (acute kidney injury)        79 y.o. female ,is admitted on 4/4/2025  with past medical history of decompensated HCV cirrhosis with recurrent ascites requiring frequent large volume paracentesis q2wk.Admitted with vomiting and diarrhea Nephrology  is consulted for concerns of HRS        Assessment and plan     SHARA KDIGO stage 3 on CKD 4    Baseline Creatinine: 2.5-2.8  Creatinine at time of consult: 4.4  Tests done:UPCR 0.09  Etiology of SHARA: Likely HRS. Other causes- abdominal compartment syndrome   Acid Base Disorder: metabolic acidosis with Na bicarbonate of 14        Recommendations :      - Frequent ascites fluid (paracentesis), last paracentesis April 7th with  1.5 L removed  - Avoid hypotension, last midodrine dose April 7th, currently on lactulose,  - last albumin dose April 6 th, current albumin level 1.8,   - Urine lytes, urine sodium less than 10, urine chloride less than 20, urine osmolality 331, urine protein creatinine ratio 0.09  - Patient refused hemodialysis, family honored her wishes  - Paracentesis as indicated per primary team  - Monitor serum chemistries daily,   - Today's potassium 3.2 -received IV potassium 40 mEq 9:08 a.m. Please replace and maintain within normal range (3.5-5)  strict intake and output Qshift, daily weights if able.  - Renal protective measures: Please adjust medications for reduced clearance  - Avoid nephrotoxic medications (NSAID, IV contrast)  - Avoid ACEi and ARBs in the setting of SHARA  - Maintain MAP > 85 currently on Levophed  - Transfuse for Hb <7

## 2025-04-14 NOTE — PROGRESS NOTES
[facility] - Medical ICU  Critical Care Medicine  Progress Note    Patient Name: Seema Gann  MRN: [id number]  Admission Date: 4/4/2025  Hospital Length of Stay: 9 days  Code Status: DNR  Attending Provider: Tutu Smith MD  Primary Care Provider: Bethany Mesa MD   Principal Problem: Decompensated cirrhosis related to hepatitis C virus (HCV)    Subjective:     HPI:  Ms. Gann is a 79 y.o. F with a PMHx of HTN, decompensated HCV cirrhosis with recurrent ascites requiring large volume paracentesis approximately every 2 weeks, thrombocytopenia, hepatocellular carcinoma s/p Y90 x2, CKD IV (baseline Cr 2.5-2.8) who initially presented to [facility] ED on 4/4 for n/v/d x1 day. She had over 6 episodes of NBNB emesis and 3 episodes of non-bloody diarrhea. Last IR paracentesis was on 3/26 and 4.6L were removed, no evidence of SBP on prior fluid studies.     In the ED, pt was hypotensive (BP 86/50) otherwise vitals WNL. Lab work remarkable for Na 135, Bicarb 15, BUN 41/Cr 4. Initial lactic acid 4.5. She received 1L NS and 5mg midodrine with improvement in BP. CTAP with liver cirrhosis, portal hypertension, large volume of ascites, and moderate L pleural effusions. She was admitted to  for further management of decompensated cirrhosis and concern for hepatorenal syndrome.     [facility] consulted on 4/5 for increasing lactic acid (7.0), hypotension requiring levophed, and concern for sepsis. Currently receiving Albumin 25% 25g q8h, midodrine 15mg TID, octreotide 100mcg q8h, Zosyn, and Vancomycin.      Pt admitted to [facility].     Hospital/ICU Course:  Stepped up to [facility] on 4/5 for worsening lactic acidosis and concern for HRS requiring MAP goals. Started on levo for MAP goal >85. Nephrology and hepatology consulted. Nephrology offered dialysis, which she refused. Cr continuing to worsen. Restarted home lactulose. Repeat CXR worsened, likely worsened volume overload following albumin administration. However, gradually with  some improvement in urine output while being on levophed, however renal recovery remains very slow and modest. On 4/11, patient and her family expressed desire to transition goals of care to being comfort focused. Along those lines, they expressed desire to pursue home with hospice. Working to arrange home hospice agency for patient.     Interval History/Significant Events: overnight remained stable and received total of 1u prbc. Given antiemetic without further hematemesis. Plan for [facility] meeting today at 12PM.     Review of Systems  Objective:     Vital Signs (Most Recent):  Temp: 98.3 °F (36.8 °C) (04/13/25 2301)  Pulse: 88 (04/14/25 0730)  Resp: 14 (04/14/25 0730)  BP: (!) 82/53 (04/14/25 0730)  SpO2: 97 % (04/14/25 0730) Vital Signs (24h Range):  Temp:  [97.8 °F (36.6 °C)-98.4 °F (36.9 °C)] 98.3 °F (36.8 °C)  Pulse:  [] 88  Resp:  [13-26] 14  SpO2:  [92 %-100 %] 97 %  BP: ()/(50-82) 82/53   Weight: 54 kg (119 lb 0.8 oz)  Body mass index is 20.43 kg/m².      Intake/Output Summary (Last 24 hours) at 4/14/2025 0807  Last data filed at 4/14/2025 0301  Gross per 24 hour   Intake 2331.36 ml   Output 400 ml   Net 1931.36 ml          Physical Exam     Constitutional:       Appearance: She is ill-appearing. She is not toxic-appearing.   HENT:      Head: Normocephalic and atraumatic.   Eyes:      Extraocular Movements: Extraocular movements intact.   Cardiovascular:      Rate and Rhythm: Normal rate.      Heart sounds: No murmur heard.  Pulmonary:      Effort: No respiratory distress.      Breath sounds: No wheezing or rales.   Abdominal:      General: There is distension.      Tenderness: There is no abdominal tenderness. There is no guarding.   Musculoskeletal:      Right lower leg: Edema present.      Left lower leg: Edema present.   Skin:     Coloration: Skin is pale.   Neurological:      Mental Status: She is oriented to person, place, and time.   Vents:     Lines/Drains/Airways       Peripherally  "Inserted Central Catheter Line  Duration             PICC Double Lumen 04/09/25 1457 right brachial 4 days              Drain  Duration             Female External Urinary Catheter w/ Suction 04/04/25 1941 9 days                  Significant Labs:    CBC/Anemia Profile:  Recent Labs   Lab 04/13/25  0908 04/13/25  1738   WBC 6.27 7.24   HGB 6.7* 6.2*   HCT 21.3* 19.7*   PLT 89* 85*   MCV 99* 98   RDW 17.4* 17.8*        Chemistries:  Recent Labs   Lab 04/13/25  0908      K 3.6   *   CO2 14*   BUN 42*   CREATININE 3.2*   CALCIUM 7.5*   ALBUMIN 1.7*   BILITOT 0.7   ALKPHOS 35*   ALT 10   AST 19   GLUCOSE 135*       All pertinent labs within the past 24 hours have been reviewed.    Significant Imaging:  I have reviewed all pertinent imaging results/findings within the past 24 hours.    ABG  No results for input(s): "PH", "PO2", "PCO2", "HCO3", "BE" in the last 168 hours.  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   Currently asymptomatic and satting well on RA.   -4/8 CT chest non con: Bilateral pleural effusions, larger on the left with complete collapse of the left lower lobe.   -4/9 No septations on bedside ultrasound, pt breathing comfortably on room air. Consider PleurX if pt decides to continue with Hospice       Cardiac/Vascular  Bradycardia  Chronic. Intermittent asx bradycardia to 30s-40s. H/o LAFB, RBBB.  - continue to monitor on telemetry.   - avoid lazaro blockade    Hypotension  Continue Midodrine  Has been running low at home and aldactone/Lasix were recently discontinued outpatient   Hepatology consulted  Nephrology consulted  -4/7 recommendations - Consider albumin and IV fluids: pt refusing dialysis  Transfuse for Hb <7  MAP goals 80-85 for HRS    Elevated troponin  - 238 -> 228, currently with no signs or symptoms of ACS, likely demand ischemia   - TTE performed 4/6 without systolic or " "diastolic dysfunction.    Renal/  Uterine prolapse  Large pelvic floor prolapse noted on CT.   - chronic, occurs when ascites builds, improves with paracentesis    Lactic acidosis  Lactic acid 4.5 on admission, initially decreased to 3.6 though increased to 7 again  Blood cx NGTD  Continue BS abx  Q6h lactic acid    Hepatorenal syndrome  Creatinine stable for now. BMP reviewed- noted Estimated Creatinine Clearance: 10 mL/min (A) (based on SCr of 3.9 mg/dL (H)). according to latest data. Based on current GFR, CKD stage is stage 4 - GFR 15-29.  Monitor UOP and serial BMP and adjust therapy as needed. Renally dose meds. Avoid nephrotoxic medications and procedures.     Creatinine 4.0 on admit, baseline around 2.5-2.8.  Likely secondary to pre-renal etiology from dehydration and volume losses given vomiting/diarrhea and hypotension. There are concerns for hepatorenal syndrome.      - Strict I&Os and daily weights   - Gentle IVF  - Avoid nephrotoxic agents such as NSAIDs, gadolinium, and IV radiocontrast  - Renally dose meds to current GFR  - Urine Na <10, Urine Osm > serum osm, and FeNa suggestive of pre-renal etiology. Has not received any diuretics, likely consistent with HRS  - continue midodrine 15 mg PO TID (d/c on 4/7)  - Levo for MAP goals of 80-85  - Creatinine starting to improve on 4/12.    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 Negative for SBP.      MELD 3.0: 21 at 4/12/2025  3:56 AM  MELD-Na: 20 at 4/12/2025  3:56 AM  Calculated from:  Serum Creatinine: 3.3 mg/dL (Using max of 3 mg/dL) at 4/12/2025  3:56 AM  Serum Sodium: 139 mmol/L " (Using max of 137 mmol/L) at 4/12/2025  3:56 AM  Total Bilirubin: 0.8 mg/dL (Using min of 1 mg/dL) at 4/12/2025  3:56 AM  Serum Albumin: 1.8 g/dL at 4/12/2025  3:56 AM  INR(ratio): 1.2 at 4/12/2025  3:56 AM  Age at listing (hypothetical): 79 years  Sex: Female at 4/12/2025  3:56 AM    - Continue home meds. Etiology likely related to chronic HCV hepatitis and HCC s/p Y90 .   - Avoid any hepatotoxic meds   - Daily CBC, CMP, INR  - Ammonia 49, pt is mentating appropriately. Restarting lactulose  - paracentesis performed 4/6  - f/u ascitic studies when collected (albumin, total protein, cell count, LDH, culture, stain)   - Na restriction <2g  - Has received 1g rocephin for SBP ppx. As now with increasing lactic and persistent hypotension, started on broad spectrum abx, de-escalate as indicated  - monitor mental status; q4h Neuro checks.   - Hepatology following    4/7  -ascitic fluid SAAG?1.1. portal hypertension likely cause; No organisms seen. Patient is not a liver transplant candidate given she does not want invasive procedures and escalation of care.    - along those lines, patient and family wish to transition goals of care to prioritize quality of life and comfort. Patient requests to enroll with home hospice services. Working with CM to arrange.    Nausea vomiting and diarrhea  - antiemetics PRN  - C diff negative       Chronic hepatitis C with cirrhosis  See decompensated cirrhosis        Critical Care Daily Checklist:    A: Awake: RASS Goal/Actual Goal:    Actual:     B: Spontaneous Breathing Trial Performed?     C: SAT & SBT Coordinated?  na                      D: Delirium: CAM-ICU     E: Early Mobility Performed? Yes   F: Feeding Goal: Goals: Meet % een/epn by next RD f/u  Status: Nutrition Goal Status: progressing towards goal   Current Diet Order   Procedures    Diet Low Sodium, 2gm      AS: Analgesia/Sedation na   T: Thromboembolic Prophylaxis heparin   H: HOB > 300 Yes   U: Stress Ulcer  Prophylaxis (if needed) na   G: Glucose Control Yes   B: Bowel Function Stool Occurrence: 2   I: Indwelling Catheter (Lines & Stephens) Necessity PICC   D: De-escalation of Antimicrobials/Pharmacotherapies D/c levo when d/c    Plan for the day/ETD Home with hospice    Code Status:  Family/Goals of Care: DNR         Critical secondary to Patient has a condition that poses threat to life and bodily function: Acute Renal Failure 2/2 HRS      Critical care was time spent personally by me on the following activities: development of treatment plan with patient or surrogate and bedside caregivers, discussions with consultants, evaluation of patient's response to treatment, examination of patient, ordering and performing treatments and interventions, ordering and review of laboratory studies, ordering and review of radiographic studies, pulse oximetry, re-evaluation of patient's condition. This critical care time did not overlap with that of any other provider or involve time for any procedures.     Caro Patterson MD  Critical Care Medicine  [facility] - Medical ICU

## 2025-04-14 NOTE — PROGRESS NOTES
[facility] - Medical ICU  Nephrology  Progress Note    Patient Name: Seema Gann  MRN: [id number]  Admission Date: 4/4/2025  Hospital Length of Stay: 9 days  Attending Provider: Tutu Smith MD   Primary Care Physician: Bethany Mesa MD  Principal Problem:Decompensated cirrhosis related to hepatitis C virus (HCV)    Subjective:     HPI: No notes on file    Interval History:     overnight remained stable and received total of 1u prbc. Given antiemetic without further hematemesis.  Remain hypotensive, blood pressure 82/53, despite being on Levophed, urine output 100 cc- transitioning to comfort care      Review of patient's allergies indicates:  No Known Allergies  Current Facility-Administered Medications   Medication Frequency    acetaminophen tablet 650 mg Q6H PRN    dextrose 50% injection 12.5 g PRN    dextrose 50% injection 25 g PRN    glucagon (human recombinant) injection 1 mg PRN    glucose chewable tablet 16 g PRN    glucose chewable tablet 24 g PRN    heparin (porcine) injection 5,000 Units Q8H    lactulose 20 gram/30 mL solution Soln 20 g BID    melatonin tablet 6 mg Nightly PRN    mirtazapine tablet 7.5 mg QHS    naloxone 0.4 mg/mL injection 0.02 mg PRN    NORepinephrine 4 mg in sodium chloride 0.9% 250 mL infusion (premix) Continuous    ondansetron disintegrating tablet 8 mg Q8H PRN    prochlorperazine tablet 10 mg Q6H PRN    sevelamer carbonate pwpk 0.8 g TID WM    sodium chloride 0.9% flush 10 mL Q12H PRN    sodium chloride 0.9% flush 10 mL Q12H PRN       Objective:     Vital Signs (Most Recent):  Temp: 98.2 °F (36.8 °C) (04/12/25 0301)  Pulse: 87 (04/12/25 0530)  Resp: 13 (04/12/25 0530)  BP: 113/67 (04/12/25 0530)  SpO2: 97 % (04/12/25 0530) Vital Signs (24h Range):  Temp:  [97.6 °F (36.4 °C)-98.3 °F (36.8 °C)] 98.2 °F (36.8 °C)  Pulse:  [] 87  Resp:  [13-33] 13  SpO2:  [93 %-98 %] 97 %  BP: ()/(57-76) 113/67     Weight: 54 kg (119 lb 0.8 oz) (04/09/25 0528)  Body mass  index is 20.43 kg/m².  Body surface area is 1.56 meters squared.    I/O last 3 completed shifts:  In: 2018.9 [P.O.:808; I.V.:1210.9]  Out: 550 [Urine:550]     Physical Exam  Constitutional:       Appearance: She is ill-appearing. She is not toxic-appearing.   HENT:      Head: Normocephalic and atraumatic.   Eyes:      Extraocular Movements: Extraocular movements intact.   Cardiovascular:      Rate and Rhythm: Normal rate.      Heart sounds: No murmur heard.  Pulmonary:      Effort: No respiratory distress.      Breath sounds: No wheezing or rales.   Abdominal:      General: There is distension.      Tenderness: There is no abdominal tenderness. There is no guarding.   Musculoskeletal:      Right lower leg: Edema present.      Left lower leg: Edema present.   Skin:     Coloration: Skin is pale.   Neurological:      Mental Status: She is oriented to person, place, and time.          Significant Labs:  ABGs:   Recent Labs   Lab 04/05/25  0657   PH 7.284   PCO2 34.1   HCO3 15.9     CBC:   Recent Labs   Lab 04/12/25  0356   WBC 5.85   RBC 2.37*   HGB 7.2*   HCT 23.2*   PLT 92*   MCV 98   MCH 30.4   MCHC 31.0*     CMP:   Recent Labs   Lab 04/12/25  0356   CALCIUM 7.3*   ALBUMIN 1.8*      K 4.0   CO2 16*   *   BUN 38*   CREATININE 3.3*   ALKPHOS 31*   ALT 13   AST 25   BILITOT 0.8     LFTs:   Recent Labs   Lab 04/12/25  0356   ALT 13   AST 25   ALKPHOS 31*   BILITOT 0.8   ALBUMIN 1.8*     Recent Labs   Lab 04/06/25  1137   COLORU Yellow   SPECGRAV 1.020   PHUR 5.0   PROTEINUA Negative   NITRITE Negative   LEUKOCYTESUR Negative   UROBILINOGEN Negative       Assessment/Plan:     Renal/  SHARA (acute kidney injury)  SHARA (acute kidney injury)        79 y.o. female ,is admitted on 4/4/2025  with past medical history of decompensated HCV cirrhosis with recurrent ascites requiring frequent large volume paracentesis q2wk.Admitted with vomiting and diarrhea Nephrology  is consulted for concerns of HRS        Assessment  and plan     SHARA KDIGO stage 3 on CKD 4    Baseline Creatinine: 2.5-2.8  Creatinine at time of consult: 4.4  Tests done:UPCR 0.09  Etiology of SHARA: Likely HRS. Other causes- abdominal compartment syndrome   Acid Base Disorder: metabolic acidosis with Na bicarbonate of 14    - Received 1 unit of packed RBC last night, given antiemetics/ no further hematemesis  - Urine output 100 cc/24 hours  - remains with Levophed, blood pressure 82/53 mmHg, MAP 64 (goal is 80-85), not met despite pressor use.   -  Sodium 141, CO2 14 estimated GFR 14  - Avoid nephrotoxic medications (NSAID, IV contrast)  - Avoid ACEi and ARBs in the setting of SHARA  - Maintain MAP > 85 currently on Levophed    GOC discussion is- transitioning to comfort care     - Comfort measures per primary team              Thank you for your consult. I will sign off. Please contact us if you have any additional questions.    Nilton Coon MD  Nephrology  [facility] - Medical ICU

## 2025-04-14 NOTE — SUBJECTIVE & OBJECTIVE
Interval History/Significant Events: overnight remained stable and received total of 1u prbc. Given antiemetic without further hematemesis. Plan for [facility] meeting today at 12PM.     Review of Systems  Objective:     Vital Signs (Most Recent):  Temp: 98.3 °F (36.8 °C) (04/13/25 2301)  Pulse: 88 (04/14/25 0730)  Resp: 14 (04/14/25 0730)  BP: (!) 82/53 (04/14/25 0730)  SpO2: 97 % (04/14/25 0730) Vital Signs (24h Range):  Temp:  [97.8 °F (36.6 °C)-98.4 °F (36.9 °C)] 98.3 °F (36.8 °C)  Pulse:  [] 88  Resp:  [13-26] 14  SpO2:  [92 %-100 %] 97 %  BP: ()/(50-82) 82/53   Weight: 54 kg (119 lb 0.8 oz)  Body mass index is 20.43 kg/m².      Intake/Output Summary (Last 24 hours) at 4/14/2025 0807  Last data filed at 4/14/2025 0301  Gross per 24 hour   Intake 2331.36 ml   Output 400 ml   Net 1931.36 ml          Physical Exam     Constitutional:       Appearance: She is ill-appearing. She is not toxic-appearing.   HENT:      Head: Normocephalic and atraumatic.   Eyes:      Extraocular Movements: Extraocular movements intact.   Cardiovascular:      Rate and Rhythm: Normal rate.      Heart sounds: No murmur heard.  Pulmonary:      Effort: No respiratory distress.      Breath sounds: No wheezing or rales.   Abdominal:      General: There is distension.      Tenderness: There is no abdominal tenderness. There is no guarding.   Musculoskeletal:      Right lower leg: Edema present.      Left lower leg: Edema present.   Skin:     Coloration: Skin is pale.   Neurological:      Mental Status: She is oriented to person, place, and time.   Vents:     Lines/Drains/Airways       Peripherally Inserted Central Catheter Line  Duration             PICC Double Lumen 04/09/25 1457 right brachial 4 days              Drain  Duration             Female External Urinary Catheter w/ Suction 04/04/25 1941 9 days                  Significant Labs:    CBC/Anemia Profile:  Recent Labs   Lab 04/13/25  0908 04/13/25  1738   WBC 6.27 7.24   HGB  6.7* 6.2*   HCT 21.3* 19.7*   PLT 89* 85*   MCV 99* 98   RDW 17.4* 17.8*        Chemistries:  Recent Labs   Lab 04/13/25  0908      K 3.6   *   CO2 14*   BUN 42*   CREATININE 3.2*   CALCIUM 7.5*   ALBUMIN 1.7*   BILITOT 0.7   ALKPHOS 35*   ALT 10   AST 19   GLUCOSE 135*       All pertinent labs within the past 24 hours have been reviewed.    Significant Imaging:  I have reviewed all pertinent imaging results/findings within the past 24 hours.

## 2025-04-14 NOTE — EICU
JAV[facility] Night Rounds Checklist  24H Vital Sign Range:  Temp:  [97.8 °F (36.6 °C)-98.7 °F (37.1 °C)]   Pulse:  []   Resp:  [12-28]   BP: (100-132)/(64-82)   SpO2:  [95 %-100 %]     Video rounds

## 2025-04-15 NOTE — PT/OT/SLP DISCHARGE
Occupational Therapy Discharge Summary    Seema Gann  MRN: [id number]   Principal Problem: Decompensated cirrhosis related to hepatitis C virus (HCV)      Patient Discharged from acute Occupational Therapy on 4/15.  .    Assessment:      Patient appropriate for care in another setting.    Objective:     GOALS:   Multidisciplinary Problems       Occupational Therapy Goals          Problem: Occupational Therapy    Goal Priority Disciplines Outcome Interventions   Occupational Therapy Goal     OT, PT/OT Progressing    Description: Goals to be met by: 7 days 4/15/25     Patient will increase functional independence with ADLs by performing:      Pt to complete toilieting with SBA  Pt to complete standing g/h skills with SBA  Pt to complete t/f bed, chair and commode with SBA  Pt to complete UE/LE dressing with SBA                          Reasons for Discontinuation of Therapy Services  Therapist determines that the patient will no longer benefit from therapy services.      Plan:     Patient Discharged to: Palliative Care/Hospice    4/15/2025

## 2025-04-15 NOTE — ASSESSMENT & PLAN NOTE
Last diagnostic/therapeutic paracentesis on 3/26 with removal of 4.6L. Negative for SBP.      MELD 3.0: 21 at 4/13/2025  9:08 AM  MELD-Na: 20 at 4/13/2025  9:08 AM  Calculated from:  Serum Creatinine: 3.2 mg/dL (Using max of 3 mg/dL) at 4/13/2025  9:08 AM  Serum Sodium: 141 mmol/L (Using max of 137 mmol/L) at 4/13/2025  9:08 AM  Total Bilirubin: 0.7 mg/dL (Using min of 1 mg/dL) at 4/13/2025  9:08 AM  Serum Albumin: 1.7 g/dL at 4/13/2025  9:08 AM  INR(ratio): 1.2 at 4/12/2025  3:56 AM  Age at listing (hypothetical): 79 years  Sex: Female at 4/13/2025  9:08 AM    - Continue home meds. Etiology likely related to chronic HCV hepatitis and HCC s/p Y90 .   - Avoid any hepatotoxic meds   - Daily CBC, CMP, INR  - Ammonia 49, pt is mentating appropriately. Restarting lactulose  - paracentesis performed 4/6  - f/u ascitic studies when collected (albumin, total protein, cell count, LDH, culture, stain)   - Na restriction <2g  - Has received 1g rocephin for SBP ppx. As now with increasing lactic and persistent hypotension, started on broad spectrum abx, de-escalate as indicated  - monitor mental status; q4h Neuro checks.   - Hepatology following    4/7  -ascitic fluid SAAG?1.1. portal hypertension likely cause; No organisms seen. Patient is not a liver transplant candidate given she does not want invasive procedures and escalation of care.    - along those lines, patient and family wish to transition goals of care to prioritize quality of life and comfort. Patient requests to enroll with home hospice services. Discharge date expected 4/16 to home hospice.

## 2025-04-15 NOTE — PLAN OF CARE
MICU DAILY GOALS     Family/Goals of care/Code Status   Code Status: DNR    24H Vital Sign Range  Temp:  [97.6 °F (36.4 °C)-98.5 °F (36.9 °C)]   Pulse:  [72-96]   Resp:  [11-26]   BP: ()/(50-59)   SpO2:  [95 %-100 %]      Shift Events (include procedures and significant events)   No acute events during the shift.    AWAKE RASS: Goal -    Actual - RASS (Valerio Agitation-Sedation Scale): alert and calm    Restraint necessity: Not necessary   BREATHE SBT: Not intubated    Coordinate A & B, analgesics/sedatives Pain: managed   SAT: Not intubated   Delirium CAM-ICU:     Early(intubated/ Progressive (non-intubated) Mobility MOVE Screen (INTUBATED ONLY): Not intubated    Activity: Activity Management: Sitting at edge of bed - L2   Feeding/Nutrition Diet order: Diet/Nutrition Received: 2 gram sodium, Specialty Diet/Nutrition Received: renal diet   Thrombus DVT prophylaxis: VTE Core Measure: Pharmacological prophylaxis initiated/maintained   HOB Elevation Head of Bed (HOB) Positioning: HOB at 30-45 degrees   Ulcer Prophylaxis GI: no   Glucose control managed     Skin Skin assessment:     Sacrum intact/not altered? Yes  Heels intact/not altered? Yes  Surgical wound? No    CHECK ONE!   (no altered skin or altered skin) and sub boxes:  [] No Altered Skin Integrity Present    []Prevention Measures Documented    [] Altered Skin Integrity Present or Discovered   [x] LDA already present in EPIC, daily doc completed              [] LDA added if not already in EPIC (describe/stage wound).               [] Wound Image Taken (required on admit,                   transfer/discharge and every Tuesday)    Wound Care Consulted? No   Bowel Function no issues    Indwelling Catheter Necessity      PICC Double Lumen 04/09/25 1457 right brachial-Line Necessity Review: Medication caustic to vasculature     De-escalation Antibiotics No

## 2025-04-15 NOTE — ASSESSMENT & PLAN NOTE
Lactic acid 4.5 on admission, initially decreased to 3.6 though increased to 7 again  Blood cx NGTD  D/c further labs.

## 2025-04-15 NOTE — EICU
Virtual ICU Quality Rounds    Admit Date: 4/4/2025  Hospital Day: 10    ICU Day: 9d 16h    24H Vital Sign Range:  Temp:  [97 °F (36.1 °C)-97.7 °F (36.5 °C)]   Pulse:  [59-83]   Resp:  [10-26]   BP: ()/(50-58)   SpO2:  [95 %-100 %]     VICU Surveillance Screening    Daily review of  line necessity(optional): Central line(s) in place    Central line type (optional): PICC    Central line indications : Vasopressors (in past 24/hrs)        LDA reconciliation : Yes    Nursing orders placed : IP YADIRA Central Line Care    Central line best practices : Consider removal if patient has no central line indications for over 24hrs and Consider removal patient has a new fever and/or central line is over 7 days old

## 2025-04-15 NOTE — PROGRESS NOTES
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 Gann is a 79 y.o. F with a PMHx of HTN, decompensated HCV cirrhosis with recurrent ascites requiring large volume paracentesis approximately every 2 weeks, thrombocytopenia, hepatocellular carcinoma s/p Y90 x2, CKD IV (baseline Cr 2.5-2.8) who [place] [facility] [place] for n/v/d x1 day. She had over 6 episodes of NBNB emesis and 3 episodes of non-bloody diarrhea. Last IR paracentesis was on 3/26 and 4.6L were removed, no evidence of SBP on prior fluid studies.     In the ED, pt was hypotensive (BP 86/50) otherwise vitals WNL. Lab work remarkable for Na 135, Bicarb 15, BUN 41/Cr 4. Initial lactic acid 4.5. She received 1L NS and 5mg midodrine with improvement in BP. CTAP with liver cirrhosis, portal hypertension, large volume of ascites, and moderate L pleural effusions. She was admitted to  for further management of decompensated cirrhosis and concern for hepatorenal syndrome.     [facility] consulted on 4/5 for increasing lactic acid (7.0), hypotension requiring levophed, and concern for sepsis. Currently receiving Albumin 25% 25g q8h, midodrine 15mg TID, octreotide 100mcg q8h, Zosyn, and Vancomycin.      Pt admitted to [facility].     Hospital/ICU Course:  Stepped up to [facility] on 4/5 for worsening lactic acidosis and concern for HRS requiring MAP goals. Started on levo for MAP goal >85. Nephrology and hepatology consulted. Nephrology offered dialysis, which she refused. Cr continuing to worsen. Restarted home lactulose. Repeat CXR worsened, likely worsened volume overload following albumin administration. However, gradually with  some improvement in urine output while being on levophed, however renal recovery remains very slow and modest. On 4/11, patient and her family expressed desire to transition goals of care to being comfort focused. Along those lines, they expressed desire to pursue home with hospice. [facility] hospice agency arranged home equipment. Plan for discharge to home hospice on 4/16.     Interval History/Significant Events: naeon    Review of Systems  Objective:     Vital Signs (Most Recent):  Temp: 97.6 °F (36.4 °C) (04/14/25 2301)  Pulse: 65 (04/15/25 0833)  Resp: 14 (04/15/25 0833)  BP: (!) 88/55 (04/15/25 0833)  SpO2: 98 % (04/15/25 0833) Vital Signs (24h Range):  Temp:  [97.6 °F (36.4 °C)-97.8 °F (36.6 °C)] 97.6 °F (36.4 °C)  Pulse:  [65-92] 65  Resp:  [11-26] 14  SpO2:  [95 %-100 %] 98 %  BP: ()/(50-59) 88/55   Weight: 54 kg (119 lb 0.8 oz)  Body mass index is 20.43 kg/m².      Intake/Output Summary (Last 24 hours) at 4/15/2025 0852  Last data filed at 4/14/2025 1700  Gross per 24 hour   Intake 165.85 ml   Output --   Net 165.85 ml          Physical Exam     Physical Exam     Constitutional:       Appearance: She is ill-appearing. She is not toxic-appearing.   HENT:      Head: Normocephalic and atraumatic.   Eyes:      Extraocular Movements: Extraocular movements intact.   Cardiovascular:      Rate and Rhythm: Normal rate.      Heart sounds: No murmur heard.  Pulmonary:      Effort: No respiratory distress.      Breath sounds: No wheezing or rales.   Abdominal:      General: There is distension.      Tenderness: There is no abdominal tenderness. There is no guarding.   Musculoskeletal:      Right lower leg: Edema present.      Left lower leg: Edema present.   Neurological:      Mental Status: She is oriented to person, place, and time.      Lines/Drains/Airways       Peripherally Inserted Central Catheter Line  Duration             PICC Double Lumen 04/09/25 1457 right brachial 5 days              Drain  Duration  "            Female External Urinary Catheter w/ Suction 04/04/25 1941 10 days                  Significant Labs:    CBC/Anemia Profile:  Recent Labs   Lab 04/13/25  0908 04/13/25  1738   WBC 6.27 7.24   HGB 6.7* 6.2*   HCT 21.3* 19.7*   PLT 89* 85*   MCV 99* 98   RDW 17.4* 17.8*        Chemistries:  Recent Labs   Lab 04/13/25  0908      K 3.6   *   CO2 14*   BUN 42*   CREATININE 3.2*   CALCIUM 7.5*   ALBUMIN 1.7*   BILITOT 0.7   ALKPHOS 35*   ALT 10   AST 19   GLUCOSE 135*       All pertinent labs within the past 24 hours have been reviewed.    Significant Imaging:  I have reviewed all pertinent imaging results/findings within the past 24 hours.    ABG  No results for input(s): "PH", "PO2", "PCO2", "HCO3", "BE" in the last 168 hours.  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   Currently asymptomatic and satting well on RA.   -4/8 CT chest non con: Bilateral pleural effusions, larger on the left with complete collapse of the left lower lobe.   -4/9 No septations on bedside ultrasound, pt breathing comfortably on room air. Consider PleurX if pt decides to continue with Hospice   -Pt breathing comfortably without O2 supplementation. I believe adding a pleurx device would unnecessarily complicated her care.       Cardiac/Vascular  Bradycardia  Chronic. Intermittent asx bradycardia to 30s-40s. H/o LAFB, RBBB.  - continue to monitor on telemetry.   - avoid lazaro blockade    Hypotension  Continue Midodrine 5mg q6h. Off levo now as pt moving to home hospice.     Elevated troponin  - 238 -> 228, currently with no signs or symptoms of ACS, likely demand ischemia   - TTE performed 4/6 without systolic or diastolic dysfunction.    Renal/  Uterine prolapse  Large pelvic floor prolapse noted on CT.   - chronic, occurs when ascites builds, improves with paracentesis    Lactic acidosis  Lactic acid 4.5 on admission, " "initially decreased to 3.6 though increased to 7 again  Blood cx NGTD  D/c further labs.     Hepatorenal syndrome  Creatinine stable for now. BMP reviewed- noted Estimated Creatinine Clearance: 10 mL/min (A) (based on SCr of 3.9 mg/dL (H)). according to latest data. Based on current GFR, CKD stage is stage 4 - GFR 15-29.  Monitor UOP and serial BMP and adjust therapy as needed. Renally dose meds. Avoid nephrotoxic medications and procedures.     Creatinine 4.0 on admit, baseline around 2.5-2.8.  Likely secondary to pre-renal etiology from dehydration and volume losses given vomiting/diarrhea and hypotension. There are concerns for hepatorenal syndrome.   4/15 Transition to comfort measures as pt pursuing home hospice.   - Avoid nephrotoxic agents such as NSAIDs, gadolinium, and IV radiocontrast  - Renally dose meds.  - off levo, continue midodrine 5mg q6h     Chronic kidney disease (CKD), stage IV (severe)  See HRS    SHARA (acute kidney injury)  See "HRS"    Hematology  Secondary thrombocytopenia  Stable. D/c repeat labs unless new indication.    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 Negative for SBP.      MELD 3.0: 21 at 4/13/2025  9:08 AM  MELD-Na: 20 at 4/13/2025  9:08 AM  Calculated from:  Serum Creatinine: 3.2 mg/dL (Using max of 3 mg/dL) at 4/13/2025  9:08 AM  Serum Sodium: 141 mmol/L (Using max of 137 mmol/L) at 4/13/2025  9:08 AM  Total Bilirubin: 0.7 mg/dL (Using min of 1 mg/dL) at 4/13/2025  9:08 AM  Serum Albumin: 1.7 g/dL at 4/13/2025  9:08 AM  INR(ratio): 1.2 at 4/12/2025  3:56 AM  Age at listing (hypothetical): 79 years  Sex: Female at 4/13/2025  9:08 AM    - Continue home meds. Etiology likely related to chronic HCV hepatitis and HCC s/p Y90 .   - Avoid any hepatotoxic meds   - " Daily CBC, CMP, INR  - Ammonia 49, pt is mentating appropriately. Restarting lactulose  - paracentesis performed 4/6  - f/u ascitic studies when collected (albumin, total protein, cell count, LDH, culture, stain)   - Na restriction <2g  - Has received 1g rocephin for SBP ppx. As now with increasing lactic and persistent hypotension, started on broad spectrum abx, de-escalate as indicated  - monitor mental status; q4h Neuro checks.   - Hepatology following    4/7  -ascitic fluid SAAG?1.1. portal hypertension likely cause; No organisms seen. Patient is not a liver transplant candidate given she does not want invasive procedures and escalation of care.    - along those lines, patient and family wish to transition goals of care to prioritize quality of life and comfort. Patient requests to enroll with home hospice services. Discharge date expected 4/16 to home hospice.     Nausea vomiting and diarrhea  - antiemetics PRN  - C diff negative       Chronic hepatitis C with cirrhosis  See decompensated cirrhosis        Critical Care Daily Checklist:    A: Awake: RASS Goal/Actual Goal:    Actual:     B: Spontaneous Breathing Trial Performed?     C: SAT & SBT Coordinated?  na                      D: Delirium: CAM-ICU     E: Early Mobility Performed? Yes   F: Feeding Goal: Goals: Meet % een/epn by next RD f/u  Status: Nutrition Goal Status: progressing towards goal   Current Diet Order   Procedures    Diet Low Sodium, 2gm      AS: Analgesia/Sedation na   T: Thromboembolic Prophylaxis none   H: HOB > 300 Yes   U: Stress Ulcer Prophylaxis (if needed) none   G: Glucose Control controlled   B: Bowel Function Stool Occurrence: 1   I: Indwelling Catheter (Lines & Stephens) Necessity PICC   D: De-escalation of Antimicrobials/Pharmacotherapies Midodrine    Plan for the day/ETD Comfort measures. D/c tomorrow with home hospice    Code Status:  Family/Goals of Care: DNR         Critical secondary to Patient has a condition that poses  threat to life and bodily function: Acute Renal Failure      Critical care was time spent personally by me on the following activities: development of treatment plan with patient or surrogate and bedside caregivers, discussions with consultants, evaluation of patient's response to treatment, examination of patient, ordering and performing treatments and interventions, ordering and review of laboratory studies, ordering and review of radiographic studies, pulse oximetry, re-evaluation of patient's condition. This critical care time did not overlap with that of any other provider or involve time for any procedures.     Caro Patterson MD  Critical Care Medicine  [facility]

## 2025-04-15 NOTE — EICU
Intervention Initiated From:  COR / JACYU    Nolan intervened regarding:  Rounding (Video assessment)    VICU Night Rounds Checklist  24H Vital Sign Range:  Temp:  [97.6 °F (36.4 °C)-98.5 °F (36.9 °C)]   Pulse:  []   Resp:  [11-26]   BP: ()/(50-74)   SpO2:  [92 %-100 %]     Video rounds and LDA reconciliation

## 2025-04-15 NOTE — ASSESSMENT & PLAN NOTE
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   Currently asymptomatic and satting well on RA.   -4/8 CT chest non con: Bilateral pleural effusions, larger on the left with complete collapse of the left lower lobe.   -4/9 No septations on bedside ultrasound, pt breathing comfortably on room air. Consider PleurX if pt decides to continue with Hospice   -Pt breathing comfortably without O2 supplementation. I believe adding a pleurx device would unnecessarily complicated her care.

## 2025-04-15 NOTE — ASSESSMENT & PLAN NOTE
Creatinine stable for now. BMP reviewed- noted Estimated Creatinine Clearance: 10 mL/min (A) (based on SCr of 3.9 mg/dL (H)). according to latest data. Based on current GFR, CKD stage is stage 4 - GFR 15-29.  Monitor UOP and serial BMP and adjust therapy as needed. Renally dose meds. Avoid nephrotoxic medications and procedures.     Creatinine 4.0 on admit, baseline around 2.5-2.8.  Likely secondary to pre-renal etiology from dehydration and volume losses given vomiting/diarrhea and hypotension. There are concerns for hepatorenal syndrome.   4/15 Transition to comfort measures as pt pursuing home hospice.   - Avoid nephrotoxic agents such as NSAIDs, gadolinium, and IV radiocontrast  - Renally dose meds.  - off levo, continue midodrine 5mg q6h

## 2025-04-15 NOTE — SUBJECTIVE & OBJECTIVE
Interval History/Significant Events: naeon    Review of Systems  Objective:     Vital Signs (Most Recent):  Temp: 97.6 °F (36.4 °C) (04/14/25 2301)  Pulse: 65 (04/15/25 0833)  Resp: 14 (04/15/25 0833)  BP: (!) 88/55 (04/15/25 0833)  SpO2: 98 % (04/15/25 0833) Vital Signs (24h Range):  Temp:  [97.6 °F (36.4 °C)-97.8 °F (36.6 °C)] 97.6 °F (36.4 °C)  Pulse:  [65-92] 65  Resp:  [11-26] 14  SpO2:  [95 %-100 %] 98 %  BP: ()/(50-59) 88/55   Weight: 54 kg (119 lb 0.8 oz)  Body mass index is 20.43 kg/m².      Intake/Output Summary (Last 24 hours) at 4/15/2025 0852  Last data filed at 4/14/2025 1700  Gross per 24 hour   Intake 165.85 ml   Output --   Net 165.85 ml          Physical Exam     Physical Exam     Constitutional:       Appearance: She is ill-appearing. She is not toxic-appearing.   HENT:      Head: Normocephalic and atraumatic.   Eyes:      Extraocular Movements: Extraocular movements intact.   Cardiovascular:      Rate and Rhythm: Normal rate.      Heart sounds: No murmur heard.  Pulmonary:      Effort: No respiratory distress.      Breath sounds: No wheezing or rales.   Abdominal:      General: There is distension.      Tenderness: There is no abdominal tenderness. There is no guarding.   Musculoskeletal:      Right lower leg: Edema present.      Left lower leg: Edema present.   Neurological:      Mental Status: She is oriented to person, place, and time.      Lines/Drains/Airways       Peripherally Inserted Central Catheter Line  Duration             PICC Double Lumen 04/09/25 1457 right brachial 5 days              Drain  Duration             Female External Urinary Catheter w/ Suction 04/04/25 1941 10 days                  Significant Labs:    CBC/Anemia Profile:  Recent Labs   Lab 04/13/25  0908 04/13/25  1738   WBC 6.27 7.24   HGB 6.7* 6.2*   HCT 21.3* 19.7*   PLT 89* 85*   MCV 99* 98   RDW 17.4* 17.8*        Chemistries:  Recent Labs   Lab 04/13/25  0908      K 3.6   *   CO2 14*   BUN 42*    CREATININE 3.2*   CALCIUM 7.5*   ALBUMIN 1.7*   BILITOT 0.7   ALKPHOS 35*   ALT 10   AST 19   GLUCOSE 135*       All pertinent labs within the past 24 hours have been reviewed.    Significant Imaging:  I have reviewed all pertinent imaging results/findings within the past 24 hours.

## 2025-04-15 NOTE — NURSING
MICU DAILY GOALS     Family/Goals of care/Code Status   Code Status: DNR    24H Vital Sign Range  Temp:  [97 °F (36.1 °C)-97.6 °F (36.4 °C)]   Pulse:  [59-82]   Resp:  [10-22]   BP: ()/(50-62)   SpO2:  [95 %-99 %]      Shift Events (include procedures and significant events)   No acute events throughout shift    AWAKE RASS: Goal -    Actual - RASS (Valerio Agitation-Sedation Scale): alert and calm    Restraint necessity: Not necessary   BREATHE SBT: Not intubated    Coordinate A & B, analgesics/sedatives Pain: managed   SAT: Not intubated   Delirium CAM-ICU:     Early(intubated/ Progressive (non-intubated) Mobility MOVE Screen (INTUBATED ONLY): Not intubated    Activity: Activity Management: Arm raise - L1   Feeding/Nutrition Diet order: Diet/Nutrition Received: 2 gram sodium, Specialty Diet/Nutrition Received: renal diet   Thrombus DVT prophylaxis: VTE Core Measure: Pharmacological prophylaxis initiated/maintained   HOB Elevation Head of Bed (HOB) Positioning: HOB elevated   Ulcer Prophylaxis GI: no   Glucose control managed     Skin Skin assessment:     Sacrum intact/not altered? Yes  Heels intact/not altered? Yes  Surgical wound? No    CHECK ONE!   (no altered skin or altered skin) and sub boxes:  [x] No Altered Skin Integrity Present    [x]Prevention Measures Documented    [] Altered Skin Integrity Present or Discovered   [] LDA already present in EPIC, daily doc completed              [] LDA added if not already in EPIC (describe/stage wound).               [] Wound Image Taken (required on admit,                   transfer/discharge and every Tuesday)    Wound Care Consulted? Yes   Bowel Function no issues    Indwelling Catheter Necessity      PICC Double Lumen 04/09/25 1457 right brachial-Line Necessity Review: Longterm central access required     De-escalation Antibiotics No        VS and assessment per flow sheet, patient progressing towards goals as tolerated, plan of care reviewed with  pt Seema  Sanjuanita and family , all concerns addressed, will continue to monitor.

## 2025-04-16 NOTE — EICU
Intervention Initiated From:  COR / JACYU    Nolan intervened regarding:  Rounding (Video assessment)    VICU Night Rounds Checklist  24H Vital Sign Range:  Temp:  [97 °F (36.1 °C)-97.6 °F (36.4 °C)]   Pulse:  [59-77]   Resp:  [10-22]   BP: ()/(50-62)   SpO2:  [95 %-99 %]     Video rounds

## 2025-04-16 NOTE — PROGRESS NOTES
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 Gann is a 79 y.o. F with a PMHx of HTN, decompensated HCV cirrhosis with recurrent ascites requiring large volume paracentesis approximately every 2 weeks, thrombocytopenia, hepatocellular carcinoma s/p Y90 x2, CKD IV (baseline Cr 2.5-2.8) who initially presented to [facility] ED on 4/4 for n/v/d x1 day. She had over 6 episodes of NBNB emesis and 3 episodes of non-bloody diarrhea. Last IR paracentesis was on 3/26 and 4.6L were removed, no evidence of SBP on prior fluid studies.     In the ED, pt was hypotensive (BP 86/50) otherwise vitals WNL. Lab work remarkable for Na 135, Bicarb 15, BUN 41/Cr 4. Initial lactic acid 4.5. She received 1L NS and 5mg midodrine with improvement in BP. CTAP with liver cirrhosis, portal hypertension, large volume of ascites, and moderate L pleural effusions. She was admitted to  for further management of decompensated cirrhosis and concern for hepatorenal syndrome.     [facility] consulted on 4/5 for increasing lactic acid (7.0), hypotension requiring levophed, and concern for sepsis. Currently receiving Albumin 25% 25g q8h, midodrine 15mg TID, octreotide 100mcg q8h, Zosyn, and Vancomycin.      Pt admitted to [facility].     Hospital/ICU Course:  Stepped up to [facility] on 4/5 for worsening lactic acidosis and concern for HRS requiring MAP goals. Started on levo for MAP goal >85. Nephrology and hepatology consulted. Nephrology offered dialysis, which she refused. Cr continuing to worsen. Restarted home lactulose. Repeat CXR worsened, likely worsened volume overload following albumin administration. However, gradually with  some improvement in urine output while being on levophed, however renal recovery remains very slow and modest. On 4/11, patient and her family expressed desire to transition goals of care to being comfort focused. Along those lines, they expressed desire to pursue home with hospice. [facility] hospice agency arranged home equipment. Plan for discharge to home hospice today on 4/16.     Interval History/Significant Events: naeon    Review of Systems  Objective:     Vital Signs (Most Recent):  Temp: 97.4 °F (36.3 °C) (04/16/25 0301)  Pulse: 66 (04/16/25 0601)  Resp: 12 (04/16/25 0601)  BP: (!) 108/58 (04/16/25 0601)  SpO2: 98 % (04/16/25 0601) Vital Signs (24h Range):  Temp:  [97 °F (36.1 °C)-98 °F (36.7 °C)] 97.4 °F (36.3 °C)  Pulse:  [60-73] 66  Resp:  [10-22] 12  SpO2:  [95 %-99 %] 98 %  BP: ()/(50-62) 108/58   Weight: 54 kg (119 lb 0.8 oz)  Body mass index is 20.43 kg/m².      Intake/Output Summary (Last 24 hours) at 4/16/2025 0713  Last data filed at 4/15/2025 1200  Gross per 24 hour   Intake 240 ml   Output --   Net 240 ml          Physical Exam      Constitutional:       Appearance: She is ill-appearing. She is not toxic-appearing.   HENT:      Head: Normocephalic and atraumatic.   Eyes:      Extraocular Movements: Extraocular movements intact.   Cardiovascular:      Rate and Rhythm: Normal rate.      Heart sounds: No murmur heard.  Pulmonary:      Effort: No respiratory distress.      Breath sounds: No wheezing or rales.   Abdominal:      General: There is distension.      Tenderness: There is no abdominal tenderness. There is no guarding.   Musculoskeletal:      Right lower leg: Edema present.      Left lower leg: Edema present.   Neurological:      Mental Status: She is oriented to person, place, and time.   Vents:     Lines/Drains/Airways       Peripherally Inserted Central Catheter Line  Duration             PICC Double Lumen 04/09/25 1457 right brachial 6 days                  Significant  "Labs:    CBC/Anemia Profile:  No results for input(s): "WBC", "HGB", "HCT", "PLT", "MCV", "RDW", "IRON", "FERRITIN", "RETIC", "FOLATE", "[id number]", "OCCULTBLOOD" in the last 48 hours.     Chemistries:  No results for input(s): "NA", "K", "CL", "CO2", "BUN", "CREATININE", "CALCIUM", "ALBUMIN", "PROT", "BILITOT", "ALKPHOS", "ALT", "AST", "GLUCOSE", "MG", "PHOS" in the last 48 hours.    All pertinent labs within the past 24 hours have been reviewed.    Significant Imaging:  I have reviewed all pertinent imaging results/findings within the past 24 hours.    ABG  No results for input(s): "PH", "PO2", "PCO2", "HCO3", "BE" in the last 168 hours.  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   Currently asymptomatic and satting well on RA.   -4/8 CT chest non con: Bilateral pleural effusions, larger on the left with complete collapse of the left lower lobe.   -4/9 No septations on bedside ultrasound, pt breathing comfortably on room air. Consider PleurX if pt decides to continue with Hospice   -Pt breathing comfortably without O2 supplementation. I believe adding a pleurx device would unnecessarily complicated her care.       Cardiac/Vascular  Bradycardia  Chronic. Intermittent asx bradycardia to 30s-40s. H/o LAFB, RBBB.  - continue to monitor on telemetry.   - avoid lazaro blockade    Hypotension  Continue Midodrine 5mg q6h. Off levo now as pt moving to home hospice.     Elevated troponin  - 238 -> 228, currently with no signs or symptoms of ACS, likely demand ischemia   - TTE performed 4/6 without systolic or diastolic dysfunction.    Renal/  Uterine prolapse  Large pelvic floor prolapse noted on CT.   - chronic, occurs when ascites builds, improves with paracentesis    Lactic acidosis  Lactic acid 4.5 on admission, initially decreased to 3.6 though increased to 7 again  Blood cx NGTD  D/c further labs.     Hepatorenal " "syndrome  Creatinine stable for now. BMP reviewed- noted Estimated Creatinine Clearance: 10 mL/min (A) (based on SCr of 3.9 mg/dL (H)). according to latest data. Based on current GFR, CKD stage is stage 4 - GFR 15-29.  Monitor UOP and serial BMP and adjust therapy as needed. Renally dose meds. Avoid nephrotoxic medications and procedures.     Creatinine 4.0 on admit, baseline around 2.5-2.8.  Likely secondary to pre-renal etiology from dehydration and volume losses given vomiting/diarrhea and hypotension. There are concerns for hepatorenal syndrome.   4/15 Transition to comfort measures as pt pursuing home hospice.   - Avoid nephrotoxic agents such as NSAIDs, gadolinium, and IV radiocontrast  - Renally dose meds.  - off levo, continue midodrine 5mg q6h     Chronic kidney disease (CKD), stage IV (severe)  See HRS    SHARA (acute kidney injury)  See "HRS"    Hematology  Secondary thrombocytopenia  Stable. D/c repeat labs unless new indication.    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 Negative for SBP.      MELD 3.0: 21 at 4/13/2025  9:08 AM  MELD-Na: 20 at 4/13/2025  9:08 AM  Calculated from:  Serum Creatinine: 3.2 mg/dL (Using max of 3 mg/dL) at 4/13/2025  9:08 AM  Serum Sodium: 141 mmol/L (Using max of 137 mmol/L) at 4/13/2025  9:08 AM  Total Bilirubin: 0.7 mg/dL (Using min of 1 mg/dL) at 4/13/2025  9:08 AM  Serum Albumin: 1.7 g/dL at 4/13/2025  9:08 AM  INR(ratio): 1.2 at 4/12/2025  3:56 AM  Age at listing (hypothetical): 79 years  Sex: Female at 4/13/2025  9:08 AM    - Continue home meds. Etiology likely related to chronic HCV hepatitis and HCC s/p Y90 .   - Avoid any hepatotoxic meds   - Daily CBC, CMP, INR  - Ammonia 49, pt is mentating appropriately. Restarting lactulose  - paracentesis " performed 4/6  - f/u ascitic studies when collected (albumin, total protein, cell count, LDH, culture, stain)   - Na restriction <2g  - Has received 1g rocephin for SBP ppx. As now with increasing lactic and persistent hypotension, started on broad spectrum abx, de-escalate as indicated  - monitor mental status; q4h Neuro checks.   - Hepatology following    4/7  -ascitic fluid SAAG?1.1. portal hypertension likely cause; No organisms seen. Patient is not a liver transplant candidate given she does not want invasive procedures and escalation of care.    - along those lines, patient and family wish to transition goals of care to prioritize quality of life and comfort. Patient requests to enroll with home hospice services. Discharge date expected 4/16 to home hospice.     Nausea vomiting and diarrhea  - antiemetics PRN  - C diff negative       Chronic hepatitis C with cirrhosis  See decompensated cirrhosis        Critical Care Daily Checklist:    A: Awake: RASS Goal/Actual Goal:    Actual:     B: Spontaneous Breathing Trial Performed?     C: SAT & SBT Coordinated?  na                      D: Delirium: CAM-ICU     E: Early Mobility Performed? Yes   F: Feeding Goal: Goals: Meet % een/epn by next RD f/u  Status: Nutrition Goal Status: progressing towards goal   Current Diet Order   Procedures    Diet Low Sodium, 2gm      AS: Analgesia/Sedation None needed   T: Thromboembolic Prophylaxis no   H: HOB > 300 Yes   U: Stress Ulcer Prophylaxis (if needed) no   G: Glucose Control controlled   B: Bowel Function Stool Occurrence: 1   I: Indwelling Catheter (Lines & Stephens) Necessity PICC   D: De-escalation of Antimicrobials/Pharmacotherapies yes    Plan for the day/ETD Discharge with home hospice    Code Status:  Family/Goals of Care: DNR         Critical secondary to Patient has a condition that poses threat to life and bodily function: Acute Renal Failure      Critical care was time spent personally by me on the following  activities: development of treatment plan with patient or surrogate and bedside caregivers, discussions with consultants, evaluation of patient's response to treatment, examination of patient, ordering and performing treatments and interventions, ordering and review of laboratory studies, ordering and review of radiographic studies, pulse oximetry, re-evaluation of patient's condition. This critical care time did not overlap with that of any other provider or involve time for any procedures.     Caro Patterson MD  Critical Care Medicine  [facility]

## 2025-04-16 NOTE — SUBJECTIVE & OBJECTIVE
"Interval History/Significant Events: lou    Review of Systems  Objective:     Vital Signs (Most Recent):  Temp: 97.4 °F (36.3 °C) (04/16/25 0301)  Pulse: 66 (04/16/25 0601)  Resp: 12 (04/16/25 0601)  BP: (!) 108/58 (04/16/25 0601)  SpO2: 98 % (04/16/25 0601) Vital Signs (24h Range):  Temp:  [97 °F (36.1 °C)-98 °F (36.7 °C)] 97.4 °F (36.3 °C)  Pulse:  [60-73] 66  Resp:  [10-22] 12  SpO2:  [95 %-99 %] 98 %  BP: ()/(50-62) 108/58   Weight: 54 kg (119 lb 0.8 oz)  Body mass index is 20.43 kg/m².      Intake/Output Summary (Last 24 hours) at 4/16/2025 0713  Last data filed at 4/15/2025 1200  Gross per 24 hour   Intake 240 ml   Output --   Net 240 ml          Physical Exam      Constitutional:       Appearance: She is ill-appearing. She is not toxic-appearing.   HENT:      Head: Normocephalic and atraumatic.   Eyes:      Extraocular Movements: Extraocular movements intact.   Cardiovascular:      Rate and Rhythm: Normal rate.      Heart sounds: No murmur heard.  Pulmonary:      Effort: No respiratory distress.      Breath sounds: No wheezing or rales.   Abdominal:      General: There is distension.      Tenderness: There is no abdominal tenderness. There is no guarding.   Musculoskeletal:      Right lower leg: Edema present.      Left lower leg: Edema present.   Neurological:      Mental Status: She is oriented to person, place, and time.   Vents:     Lines/Drains/Airways       Peripherally Inserted Central Catheter Line  Duration             PICC Double Lumen 04/09/25 1457 right brachial 6 days                  Significant Labs:    CBC/Anemia Profile:  No results for input(s): "WBC", "HGB", "HCT", "PLT", "MCV", "RDW", "IRON", "FERRITIN", "RETIC", "FOLATE", "[id number]", "OCCULTBLOOD" in the last 48 hours.     Chemistries:  No results for input(s): "NA", "K", "CL", "CO2", "BUN", "CREATININE", "CALCIUM", "ALBUMIN", "PROT", "BILITOT", "ALKPHOS", "ALT", "AST", "GLUCOSE", "MG", "PHOS" in the last 48 hours.    All " pertinent labs within the past 24 hours have been reviewed.    Significant Imaging:  I have reviewed all pertinent imaging results/findings within the past 24 hours.

## 2025-04-16 NOTE — PLAN OF CARE
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) [B19.20, K74.69]  Yes     Chronic    Uterine prolapse [N81.4]  Yes    Bradycardia [R00.1]  Yes     Chronic    Nausea vomiting and diarrhea [R11.2, R19.7]  Yes    Lactic acidosis [E87.20]  Yes    Hypotension [I95.9]  Yes    Hepatorenal syndrome [K76.7]  Yes    Chronic kidney disease (CKD), stage IV (severe) [N18.4]  Yes    Elevated troponin [R79.89]  Yes    Pleural effusion [J90]  Yes     Chronic    Moderate protein-calorie malnutrition [E44.0]  Yes     Chronic    SHARA (acute kidney injury) [N17.9]  Yes    Secondary thrombocytopenia [D69.59]  Yes    Anemia in stage 4 chronic kidney disease [N18.4, D63.1]  Yes    HCC (hepatocellular carcinoma) [C22.0]  Yes     Chronic      HCC- treated with Y90 x2.       Chronic hepatitis C with cirrhosis [B18.2, K74.60]  Yes     Chronic     Genotype 1b, tx naive.         Resolved Hospital Problems    Diagnosis Date Resolved POA    Ascites of liver [R18.8] 04/05/2025 Yes       Hospice Qualifying Diagnoses: Acute Renal Failure       Patient has a life expectancy < 6 months due to:  Primary Hospice Diagnosis: Acute Renal Failure 2/2 HRS 2/2 HCV cirrhosis  Comorbid Conditions Contributing to Decline:  recurrent ascites s/p paracentesis on 4/7 and pleural effusion likely hepatic hydrothorax though pt breathing comfortably without supplemental O2    Vital Signs: Routine per Hospice Protocol.    Code Status: DNR    Allergies: Review of patient's allergies indicates:  No Known Allergies    Diet: Low sodium      Activities: As tolerated    Goals of Care Treatment Preferences:  Code Status: DNR      Nursing: Per Hospice  Routine.  R    Routine Skin for Bedridden Patients: Apply moisture barrier cream to all skin folds and   wet areas in perineal area daily and after baths and all bowel movements.    PICC Care:   Scrub the Hub: Prior to accessing the line, always perform a 30 second alcohol scrub  Each lumen of the central line is to be flushed at least daily with 10 mL Normal Saline and 3 mL Heparin flush (100 units/mL)  Skilled Nurse (SN) may draw blood from IV access  Date of removal: No end date    Oxygen: not on any supplemental O2    Medications:        Medication List        START taking these medications      lactulose 10 gram/15 mL solution  Commonly known as: CHRONULAC  Take 30 mLs (20 g total) by mouth 2 (two) times daily.     melatonin 3 mg tablet  Commonly known as: MELATIN  Take 2 tablets (6 mg total) by mouth nightly as needed for Insomnia.     midodrine 5 MG Tab  Commonly known as: PROAMATINE  Take 1 tablet (5 mg total) by mouth every 6 (six) hours.     ondansetron 4 MG tablet  Commonly known as: ZOFRAN  Take 1 tablet (4 mg total) by mouth every 8 (eight) hours as needed for Nausea.     prochlorperazine 10 MG tablet  Commonly known as: COMPAZINE  Take 1 tablet (10 mg total) by mouth every 6 (six) hours as needed.            CONTINUE taking these medications      acetaminophen 500 MG tablet  Commonly known as: TYLENOL  Take 500 mg by mouth daily as needed for Pain.     mirtazapine 7.5 MG Tab  Commonly known as: REMERON  Take 1 tablet (7.5 mg total) by mouth every evening.            STOP taking these medications      MAN-SELTZER ORAL     CALCIUM ORAL              Future Orders:  Hospice Medical Director may dictate new orders for comfortable care measures & sign death certificate.        _________________________________  Caro Patterson MD  04/16/2025

## 2025-04-16 NOTE — ASSESSMENT & PLAN NOTE
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   Currently asymptomatic and satting well on RA.   -4/8 CT chest non con: Bilateral pleural effusions, larger on the left with complete collapse of the left lower lobe.   -4/9 No septations on bedside ultrasound, pt breathing comfortably on room air. Consider PleurX if pt decides to continue with Hospice   -Pt breathing comfortably without O2 supplementation. I believe adding a pleurx device would unnecessarily complicate her care.

## 2025-04-16 NOTE — PLAN OF CARE
MICU DAILY GOALS     Family/Goals of care/Code Status   Code Status: DNR    24H Vital Sign Range  Temp:  [97 °F (36.1 °C)-98 °F (36.7 °C)]   Pulse:  [59-73]   Resp:  [10-22]   BP: ()/(50-62)   SpO2:  [95 %-99 %]      Shift Events (include procedures and significant events)   No acute events during the shift.    AWAKE RASS: Goal -    Actual - RASS (Valerio Agitation-Sedation Scale): alert and calm    Restraint necessity: Not necessary   BREATHE SBT: Not intubated    Coordinate A & B, analgesics/sedatives Pain: managed   SAT: Not intubated   Delirium CAM-ICU:     Early(intubated/ Progressive (non-intubated) Mobility MOVE Screen (INTUBATED ONLY): Not intubated    Activity: Activity Management: Arm raise - L1, Rolling - L1, Straight leg raise - L1   Feeding/Nutrition Diet order: Diet/Nutrition Received: 2 gram sodium, Specialty Diet/Nutrition Received: renal diet   Thrombus DVT prophylaxis: VTE Core Measure: Pharmacological prophylaxis initiated/maintained   HOB Elevation Head of Bed (HOB) Positioning: HOB at 45 degrees   Ulcer Prophylaxis GI: yes   Glucose control managed     Skin Skin assessment:     Sacrum intact/not altered? Yes  Heels intact/not altered? Yes  Surgical wound? No    CHECK ONE!   (no altered skin or altered skin) and sub boxes:  [] No Altered Skin Integrity Present    []Prevention Measures Documented    [x] Altered Skin Integrity Present or Discovered   [x] LDA already present in EPIC, daily doc completed              [] LDA added if not already in EPIC (describe/stage wound).               [] Wound Image Taken (required on admit,                   transfer/discharge and every Tuesday)    Wound Care Consulted? No   Bowel Function no issues    Indwelling Catheter Necessity      PICC Double Lumen 04/09/25 1457 right brachial-Line Necessity Review: Longterm central access required     De-escalation Antibiotics No

## 2025-04-16 NOTE — ASSESSMENT & PLAN NOTE
Chronic. Intermittent asx bradycardia to 30s-40s. H/o LAFB, RBBB.  - continue to monitor on telemetry.

## 2025-04-16 NOTE — EICU
Virtual ICU Quality Rounds    Admit Date: 4/4/2025  Hospital Day: 11    ICU Day: 10d 9h    24H Vital Sign Range:  Temp:  [97 °F (36.1 °C)-98 °F (36.7 °C)]   Pulse:  [60-73]   Resp:  [10-22]   BP: ()/(50-62)   SpO2:  [95 %-99 %]     VICU Surveillance Screening    Daily review of  line necessity(optional): Central line(s) in place    Central line type (optional): PICC    Central line indications : Poor IV access and Vesicants    LDA reconciliation : Yes    Nursing orders placed : IP YADIRA Central Line Care    Central line best practices : Consider removal if patient has no central line indications for over 24hrs and Consider removal patient has a new fever and/or central line is over 7 days old

## 2025-04-16 NOTE — DISCHARGE SUMMARY
[facility] - Medical [facility]  Critical Care Medicine  Discharge Summary      Patient Name: Seema Gann  MRN: [id number]  Admission Date: 4/4/2025  Hospital Length of Stay: 11 days  Discharge Date and Time: 04/16/2025 8:09 AM  Attending Physician: Tutu Smith MD   Discharging Provider: Caro Patterson MD  Primary Care Provider: Bethany Mesa MD  Reason for Admission: Acute Renal Failure 2/2 HRS 2/2 HCV cirrhosis    HPI:   Ms. Gann is a 79 y.o. F with a PMHx of HTN, decompensated HCV cirrhosis with recurrent ascites requiring large volume paracentesis approximately every 2 weeks, thrombocytopenia, hepatocellular carcinoma s/p Y90 x2, CKD IV (baseline Cr 2.5-2.8) who initially presented to [facility] ED on 4/4 for n/v/d x1 day. She had over 6 episodes of NBNB emesis and 3 episodes of non-bloody diarrhea. Last IR paracentesis was on 3/26 and 4.6L were removed, no evidence of SBP on prior fluid studies.     In the ED, pt was hypotensive (BP 86/50) otherwise vitals WNL. Lab work remarkable for Na 135, Bicarb 15, BUN 41/Cr 4. Initial lactic acid 4.5. She received 1L NS and 5mg midodrine with improvement in BP. CTAP with liver cirrhosis, portal hypertension, large volume of ascites, and moderate L pleural effusions. She was admitted to  for further management of decompensated cirrhosis and concern for hepatorenal syndrome.     [facility] consulted on 4/5 for increasing lactic acid (7.0), hypotension requiring levophed, and concern for sepsis. Currently receiving Albumin 25% 25g q8h, midodrine 15mg TID, octreotide 100mcg q8h, Zosyn, and Vancomycin.      Pt admitted to [facility].     * No surgery found *    Indwelling Lines/Drains at Time of Discharge:   Lines/Drains/Airways       Peripherally Inserted Central Catheter Line  Duration             PICC Double Lumen 04/09/25 1457 right brachial 6 days                  Hospital Course:   Stepped up to [facility] on 4/5 for worsening lactic acidosis and concern for HRS requiring MAP  goals. Started on levo for MAP goal >85. Nephrology and hepatology consulted. Nephrology offered dialysis, which she refused. Cr continuing to worsen. Restarted home lactulose. Repeat CXR worsened, likely worsened volume overload following albumin administration. However, gradually with some improvement in urine output while being on levophed, however renal recovery remains very slow and modest. On 4/11, patient and her family expressed desire to transition goals of care to being comfort focused. Along those lines, they expressed desire to pursue home with hospice. [facility] hospice agency arranged home equipment. Plan for discharge to home hospice today on 4/16.     Consults (From admission, onward)          Status Ordering Provider     Inpatient consult to PICC team ([facility])  Once        Provider:  (Not yet assigned)    Completed LUIS A EL     Inpatient consult to Nephrology  Once        Provider:  (Not yet assigned)    Completed DARRICK MORGAN     Inpatient consult to Critical Care Medicine  Once        Provider:  (Not yet assigned)    Completed JUAN COOL     Inpatient consult to Hepatology  Once        Provider:  (Not yet assigned)    Completed ANGELA RUBIO          Significant Labs:  All pertinent labs within the past 24 hours have been reviewed.    Significant Imaging:  I have reviewed all pertinent imaging results/findings within the past 24 hours.    Pending Diagnostic Studies:       Procedure Component Value Units Date/Time    Urinalysis, Reflex to Urine Culture [[id number]] Collected: 04/04/25 2011    Order Status: Sent Lab Status: No result     Specimen: Urine           Final Active Diagnoses:    Diagnosis Date Noted POA    PRINCIPAL PROBLEM:  Decompensated cirrhosis related to hepatitis C virus (HCV) [B19.20, K74.69] 07/05/2024 Yes     Chronic    Uterine prolapse [N81.4] 04/06/2025 Yes    Bradycardia [R00.1] 04/06/2025 Yes     Chronic    Nausea vomiting and diarrhea [R11.2,  "R19.7] 04/05/2025 Yes    Lactic acidosis [E87.20] 04/05/2025 Yes    Hypotension [I95.9] 04/05/2025 Yes    Hepatorenal syndrome [K76.7] 09/28/2024 Yes    Chronic kidney disease (CKD), stage IV (severe) [N18.4] 09/06/2024 Yes    Elevated troponin [R79.89] 08/30/2024 Yes    Pleural effusion [J90] 08/22/2024 Yes     Chronic    Moderate protein-calorie malnutrition [E44.0] 08/22/2024 Yes     Chronic    SHARA (acute kidney injury) [N17.9] 11/27/2023 Yes    Secondary thrombocytopenia [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  Service: Critical Care Medicine    Discharged Condition: fair    Disposition:       Patient Instructions:      WALKER FOR HOME USE     Order Specific Question Answer Comments   Type of Walker: Adult (5'4"-6'6")    With wheels? Yes    Height: 5' 4" (1.626 m)    Weight: 54 kg (119 lb 0.8 oz)    Length of need (1-99 months): 99    Does patient have medical equipment at home? none    Please check all that apply: Patient's condition impairs ambulation.    Please check all that apply: Patient is unable to safely ambulate without equipment.    Please check all that apply: Walker will be used for gait training.      PT evaluate and treat   Standing Status: Future Standing Exp. Date: 04/10/26   Order Comments: Treat according to established therapy treatment plan.     Medications:  Reconciled Home Medications:      Medication List        START taking these medications      lactulose 10 gram/15 mL solution  Commonly known as: CHRONULAC  Take 30 mLs (20 g " total) by mouth 2 (two) times daily.     melatonin 3 mg tablet  Commonly known as: MELATIN  Take 2 tablets (6 mg total) by mouth nightly as needed for Insomnia.     midodrine 5 MG Tab  Commonly known as: PROAMATINE  Take 1 tablet (5 mg total) by mouth every 6 (six) hours.     ondansetron 4 MG tablet  Commonly known as: ZOFRAN  Take 1 tablet (4 mg total) by mouth every 8 (eight) hours as needed for Nausea.     prochlorperazine 10 MG tablet  Commonly known as: COMPAZINE  Take 1 tablet (10 mg total) by mouth every 6 (six) hours as needed.            CONTINUE taking these medications      acetaminophen 500 MG tablet  Commonly known as: TYLENOL  Take 500 mg by mouth daily as needed for Pain.     mirtazapine 7.5 MG Tab  Commonly known as: REMERON  Take 1 tablet (7.5 mg total) by mouth every evening.            STOP taking these medications      ASIF ORAL     CALCIUM ORAL               Caro Patterson MD  Critical Care Medicine  [facility] - Medical ICU

## 2025-06-17 ENCOUNTER — TELEPHONE (OUTPATIENT)
Dept: PHARMACY | Facility: CLINIC | Age: 80
End: 2025-06-17
Payer: MEDICARE